# Patient Record
Sex: FEMALE | Race: WHITE | NOT HISPANIC OR LATINO | Employment: OTHER | URBAN - METROPOLITAN AREA
[De-identification: names, ages, dates, MRNs, and addresses within clinical notes are randomized per-mention and may not be internally consistent; named-entity substitution may affect disease eponyms.]

---

## 2017-01-26 RX ORDER — WARFARIN SODIUM 2 MG/1
TABLET ORAL
Qty: 30 TABLET | Refills: 0 | Status: SHIPPED | OUTPATIENT
Start: 2017-01-26 | End: 2017-03-07 | Stop reason: SDUPTHER

## 2017-02-01 ENCOUNTER — TELEPHONE (OUTPATIENT)
Dept: CARDIOLOGY | Facility: CLINIC | Age: 82
End: 2017-02-01

## 2017-02-01 NOTE — TELEPHONE ENCOUNTER
Pt said you took her losartan because her feet were swelling.  She went bk to Dr. Lema and she put her bk on it and a water pill and she is wondering if she should take the Klor zak with it?    Shu

## 2017-02-02 NOTE — TELEPHONE ENCOUNTER
I would have her take water pill and check a BMP in 1 week after being on it to check her potassium level.

## 2017-02-06 DIAGNOSIS — I10 ESSENTIAL HYPERTENSION: Primary | ICD-10-CM

## 2017-02-06 NOTE — TELEPHONE ENCOUNTER
Spoke to pt. Please place the order for the BMP.  I will call her bk and let her know when it is there.      Thanks,  Shu

## 2017-02-07 ENCOUNTER — LAB (OUTPATIENT)
Dept: LAB | Facility: HOSPITAL | Age: 82
End: 2017-02-07

## 2017-02-07 DIAGNOSIS — I10 ESSENTIAL HYPERTENSION: ICD-10-CM

## 2017-02-07 LAB
ANION GAP SERPL CALCULATED.3IONS-SCNC: 10.5 MMOL/L
BUN BLD-MCNC: 16 MG/DL (ref 8–23)
BUN/CREAT SERPL: 18 (ref 7–25)
CALCIUM SPEC-SCNC: 9.2 MG/DL (ref 8.6–10.5)
CHLORIDE SERPL-SCNC: 97 MMOL/L (ref 98–107)
CO2 SERPL-SCNC: 30.5 MMOL/L (ref 22–29)
CREAT BLD-MCNC: 0.89 MG/DL (ref 0.57–1)
GFR SERPL CREATININE-BSD FRML MDRD: 61 ML/MIN/1.73
GLUCOSE BLD-MCNC: 97 MG/DL (ref 65–99)
POTASSIUM BLD-SCNC: 3.8 MMOL/L (ref 3.5–5.2)
SODIUM BLD-SCNC: 138 MMOL/L (ref 136–145)

## 2017-02-07 PROCEDURE — 80048 BASIC METABOLIC PNL TOTAL CA: CPT

## 2017-02-07 PROCEDURE — 36415 COLL VENOUS BLD VENIPUNCTURE: CPT

## 2017-02-08 ENCOUNTER — TELEPHONE (OUTPATIENT)
Dept: CARDIOLOGY | Facility: CLINIC | Age: 82
End: 2017-02-08

## 2017-03-07 ENCOUNTER — OFFICE VISIT (OUTPATIENT)
Dept: INTERNAL MEDICINE | Facility: CLINIC | Age: 82
End: 2017-03-07

## 2017-03-07 VITALS
OXYGEN SATURATION: 98 % | DIASTOLIC BLOOD PRESSURE: 90 MMHG | BODY MASS INDEX: 28.32 KG/M2 | SYSTOLIC BLOOD PRESSURE: 128 MMHG | HEIGHT: 65 IN | HEART RATE: 72 BPM | WEIGHT: 170 LBS

## 2017-03-07 DIAGNOSIS — I10 BENIGN ESSENTIAL HYPERTENSION: Primary | ICD-10-CM

## 2017-03-07 PROCEDURE — 99213 OFFICE O/P EST LOW 20 MIN: CPT | Performed by: INTERNAL MEDICINE

## 2017-03-07 RX ORDER — METOPROLOL SUCCINATE 50 MG/1
50 TABLET, EXTENDED RELEASE ORAL DAILY
Qty: 90 TABLET | Refills: 0
Start: 2017-03-07 | End: 2017-03-24 | Stop reason: SDUPTHER

## 2017-03-07 RX ORDER — WARFARIN SODIUM 2 MG/1
TABLET ORAL
Qty: 30 TABLET | Refills: 2 | Status: SHIPPED | OUTPATIENT
Start: 2017-03-07 | End: 2017-06-08

## 2017-03-07 NOTE — PATIENT INSTRUCTIONS

## 2017-03-07 NOTE — PROGRESS NOTES
"Subjective     Citlali ARIANA Solorio is a 81 y.o. female who presents with   Chief Complaint   Patient presents with   • Hypertension       History of Present Illness     HTN.  Control is variable.  Runs from the 90s-160s systolic.  She feels very weak when it is low.  She is eating a lot of salty foods.  There is room for improvement on that might could help the highs.     Review of Systems   Respiratory: Negative.    Cardiovascular: Negative.        The following portions of the patient's history were reviewed and updated as appropriate: allergies, current medications and problem list.    Patient Active Problem List    Diagnosis Date Noted   • Mitral valve insufficiency 07/06/2016   • Tricuspid valve insufficiency 07/06/2016   • Stress incontinence 05/23/2016   • Abnormal gait 04/21/2016     Note Last Updated: 4/21/2016     Description: \"frozen gait\"  Seen at [facility] with full work up.     • Anxiety 04/21/2016   • Atherosclerosis of both carotid arteries 04/21/2016     Note Last Updated: 4/21/2016     Description: plaque on screening last done 3/13, 11/13     • Heart failure 04/21/2016     Note Last Updated: 4/21/2016     Description: admission 7/2013     • Gastroesophageal reflux disease 04/21/2016   • Hyperlipidemia 04/21/2016     Note Last Updated: 4/21/2016     Description: Patient tried Lipitor, Crestor, Zocor, Bacol and Livalo in the past and was intolerant of all of them.     • Impaired fasting glucose 04/21/2016   • Spinal stenosis of lumbar region 04/21/2016   • Cobalamin deficiency 04/21/2016     Note Last Updated: 4/21/2016     Description: told at [facility] that B12 was low.  Monthly shots recommended 5/5/15.     • Atrial fibrillation 06/24/2013   • Benign essential hypertension 06/24/2013       Current Outpatient Prescriptions on File Prior to Visit   Medication Sig Dispense Refill   • Cholecalciferol (VITAMIN D PO) Take  by mouth daily.     • Gemfibrozil (LOPID PO) Take 5 mg by mouth.     • warfarin " "(COUMADIN) 2.5 MG tablet Take  by mouth.     • [DISCONTINUED] furosemide (LASIX) 20 MG tablet TAKE ONE TABLET BY MOUTH ONCE A DAY 90 tablet 1   • [DISCONTINUED] metoprolol succinate XL (TOPROL-XL) 50 MG 24 hr tablet TAKE TWO TABLETS BY MOUTH DAILY AS DIRECTED 180 tablet 0   • [DISCONTINUED] potassium chloride (K-DUR) 10 MEQ CR tablet Take 1 tablet by mouth daily. 90 tablet 2   • losartan-hydrochlorothiazide (HYZAAR) 50-12.5 MG per tablet Take 1 tablet by mouth Daily. 90 tablet 3   • [DISCONTINUED] azithromycin (ZITHROMAX Z-NERY) 250 MG tablet Take 2 tablets the first day, then 1 tablet daily for 4 days. 5 tablet 0   • [DISCONTINUED] warfarin (COUMADIN) 2 MG tablet TAKE ONE TABLET BY MOUTH DAILY OR AS DIRECTED 30 tablet 0     No current facility-administered medications on file prior to visit.        Objective     Visit Vitals   • /90   • Pulse 72   • Ht 65\" (165.1 cm)   • Wt 170 lb (77.1 kg)   • SpO2 98%   • BMI 28.29 kg/m2       Physical Exam   Constitutional: She is oriented to person, place, and time. She appears well-developed and well-nourished.   HENT:   Head: Normocephalic and atraumatic.   Cardiovascular: Normal rate, regular rhythm and normal heart sounds.    Pulmonary/Chest: Effort normal and breath sounds normal.   Neurological: She is alert and oriented to person, place, and time.   Skin: Skin is warm and dry.   Psychiatric: She has a normal mood and affect. Her behavior is normal.       Assessment/Plan   Citlali was seen today for hypertension.    Diagnoses and all orders for this visit:    Benign essential hypertension  -     Basic Metabolic Panel; Future    Other orders  -     metoprolol succinate XL (TOPROL-XL) 50 MG 24 hr tablet; Take 1 tablet by mouth Daily.        Discussion  Patient presents in f/u of hypertension with variable control.  Decrease losartan HCT to 1/2 daily.  Continue her Toprol XL 50 qd.  D/c potassium since not on lasix.  Check BMP in two weeks.  Call in checks from home.  "        Future Appointments  Date Time Provider Department Center   5/30/2017 11:00 AM Cristal Lema MD [facility] None

## 2017-03-21 RX ORDER — METOPROLOL SUCCINATE 50 MG/1
TABLET, EXTENDED RELEASE ORAL
Qty: 180 TABLET | Refills: 0 | OUTPATIENT
Start: 2017-03-21

## 2017-03-22 ENCOUNTER — TELEPHONE (OUTPATIENT)
Dept: CARDIOLOGY | Facility: CLINIC | Age: 82
End: 2017-03-22

## 2017-03-24 RX ORDER — METOPROLOL SUCCINATE 50 MG/1
50 TABLET, EXTENDED RELEASE ORAL DAILY
Qty: 90 TABLET | Refills: 0 | Status: SHIPPED | OUTPATIENT
Start: 2017-03-24 | End: 2017-06-07 | Stop reason: SDUPTHER

## 2017-03-24 NOTE — TELEPHONE ENCOUNTER
Refill sent.   prt  
We received a refill request for pt's Metoprolol 50 mg #180.  Is this ok with you since it's a 90 day Rx and she needs an appt. for May?        Thanks,  Shu  
ok  
No

## 2017-03-27 ENCOUNTER — APPOINTMENT (OUTPATIENT)
Dept: WOMENS IMAGING | Facility: HOSPITAL | Age: 82
End: 2017-03-27

## 2017-03-27 PROCEDURE — G0202 SCR MAMMO BI INCL CAD: HCPCS | Performed by: RADIOLOGY

## 2017-03-27 PROCEDURE — 77063 BREAST TOMOSYNTHESIS BI: CPT | Performed by: RADIOLOGY

## 2017-03-27 PROCEDURE — MDREVIEWSP: Performed by: RADIOLOGY

## 2017-04-20 ENCOUNTER — OFFICE VISIT (OUTPATIENT)
Dept: CARDIOLOGY | Facility: CLINIC | Age: 82
End: 2017-04-20

## 2017-04-20 VITALS
DIASTOLIC BLOOD PRESSURE: 86 MMHG | SYSTOLIC BLOOD PRESSURE: 140 MMHG | WEIGHT: 173 LBS | HEIGHT: 65 IN | HEART RATE: 73 BPM | BODY MASS INDEX: 28.82 KG/M2

## 2017-04-20 DIAGNOSIS — I48.0 PAF (PAROXYSMAL ATRIAL FIBRILLATION) (HCC): Primary | ICD-10-CM

## 2017-04-20 PROCEDURE — 99213 OFFICE O/P EST LOW 20 MIN: CPT | Performed by: INTERNAL MEDICINE

## 2017-04-20 PROCEDURE — 93000 ELECTROCARDIOGRAM COMPLETE: CPT | Performed by: INTERNAL MEDICINE

## 2017-04-20 NOTE — PROGRESS NOTES
Subjective:     Encounter Date:04/20/2017      Patient ID: Citlali Solorio is a 81 y.o. female.    Chief Complaint:  Atrial Fibrillation   Presents for follow-up visit. Symptoms are negative for chest pain, dizziness, hypertension, hypotension, palpitations, shortness of breath and syncope. The symptoms have been stable. Past medical history includes atrial fibrillation.       Patient presents today for reevaluation.  Patient's been having fatigue and weak spells.  Dr. Lema had decreased her medications.  Since that she has felt significantly better.  Most of her symptoms have resolved.  Currently she complains of some right knee pain is scheduled to see orthopedics tomorrow.    Review of Systems   Cardiovascular: Negative for chest pain, palpitations and syncope.   Respiratory: Negative for shortness of breath.    Neurological: Negative for dizziness.   All other systems reviewed and are negative.        ECG 12 Lead  Date/Time: 4/20/2017 3:53 PM  Performed by: DAVE CHASE  Authorized by: DAVE CHASE   Comparison: compared with previous ECG from 5/19/2016  Similar to previous ECG  Rhythm: sinus rhythm  Clinical impression: normal ECG               Objective:     Physical Exam   Constitutional: She is oriented to person, place, and time. She appears well-developed.   HENT:   Head: Normocephalic.   Eyes: Conjunctivae are normal.   Neck: Normal range of motion.   Cardiovascular: Normal rate, regular rhythm and normal heart sounds.    Pulmonary/Chest: Breath sounds normal.   Abdominal: Soft. Bowel sounds are normal.   Musculoskeletal: Normal range of motion. She exhibits no edema.   Neurological: She is alert and oriented to person, place, and time.   Skin: Skin is warm and dry.   Psychiatric: She has a normal mood and affect. Her behavior is normal.   Vitals reviewed.      Lab Review:       Assessment:         No diagnosis found.       Plan:       1.  Paroxysmal atrial fibrillation.  She remains in  sinus rhythm anticoagulated.  2.  Hypotension.  Patient clearly was symptomatic with decreasing her medications she is doing better.  She did ask about the potassium.  Per your note intercurrent check a BMP in several weeks to agree with.  3.  At this point I think she is stable from a cardiovascular standpoint told her follow-up in 6 months sooner if issues.    Atrial Fibrillation and Atrial Flutter  Assessment  • The patient has paroxysmal atrial fibrillation  • The patient's CHADS2-VASc score is 4  • A [id number] score of 2 or more is considered a high risk for a thromboembolic event  • Warfarin prescribed    Plan  • Attempt to maintain sinus rhythm  • Continue warfarin for antithrombotic therapy, bleeding issues discussed  • Continue beta blocker for rhythm control

## 2017-04-21 ENCOUNTER — TRANSCRIBE ORDERS (OUTPATIENT)
Dept: ADMINISTRATIVE | Facility: HOSPITAL | Age: 82
End: 2017-04-21

## 2017-04-21 DIAGNOSIS — G89.29 CHRONIC PAIN OF RIGHT KNEE: Primary | ICD-10-CM

## 2017-04-21 DIAGNOSIS — M25.561 CHRONIC PAIN OF RIGHT KNEE: Primary | ICD-10-CM

## 2017-04-26 ENCOUNTER — HOSPITAL ENCOUNTER (OUTPATIENT)
Dept: MRI IMAGING | Facility: HOSPITAL | Age: 82
Discharge: HOME | End: 2017-04-26
Attending: ORTHOPAEDIC SURGERY | Admitting: ORTHOPAEDIC SURGERY

## 2017-04-26 DIAGNOSIS — G89.29 CHRONIC PAIN OF RIGHT KNEE: ICD-10-CM

## 2017-04-26 DIAGNOSIS — M25.561 CHRONIC PAIN OF RIGHT KNEE: ICD-10-CM

## 2017-04-26 PROCEDURE — 73721 MRI JNT OF LWR EXTRE W/O DYE: CPT

## 2017-05-18 ENCOUNTER — HOSPITAL ENCOUNTER (OUTPATIENT)
Dept: CARDIOLOGY | Facility: HOSPITAL | Age: 82
Discharge: HOME | End: 2017-05-18
Admitting: PSYCHIATRY & NEUROLOGY

## 2017-05-18 ENCOUNTER — TRANSCRIBE ORDERS (OUTPATIENT)
Dept: ADMINISTRATIVE | Facility: HOSPITAL | Age: 82
End: 2017-05-18

## 2017-05-18 DIAGNOSIS — R00.2 PALPITATIONS: ICD-10-CM

## 2017-05-18 DIAGNOSIS — Z51.81 ENCOUNTER FOR THERAPEUTIC DRUG MONITORING: ICD-10-CM

## 2017-05-18 DIAGNOSIS — I48.91 ATRIAL FIBRILLATION, UNSPECIFIED TYPE (HCC): ICD-10-CM

## 2017-05-18 DIAGNOSIS — I48.91 ATRIAL FIBRILLATION, UNSPECIFIED TYPE (HCC): Primary | ICD-10-CM

## 2017-05-18 PROCEDURE — 93005 ELECTROCARDIOGRAM TRACING: CPT | Performed by: PSYCHIATRY & NEUROLOGY

## 2017-05-18 PROCEDURE — 93010 ELECTROCARDIOGRAM REPORT: CPT | Performed by: INTERNAL MEDICINE

## 2017-05-30 ENCOUNTER — OFFICE VISIT (OUTPATIENT)
Dept: INTERNAL MEDICINE | Facility: CLINIC | Age: 82
End: 2017-05-30

## 2017-05-30 VITALS
WEIGHT: 166 LBS | TEMPERATURE: 98 F | HEIGHT: 65 IN | OXYGEN SATURATION: 96 % | HEART RATE: 92 BPM | BODY MASS INDEX: 27.66 KG/M2 | DIASTOLIC BLOOD PRESSURE: 80 MMHG | SYSTOLIC BLOOD PRESSURE: 132 MMHG

## 2017-05-30 DIAGNOSIS — I10 BENIGN ESSENTIAL HYPERTENSION: ICD-10-CM

## 2017-05-30 DIAGNOSIS — E78.5 HYPERLIPIDEMIA, UNSPECIFIED HYPERLIPIDEMIA TYPE: ICD-10-CM

## 2017-05-30 DIAGNOSIS — Z00.00 MEDICARE ANNUAL WELLNESS VISIT, SUBSEQUENT: Primary | ICD-10-CM

## 2017-05-30 DIAGNOSIS — R73.01 IMPAIRED FASTING GLUCOSE: ICD-10-CM

## 2017-05-30 DIAGNOSIS — I48.91 ATRIAL FIBRILLATION, UNSPECIFIED TYPE (HCC): ICD-10-CM

## 2017-05-30 DIAGNOSIS — E53.8 B12 DEFICIENCY: ICD-10-CM

## 2017-05-30 PROCEDURE — 99214 OFFICE O/P EST MOD 30 MIN: CPT | Performed by: INTERNAL MEDICINE

## 2017-05-30 PROCEDURE — G0439 PPPS, SUBSEQ VISIT: HCPCS | Performed by: INTERNAL MEDICINE

## 2017-05-31 LAB
ALBUMIN SERPL-MCNC: 4.2 G/DL (ref 3.5–5.2)
ALBUMIN/GLOB SERPL: 1.4 G/DL
ALP SERPL-CCNC: 51 U/L (ref 39–117)
ALT SERPL-CCNC: 15 U/L (ref 1–33)
AST SERPL-CCNC: 16 U/L (ref 1–32)
BASOPHILS # BLD AUTO: 0.02 10*3/MM3 (ref 0–0.2)
BASOPHILS NFR BLD AUTO: 0.3 % (ref 0–1.5)
BILIRUB SERPL-MCNC: 0.5 MG/DL (ref 0.1–1.2)
BUN SERPL-MCNC: 18 MG/DL (ref 8–23)
BUN/CREAT SERPL: 18.6 (ref 7–25)
CALCIUM SERPL-MCNC: 9.5 MG/DL (ref 8.6–10.5)
CHLORIDE SERPL-SCNC: 98 MMOL/L (ref 98–107)
CHOLEST SERPL-MCNC: 191 MG/DL (ref 0–200)
CO2 SERPL-SCNC: 28.2 MMOL/L (ref 22–29)
CREAT SERPL-MCNC: 0.97 MG/DL (ref 0.57–1)
EOSINOPHIL # BLD AUTO: 0.08 10*3/MM3 (ref 0–0.7)
EOSINOPHIL NFR BLD AUTO: 1.3 % (ref 0.3–6.2)
ERYTHROCYTE [DISTWIDTH] IN BLOOD BY AUTOMATED COUNT: 12.9 % (ref 11.7–13)
GLOBULIN SER CALC-MCNC: 3 GM/DL
GLUCOSE SERPL-MCNC: 85 MG/DL (ref 65–99)
HCT VFR BLD AUTO: 38.7 % (ref 35.6–45.5)
HDLC SERPL-MCNC: 41 MG/DL (ref 40–60)
HGB BLD-MCNC: 12.6 G/DL (ref 11.9–15.5)
IMM GRANULOCYTES # BLD: 0.02 10*3/MM3 (ref 0–0.03)
IMM GRANULOCYTES NFR BLD: 0.3 % (ref 0–0.5)
LDLC SERPL CALC-MCNC: 93 MG/DL (ref 0–100)
LYMPHOCYTES # BLD AUTO: 1.55 10*3/MM3 (ref 0.9–4.8)
LYMPHOCYTES NFR BLD AUTO: 24.2 % (ref 19.6–45.3)
MCH RBC QN AUTO: 32.1 PG (ref 26.9–32)
MCHC RBC AUTO-ENTMCNC: 32.6 G/DL (ref 32.4–36.3)
MCV RBC AUTO: 98.5 FL (ref 80.5–98.2)
MONOCYTES # BLD AUTO: 0.73 10*3/MM3 (ref 0.2–1.2)
MONOCYTES NFR BLD AUTO: 11.4 % (ref 5–12)
NEUTROPHILS # BLD AUTO: 4 10*3/MM3 (ref 1.9–8.1)
NEUTROPHILS NFR BLD AUTO: 62.5 % (ref 42.7–76)
PLATELET # BLD AUTO: 271 10*3/MM3 (ref 140–500)
POTASSIUM SERPL-SCNC: 4.3 MMOL/L (ref 3.5–5.2)
PROT SERPL-MCNC: 7.2 G/DL (ref 6–8.5)
RBC # BLD AUTO: 3.93 10*6/MM3 (ref 3.9–5.2)
SODIUM SERPL-SCNC: 139 MMOL/L (ref 136–145)
TRIGL SERPL-MCNC: 287 MG/DL (ref 0–150)
TSH SERPL DL<=0.005 MIU/L-ACNC: 2.64 MIU/ML (ref 0.27–4.2)
UNABLE TO VOID: NORMAL
VIT B12 SERPL-MCNC: 766 PG/ML (ref 211–946)
VLDLC SERPL CALC-MCNC: 57.4 MG/DL (ref 5–40)
WBC # BLD AUTO: 6.4 10*3/MM3 (ref 4.5–10.7)

## 2017-06-08 ENCOUNTER — APPOINTMENT (OUTPATIENT)
Dept: PREADMISSION TESTING | Facility: HOSPITAL | Age: 82
End: 2017-06-08

## 2017-06-08 VITALS
HEIGHT: 65 IN | RESPIRATION RATE: 16 BRPM | WEIGHT: 168 LBS | BODY MASS INDEX: 27.99 KG/M2 | TEMPERATURE: 97.8 F | SYSTOLIC BLOOD PRESSURE: 163 MMHG | DIASTOLIC BLOOD PRESSURE: 80 MMHG | OXYGEN SATURATION: 97 % | HEART RATE: 86 BPM

## 2017-06-08 RX ORDER — WARFARIN SODIUM 2 MG/1
2 TABLET ORAL
COMMUNITY
End: 2017-06-27 | Stop reason: SDUPTHER

## 2017-06-08 RX ORDER — METOPROLOL SUCCINATE 50 MG/1
50 TABLET, EXTENDED RELEASE ORAL DAILY
Qty: 90 TABLET | Refills: 1 | Status: ON HOLD | OUTPATIENT
Start: 2017-06-08 | End: 2017-08-25

## 2017-06-08 RX ORDER — WARFARIN SODIUM 1 MG/1
1 TABLET ORAL
COMMUNITY
End: 2017-08-25 | Stop reason: HOSPADM

## 2017-06-15 ENCOUNTER — ANESTHESIA (OUTPATIENT)
Dept: PERIOP | Facility: HOSPITAL | Age: 82
End: 2017-06-15

## 2017-06-15 ENCOUNTER — HOSPITAL ENCOUNTER (OUTPATIENT)
Facility: HOSPITAL | Age: 82
Setting detail: HOSPITAL OUTPATIENT SURGERY
Discharge: HOME | End: 2017-06-15
Attending: ORTHOPAEDIC SURGERY | Admitting: ORTHOPAEDIC SURGERY

## 2017-06-15 ENCOUNTER — ANESTHESIA EVENT (OUTPATIENT)
Dept: PERIOP | Facility: HOSPITAL | Age: 82
End: 2017-06-15

## 2017-06-15 VITALS
TEMPERATURE: 98.7 F | SYSTOLIC BLOOD PRESSURE: 126 MMHG | RESPIRATION RATE: 16 BRPM | OXYGEN SATURATION: 98 % | HEART RATE: 76 BPM | DIASTOLIC BLOOD PRESSURE: 85 MMHG

## 2017-06-15 DIAGNOSIS — S83.231D COMPLEX TEAR OF MEDIAL MENISCUS OF RIGHT KNEE AS CURRENT INJURY, SUBSEQUENT ENCOUNTER: Primary | ICD-10-CM

## 2017-06-15 PROBLEM — S83.241A TEAR OF MEDIAL MENISCUS OF RIGHT KNEE, CURRENT: Status: ACTIVE | Noted: 2017-06-15

## 2017-06-15 LAB
INR PPP: 1.38 (ref 0.9–1.1)
PROTHROMBIN TIME: 16.5 SECONDS (ref 11.7–14.2)

## 2017-06-15 PROCEDURE — 25010000002 FENTANYL CITRATE (PF) 100 MCG/2ML SOLUTION: Performed by: NURSE ANESTHETIST, CERTIFIED REGISTERED

## 2017-06-15 PROCEDURE — 85610 PROTHROMBIN TIME: CPT | Performed by: ORTHOPAEDIC SURGERY

## 2017-06-15 PROCEDURE — 25010000002 NALOXONE PER 1 MG: Performed by: ANESTHESIOLOGY

## 2017-06-15 PROCEDURE — 25010000002 MIDAZOLAM PER 1 MG: Performed by: NURSE ANESTHETIST, CERTIFIED REGISTERED

## 2017-06-15 PROCEDURE — 25010000002 PROPOFOL 10 MG/ML EMULSION: Performed by: NURSE ANESTHETIST, CERTIFIED REGISTERED

## 2017-06-15 PROCEDURE — 25010000002 PROPOFOL 1000 MG/ML EMULSION: Performed by: NURSE ANESTHETIST, CERTIFIED REGISTERED

## 2017-06-15 RX ORDER — PROMETHAZINE HYDROCHLORIDE 25 MG/1
25 TABLET ORAL ONCE AS NEEDED
Status: DISCONTINUED | OUTPATIENT
Start: 2017-06-15 | End: 2017-06-15 | Stop reason: HOSPADM

## 2017-06-15 RX ORDER — FENTANYL CITRATE 50 UG/ML
INJECTION, SOLUTION INTRAMUSCULAR; INTRAVENOUS AS NEEDED
Status: DISCONTINUED | OUTPATIENT
Start: 2017-06-15 | End: 2017-06-15 | Stop reason: SURG

## 2017-06-15 RX ORDER — SODIUM CHLORIDE 0.9 % (FLUSH) 0.9 %
1-10 SYRINGE (ML) INJECTION AS NEEDED
Status: DISCONTINUED | OUTPATIENT
Start: 2017-06-15 | End: 2017-06-15 | Stop reason: HOSPADM

## 2017-06-15 RX ORDER — HYDRALAZINE HYDROCHLORIDE 20 MG/ML
5 INJECTION INTRAMUSCULAR; INTRAVENOUS
Status: DISCONTINUED | OUTPATIENT
Start: 2017-06-15 | End: 2017-06-15 | Stop reason: HOSPADM

## 2017-06-15 RX ORDER — LIDOCAINE HYDROCHLORIDE 20 MG/ML
INJECTION, SOLUTION INFILTRATION; PERINEURAL AS NEEDED
Status: DISCONTINUED | OUTPATIENT
Start: 2017-06-15 | End: 2017-06-15 | Stop reason: SURG

## 2017-06-15 RX ORDER — PROMETHAZINE HYDROCHLORIDE 25 MG/ML
6.25 INJECTION, SOLUTION INTRAMUSCULAR; INTRAVENOUS ONCE AS NEEDED
Status: DISCONTINUED | OUTPATIENT
Start: 2017-06-15 | End: 2017-06-15 | Stop reason: HOSPADM

## 2017-06-15 RX ORDER — ACETAMINOPHEN 325 MG/1
650 TABLET ORAL ONCE AS NEEDED
Status: DISCONTINUED | OUTPATIENT
Start: 2017-06-15 | End: 2017-06-15 | Stop reason: HOSPADM

## 2017-06-15 RX ORDER — OXYCODONE HYDROCHLORIDE AND ACETAMINOPHEN 5; 325 MG/1; MG/1
1 TABLET ORAL ONCE AS NEEDED
Status: DISCONTINUED | OUTPATIENT
Start: 2017-06-15 | End: 2017-06-15 | Stop reason: HOSPADM

## 2017-06-15 RX ORDER — ACETAMINOPHEN 325 MG/1
650 TABLET ORAL ONCE
Status: DISCONTINUED | OUTPATIENT
Start: 2017-06-15 | End: 2017-06-15 | Stop reason: HOSPADM

## 2017-06-15 RX ORDER — FENTANYL CITRATE 50 UG/ML
25 INJECTION, SOLUTION INTRAMUSCULAR; INTRAVENOUS
Status: DISCONTINUED | OUTPATIENT
Start: 2017-06-15 | End: 2017-06-15 | Stop reason: HOSPADM

## 2017-06-15 RX ORDER — NALBUPHINE HCL 10 MG/ML
2 AMPUL (ML) INJECTION EVERY 4 HOURS PRN
Status: DISCONTINUED | OUTPATIENT
Start: 2017-06-15 | End: 2017-06-15 | Stop reason: HOSPADM

## 2017-06-15 RX ORDER — PROPOFOL 10 MG/ML
VIAL (ML) INTRAVENOUS AS NEEDED
Status: DISCONTINUED | OUTPATIENT
Start: 2017-06-15 | End: 2017-06-15 | Stop reason: SURG

## 2017-06-15 RX ORDER — DIPHENHYDRAMINE HYDROCHLORIDE 50 MG/ML
12.5 INJECTION INTRAMUSCULAR; INTRAVENOUS
Status: DISCONTINUED | OUTPATIENT
Start: 2017-06-15 | End: 2017-06-15 | Stop reason: HOSPADM

## 2017-06-15 RX ORDER — SODIUM CHLORIDE, SODIUM LACTATE, POTASSIUM CHLORIDE, AND CALCIUM CHLORIDE .6; .31; .03; .02 G/100ML; G/100ML; G/100ML; G/100ML
IRRIGANT IRRIGATION AS NEEDED
Status: DISCONTINUED | OUTPATIENT
Start: 2017-06-15 | End: 2017-06-15 | Stop reason: HOSPADM

## 2017-06-15 RX ORDER — MIDAZOLAM HYDROCHLORIDE 1 MG/ML
INJECTION INTRAMUSCULAR; INTRAVENOUS AS NEEDED
Status: DISCONTINUED | OUTPATIENT
Start: 2017-06-15 | End: 2017-06-15 | Stop reason: SURG

## 2017-06-15 RX ORDER — PROMETHAZINE HYDROCHLORIDE 25 MG/1
25 SUPPOSITORY RECTAL ONCE AS NEEDED
Status: DISCONTINUED | OUTPATIENT
Start: 2017-06-15 | End: 2017-06-15 | Stop reason: HOSPADM

## 2017-06-15 RX ORDER — NALBUPHINE HCL 10 MG/ML
10 AMPUL (ML) INJECTION EVERY 4 HOURS PRN
Status: DISCONTINUED | OUTPATIENT
Start: 2017-06-15 | End: 2017-06-15 | Stop reason: HOSPADM

## 2017-06-15 RX ORDER — LIDOCAINE HYDROCHLORIDE 10 MG/ML
0.5 INJECTION, SOLUTION EPIDURAL; INFILTRATION; INTRACAUDAL; PERINEURAL ONCE AS NEEDED
Status: COMPLETED | OUTPATIENT
Start: 2017-06-15 | End: 2017-06-15

## 2017-06-15 RX ORDER — ACETAMINOPHEN 650 MG/1
650 SUPPOSITORY RECTAL ONCE AS NEEDED
Status: DISCONTINUED | OUTPATIENT
Start: 2017-06-15 | End: 2017-06-15 | Stop reason: HOSPADM

## 2017-06-15 RX ORDER — HYDROCODONE BITARTRATE AND ACETAMINOPHEN 5; 325 MG/1; MG/1
1 TABLET ORAL ONCE AS NEEDED
Status: COMPLETED | OUTPATIENT
Start: 2017-06-15 | End: 2017-06-15

## 2017-06-15 RX ORDER — HYDROCODONE BITARTRATE AND ACETAMINOPHEN 5; 325 MG/1; MG/1
TABLET ORAL
Status: COMPLETED
Start: 2017-06-15 | End: 2017-06-15

## 2017-06-15 RX ORDER — FAMOTIDINE 10 MG/ML
20 INJECTION, SOLUTION INTRAVENOUS ONCE
Status: COMPLETED | OUTPATIENT
Start: 2017-06-15 | End: 2017-06-15

## 2017-06-15 RX ORDER — CLINDAMYCIN PHOSPHATE 600 MG/50ML
600 INJECTION INTRAVENOUS ONCE
Status: COMPLETED | OUTPATIENT
Start: 2017-06-15 | End: 2017-06-15

## 2017-06-15 RX ORDER — SODIUM CHLORIDE, SODIUM LACTATE, POTASSIUM CHLORIDE, CALCIUM CHLORIDE 600; 310; 30; 20 MG/100ML; MG/100ML; MG/100ML; MG/100ML
9 INJECTION, SOLUTION INTRAVENOUS CONTINUOUS
Status: DISCONTINUED | OUTPATIENT
Start: 2017-06-15 | End: 2017-06-15 | Stop reason: HOSPADM

## 2017-06-15 RX ORDER — NALOXONE HCL 0.4 MG/ML
0.4 VIAL (ML) INJECTION AS NEEDED
Status: DISCONTINUED | OUTPATIENT
Start: 2017-06-15 | End: 2017-06-15 | Stop reason: HOSPADM

## 2017-06-15 RX ORDER — BUPIVACAINE HYDROCHLORIDE 5 MG/ML
INJECTION, SOLUTION PERINEURAL AS NEEDED
Status: DISCONTINUED | OUTPATIENT
Start: 2017-06-15 | End: 2017-06-15 | Stop reason: HOSPADM

## 2017-06-15 RX ADMIN — NALOXONE HYDROCHLORIDE 0.04 MG: 0.4 INJECTION, SOLUTION INTRAMUSCULAR; INTRAVENOUS; SUBCUTANEOUS at 15:02

## 2017-06-15 RX ADMIN — PROPOFOL 160 MCG/KG/MIN: 10 INJECTION, EMULSION INTRAVENOUS at 14:10

## 2017-06-15 RX ADMIN — SODIUM CHLORIDE, POTASSIUM CHLORIDE, SODIUM LACTATE AND CALCIUM CHLORIDE 9 ML/HR: 600; 310; 30; 20 INJECTION, SOLUTION INTRAVENOUS at 12:31

## 2017-06-15 RX ADMIN — FENTANYL CITRATE 25 MCG: 50 INJECTION INTRAMUSCULAR; INTRAVENOUS at 14:28

## 2017-06-15 RX ADMIN — HYDROCODONE BITARTRATE AND ACETAMINOPHEN 1 TABLET: 5; 325 TABLET ORAL at 16:20

## 2017-06-15 RX ADMIN — FAMOTIDINE 20 MG: 10 INJECTION, SOLUTION INTRAVENOUS at 13:54

## 2017-06-15 RX ADMIN — CLINDAMYCIN PHOSPHATE 600 MG: 12 INJECTION, SOLUTION INTRAVENOUS at 14:04

## 2017-06-15 RX ADMIN — PROPOFOL 150 MG: 10 INJECTION, EMULSION INTRAVENOUS at 14:10

## 2017-06-15 RX ADMIN — PROPOFOL 20 MG: 10 INJECTION, EMULSION INTRAVENOUS at 14:28

## 2017-06-15 RX ADMIN — FENTANYL CITRATE 25 MCG: 50 INJECTION INTRAMUSCULAR; INTRAVENOUS at 14:22

## 2017-06-15 RX ADMIN — PROPOFOL 30 MG: 10 INJECTION, EMULSION INTRAVENOUS at 14:22

## 2017-06-15 RX ADMIN — LIDOCAINE HYDROCHLORIDE 0.5 ML: 10 INJECTION, SOLUTION EPIDURAL; INFILTRATION; INTRACAUDAL; PERINEURAL at 12:31

## 2017-06-15 RX ADMIN — PROPOFOL 30 MG: 10 INJECTION, EMULSION INTRAVENOUS at 14:23

## 2017-06-15 RX ADMIN — MIDAZOLAM HYDROCHLORIDE 3 MG: 1 INJECTION, SOLUTION INTRAMUSCULAR; INTRAVENOUS at 14:06

## 2017-06-15 RX ADMIN — FENTANYL CITRATE 25 MCG: 50 INJECTION INTRAMUSCULAR; INTRAVENOUS at 14:15

## 2017-06-15 RX ADMIN — LIDOCAINE HYDROCHLORIDE 40 MG: 20 INJECTION, SOLUTION INFILTRATION; PERINEURAL at 14:10

## 2017-06-15 NOTE — PLAN OF CARE
Problem: Patient Care Overview (Adult)  Goal: Plan of Care Review  Outcome: Ongoing (interventions implemented as appropriate)    06/15/17 1145   Coping/Psychosocial Response Interventions   Plan Of Care Reviewed With patient   Patient Care Overview   Progress no change       Goal: Discharge Needs Assessment  Outcome: Ongoing (interventions implemented as appropriate)    06/15/17 1145   Discharge Needs Assessment   Concerns To Be Addressed no discharge needs identified   Equipment Needed After Discharge none   Self-Care   Equipment Currently Used at Home none         Problem: Perioperative Period (Adult)  Goal: Signs and Symptoms of Listed Potential Problems Will be Absent or Manageable (Perioperative Period)  Outcome: Ongoing (interventions implemented as appropriate)    06/15/17 1145   Perioperative Period   Problems Assessed (Perioperative Period) all   Problems Present (Perioperative Period) pain

## 2017-06-15 NOTE — PLAN OF CARE
Problem: Patient Care Overview (Adult)  Goal: Plan of Care Review  Outcome: Ongoing (interventions implemented as appropriate)    06/15/17 0887   Coping/Psychosocial Response Interventions   Plan Of Care Reviewed With patient   Patient Care Overview   Progress improving   Outcome Evaluation   Outcome Summary/Follow up Plan low b/p on arrival to pacu tx. per anesth.       Goal: Adult Individualization and Mutuality  Outcome: Ongoing (interventions implemented as appropriate)  Goal: Discharge Needs Assessment  Outcome: Ongoing (interventions implemented as appropriate)    Problem: Perioperative Period (Adult)  Goal: Signs and Symptoms of Listed Potential Problems Will be Absent or Manageable (Perioperative Period)  Outcome: Ongoing (interventions implemented as appropriate)

## 2017-06-15 NOTE — ANESTHESIA PROCEDURE NOTES
Airway  Urgency: elective    Airway not difficult    General Information and Staff    Patient location during procedure: OR  Anesthesiologist: RENDER, ROSA M RAY  CRNA: ARLYN DACOSTA    Indications and Patient Condition  Indications for airway management: airway protection    Preoxygenated: yes  Mask difficulty assessment: 1 - vent by mask    Final Airway Details  Final airway type: supraglottic airway      Successful airway: unique  Size 4    Number of attempts at approach: 1    Additional Comments  Smooth IV/mask induction and placement of LMA. Atraumatic, lips/teeth/mouth intact, as preop. +ETCO2, bilateral breath sounds and equal.

## 2017-06-15 NOTE — PERIOPERATIVE NURSING NOTE
Anesth. Called to pacu stat s/t low b/p.dr. Allison and  in pacu . Oli given per dr. Allison/ orders for 0.04 narcan  Given iv .

## 2017-06-15 NOTE — ANESTHESIA POSTPROCEDURE EVALUATION
Patient: Citlali A Clore    Procedure Summary     Date Anesthesia Start Anesthesia Stop Room / Location    06/15/17 1404 1455  [facility] OR  /  [facility]       Procedure Diagnosis Surgeon Provider    RT KNEE ARTHROSCOPIC PARTIAL MEDIAL AND LATERAL MENISECTOMY AND SYNOVECTOMY (Right Knee) No diagnosis on file. MD Jose F Reed MD          Anesthesia Type: general  Last vitals  /82 (06/15/17 1615)    Temp 37.1 °C (98.7 °F) (06/15/17 1615)    Pulse 72 (06/15/17 1615)   Resp 16 (06/15/17 1615)    SpO2 96 % (06/15/17 1615)      Post Anesthesia Care and Evaluation    Patient location during evaluation: PACU  Patient participation: complete - patient participated  Level of consciousness: awake and alert  Pain management: adequate  Airway patency: patent  Anesthetic complications: No anesthetic complications    Cardiovascular status: acceptable  Respiratory status: acceptable  Hydration status: acceptable

## 2017-06-15 NOTE — ANESTHESIA PREPROCEDURE EVALUATION
" Anesthesia Evaluation     history of anesthetic complications: PONV    NPO Liquid Status: > 4 hours     Airway   Mallampati: II  TM distance: >3 FB  Neck ROM: full  Dental - normal exam     Pulmonary - normal exam   Cardiovascular   Exercise tolerance: poor (<4 METS)    ECG reviewed  Rhythm: regular    (+) hypertension, dysrhythmias Atrial Fib, CHF, hyperlipidemia  (-) murmur    ROS comment: Currently in NSR- hx of afib    Neuro/Psych  (+) TIA, psychiatric history Anxiety,      ROS Comment: \"frozen gait\" neurologic problem  GI/Hepatic/Renal/Endo    (+)  GERD,     Musculoskeletal (-) negative ROS    Abdominal  - normal exam   Substance History      OB/GYN          Other                                      Anesthesia Plan    ASA 3     general   (  D/W R&B of GA including but not limited to: heart, lung, liver, kidney, neurologic problems, positioning injuries, dental damage, corneal abrasion and TMJ.  .    Pt reports she always gets sick with \"anesthetic gas\"  Will plan on TIVA)  intravenous induction   Anesthetic plan and risks discussed with patient.      "

## 2017-06-15 NOTE — BRIEF OP NOTE
KNEE ARTHROSCOPY  Procedure Note    Citlali ARIANA Clore  6/15/2017    Pre-op Diagnosis:   Right knee medial and lateral meniscal tears  Right knee synovitis    Post-op Diagnosis:     Right knee medial and lateral meniscal tears  Right knee synovitis    Procedure/CPT® Codes:      Procedure(s):  RT KNEE ARTHROSCOPIC PARTIAL MEDIAL AND LATERAL MENISECTOMY AND SYNOVECTOMY    Surgeon(s):  Sam Soni MD    Anesthesia: General    Staff:   Circulator: Maira Velez RN  Scrub Person: Tia Billingsley    Estimated Blood Loss: *No blood loss documented*  Urine Voided: * No values recorded between 6/15/2017  2:02 PM and 6/15/2017  2:51 PM *    Specimens:                * No specimens in log *      Drains:           Findings: tears    Complications: none      Sam Soni MD     Date: 6/15/2017  Time: 2:52 PM

## 2017-06-15 NOTE — PLAN OF CARE
Problem: Patient Care Overview (Adult)  Goal: Plan of Care Review  Outcome: Outcome(s) achieved Date Met:  06/15/17

## 2017-06-15 NOTE — PLAN OF CARE
Problem: Perioperative Period (Adult)  Goal: Signs and Symptoms of Listed Potential Problems Will be Absent or Manageable (Perioperative Period)  Outcome: Outcome(s) achieved Date Met:  06/15/17

## 2017-06-15 NOTE — PLAN OF CARE
Problem: Patient Care Overview (Adult)  Goal: Discharge Needs Assessment  Outcome: Outcome(s) achieved Date Met:  06/15/17

## 2017-06-16 NOTE — OP NOTE
DATE OF PROCEDURE:  06/15/2017     PREOPERATIVE DIAGNOSES:   1.  Right knee lateral meniscal tear.   2.  Right knee synovitis.     POSTOPERATIVE DIAGNOSES:  1.  Right knee lateral meniscal tear.   2.  Right knee synovitis.   3.  Right knee anterior medial meniscal horn tear.     PROCEDURES PERFORMED:  1.  Right knee arthroscopic partial medial and lateral meniscectomies, 42253.   2.  Right knee arthroscopic synovectomy, 86612.     SURGEON:  Sam Soni MD     ASSISTANT:  None.     ANESTHESIA:  General with LMA.     COMPLICATIONS:  None.     SPECIMENS:  None.     DRAINS:  None.     IMPLANTS:  None.     INDICATIONS FOR PROCEDURE:  The patient is an 82-year-old female who has had persistent right knee pain. She had failed to respond to conservative treatment, including medication, cortisone injection, viscosupplementation injection, and PRP injection. MRI confirmed meniscal tear and synovitis. Swelling was a big component of her discomfort. We discussed the options and secondary to failure to respond to conservative treatment, the patient elected knee arthroscopy. I discussed with her that this may not get rid of her knee pain. Hopefully, this would help with swelling and improve her function. Risks, benefits, alternatives of surgery were discussed with the patient and family, and informed consent was obtained. Risks include but are not limited to infection, bleeding, nerve injury, blood clots, risks associated with anesthesia, need for further surgery, persistent pain, and possibly death.     DESCRIPTION OF PROCEDURE:  On 06/15/2017, the patient was seen in preoperative holding area, where her surgical site was marked. Preoperative antibiotics received. History and physical and consent updated. She was taken to the operating room and provided general anesthesia. Right thigh-high tourniquet placed. Right leg placed in arthroscopic leg easley. It was then prepped and draped in typical sterile fashion. Timeout  performed, confirming the correct surgical site and procedure. Esmarch used to exsanguinate the leg and tourniquet inflated to 250 mmHg. At this point, an anterolateral portal created with a #11 blade. Blunt trocar carefully inserted into the knee. Full inventory of the knee was performed. There was significant synovitis, particularly in the suprapatellar pouch. There was tricompartmental degenerative changes, but most predominantly in the lateral compartment. There were significant calcifications noted throughout the joint, consistent with pseudogout. An anteromedial portal was created using an 18-gauge needle for guidance. Next, an arthroscopic shaver was brought into the knee. There was a medial meniscal horn tear noted. A shaver was used to debride this back to a stable base. There were significant calcifications within this. Before and after images were taken. This was roughly 10% of the medial meniscus. Intercondylar notch was normal. Lateral compartment was evaluated. There was a large posterior complex radial tear, extending into the body of the lateral meniscus. There was exposed bone underneath the torn portion of the meniscus. There were grade 2 and 3 changes of the lateral femoral condyle. Arthroscopic shaver was brought in and the meniscus tear was debrided back to the peripheral rim that was stable. Before and after images were taken of this. Articular cartilage debrided as well to a stable base. Once this was complete, the ArthroCare wand was inserted into the knee and particularly into the suprapatellar pouch, where there was significant synovitis. A good majority of this was cauterized for A synovectomy to improve pain and swelling. In a similar fashion, before and after images were taken. The ArthroCare wand was taken along the medial and lateral gutters, as well as the anterior aspect of the knee. Once this was complete, all instruments were removed. Two 3-0 nylon sutures were placed. Marcaine  0.5% without epinephrine 15 mL was injected into the knee. Adaptic, 4 x 4's, ABD pad, cast padding, Ace bandage were placed. Tourniquet was released. The patient was taken to the PACU postoperatively in stable condition.     PLAN:  The patient will be discharged to home. She will be weight-bearing as tolerated, with range of motion as tolerated. She will restart her Coumadin. She will followup in my office in 10-14 days. No complications encountered during the surgical procedure.        Sam Soni M.D.  BAD:ms  D:   06/15/2017 17:00:28  T:   06/15/2017 19:59:39  Job ID:   [id number]  Document ID:   [id number]  cc:

## 2017-06-27 RX ORDER — WARFARIN SODIUM 2 MG/1
TABLET ORAL
Qty: 30 TABLET | Refills: 1 | Status: SHIPPED | OUTPATIENT
Start: 2017-06-27 | End: 2017-06-28 | Stop reason: SDUPTHER

## 2017-06-28 RX ORDER — WARFARIN SODIUM 2 MG/1
2 TABLET ORAL
Qty: 30 TABLET | Refills: 1 | Status: ON HOLD | OUTPATIENT
Start: 2017-06-28 | End: 2017-08-25

## 2017-07-28 ENCOUNTER — TELEPHONE (OUTPATIENT)
Dept: CARDIOLOGY | Facility: CLINIC | Age: 82
End: 2017-07-28

## 2017-07-28 ENCOUNTER — TELEPHONE (OUTPATIENT)
Dept: INTERNAL MEDICINE | Facility: CLINIC | Age: 82
End: 2017-07-28

## 2017-07-28 NOTE — TELEPHONE ENCOUNTER
Patient daughter is call about getting a occupational home assessment for her mother in her home. Patient daughter will be calling back with all the information.

## 2017-08-08 ENCOUNTER — OFFICE VISIT (OUTPATIENT)
Dept: INTERNAL MEDICINE | Facility: CLINIC | Age: 82
End: 2017-08-08

## 2017-08-08 VITALS
HEART RATE: 62 BPM | WEIGHT: 168 LBS | DIASTOLIC BLOOD PRESSURE: 62 MMHG | BODY MASS INDEX: 27.99 KG/M2 | RESPIRATION RATE: 18 BRPM | SYSTOLIC BLOOD PRESSURE: 128 MMHG | OXYGEN SATURATION: 98 % | HEIGHT: 65 IN

## 2017-08-08 DIAGNOSIS — M54.50 ACUTE RIGHT-SIDED LOW BACK PAIN WITHOUT SCIATICA: Primary | ICD-10-CM

## 2017-08-08 DIAGNOSIS — M79.601 RIGHT ARM PAIN: ICD-10-CM

## 2017-08-08 PROCEDURE — 72110 X-RAY EXAM L-2 SPINE 4/>VWS: CPT | Performed by: INTERNAL MEDICINE

## 2017-08-08 PROCEDURE — 73060 X-RAY EXAM OF HUMERUS: CPT | Performed by: INTERNAL MEDICINE

## 2017-08-08 PROCEDURE — 99213 OFFICE O/P EST LOW 20 MIN: CPT | Performed by: INTERNAL MEDICINE

## 2017-08-08 RX ORDER — UBIDECARENONE 75 MG
50 CAPSULE ORAL DAILY
COMMUNITY
End: 2018-07-18 | Stop reason: SDUPTHER

## 2017-08-08 RX ORDER — METHYLPREDNISOLONE 4 MG/1
TABLET ORAL
Qty: 21 TABLET | Refills: 0 | Status: SHIPPED | OUTPATIENT
Start: 2017-08-08 | End: 2017-08-22

## 2017-08-08 NOTE — PROGRESS NOTES
Subjective     Citlali ARIANA Solorio is a 82 y.o. female who presents with   Chief Complaint   Patient presents with   • Cyst     right arm, x 1 week, hurts when using arm   • Back Pain       History of Present Illness       C/o LBP.  Severe and started today.  No pain at rest.  Started with getting out of bed. She took two tylenol.  Ibuprofen was helpful.     Right arm bruise.  Mid arm.  No injury.  Painful to lift.     Review of Systems   Respiratory: Negative.    Cardiovascular: Negative.        The following portions of the patient's history were reviewed and updated as appropriate: allergies, current medications and problem list.    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     • Impaired fasting glucose 04/21/2016   • Spinal stenosis of lumbar region 04/21/2016   • Cobalamin deficiency 04/21/2016     Note Last Updated: 5/31/2017     Description: told at [facility] that B12 was low.  Monthly shots recommended 5/5/15.  Now on oral replacement.      • Atrial fibrillation 06/24/2013   • Benign essential hypertension 06/24/2013       Current Outpatient Prescriptions on File Prior to Visit   Medication Sig Dispense Refill   • Cholecalciferol (VITAMIN D PO) Take 1,000 mg by mouth Daily.    "  • losartan-hydrochlorothiazide (HYZAAR) 50-12.5 MG per tablet Take 1 tablet by mouth Daily. (Patient taking differently: Take 1 tablet by mouth Every Morning. Take 1/2 tablet daily) 90 tablet 3   • metoprolol succinate XL (TOPROL-XL) 50 MG 24 hr tablet Take 1 tablet by mouth Daily. (Patient taking differently: Take 50 mg by mouth Every Evening.) 90 tablet 1   • warfarin (COUMADIN) 1 MG tablet Take 1 mg by mouth Daily. TAKES 3 TIMES WEEKLY(MON., WED., Friday)  STOPPED  6/11/2017     • warfarin (COUMADIN) 2 MG tablet Take 1 tablet by mouth Daily. Take one tablet by mouth daily or as directed 30 tablet 1     No current facility-administered medications on file prior to visit.        Objective     /62  Pulse 62  Resp 18  Ht 65\" (165.1 cm)  Wt 168 lb (76.2 kg)  SpO2 98%  BMI 27.96 kg/m2    Physical Exam   Constitutional: She is oriented to person, place, and time. She appears well-developed and well-nourished.   HENT:   Head: Normocephalic and atraumatic.   Pulmonary/Chest: Effort normal.   Musculoskeletal:        Lumbar back: She exhibits tenderness. She exhibits normal range of motion, no bony tenderness, no swelling, no edema, no deformity and no laceration.        Right upper arm: She exhibits tenderness. She exhibits no bony tenderness, no swelling, no edema and no deformity.   Neurological: She is alert and oriented to person, place, and time.   Psychiatric: She has a normal mood and affect. Her behavior is normal.     X-rays of the right arm and spine performed today for following indication:   pain.  X-ray reveal ddd in spine.  NAD of arm.  There is no available x-ray for comparison.  X-ray sent to radiology for official interpretation and findings.        Assessment/Plan   Citlali was seen today for cyst and back pain.    Diagnoses and all orders for this visit:    Acute right-sided low back pain without sciatica  -     XR Spine Lumbar 4+ View  -     XR Humerus Right (In Office)    Right arm " pain  -     XR Spine Lumbar 4+ View  -     XR Humerus Right (In Office)    Other orders  -     MethylPREDNISolone (MEDROL, NERY,) 4 MG tablet; Take as directed on package instructions.        Discussion  Patient presents with acute severe LBP most c/w strain.  Trial of conservative management with a steroid nery.  Let me know if not feeling better over the next several day or if there is any change in symptoms.  Arm in right arm from bruise should spontaneously resolve.           No future appointments.

## 2017-08-22 ENCOUNTER — HOSPITAL ENCOUNTER (OUTPATIENT)
Dept: CARDIOLOGY | Facility: HOSPITAL | Age: 82
Discharge: HOME | End: 2017-08-22

## 2017-08-22 ENCOUNTER — OFFICE VISIT (OUTPATIENT)
Dept: INTERNAL MEDICINE | Facility: CLINIC | Age: 82
End: 2017-08-22

## 2017-08-22 ENCOUNTER — HOSPITAL ENCOUNTER (INPATIENT)
Facility: HOSPITAL | Age: 82
LOS: 1 days | Discharge: HOME | End: 2017-08-25
Attending: INTERNAL MEDICINE | Admitting: INTERNAL MEDICINE

## 2017-08-22 VITALS
HEIGHT: 65 IN | DIASTOLIC BLOOD PRESSURE: 82 MMHG | SYSTOLIC BLOOD PRESSURE: 130 MMHG | BODY MASS INDEX: 27.99 KG/M2 | TEMPERATURE: 98.1 F | WEIGHT: 168 LBS | OXYGEN SATURATION: 97 %

## 2017-08-22 VITALS
HEART RATE: 146 BPM | HEIGHT: 65 IN | OXYGEN SATURATION: 99 % | SYSTOLIC BLOOD PRESSURE: 128 MMHG | WEIGHT: 168 LBS | DIASTOLIC BLOOD PRESSURE: 78 MMHG | BODY MASS INDEX: 27.99 KG/M2

## 2017-08-22 DIAGNOSIS — J40 BRONCHITIS: ICD-10-CM

## 2017-08-22 DIAGNOSIS — R93.89 ABNORMAL CHEST X-RAY: ICD-10-CM

## 2017-08-22 DIAGNOSIS — I50.9 ACUTE HEART FAILURE, UNSPECIFIED HEART FAILURE TYPE (HCC): ICD-10-CM

## 2017-08-22 DIAGNOSIS — R94.31 ABNORMAL EKG: ICD-10-CM

## 2017-08-22 DIAGNOSIS — R00.0 TACHYCARDIA: ICD-10-CM

## 2017-08-22 DIAGNOSIS — R05.9 COUGH: ICD-10-CM

## 2017-08-22 DIAGNOSIS — R60.0 LOCALIZED EDEMA: ICD-10-CM

## 2017-08-22 DIAGNOSIS — M62.81 MUSCLE WEAKNESS (GENERALIZED): Primary | ICD-10-CM

## 2017-08-22 DIAGNOSIS — I48.0 PAROXYSMAL ATRIAL FIBRILLATION (HCC): Primary | ICD-10-CM

## 2017-08-22 DIAGNOSIS — I48.91 ATRIAL FIBRILLATION, UNSPECIFIED TYPE (HCC): Primary | ICD-10-CM

## 2017-08-22 LAB
ALBUMIN SERPL-MCNC: 4 G/DL (ref 3.5–5.2)
ALBUMIN/GLOB SERPL: 1.3 G/DL
ALP SERPL-CCNC: 53 U/L (ref 39–117)
ALT SERPL W P-5'-P-CCNC: 24 U/L (ref 1–33)
ANION GAP SERPL CALCULATED.3IONS-SCNC: 13.2 MMOL/L
ANION GAP SERPL CALCULATED.3IONS-SCNC: 13.9 MMOL/L
AST SERPL-CCNC: 18 U/L (ref 1–32)
BASOPHILS # BLD AUTO: 0.03 10*3/MM3 (ref 0–0.2)
BASOPHILS NFR BLD AUTO: 0.4 % (ref 0–1.5)
BILIRUB SERPL-MCNC: 1.1 MG/DL (ref 0.1–1.2)
BUN BLD-MCNC: 11 MG/DL (ref 8–23)
BUN BLD-MCNC: 12 MG/DL (ref 8–23)
BUN/CREAT SERPL: 10.4 (ref 7–25)
BUN/CREAT SERPL: 10.8 (ref 7–25)
CALCIUM SPEC-SCNC: 9.1 MG/DL (ref 8.6–10.5)
CALCIUM SPEC-SCNC: 9.2 MG/DL (ref 8.6–10.5)
CHLORIDE SERPL-SCNC: 101 MMOL/L (ref 98–107)
CHLORIDE SERPL-SCNC: 99 MMOL/L (ref 98–107)
CO2 SERPL-SCNC: 25.1 MMOL/L (ref 22–29)
CO2 SERPL-SCNC: 26.8 MMOL/L (ref 22–29)
CREAT BLD-MCNC: 1.02 MG/DL (ref 0.57–1)
CREAT BLD-MCNC: 1.15 MG/DL (ref 0.57–1)
D DIMER PPP FEU-MCNC: 0.3 MCGFEU/ML (ref 0–0.49)
DEPRECATED RDW RBC AUTO: 46.7 FL (ref 37–54)
EOSINOPHIL # BLD AUTO: 0.16 10*3/MM3 (ref 0–0.7)
EOSINOPHIL NFR BLD AUTO: 2.3 % (ref 0.3–6.2)
ERYTHROCYTE [DISTWIDTH] IN BLOOD BY AUTOMATED COUNT: 12.9 % (ref 11.7–13)
GFR SERPL CREATININE-BSD FRML MDRD: 45 ML/MIN/1.73
GFR SERPL CREATININE-BSD FRML MDRD: 52 ML/MIN/1.73
GLOBULIN UR ELPH-MCNC: 3 GM/DL
GLUCOSE BLD-MCNC: 105 MG/DL (ref 65–99)
GLUCOSE BLD-MCNC: 133 MG/DL (ref 65–99)
HCT VFR BLD AUTO: 39.3 % (ref 35.6–45.5)
HGB BLD-MCNC: 12.7 G/DL (ref 11.9–15.5)
IMM GRANULOCYTES # BLD: 0.02 10*3/MM3 (ref 0–0.03)
IMM GRANULOCYTES NFR BLD: 0.3 % (ref 0–0.5)
INR PPP: 3.91 (ref 0.9–1.1)
INR PPP: 4.01 (ref 0.9–1.1)
LYMPHOCYTES # BLD AUTO: 1.63 10*3/MM3 (ref 0.9–4.8)
LYMPHOCYTES NFR BLD AUTO: 23.2 % (ref 19.6–45.3)
MCH RBC QN AUTO: 32 PG (ref 26.9–32)
MCHC RBC AUTO-ENTMCNC: 32.3 G/DL (ref 32.4–36.3)
MCV RBC AUTO: 99 FL (ref 80.5–98.2)
MONOCYTES # BLD AUTO: 0.66 10*3/MM3 (ref 0.2–1.2)
MONOCYTES NFR BLD AUTO: 9.4 % (ref 5–12)
NEUTROPHILS # BLD AUTO: 4.52 10*3/MM3 (ref 1.9–8.1)
NEUTROPHILS NFR BLD AUTO: 64.4 % (ref 42.7–76)
NT-PROBNP SERPL-MCNC: 2159 PG/ML (ref 0–1800)
PLATELET # BLD AUTO: 228 10*3/MM3 (ref 140–500)
PMV BLD AUTO: 11 FL (ref 6–12)
POTASSIUM BLD-SCNC: 3.6 MMOL/L (ref 3.5–5.2)
POTASSIUM BLD-SCNC: 4.5 MMOL/L (ref 3.5–5.2)
PROCALCITONIN SERPL-MCNC: 0.05 NG/ML (ref 0.1–0.25)
PROT SERPL-MCNC: 7 G/DL (ref 6–8.5)
PROTHROMBIN TIME: 37.1 SECONDS (ref 11.7–14.2)
PROTHROMBIN TIME: 37.9 SECONDS (ref 11.7–14.2)
RBC # BLD AUTO: 3.97 10*6/MM3 (ref 3.9–5.2)
SODIUM BLD-SCNC: 139 MMOL/L (ref 136–145)
SODIUM BLD-SCNC: 140 MMOL/L (ref 136–145)
TROPONIN T SERPL-MCNC: <0.01 NG/ML (ref 0–0.03)
WBC NRBC COR # BLD: 7.02 10*3/MM3 (ref 4.5–10.7)

## 2017-08-22 PROCEDURE — 99214 OFFICE O/P EST MOD 30 MIN: CPT | Performed by: INTERNAL MEDICINE

## 2017-08-22 PROCEDURE — 36415 COLL VENOUS BLD VENIPUNCTURE: CPT | Performed by: NURSE PRACTITIONER

## 2017-08-22 PROCEDURE — 71020 XR CHEST PA AND LATERAL: CPT | Performed by: INTERNAL MEDICINE

## 2017-08-22 PROCEDURE — 94760 N-INVAS EAR/PLS OXIMETRY 1: CPT | Performed by: NURSE PRACTITIONER

## 2017-08-22 PROCEDURE — 96374 THER/PROPH/DIAG INJ IV PUSH: CPT | Performed by: NURSE PRACTITIONER

## 2017-08-22 PROCEDURE — 85025 COMPLETE CBC W/AUTO DIFF WBC: CPT | Performed by: NURSE PRACTITIONER

## 2017-08-22 PROCEDURE — 85610 PROTHROMBIN TIME: CPT | Performed by: NURSE PRACTITIONER

## 2017-08-22 PROCEDURE — G0378 HOSPITAL OBSERVATION PER HR: HCPCS

## 2017-08-22 PROCEDURE — 83880 ASSAY OF NATRIURETIC PEPTIDE: CPT | Performed by: NURSE PRACTITIONER

## 2017-08-22 PROCEDURE — 84145 PROCALCITONIN (PCT): CPT | Performed by: NURSE PRACTITIONER

## 2017-08-22 PROCEDURE — 84484 ASSAY OF TROPONIN QUANT: CPT | Performed by: NURSE PRACTITIONER

## 2017-08-22 PROCEDURE — 25010000002 PERFLUTREN (DEFINITY) 8.476 MG IN SODIUM CHLORIDE 10 ML INJECTION: Performed by: NURSE PRACTITIONER

## 2017-08-22 PROCEDURE — C8929 TTE W OR WO FOL WCON,DOPPLER: HCPCS

## 2017-08-22 PROCEDURE — 93000 ELECTROCARDIOGRAM COMPLETE: CPT | Performed by: INTERNAL MEDICINE

## 2017-08-22 PROCEDURE — 93306 TTE W/DOPPLER COMPLETE: CPT | Performed by: INTERNAL MEDICINE

## 2017-08-22 PROCEDURE — 99215 OFFICE O/P EST HI 40 MIN: CPT | Performed by: NURSE PRACTITIONER

## 2017-08-22 PROCEDURE — 85379 FIBRIN DEGRADATION QUANT: CPT | Performed by: NURSE PRACTITIONER

## 2017-08-22 PROCEDURE — 80053 COMPREHEN METABOLIC PANEL: CPT | Performed by: NURSE PRACTITIONER

## 2017-08-22 RX ORDER — L.ACID,PARA/B.BIFIDUM/S.THERM 8B CELL
1 CAPSULE ORAL DAILY
Status: DISCONTINUED | OUTPATIENT
Start: 2017-08-22 | End: 2017-08-25 | Stop reason: HOSPADM

## 2017-08-22 RX ORDER — SODIUM CHLORIDE 0.9 % (FLUSH) 0.9 %
10 SYRINGE (ML) INJECTION AS NEEDED
Status: DISCONTINUED | OUTPATIENT
Start: 2017-08-22 | End: 2017-08-25 | Stop reason: HOSPADM

## 2017-08-22 RX ORDER — HYDROCHLOROTHIAZIDE 12.5 MG/1
12.5 CAPSULE, GELATIN COATED ORAL
Status: DISCONTINUED | OUTPATIENT
Start: 2017-08-23 | End: 2017-08-25 | Stop reason: HOSPADM

## 2017-08-22 RX ORDER — ACETAMINOPHEN 325 MG/1
650 TABLET ORAL EVERY 4 HOURS PRN
Status: DISCONTINUED | OUTPATIENT
Start: 2017-08-22 | End: 2017-08-25 | Stop reason: HOSPADM

## 2017-08-22 RX ORDER — LOSARTAN POTASSIUM AND HYDROCHLOROTHIAZIDE 12.5; 5 MG/1; MG/1
1 TABLET ORAL EVERY MORNING
Status: DISCONTINUED | OUTPATIENT
Start: 2017-08-23 | End: 2017-08-22 | Stop reason: CLARIF

## 2017-08-22 RX ORDER — DILTIAZEM HYDROCHLORIDE 5 MG/ML
5 INJECTION INTRAVENOUS ONCE
Status: COMPLETED | OUTPATIENT
Start: 2017-08-22 | End: 2017-08-22

## 2017-08-22 RX ORDER — NITROGLYCERIN 0.4 MG/1
0.4 TABLET SUBLINGUAL
Status: DISCONTINUED | OUTPATIENT
Start: 2017-08-22 | End: 2017-08-25 | Stop reason: HOSPADM

## 2017-08-22 RX ORDER — DOXYCYCLINE 100 MG/1
100 CAPSULE ORAL 2 TIMES DAILY
Qty: 20 CAPSULE | Refills: 0 | Status: ON HOLD | OUTPATIENT
Start: 2017-08-22 | End: 2017-08-22

## 2017-08-22 RX ORDER — SODIUM CHLORIDE 0.9 % (FLUSH) 0.9 %
1-10 SYRINGE (ML) INJECTION AS NEEDED
Status: DISCONTINUED | OUTPATIENT
Start: 2017-08-22 | End: 2017-08-25 | Stop reason: HOSPADM

## 2017-08-22 RX ORDER — UBIDECARENONE 75 MG
50 CAPSULE ORAL DAILY
Status: DISCONTINUED | OUTPATIENT
Start: 2017-08-22 | End: 2017-08-22 | Stop reason: CLARIF

## 2017-08-22 RX ORDER — SODIUM CHLORIDE 0.9 % (FLUSH) 0.9 %
10 SYRINGE (ML) INJECTION AS NEEDED
Status: DISCONTINUED | OUTPATIENT
Start: 2017-08-22 | End: 2018-01-10 | Stop reason: HOSPADM

## 2017-08-22 RX ORDER — METOPROLOL SUCCINATE 50 MG/1
50 TABLET, EXTENDED RELEASE ORAL EVERY EVENING
Status: DISCONTINUED | OUTPATIENT
Start: 2017-08-22 | End: 2017-08-23

## 2017-08-22 RX ORDER — NITROGLYCERIN 0.4 MG/1
0.4 TABLET SUBLINGUAL
Status: DISCONTINUED | OUTPATIENT
Start: 2017-08-22 | End: 2018-08-21

## 2017-08-22 RX ORDER — LOSARTAN POTASSIUM 50 MG/1
50 TABLET ORAL
Status: DISCONTINUED | OUTPATIENT
Start: 2017-08-23 | End: 2017-08-25 | Stop reason: HOSPADM

## 2017-08-22 RX ADMIN — Medication 1 CAPSULE: at 20:42

## 2017-08-22 RX ADMIN — METOPROLOL SUCCINATE 50 MG: 50 TABLET, FILM COATED, EXTENDED RELEASE ORAL at 20:42

## 2017-08-22 RX ADMIN — DILTIAZEM HYDROCHLORIDE 13 MG/HR: 100 INJECTION, POWDER, LYOPHILIZED, FOR SOLUTION INTRAVENOUS at 22:54

## 2017-08-22 RX ADMIN — METOROPROLOL TARTRATE 5 MG: 5 INJECTION, SOLUTION INTRAVENOUS at 15:41

## 2017-08-22 RX ADMIN — DILTIAZEM HYDROCHLORIDE 5 MG: 5 INJECTION INTRAVENOUS at 18:25

## 2017-08-22 RX ADMIN — PERFLUTREN 1.5 ML: 6.52 INJECTION, SUSPENSION INTRAVENOUS at 16:56

## 2017-08-22 RX ADMIN — DILTIAZEM HYDROCHLORIDE 10 MG/HR: 100 INJECTION, POWDER, LYOPHILIZED, FOR SOLUTION INTRAVENOUS at 18:32

## 2017-08-22 NOTE — PROGRESS NOTES
To PCP today for cough x 10 days. Found to be in AFib with RVR. CXR done. Has had steroids recently, and PCP would like to start ABs. No CP. No SOA. Nausea but no vomiting (may be due to home meds).

## 2017-08-22 NOTE — PROGRESS NOTES
Subjective     Citlali ARIANA Solorio is a 82 y.o. female who presents with   Chief Complaint   Patient presents with   • Cough     times 1 week       History of Present Illness     C/o rapid heart rate since she took a steroid.  No SOA is associated.  No CP.  C/o cough for the past 10 days.  Non-productive.  No fever.  Deep cough.  No fever.      Review of Systems   Constitutional: Negative for fever.   HENT: Negative for sore throat.    Cardiovascular: Positive for leg swelling.       The following portions of the patient's history were reviewed and updated as appropriate: allergies, current medications and problem list.    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     • Impaired fasting glucose 04/21/2016   • Spinal stenosis of lumbar region 04/21/2016   • Cobalamin deficiency 04/21/2016     Note Last Updated: 5/31/2017     Description: told at [facility] that B12 was low.  Monthly shots recommended 5/5/15.  Now on oral replacement.      • Atrial fibrillation 06/24/2013   • Benign essential hypertension 06/24/2013       Current Outpatient Prescriptions on File Prior to Visit   Medication Sig Dispense Refill   • Cholecalciferol (VITAMIN D PO) Take 1,000 mg by mouth Daily.     •  "losartan-hydrochlorothiazide (HYZAAR) 50-12.5 MG per tablet Take 1 tablet by mouth Daily. (Patient taking differently: Take 1 tablet by mouth Every Morning. Take 1/2 tablet daily) 90 tablet 3   • metoprolol succinate XL (TOPROL-XL) 50 MG 24 hr tablet Take 1 tablet by mouth Daily. (Patient taking differently: Take 50 mg by mouth Every Evening.) 90 tablet 1   • vitamin B-12 (CYANOCOBALAMIN) 100 MCG tablet Take 50 mcg by mouth Daily.     • warfarin (COUMADIN) 1 MG tablet Take 1 mg by mouth Daily. TAKES 3 TIMES WEEKLY(MON., WED., Friday)  STOPPED  6/11/2017     • warfarin (COUMADIN) 2 MG tablet Take 1 tablet by mouth Daily. Take one tablet by mouth daily or as directed 30 tablet 1   • [DISCONTINUED] MethylPREDNISolone (MEDROL, NERY,) 4 MG tablet Take as directed on package instructions. 21 tablet 0     No current facility-administered medications on file prior to visit.        Objective     /82  Temp 98.1 °F (36.7 °C)  Ht 65\" (165.1 cm)  Wt 168 lb (76.2 kg)  SpO2 97%  BMI 27.96 kg/m2    Physical Exam   Constitutional: She is oriented to person, place, and time. She appears well-developed and well-nourished.   HENT:   Head: Normocephalic and atraumatic.   Right Ear: Hearing and tympanic membrane normal.   Left Ear: Hearing and tympanic membrane normal.   Mouth/Throat: No oropharyngeal exudate or posterior oropharyngeal erythema.   Cardiovascular: Normal heart sounds.  An irregularly irregular rhythm present. Tachycardia present.    1+ ankle edema   Pulmonary/Chest: Effort normal. She has rales in the right lower field and the left lower field.   Neurological: She is alert and oriented to person, place, and time.   Skin: Skin is warm and dry.   Psychiatric: She has a normal mood and affect. Her behavior is normal.          ECG 12 Lead  Date/Time: 8/22/2017 1:34 PM  Performed by: PRASHANT MONTERROSO  Authorized by: PRASHANT MONTERROSO   Comparison: compared with previous ECG from 5/18/2017  Comparison to previous ECG: " She is in rapid afib  Rhythm: atrial fibrillation  Rate: tachycardic  Conduction: conduction normal  ST Segments: ST segments normal  T Waves: T waves normal  QRS axis: normal  Clinical impression: abnormal ECG        X-rays of the chest  performed today for following indication:   cough.  X-ray reveal increased markings in bases.  There is no available x-ray for comparison.  X-ray sent to radiology for official interpretation and findings.          Assessment/Plan   Citlali was seen today for cough.    Diagnoses and all orders for this visit:    Atrial fibrillation, unspecified type  -     ECG 12 Lead    Bronchitis  -     XR Chest PA & Lateral    Other orders  -     doxycycline (MONODOX) 100 MG capsule; Take 1 capsule by mouth 2 (Two) Times a Day.        Discussion  Patient came in for a cough.  She was found to be in rapid afib.  I will treat her for acute bronchitis with doxycycline.  She is instructed to let her cardiologist know about the antibiotic because they manage her INR.  D/w CPU at [facility].  They will see her today for rapid afib.  15/25 minutes was spent in counseling of the following topics: test results, impressions, risks/benefits of treatment options,         No future appointments.

## 2017-08-23 ENCOUNTER — APPOINTMENT (OUTPATIENT)
Dept: GENERAL RADIOLOGY | Facility: HOSPITAL | Age: 82
End: 2017-08-23

## 2017-08-23 LAB
ANION GAP SERPL CALCULATED.3IONS-SCNC: 14.2 MMOL/L
ASCENDING AORTA: 2.5 CM
BH CV ECHO MEAS - ACS: 1.7 CM
BH CV ECHO MEAS - AO MAX PG (FULL): 2.6 MMHG
BH CV ECHO MEAS - AO MAX PG: 4.7 MMHG
BH CV ECHO MEAS - AO MEAN PG (FULL): 2.1 MMHG
BH CV ECHO MEAS - AO MEAN PG: 3.3 MMHG
BH CV ECHO MEAS - AO ROOT AREA (BSA CORRECTED): 1.4
BH CV ECHO MEAS - AO ROOT AREA: 5 CM^2
BH CV ECHO MEAS - AO ROOT DIAM: 2.5 CM
BH CV ECHO MEAS - AO V2 MAX: 108.6 CM/SEC
BH CV ECHO MEAS - AO V2 MEAN: 87.6 CM/SEC
BH CV ECHO MEAS - AO V2 VTI: 20.4 CM
BH CV ECHO MEAS - AVA(I,A): 1.8 CM^2
BH CV ECHO MEAS - AVA(I,D): 1.8 CM^2
BH CV ECHO MEAS - AVA(V,A): 1.9 CM^2
BH CV ECHO MEAS - AVA(V,D): 1.9 CM^2
BH CV ECHO MEAS - BSA(HAYCOCK): 1.9 M^2
BH CV ECHO MEAS - BSA: 1.8 M^2
BH CV ECHO MEAS - BZI_BMI: 28 KILOGRAMS/M^2
BH CV ECHO MEAS - BZI_METRIC_HEIGHT: 165.1 CM
BH CV ECHO MEAS - BZI_METRIC_WEIGHT: 76.2 KG
BH CV ECHO MEAS - CONTRAST EF (2CH): 51.1 ML/M^2
BH CV ECHO MEAS - CONTRAST EF 4CH: 53.8 ML/M^2
BH CV ECHO MEAS - EDV(MOD-SP2): 88 ML
BH CV ECHO MEAS - EDV(MOD-SP4): 78 ML
BH CV ECHO MEAS - EDV(TEICH): 73.9 ML
BH CV ECHO MEAS - EF(CUBED): 49.4 %
BH CV ECHO MEAS - EF(MOD-SP2): 51.1 %
BH CV ECHO MEAS - EF(MOD-SP4): 53.8 %
BH CV ECHO MEAS - EF(TEICH): 42 %
BH CV ECHO MEAS - ESV(MOD-SP2): 43 ML
BH CV ECHO MEAS - ESV(MOD-SP4): 36 ML
BH CV ECHO MEAS - ESV(TEICH): 42.9 ML
BH CV ECHO MEAS - FS: 20.3 %
BH CV ECHO MEAS - IVS/LVPW: 1.2
BH CV ECHO MEAS - IVSD: 1 CM
BH CV ECHO MEAS - LAT PEAK E' VEL: 7 CM/SEC
BH CV ECHO MEAS - LV DIASTOLIC VOL/BSA (35-75): 42.5 ML/M^2
BH CV ECHO MEAS - LV MASS(C)D: 116.4 GRAMS
BH CV ECHO MEAS - LV MASS(C)DI: 63.4 GRAMS/M^2
BH CV ECHO MEAS - LV MAX PG: 2.1 MMHG
BH CV ECHO MEAS - LV MEAN PG: 1.2 MMHG
BH CV ECHO MEAS - LV SYSTOLIC VOL/BSA (12-30): 19.6 ML/M^2
BH CV ECHO MEAS - LV V1 MAX: 72.8 CM/SEC
BH CV ECHO MEAS - LV V1 MEAN: 53.1 CM/SEC
BH CV ECHO MEAS - LV V1 VTI: 13.3 CM
BH CV ECHO MEAS - LVIDD: 4.1 CM
BH CV ECHO MEAS - LVIDS: 3.3 CM
BH CV ECHO MEAS - LVLD AP2: 6.1 CM
BH CV ECHO MEAS - LVLD AP4: 7.1 CM
BH CV ECHO MEAS - LVLS AP2: 5.4 CM
BH CV ECHO MEAS - LVLS AP4: 5.7 CM
BH CV ECHO MEAS - LVOT AREA (M): 2.8 CM^2
BH CV ECHO MEAS - LVOT AREA: 2.8 CM^2
BH CV ECHO MEAS - LVOT DIAM: 1.9 CM
BH CV ECHO MEAS - LVPWD: 0.83 CM
BH CV ECHO MEAS - MED PEAK E' VEL: 10 CM/SEC
BH CV ECHO MEAS - MR MAX PG: 93.6 MMHG
BH CV ECHO MEAS - MR MAX VEL: 483.7 CM/SEC
BH CV ECHO MEAS - MV DEC SLOPE: 1169 CM/SEC^2
BH CV ECHO MEAS - MV DEC TIME: 0.08 SEC
BH CV ECHO MEAS - MV E MAX VEL: 90 CM/SEC
BH CV ECHO MEAS - MV P1/2T MAX VEL: 89.4 CM/SEC
BH CV ECHO MEAS - MV P1/2T: 22.4 MSEC
BH CV ECHO MEAS - MVA P1/2T LCG: 2.5 CM^2
BH CV ECHO MEAS - MVA(P1/2T): 9.8 CM^2
BH CV ECHO MEAS - PA ACC TIME: 0.1 SEC
BH CV ECHO MEAS - PA MAX PG (FULL): 3.1 MMHG
BH CV ECHO MEAS - PA MAX PG: 4 MMHG
BH CV ECHO MEAS - PA PR(ACCEL): 34.6 MMHG
BH CV ECHO MEAS - PA V2 MAX: 100.2 CM/SEC
BH CV ECHO MEAS - PULM A REVS DUR: 0.08 SEC
BH CV ECHO MEAS - PULM A REVS VEL: 21.7 CM/SEC
BH CV ECHO MEAS - PULM DIAS VEL: 17.1 CM/SEC
BH CV ECHO MEAS - PULM S/D: 2
BH CV ECHO MEAS - PULM SYS VEL: 34.8 CM/SEC
BH CV ECHO MEAS - PVA(V,A): 1.4 CM^2
BH CV ECHO MEAS - PVA(V,D): 1.4 CM^2
BH CV ECHO MEAS - QP/QS: 0.72
BH CV ECHO MEAS - RAP SYSTOLE: 3 MMHG
BH CV ECHO MEAS - RV MAX PG: 0.94 MMHG
BH CV ECHO MEAS - RV MEAN PG: 0.55 MMHG
BH CV ECHO MEAS - RV V1 MAX: 48.5 CM/SEC
BH CV ECHO MEAS - RV V1 MEAN: 33.1 CM/SEC
BH CV ECHO MEAS - RV V1 VTI: 9.2 CM
BH CV ECHO MEAS - RVOT AREA: 2.9 CM^2
BH CV ECHO MEAS - RVOT DIAM: 1.9 CM
BH CV ECHO MEAS - RVSP: 48 MMHG
BH CV ECHO MEAS - SI(AO): 55.4 ML/M^2
BH CV ECHO MEAS - SI(CUBED): 18.4 ML/M^2
BH CV ECHO MEAS - SI(LVOT): 20.1 ML/M^2
BH CV ECHO MEAS - SI(MOD-SP2): 24.5 ML/M^2
BH CV ECHO MEAS - SI(MOD-SP4): 22.9 ML/M^2
BH CV ECHO MEAS - SI(TEICH): 16.9 ML/M^2
BH CV ECHO MEAS - SV(AO): 101.7 ML
BH CV ECHO MEAS - SV(CUBED): 33.9 ML
BH CV ECHO MEAS - SV(LVOT): 37 ML
BH CV ECHO MEAS - SV(MOD-SP2): 45 ML
BH CV ECHO MEAS - SV(MOD-SP4): 42 ML
BH CV ECHO MEAS - SV(RVOT): 26.5 ML
BH CV ECHO MEAS - SV(TEICH): 31 ML
BH CV ECHO MEAS - TAPSE (>1.6): 1.8 CM2
BH CV ECHO MEAS - TR MAX VEL: 335.8 CM/SEC
BH CV XLRA - RV BASE: 3.1 CM
BH CV XLRA - TDI S': 12 CM/SEC
BUN BLD-MCNC: 10 MG/DL (ref 8–23)
BUN/CREAT SERPL: 10.3 (ref 7–25)
C DIFF TOX GENS STL QL NAA+PROBE: NEGATIVE
CALCIUM SPEC-SCNC: 8.9 MG/DL (ref 8.6–10.5)
CHLORIDE SERPL-SCNC: 98 MMOL/L (ref 98–107)
CO2 SERPL-SCNC: 25.8 MMOL/L (ref 22–29)
CREAT BLD-MCNC: 0.97 MG/DL (ref 0.57–1)
E/E' RATIO: 9
GFR SERPL CREATININE-BSD FRML MDRD: 55 ML/MIN/1.73
GLUCOSE BLD-MCNC: 138 MG/DL (ref 65–99)
INR PPP: 3.72 (ref 0.9–1.1)
LEFT ATRIUM VOLUME INDEX: 69 ML/M2
POTASSIUM BLD-SCNC: 3.7 MMOL/L (ref 3.5–5.2)
PROTHROMBIN TIME: 35.7 SECONDS (ref 11.7–14.2)
SINUS: 2.6 CM
SODIUM BLD-SCNC: 138 MMOL/L (ref 136–145)
STJ: 2.5 CM

## 2017-08-23 PROCEDURE — G8978 MOBILITY CURRENT STATUS: HCPCS

## 2017-08-23 PROCEDURE — G0378 HOSPITAL OBSERVATION PER HR: HCPCS

## 2017-08-23 PROCEDURE — G8979 MOBILITY GOAL STATUS: HCPCS

## 2017-08-23 PROCEDURE — 25010000002 DIGOXIN PER 500 MCG: Performed by: INTERNAL MEDICINE

## 2017-08-23 PROCEDURE — 85610 PROTHROMBIN TIME: CPT | Performed by: NURSE PRACTITIONER

## 2017-08-23 PROCEDURE — 99226 PR SBSQ OBSERVATION CARE/DAY 35 MINUTES: CPT | Performed by: INTERNAL MEDICINE

## 2017-08-23 PROCEDURE — 71020 HC CHEST PA AND LATERAL: CPT

## 2017-08-23 PROCEDURE — 97162 PT EVAL MOD COMPLEX 30 MIN: CPT

## 2017-08-23 PROCEDURE — 80048 BASIC METABOLIC PNL TOTAL CA: CPT | Performed by: NURSE PRACTITIONER

## 2017-08-23 PROCEDURE — 87493 C DIFF AMPLIFIED PROBE: CPT | Performed by: INTERNAL MEDICINE

## 2017-08-23 RX ORDER — METOPROLOL SUCCINATE 50 MG/1
50 TABLET, EXTENDED RELEASE ORAL EVERY 12 HOURS SCHEDULED
Status: DISCONTINUED | OUTPATIENT
Start: 2017-08-23 | End: 2017-08-25 | Stop reason: HOSPADM

## 2017-08-23 RX ORDER — DIGOXIN 0.25 MG/ML
250 INJECTION INTRAMUSCULAR; INTRAVENOUS ONCE
Status: COMPLETED | OUTPATIENT
Start: 2017-08-23 | End: 2017-08-23

## 2017-08-23 RX ORDER — DIGOXIN 0.25 MG/ML
250 INJECTION INTRAMUSCULAR; INTRAVENOUS ONCE
Status: COMPLETED | OUTPATIENT
Start: 2017-08-24 | End: 2017-08-24

## 2017-08-23 RX ADMIN — METOPROLOL SUCCINATE 50 MG: 50 TABLET, FILM COATED, EXTENDED RELEASE ORAL at 20:51

## 2017-08-23 RX ADMIN — DIGOXIN 250 MCG: 0.25 INJECTION INTRAMUSCULAR; INTRAVENOUS at 17:53

## 2017-08-23 RX ADMIN — HYDROCHLOROTHIAZIDE 12.5 MG: 12.5 CAPSULE, GELATIN COATED ORAL at 08:59

## 2017-08-23 RX ADMIN — Medication 1 CAPSULE: at 08:59

## 2017-08-23 RX ADMIN — LOSARTAN POTASSIUM 50 MG: 50 TABLET, FILM COATED ORAL at 08:59

## 2017-08-24 ENCOUNTER — APPOINTMENT (OUTPATIENT)
Dept: GENERAL RADIOLOGY | Facility: HOSPITAL | Age: 82
End: 2017-08-24

## 2017-08-24 PROBLEM — I48.91 ATRIAL FIBRILLATION WITH RVR: Status: ACTIVE | Noted: 2017-08-24

## 2017-08-24 LAB
ANION GAP SERPL CALCULATED.3IONS-SCNC: 13.5 MMOL/L
BUN BLD-MCNC: 8 MG/DL (ref 8–23)
BUN/CREAT SERPL: 9.8 (ref 7–25)
CALCIUM SPEC-SCNC: 9.2 MG/DL (ref 8.6–10.5)
CHLORIDE SERPL-SCNC: 99 MMOL/L (ref 98–107)
CO2 SERPL-SCNC: 26.5 MMOL/L (ref 22–29)
CREAT BLD-MCNC: 0.82 MG/DL (ref 0.57–1)
DEPRECATED RDW RBC AUTO: 46.2 FL (ref 37–54)
ERYTHROCYTE [DISTWIDTH] IN BLOOD BY AUTOMATED COUNT: 12.8 % (ref 11.7–13)
GFR SERPL CREATININE-BSD FRML MDRD: 67 ML/MIN/1.73
GLUCOSE BLD-MCNC: 91 MG/DL (ref 65–99)
HCT VFR BLD AUTO: 36.3 % (ref 35.6–45.5)
HGB BLD-MCNC: 11.7 G/DL (ref 11.9–15.5)
INR PPP: 2.44 (ref 0.9–1.1)
MCH RBC QN AUTO: 31.8 PG (ref 26.9–32)
MCHC RBC AUTO-ENTMCNC: 32.2 G/DL (ref 32.4–36.3)
MCV RBC AUTO: 98.6 FL (ref 80.5–98.2)
PLATELET # BLD AUTO: 214 10*3/MM3 (ref 140–500)
PMV BLD AUTO: 11.2 FL (ref 6–12)
POTASSIUM BLD-SCNC: 3.9 MMOL/L (ref 3.5–5.2)
PROCALCITONIN SERPL-MCNC: 0.05 NG/ML (ref 0.1–0.25)
PROTHROMBIN TIME: 25.7 SECONDS (ref 11.7–14.2)
RBC # BLD AUTO: 3.68 10*6/MM3 (ref 3.9–5.2)
SODIUM BLD-SCNC: 139 MMOL/L (ref 136–145)
WBC NRBC COR # BLD: 6.45 10*3/MM3 (ref 4.5–10.7)

## 2017-08-24 PROCEDURE — 25010000002 DIGOXIN PER 500 MCG: Performed by: INTERNAL MEDICINE

## 2017-08-24 PROCEDURE — 84145 PROCALCITONIN (PCT): CPT | Performed by: INTERNAL MEDICINE

## 2017-08-24 PROCEDURE — 97110 THERAPEUTIC EXERCISES: CPT

## 2017-08-24 PROCEDURE — 80048 BASIC METABOLIC PNL TOTAL CA: CPT | Performed by: NURSE PRACTITIONER

## 2017-08-24 PROCEDURE — 85027 COMPLETE CBC AUTOMATED: CPT | Performed by: INTERNAL MEDICINE

## 2017-08-24 PROCEDURE — 74230 X-RAY XM SWLNG FUNCJ C+: CPT

## 2017-08-24 PROCEDURE — 99232 SBSQ HOSP IP/OBS MODERATE 35: CPT | Performed by: INTERNAL MEDICINE

## 2017-08-24 PROCEDURE — G8996 SWALLOW CURRENT STATUS: HCPCS

## 2017-08-24 PROCEDURE — 85610 PROTHROMBIN TIME: CPT | Performed by: NURSE PRACTITIONER

## 2017-08-24 PROCEDURE — 92611 MOTION FLUOROSCOPY/SWALLOW: CPT

## 2017-08-24 PROCEDURE — G8997 SWALLOW GOAL STATUS: HCPCS

## 2017-08-24 RX ORDER — WARFARIN SODIUM 1 MG/1
1 TABLET ORAL
Status: COMPLETED | OUTPATIENT
Start: 2017-08-24 | End: 2017-08-24

## 2017-08-24 RX ORDER — DIGOXIN 125 MCG
125 TABLET ORAL
Status: DISCONTINUED | OUTPATIENT
Start: 2017-08-24 | End: 2017-08-25 | Stop reason: HOSPADM

## 2017-08-24 RX ORDER — DEXTROMETHORPHAN POLISTIREX 30 MG/5ML
30 SUSPENSION ORAL 2 TIMES DAILY PRN
Status: DISCONTINUED | OUTPATIENT
Start: 2017-08-24 | End: 2017-08-25 | Stop reason: HOSPADM

## 2017-08-24 RX ADMIN — METOPROLOL SUCCINATE 50 MG: 50 TABLET, FILM COATED, EXTENDED RELEASE ORAL at 08:50

## 2017-08-24 RX ADMIN — Medication 1 CAPSULE: at 08:53

## 2017-08-24 RX ADMIN — LOSARTAN POTASSIUM 50 MG: 50 TABLET, FILM COATED ORAL at 08:50

## 2017-08-24 RX ADMIN — HYDROCHLOROTHIAZIDE 12.5 MG: 12.5 CAPSULE, GELATIN COATED ORAL at 08:50

## 2017-08-24 RX ADMIN — WARFARIN SODIUM 1 MG: 1 TABLET ORAL at 18:05

## 2017-08-24 RX ADMIN — METOPROLOL SUCCINATE 50 MG: 50 TABLET, FILM COATED, EXTENDED RELEASE ORAL at 20:52

## 2017-08-24 RX ADMIN — DIGOXIN 125 MCG: 0.12 TABLET ORAL at 12:49

## 2017-08-24 RX ADMIN — DIGOXIN 250 MCG: 0.25 INJECTION INTRAMUSCULAR; INTRAVENOUS at 01:35

## 2017-08-25 VITALS
BODY MASS INDEX: 27.71 KG/M2 | DIASTOLIC BLOOD PRESSURE: 93 MMHG | SYSTOLIC BLOOD PRESSURE: 157 MMHG | TEMPERATURE: 97.6 F | RESPIRATION RATE: 18 BRPM | OXYGEN SATURATION: 92 % | WEIGHT: 166.31 LBS | HEART RATE: 91 BPM | HEIGHT: 65 IN

## 2017-08-25 LAB
ANION GAP SERPL CALCULATED.3IONS-SCNC: 13.2 MMOL/L
BUN BLD-MCNC: 10 MG/DL (ref 8–23)
BUN/CREAT SERPL: 11 (ref 7–25)
CALCIUM SPEC-SCNC: 9 MG/DL (ref 8.6–10.5)
CHLORIDE SERPL-SCNC: 98 MMOL/L (ref 98–107)
CO2 SERPL-SCNC: 26.8 MMOL/L (ref 22–29)
CREAT BLD-MCNC: 0.91 MG/DL (ref 0.57–1)
GFR SERPL CREATININE-BSD FRML MDRD: 59 ML/MIN/1.73
GLUCOSE BLD-MCNC: 89 MG/DL (ref 65–99)
INR PPP: 1.93 (ref 0.9–1.1)
POTASSIUM BLD-SCNC: 3.4 MMOL/L (ref 3.5–5.2)
PROTHROMBIN TIME: 21.4 SECONDS (ref 11.7–14.2)
SODIUM BLD-SCNC: 138 MMOL/L (ref 136–145)

## 2017-08-25 PROCEDURE — 80048 BASIC METABOLIC PNL TOTAL CA: CPT | Performed by: NURSE PRACTITIONER

## 2017-08-25 PROCEDURE — 99238 HOSP IP/OBS DSCHRG MGMT 30/<: CPT | Performed by: INTERNAL MEDICINE

## 2017-08-25 PROCEDURE — G8997 SWALLOW GOAL STATUS: HCPCS

## 2017-08-25 PROCEDURE — G8998 SWALLOW D/C STATUS: HCPCS

## 2017-08-25 PROCEDURE — 92526 ORAL FUNCTION THERAPY: CPT

## 2017-08-25 PROCEDURE — G8996 SWALLOW CURRENT STATUS: HCPCS

## 2017-08-25 PROCEDURE — 85610 PROTHROMBIN TIME: CPT | Performed by: NURSE PRACTITIONER

## 2017-08-25 RX ORDER — DIGOXIN 125 MCG
125 TABLET ORAL
Qty: 30 TABLET | Refills: 11 | Status: SHIPPED | OUTPATIENT
Start: 2017-08-25 | End: 2018-07-24 | Stop reason: SDUPTHER

## 2017-08-25 RX ORDER — METOPROLOL SUCCINATE 50 MG/1
50 TABLET, EXTENDED RELEASE ORAL 2 TIMES DAILY
Qty: 90 TABLET | Refills: 1 | Status: SHIPPED | OUTPATIENT
Start: 2017-08-25 | End: 2017-08-31 | Stop reason: SDUPTHER

## 2017-08-25 RX ORDER — WARFARIN SODIUM 1 MG/1
1 TABLET ORAL
Qty: 30 TABLET | Refills: 3 | Status: SHIPPED | OUTPATIENT
Start: 2017-08-25 | End: 2018-02-27 | Stop reason: SDUPTHER

## 2017-08-25 RX ADMIN — METOPROLOL SUCCINATE 50 MG: 50 TABLET, FILM COATED, EXTENDED RELEASE ORAL at 08:20

## 2017-08-25 RX ADMIN — HYDROCHLOROTHIAZIDE 12.5 MG: 12.5 CAPSULE, GELATIN COATED ORAL at 08:20

## 2017-08-25 RX ADMIN — Medication 1 CAPSULE: at 08:21

## 2017-08-25 RX ADMIN — LOSARTAN POTASSIUM 50 MG: 50 TABLET, FILM COATED ORAL at 08:20

## 2017-08-28 ENCOUNTER — PATIENT OUTREACH (OUTPATIENT)
Dept: CASE MANAGEMENT | Facility: OTHER | Age: 82
End: 2017-08-28

## 2017-08-28 ENCOUNTER — EPISODE CHANGES (OUTPATIENT)
Dept: CASE MANAGEMENT | Facility: OTHER | Age: 82
End: 2017-08-28

## 2017-08-28 ENCOUNTER — DOCUMENTATION (OUTPATIENT)
Dept: CASE MANAGEMENT | Facility: OTHER | Age: 82
End: 2017-08-28

## 2017-08-28 ENCOUNTER — TELEPHONE (OUTPATIENT)
Dept: CARDIOLOGY | Facility: CLINIC | Age: 82
End: 2017-08-28

## 2017-08-28 NOTE — TELEPHONE ENCOUNTER
Pt's daughter, Laura, called and said her mother wants to know if it's ok with you, to take the antibiotic Doxycycline, 100 mg BID?  She was in the hospital last Friday and Dr. Lema has prescribed this for her.      Thanks,  Shu

## 2017-08-28 NOTE — OUTREACH NOTE
Complaints of weakness and fatigue.  [facility] will schedule appointment with Cathy Jin for this Friday as advised upon hospital discharge instructions.  INR this morning 1.6 and she has placed a call to cardiology office for instructions with Coumadin dosing.  CA arranged follow-up appt. With Dr. Pita Ndiaye for 9/11/17 at PM which patient is aware and agreeable.  States daughter assists with transport needs to appointments.  Continues with coughing and review of medications from discharge and Doxcycline absent from list and informed to administer as prescribed by Dr. Lema on 8/22 for bronchitis.

## 2017-08-29 ENCOUNTER — TELEPHONE (OUTPATIENT)
Dept: INTERNAL MEDICINE | Facility: CLINIC | Age: 82
End: 2017-08-29

## 2017-08-29 RX ORDER — BENZONATATE 200 MG/1
200 CAPSULE ORAL 3 TIMES DAILY PRN
Qty: 30 CAPSULE | Refills: 0 | Status: SHIPPED | OUTPATIENT
Start: 2017-08-29 | End: 2017-09-20 | Stop reason: SDUPTHER

## 2017-08-29 NOTE — TELEPHONE ENCOUNTER
Medicine you gave her for cough isn't helping can you send in something else? CC    I called in Tessalon Perrless.  I would like to see in f/u.  SLW    Patient advised. CLC

## 2017-08-31 RX ORDER — METOPROLOL SUCCINATE 50 MG/1
50 TABLET, EXTENDED RELEASE ORAL 2 TIMES DAILY
Qty: 60 TABLET | Refills: 1 | Status: SHIPPED | OUTPATIENT
Start: 2017-08-31 | End: 2017-09-01 | Stop reason: SDUPTHER

## 2017-09-01 ENCOUNTER — OFFICE VISIT (OUTPATIENT)
Dept: CARDIOLOGY | Facility: CLINIC | Age: 82
End: 2017-09-01

## 2017-09-01 VITALS
HEIGHT: 65 IN | SYSTOLIC BLOOD PRESSURE: 130 MMHG | BODY MASS INDEX: 27.82 KG/M2 | WEIGHT: 167 LBS | DIASTOLIC BLOOD PRESSURE: 70 MMHG | HEART RATE: 109 BPM

## 2017-09-01 DIAGNOSIS — I50.9 ACUTE HEART FAILURE, UNSPECIFIED HEART FAILURE TYPE (HCC): ICD-10-CM

## 2017-09-01 DIAGNOSIS — R05.9 COUGH: ICD-10-CM

## 2017-09-01 DIAGNOSIS — I48.91 ATRIAL FIBRILLATION WITH RVR (HCC): Primary | ICD-10-CM

## 2017-09-01 PROCEDURE — 93000 ELECTROCARDIOGRAM COMPLETE: CPT | Performed by: NURSE PRACTITIONER

## 2017-09-01 PROCEDURE — 99214 OFFICE O/P EST MOD 30 MIN: CPT | Performed by: NURSE PRACTITIONER

## 2017-09-01 RX ORDER — METOPROLOL SUCCINATE 50 MG/1
TABLET, EXTENDED RELEASE ORAL
Qty: 60 TABLET | Refills: 1
Start: 2017-09-01 | End: 2017-09-26 | Stop reason: SDUPTHER

## 2017-09-01 RX ORDER — FUROSEMIDE 20 MG/1
20 TABLET ORAL DAILY
Qty: 30 TABLET | Refills: 2 | Status: SHIPPED | OUTPATIENT
Start: 2017-09-01 | End: 2017-12-26 | Stop reason: SDUPTHER

## 2017-09-01 RX ORDER — DOXYCYCLINE HYCLATE 100 MG
100 TABLET ORAL 2 TIMES DAILY
COMMUNITY
End: 2017-09-28 | Stop reason: ALTCHOICE

## 2017-09-01 RX ORDER — POTASSIUM CHLORIDE 750 MG/1
10 TABLET, FILM COATED, EXTENDED RELEASE ORAL DAILY
Qty: 30 TABLET | Refills: 11 | Status: SHIPPED | OUTPATIENT
Start: 2017-09-01 | End: 2018-09-04 | Stop reason: SDUPTHER

## 2017-09-01 NOTE — PROGRESS NOTES
"Date of Office Visit: 2017  Encounter Provider: FAM Adkins  Place of Service: [facility] CARDIOLOGY  Patient Name: Citlali Solorio  :1935    Chief Complaint   Patient presents with   • Atrial Fibrillation   :     HPI: Citlali Solorio is a 82 y.o. female comes in today for follow up.Regularly follows with Dr. Apple. I am seeing the patient for the first time today and have reviewed their records.     She has a history of paroxysmal atrial fibrillation (warfarin),hypotension, diastolic heart failure, hyperlipidemia.    2017, she presented to the [facility]  For A. fib with RVR.  Her rate was controlled after doubling her beta blocker and adding digoxin.    She comes in for follow-up today.  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  During her hospital stay, she had a substantial cough.  This was investigated with a swallow study and was ruled out for aspiration.  She was seen by pulmonary.  Today she comes in still complaining that her cough is there.  Actually a little bit worse.  She does not have orthopnea.  She does not have a cough at night, she is able to get some sleep.  She is still taking doxycycline.  Her heart rate is still elevated.  She denies any edema or dizziness.      Past Medical History:   Diagnosis Date   • Abnormal gait     \"frozen gait\"  Seen at [facility] with full work up.   • Anxiety    • Ataxic gait    • Atrial fibrillation    • Carotid artery stenosis     plaque on screening last done 3/13,    • Diastolic congestive heart failure     addmission 2013   • Dysuria    • Esophageal reflux    • H/O colonoscopy     normal 2012   • History of arthritis    • History of dysuria    • History of vitamin B deficiency    • Hypercholesterolemia     Patient tried Lipitor, Crestor, Zocor, Bacol and Livalo in the past and was intolerant of all of them.   • Hypertension    • IFG " (impaired fasting glucose)    • Left shoulder pain    • Lumbar canal stenosis    • Transient cerebral ischemia     versus migraine in 11/11   • Vitamin B12 deficiency     told at [facility] that B12 was low.  Monthly shots recommended 5/5/15.       Past Surgical History:   Procedure Laterality Date   • BLADDER SURGERY     • CATARACT EXTRACTION     • HYSTERECTOMY     • KNEE ARTHROSCOPY Right 6/15/2017    Procedure: RT KNEE ARTHROSCOPIC PARTIAL MEDIAL AND LATERAL MENISECTOMY AND SYNOVECTOMY;  Surgeon: Sam Soni MD;  [facility] [facility];  Service:    • KNEE SURGERY Left     Knee Replacement           Review of Systems   Constitution: Negative for fever and malaise/fatigue.   HENT: Negative for ear pain, hearing loss, nosebleeds and sore throat.    Eyes: Negative for double vision, pain, vision loss in left eye, vision loss in right eye and visual disturbance.   Cardiovascular: Negative for chest pain, claudication, leg swelling, palpitations and syncope.   Respiratory: Positive for cough. Negative for shortness of breath, snoring and wheezing.    Endocrine: Negative for cold intolerance, heat intolerance and polyuria.   Skin: Negative for color change, itching and rash.   Musculoskeletal: Negative for joint pain, joint swelling and muscle cramps.   Gastrointestinal: Negative for abdominal pain, diarrhea, melena, nausea and vomiting.   Genitourinary: Negative for bladder incontinence and hematuria.   Neurological: Negative for excessive daytime sleepiness, dizziness, light-headedness, paresthesias and seizures.   Psychiatric/Behavioral: Negative for depression. The patient is not nervous/anxious.    All other systems reviewed and are negative.    All other systems reviewed and are negative    Allergies   Allergen Reactions   • Atorvastatin    • Penicillins Itching   • Pitavastatin    • Rosuvastatin    • Simvastatin        All aspects of family and social history reviewed.          Objective:     Vitals:     "09/01/17 1345   BP: 130/70   BP Location: Right arm   Pulse: 109   Weight: 167 lb (75.8 kg)   Height: 65\" (165.1 cm)     Body mass index is 27.79 kg/(m^2).    PHYSICAL EXAM:  Physical Exam   Constitutional: She is oriented to person, place, and time. She appears well-developed and well-nourished.   HENT:   Head: Normocephalic and atraumatic.   Neck: Neck supple. No JVD present.   Cardiovascular: Normal rate, regular rhythm, normal heart sounds and intact distal pulses.    Pulses:       Carotid pulses are 2+ on the right side, and 2+ on the left side.       Radial pulses are 2+ on the right side, and 2+ on the left side.        Dorsalis pedis pulses are 2+ on the right side, and 2+ on the left side.   Pulmonary/Chest: Effort normal and breath sounds normal. No accessory muscle usage. No respiratory distress. She has no rales.   Abdominal: Soft. Normal appearance and bowel sounds are normal. There is no tenderness.   Musculoskeletal: Normal range of motion. She exhibits no edema.   Neurological: She is alert and oriented to person, place, and time.   Skin: Skin is warm, dry and intact. She is not diaphoretic.   Psychiatric: She has a normal mood and affect. Her speech is normal and behavior is normal. Judgment and thought content normal. Cognition and memory are normal.         ECG 12 Lead  Date/Time: 9/1/2017 2:23 PM  Performed by: MADELINE SHANE  Authorized by: MADELINE SHANE   Comparison: compared with previous ECG from 8/22/2017  Similar to previous ECG  Rhythm: atrial fibrillation  Rate: tachycardic  BPM: 109  Conduction: conduction normal  ST Segments: ST segments normal  T Waves: T waves normal  QRS axis: normal  Clinical impression: abnormal ECG  Comments: Indication: afib                Assessment:       Diagnosis Plan   1. Atrial fibrillation with RVR     2. Acute heart failure, unspecified heart failure type     3. Cough          Orders Placed This Encounter   Procedures   • ECG 12 Lead     This order " was created via procedure documentation       Current Outpatient Prescriptions   Medication Sig Dispense Refill   • benzonatate (TESSALON) 200 MG capsule Take 1 capsule by mouth 3 (Three) Times a Day As Needed for Cough. 30 capsule 0   • Cholecalciferol (VITAMIN D PO) Take 1,000 mg by mouth Daily.     • digoxin (LANOXIN) 125 MCG tablet Take 1 tablet by mouth Daily. 30 tablet 11   • doxycycline (VIBRAMYICN) 100 MG tablet Take 100 mg by mouth 2 (Two) Times a Day.     • losartan-hydrochlorothiazide (HYZAAR) 50-12.5 MG per tablet Take 1 tablet by mouth Daily. (Patient taking differently: Take 1 tablet by mouth Every Morning. Take 1/2 tablet daily) 90 tablet 3   • metoprolol succinate XL (TOPROL-XL) 50 MG 24 hr tablet 75 mg bid 60 tablet 1   • Probiotic Product (PROBIOTIC PO) Take  by mouth.     • vitamin B-12 (CYANOCOBALAMIN) 100 MCG tablet Take 50 mcg by mouth Daily.     • warfarin (COUMADIN) 1 MG tablet Take 1 tablet by mouth Daily. Take one tablet by mouth daily or as directed 30 tablet 3   • furosemide (LASIX) 20 MG tablet Take 1 tablet by mouth Daily. 30 tablet 2   • potassium chloride (K-DUR) 10 MEQ CR tablet Take 1 tablet by mouth Daily. 30 tablet 11     Current Facility-Administered Medications   Medication Dose Route Frequency Provider Last Rate Last Dose   • nitroglycerin (NITROSTAT) SL tablet 0.4 mg  0.4 mg Sublingual Q5 Min PRN FAM Lopez       • sodium chloride 0.9 % flush 10 mL  10 mL Intravenous PRN FAM Lopez                Plan:       1. Atrial Fibrillation and Atrial Flutter  Assessment  • The patient has paroxysmal atrial fibrillation  • The patient's CHADS2-VASc score is 4  • A [id number] score of 2 or more is considered a high risk for a thromboembolic event  • Warfarin prescribed    Plan  • Attempt to maintain sinus rhythm  • Continue warfarin for antithrombotic therapy, bleeding issues discussed  • Continue beta blocker for rhythm control    Continues in atrial fibrillation  with a rapid rate.  I'm and increase her metoprolol to 75 mg twice a day.    2.  Diastolic heart failure/cough-patient still having a cough.  Rales in her lower lungs.  She did not go home on diuretic and after reviewing the notes, it seems it was some improved on a diuretic.  I I'm going to try to start furosemide 20 mg daily with 10 mEq of potassium.  The patient follows up with her primary care physician next week.  If her cough is improved on the furosemide, she can continue this.  If not improved, it is okay to stop after she sees her PCP next week.  This was discussed with the patient's daughter.      Follow up in office in 3 weeks    As always, it has been a pleasure to participate in this patient's care.      Sincerely,      FAM Adkins

## 2017-09-05 ENCOUNTER — TELEPHONE (OUTPATIENT)
Dept: CARDIOLOGY | Facility: CLINIC | Age: 82
End: 2017-09-05

## 2017-09-05 NOTE — TELEPHONE ENCOUNTER
Pt called because she is feeling very sick. She has been put on a lot of meds recently she says. She would like to discuss with you........Dacia

## 2017-09-06 ENCOUNTER — OFFICE VISIT (OUTPATIENT)
Dept: INTERNAL MEDICINE | Facility: CLINIC | Age: 82
End: 2017-09-06

## 2017-09-06 VITALS
SYSTOLIC BLOOD PRESSURE: 90 MMHG | OXYGEN SATURATION: 98 % | WEIGHT: 167 LBS | HEIGHT: 65 IN | BODY MASS INDEX: 27.82 KG/M2 | DIASTOLIC BLOOD PRESSURE: 60 MMHG | HEART RATE: 90 BPM

## 2017-09-06 DIAGNOSIS — I50.9 ACUTE HEART FAILURE, UNSPECIFIED HEART FAILURE TYPE (HCC): ICD-10-CM

## 2017-09-06 DIAGNOSIS — R05.3 PERSISTENT COUGH: Primary | ICD-10-CM

## 2017-09-06 PROCEDURE — 99213 OFFICE O/P EST LOW 20 MIN: CPT | Performed by: INTERNAL MEDICINE

## 2017-09-06 RX ORDER — CETIRIZINE HYDROCHLORIDE 10 MG/1
10 TABLET ORAL DAILY
Qty: 89 TABLET | Refills: 3
Start: 2017-09-06 | End: 2017-09-28 | Stop reason: ALTCHOICE

## 2017-09-06 NOTE — PROGRESS NOTES
Subjective     Citlali ARIANA Solorio is a 82 y.o. female who presents with   Chief Complaint   Patient presents with   • Cough     Hospital Follow up       History of Present Illness     Patient with persistent cough for three weeks.  At last OV she was admitted to the hospital with CHF exacerbation.  Cough has persisted throughout.  No fever.  No swelling.  Breathing fine.  Cough is associated with talking but not eating.  Nose is running more.  She saw pulmonary while in the hospital.  They suspected aspiration.  She received speech evaluation while in the hospital.     Review of Systems   Respiratory: Positive for cough. Negative for shortness of breath and wheezing.    Cardiovascular: Negative for leg swelling.       The following portions of the patient's history were reviewed and updated as appropriate: allergies, current medications and problem list.    Patient Active Problem List    Diagnosis Date Noted   • Cough 09/01/2017   • Atrial fibrillation with RVR 08/24/2017   • Tear of medial meniscus of right knee, current 06/15/2017   • Mitral valve insufficiency 07/06/2016   • Tricuspid valve insufficiency 07/06/2016   • Stress incontinence 05/23/2016   • Progressive supranuclear palsy 04/21/2016   • Anxiety 04/21/2016   • Atherosclerosis of both carotid arteries 04/21/2016     Note Last Updated: 5/31/2017     Description: plaque on screening last done 3/13, 11/13, 10/16     • Heart failure 04/21/2016     Note Last Updated: 4/21/2016     Description: admission 7/2013     • Gastroesophageal reflux disease 04/21/2016   • Hyperlipidemia 04/21/2016     Note Last Updated: 4/21/2016     Description: Patient tried Lipitor, Crestor, Zocor, Bacol and Livalo in the past and was intolerant of all of them.     • Impaired fasting glucose 04/21/2016   • Spinal stenosis of lumbar region 04/21/2016   • Cobalamin deficiency 04/21/2016     Note Last Updated: 5/31/2017     Description: told at [facility] that B12 was low.  Monthly  "shots recommended 5/5/15.  Now on oral replacement.      • Atrial fibrillation 06/24/2013   • Benign essential hypertension 06/24/2013       Current Outpatient Prescriptions on File Prior to Visit   Medication Sig Dispense Refill   • Cholecalciferol (VITAMIN D PO) Take 1,000 mg by mouth Daily.     • digoxin (LANOXIN) 125 MCG tablet Take 1 tablet by mouth Daily. 30 tablet 11   • doxycycline (VIBRAMYICN) 100 MG tablet Take 100 mg by mouth 2 (Two) Times a Day.     • furosemide (LASIX) 20 MG tablet Take 1 tablet by mouth Daily. 30 tablet 2   • losartan-hydrochlorothiazide (HYZAAR) 50-12.5 MG per tablet Take 1 tablet by mouth Daily. (Patient taking differently: Take 1 tablet by mouth Every Morning. Take 1/2 tablet daily) 90 tablet 3   • metoprolol succinate XL (TOPROL-XL) 50 MG 24 hr tablet 75 mg bid 60 tablet 1   • potassium chloride (K-DUR) 10 MEQ CR tablet Take 1 tablet by mouth Daily. 30 tablet 11   • Probiotic Product (PROBIOTIC PO) Take  by mouth.     • vitamin B-12 (CYANOCOBALAMIN) 100 MCG tablet Take 50 mcg by mouth Daily.     • warfarin (COUMADIN) 1 MG tablet Take 1 tablet by mouth Daily. Take one tablet by mouth daily or as directed 30 tablet 3   • benzonatate (TESSALON) 200 MG capsule Take 1 capsule by mouth 3 (Three) Times a Day As Needed for Cough. 30 capsule 0     Current Facility-Administered Medications on File Prior to Visit   Medication Dose Route Frequency Provider Last Rate Last Dose   • nitroglycerin (NITROSTAT) SL tablet 0.4 mg  0.4 mg Sublingual Q5 Min PRN Jelena Hernandez APRN       • sodium chloride 0.9 % flush 10 mL  10 mL Intravenous PRN Jelena Hernandez APRN           Objective     BP 90/60  Pulse 90  Ht 65\" (165.1 cm)  Wt 167 lb (75.8 kg)  SpO2 98%  BMI 27.79 kg/m2    Physical Exam   Constitutional: She is oriented to person, place, and time. She appears well-developed and well-nourished.   HENT:   Head: Normocephalic and atraumatic.   Right Ear: Hearing and tympanic membrane normal. "   Left Ear: Hearing and tympanic membrane normal.   Mouth/Throat: No oropharyngeal exudate or posterior oropharyngeal erythema.   Cardiovascular: Normal rate, regular rhythm and normal heart sounds.    Pulmonary/Chest: Effort normal and breath sounds normal.   Neurological: She is alert and oriented to person, place, and time.   Skin: Skin is warm and dry.   Psychiatric: She has a normal mood and affect. Her behavior is normal.       Assessment/Plan   Citlali was seen today for cough.    Diagnoses and all orders for this visit:    Persistent cough  -     CBC & Differential  -     Comprehensive Metabolic Panel  -     Digoxin Level    Acute heart failure, unspecified heart failure type  -     CBC & Differential  -     Comprehensive Metabolic Panel  -     Digoxin Level    Other orders  -     cetirizine (zyrTEC) 10 MG tablet; Take 1 tablet by mouth Daily.        Discussion  Patient presents in f/u of persistent cough.  They think allergies could be contributing.  Add OTC Zyrtec.  Let me know if not feeling better over the next 7 days or if there is any change in symptoms.  Continue current recommendations from speech therapy.  Refer back to pulmonary if cough persists.          Future Appointments  Date Time Provider Department Center   9/25/2017 2:30 PM MD ERMA Reddy [facility] None   11/14/2017 3:00 PM MD THIAGO [facility]K [facility] None

## 2017-09-07 ENCOUNTER — TELEPHONE (OUTPATIENT)
Dept: INTERNAL MEDICINE | Facility: CLINIC | Age: 82
End: 2017-09-07

## 2017-09-07 LAB
ALBUMIN SERPL-MCNC: 3.8 G/DL (ref 3.5–4.7)
ALBUMIN/GLOB SERPL: 1.2 {RATIO} (ref 1.2–2.2)
ALP SERPL-CCNC: 67 IU/L (ref 39–117)
ALT SERPL-CCNC: 15 IU/L (ref 0–32)
AST SERPL-CCNC: 18 IU/L (ref 0–40)
BASOPHILS # BLD AUTO: 0 X10E3/UL (ref 0–0.2)
BASOPHILS NFR BLD AUTO: 0 %
BILIRUB SERPL-MCNC: 0.6 MG/DL (ref 0–1.2)
BUN SERPL-MCNC: 26 MG/DL (ref 8–27)
BUN/CREAT SERPL: 25 (ref 12–28)
CALCIUM SERPL-MCNC: 9.7 MG/DL (ref 8.7–10.3)
CHLORIDE SERPL-SCNC: 98 MMOL/L (ref 96–106)
CO2 SERPL-SCNC: 25 MMOL/L (ref 18–29)
CREAT SERPL-MCNC: 1.03 MG/DL (ref 0.57–1)
DIGOXIN SERPL-MCNC: 0.7 NG/ML (ref 0.5–0.9)
EOSINOPHIL # BLD AUTO: 0.2 X10E3/UL (ref 0–0.4)
EOSINOPHIL NFR BLD AUTO: 2 %
ERYTHROCYTE [DISTWIDTH] IN BLOOD BY AUTOMATED COUNT: 13.2 % (ref 12.3–15.4)
GLOBULIN SER CALC-MCNC: 3.1 G/DL (ref 1.5–4.5)
GLUCOSE SERPL-MCNC: 104 MG/DL (ref 65–99)
HCT VFR BLD AUTO: 40.4 % (ref 34–46.6)
HGB BLD-MCNC: 13.8 G/DL (ref 11.1–15.9)
IMM GRANULOCYTES # BLD: 0 X10E3/UL (ref 0–0.1)
IMM GRANULOCYTES NFR BLD: 0 %
LYMPHOCYTES # BLD AUTO: 2.2 X10E3/UL (ref 0.7–3.1)
LYMPHOCYTES NFR BLD AUTO: 32 %
MCH RBC QN AUTO: 31.3 PG (ref 26.6–33)
MCHC RBC AUTO-ENTMCNC: 34.2 G/DL (ref 31.5–35.7)
MCV RBC AUTO: 92 FL (ref 79–97)
MONOCYTES # BLD AUTO: 0.7 X10E3/UL (ref 0.1–0.9)
MONOCYTES NFR BLD AUTO: 10 %
NEUTROPHILS # BLD AUTO: 3.7 X10E3/UL (ref 1.4–7)
NEUTROPHILS NFR BLD AUTO: 56 %
PLATELET # BLD AUTO: 323 X10E3/UL (ref 150–379)
POTASSIUM SERPL-SCNC: 4 MMOL/L (ref 3.5–5.2)
PROT SERPL-MCNC: 6.9 G/DL (ref 6–8.5)
RBC # BLD AUTO: 4.41 X10E6/UL (ref 3.77–5.28)
SODIUM SERPL-SCNC: 140 MMOL/L (ref 134–144)
WBC # BLD AUTO: 6.8 X10E3/UL (ref 3.4–10.8)

## 2017-09-07 NOTE — TELEPHONE ENCOUNTER
Patient was seen yesterday by Dr. Lema for a hosptial follow up. Patient daughter called Laura and stated that her and her mother forgot to tell Dr. Lema if she could order an x-ray because a fluid around her lungs and afib per daughter. Can Dr. Lema order this for her?    Best call back number for Laura is [phone]    OK to put in order for the hospital or here. Dx CHF.  SLW

## 2017-09-08 ENCOUNTER — HOSPITAL ENCOUNTER (OUTPATIENT)
Dept: GENERAL RADIOLOGY | Facility: HOSPITAL | Age: 82
Discharge: HOME | End: 2017-09-08
Admitting: INTERNAL MEDICINE

## 2017-09-08 DIAGNOSIS — I50.9 ACUTE HEART FAILURE, UNSPECIFIED HEART FAILURE TYPE (HCC): Primary | ICD-10-CM

## 2017-09-08 PROCEDURE — 71020 HC CHEST PA AND LATERAL: CPT

## 2017-09-20 RX ORDER — BENZONATATE 200 MG/1
CAPSULE ORAL
Qty: 30 CAPSULE | Refills: 0 | Status: SHIPPED | OUTPATIENT
Start: 2017-09-20 | End: 2017-11-20

## 2017-09-21 ENCOUNTER — TELEPHONE (OUTPATIENT)
Dept: CARDIOLOGY | Facility: CLINIC | Age: 82
End: 2017-09-21

## 2017-09-21 NOTE — TELEPHONE ENCOUNTER
----- Message from Shu Escoto MA sent at 9/21/2017  9:58 AM EDT -----  Regarding: RE: INR  1 week.    Thanks!  ----- Message -----     From: Dacia Tovar MA     Sent: 9/21/2017   9:49 AM       To: Shu Escoto MA  Subject: INR                                              Pt called and said she did not catch when she is to re-check INR when you called the other day. You can call her or just message me back and I will let her know. Thanks.......Dacia

## 2017-09-26 RX ORDER — METOPROLOL SUCCINATE 50 MG/1
TABLET, EXTENDED RELEASE ORAL
Qty: 90 TABLET | Refills: 2 | Status: SHIPPED | OUTPATIENT
Start: 2017-09-26 | End: 2018-02-20 | Stop reason: SDUPTHER

## 2017-09-28 ENCOUNTER — OFFICE VISIT (OUTPATIENT)
Dept: CARDIOLOGY | Facility: CLINIC | Age: 82
End: 2017-09-28

## 2017-09-28 VITALS
WEIGHT: 167 LBS | OXYGEN SATURATION: 97 % | BODY MASS INDEX: 27.82 KG/M2 | SYSTOLIC BLOOD PRESSURE: 100 MMHG | HEIGHT: 65 IN | HEART RATE: 77 BPM | DIASTOLIC BLOOD PRESSURE: 70 MMHG

## 2017-09-28 DIAGNOSIS — I50.9 ACUTE HEART FAILURE, UNSPECIFIED HEART FAILURE TYPE (HCC): Primary | ICD-10-CM

## 2017-09-28 DIAGNOSIS — I48.20 CHRONIC ATRIAL FIBRILLATION (HCC): ICD-10-CM

## 2017-09-28 DIAGNOSIS — I34.0 NON-RHEUMATIC MITRAL REGURGITATION: ICD-10-CM

## 2017-09-28 PROCEDURE — 99214 OFFICE O/P EST MOD 30 MIN: CPT | Performed by: INTERNAL MEDICINE

## 2017-09-28 NOTE — PROGRESS NOTES
Subjective:     Encounter Date:09/28/2017      Patient ID: Citlali Solorio is a 82 y.o. female.    Chief Complaint:  Atrial Fibrillation   Presents for follow-up visit. Symptoms are negative for chest pain, dizziness, hypertension, hypotension, palpitations, shortness of breath and syncope. The symptoms have been stable. Past medical history includes atrial fibrillation.   Cough   This is a recurrent problem. The current episode started more than 1 month ago. The problem has been gradually improving. Pertinent negatives include no chest pain or shortness of breath.       82 year old female who presents today for reevaluation.  Patient had a cough that has significantly improved.  She was in heart failure with atrial fibrillation and a rapid rate.  Her heart rate is significantly improved today which has helped her cough.  She says however it has persisted and there are nights where she is up all night coughing.    Review of Systems   Cardiovascular: Negative for chest pain, palpitations and syncope.   Respiratory: Positive for cough. Negative for shortness of breath.    Neurological: Negative for dizziness.       Procedures       Objective:     Physical Exam   Constitutional: She is oriented to person, place, and time. She appears well-developed and well-nourished.   HENT:   Head: Normocephalic and atraumatic.   Eyes: Conjunctivae are normal.   Neck: Normal range of motion. Neck supple. No JVD present.   Cardiovascular: Normal rate, normal heart sounds and intact distal pulses.  An irregularly irregular rhythm present.   Pulmonary/Chest: Effort normal and breath sounds normal. No accessory muscle usage. No respiratory distress. She has no rales.   Abdominal: Soft. Normal appearance and bowel sounds are normal. There is no tenderness.   Musculoskeletal: Normal range of motion. She exhibits no edema.   Neurological: She is alert and oriented to person, place, and time.   Skin: Skin is warm, dry and intact. She is  not diaphoretic.   Psychiatric: She has a normal mood and affect. Her speech is normal and behavior is normal. Judgment and thought content normal. Cognition and memory are normal.   Vitals reviewed.      Lab Review:       Assessment:         No diagnosis found.       Plan:       1.  Atrial fibrillation.  Heart rate is significantly improved.  2.  Cough probably from fluid.  It is improved with her diuretics.  She has not taken them consistently and I told her to.  One other concern is the ARB could be causing it.  I told her to hold it for a week take her Lasix and potassium daily.  We will see if it changes are cough and they're supposed to report back.  3.  Blood pressures good  4.  Follow-up 3 months sooner if worsening issues.        Atrial Fibrillation and Atrial Flutter  Assessment  • The patient has paroxysmal atrial fibrillation  • The patient's CHADS2-VASc score is 4  • A [id number] score of 2 or more is considered a high risk for a thromboembolic event  • Warfarin prescribed    Plan  • Attempt to maintain sinus rhythm  • Continue warfarin for antithrombotic therapy, bleeding issues discussed  • Continue beta blocker for rhythm control

## 2017-10-04 ENCOUNTER — TELEPHONE (OUTPATIENT)
Dept: CARDIOLOGY | Facility: CLINIC | Age: 82
End: 2017-10-04

## 2017-10-04 ENCOUNTER — OFFICE VISIT (OUTPATIENT)
Dept: CARDIOLOGY | Facility: CLINIC | Age: 82
End: 2017-10-04

## 2017-10-04 VITALS
DIASTOLIC BLOOD PRESSURE: 98 MMHG | WEIGHT: 176 LBS | HEART RATE: 87 BPM | BODY MASS INDEX: 29.32 KG/M2 | HEIGHT: 65 IN | SYSTOLIC BLOOD PRESSURE: 140 MMHG

## 2017-10-04 DIAGNOSIS — I48.20 CHRONIC ATRIAL FIBRILLATION (HCC): Primary | ICD-10-CM

## 2017-10-04 DIAGNOSIS — I50.33 ACUTE ON CHRONIC DIASTOLIC HEART FAILURE (HCC): ICD-10-CM

## 2017-10-04 PROCEDURE — 99215 OFFICE O/P EST HI 40 MIN: CPT | Performed by: INTERNAL MEDICINE

## 2017-10-04 PROCEDURE — 93000 ELECTROCARDIOGRAM COMPLETE: CPT | Performed by: INTERNAL MEDICINE

## 2017-10-04 RX ORDER — HYDRALAZINE HYDROCHLORIDE 25 MG/1
25 TABLET, FILM COATED ORAL 2 TIMES DAILY
Qty: 60 TABLET | Refills: 6 | Status: SHIPPED | OUTPATIENT
Start: 2017-10-04 | End: 2018-05-22 | Stop reason: SDUPTHER

## 2017-10-04 NOTE — TELEPHONE ENCOUNTER
Pt called because she has a lot of swelling in hers legs. She was asking about needing compression stockings. Her daughter says you probably need to see her. Pt is currently on Lasix 20mg daily.....Dacia

## 2017-10-06 ENCOUNTER — TELEPHONE (OUTPATIENT)
Dept: CARDIOLOGY | Facility: CLINIC | Age: 82
End: 2017-10-06

## 2017-10-06 ENCOUNTER — PATIENT OUTREACH (OUTPATIENT)
Dept: CASE MANAGEMENT | Facility: OTHER | Age: 82
End: 2017-10-06

## 2017-10-06 DIAGNOSIS — I50.43 ACUTE ON CHRONIC COMBINED SYSTOLIC AND DIASTOLIC CONGESTIVE HEART FAILURE (HCC): Primary | ICD-10-CM

## 2017-10-06 NOTE — OUTREACH NOTE
Review of heart failure education and states has a difficulty with low sodium diet and adherent to no added salt and advised to avoid canned good products and frozen foods high in sodium.. Records daily weights and adherent to medication regimen and contacting cardiologist with weight gain as directed.   Reports two daughters are very supportive and assist with home care needs.

## 2017-10-06 NOTE — TELEPHONE ENCOUNTER
Pt left msg saying she was in the office the other day because her feet were really swollen and they've gone down a lot. The swelling isn't totally gone, but has gone down a lot.  She went ahead and took her 2 potassium pills this morning.  What do you think about that?  And she mentioned you wanted to check her potassium.      Thanks,  Shu

## 2017-10-06 NOTE — PROGRESS NOTES
Subjective:     Encounter Date:10/04/2017      Patient ID: Citlali Solorio is a 82 y.o. female.    Chief Complaint:  Atrial Fibrillation   Presents for follow-up visit. Symptoms are negative for chest pain, dizziness, hypertension, hypotension, palpitations, shortness of breath and syncope. The symptoms have been stable. Past medical history includes atrial fibrillation.   Cough   This is a recurrent problem. The current episode started more than 1 month ago. The problem has been rapidly improving. Pertinent negatives include no chest pain, rash or shortness of breath.   Leg Swelling   This is a new problem. The current episode started in the past 7 days. The problem occurs daily. The problem has been rapidly worsening. Associated symptoms include coughing, fatigue and joint swelling. Pertinent negatives include no chest pain, rash or vertigo.       82-year-old female well-known to me.  She was just seen recently and was having a persistent cough.  We changed her medications around concerned that it could've been her ARB causing the cough.  With discontinuing that her cough has steadily improved.  She however had excessive salt intake 2 nights ago.  She was eating being chicken gizzards that her  even commented are extremely salty.  She then subsequently had barbecue last night.  With this she has significant swelling her daughter contacted my office and she was worked in today emergently.    Review of Systems   Constitution: Positive for fatigue.   Cardiovascular: Negative for chest pain, palpitations and syncope.   Respiratory: Positive for cough. Negative for shortness of breath.    Skin: Negative for rash.   Musculoskeletal: Positive for joint pain and joint swelling.   Gastrointestinal: Positive for diarrhea.   Neurological: Negative for dizziness and vertigo.   All other systems reviewed and are negative.        ECG 12 Lead  Date/Time: 10/4/2017 11:19 AM  Performed by: DAVE CHASE  by: DAVE CHASE   Comparison: compared with previous ECG from 9/1/2017  Similar to previous ECG  Rhythm: atrial fibrillation  Clinical impression: abnormal ECG               Objective:     Physical Exam   Constitutional: She is oriented to person, place, and time. She appears well-developed.   HENT:   Head: Normocephalic.   Eyes: Conjunctivae are normal.   Neck: Normal range of motion.   Cardiovascular: Normal rate and normal heart sounds.  An irregularly irregular rhythm present.   Pulmonary/Chest: Breath sounds normal.   Abdominal: Soft. Bowel sounds are normal.   Musculoskeletal: Normal range of motion. She exhibits edema.   Neurological: She is alert and oriented to person, place, and time.   Skin: Skin is warm and dry.   Psychiatric: She has a normal mood and affect. Her behavior is normal.   Vitals reviewed.      Lab Review:       Assessment:          Diagnosis Plan   1. Chronic atrial fibrillation  ECG 12 Lead   2. Acute on chronic diastolic heart failure            Plan:       1.  Chronic atrial fibrillation heart rate better continue the same.  2.  Cough improving with discontinuation of ARB.  3.  Diastolic heart failure.  Massive salt intake causing lower extremity edema.  I gave her IV diuretics here in the office today as well as increased her diuretics and potassium at home.  We'll see how she responds she is to call me back on Friday.  4.  We'll check BMP next week  5.  Hypertension blood pressures a little elevated I think this is secondary to fluid overload  6.  If not improved by Friday we'll consider admission to the hospital.        Atrial Fibrillation and Atrial Flutter  Assessment  • The patient has paroxysmal atrial fibrillation  • The patient's CHADS2-VASc score is 4  • A ZQA4BQ2-QAKi score of 2 or more is considered a high risk for a thromboembolic event  • Warfarin prescribed    Plan  • Attempt to maintain sinus rhythm  • Continue warfarin for antithrombotic therapy, bleeding issues  discussed  • Continue beta blocker for rhythm control  Heart Failure  Assessment  • NYHA class III-B - There is significant limitation of physical activity. The patient is comfortable at rest, but minimal activity causes fatigue, palpitations or shortness of breath.  • Left ventricular function is normal by qualitative assessment    Plan  • The patient has received heart failure education on the following topics: dietary sodium restriction and medication instructions  • The heart failure care plan was discussed with the patient today including: up-titrating HF medications    Subjective/Objective  • Physical exam findings positive for peripheral edema.

## 2017-10-09 ENCOUNTER — LAB (OUTPATIENT)
Dept: LAB | Facility: HOSPITAL | Age: 82
End: 2017-10-09

## 2017-10-09 DIAGNOSIS — I50.43 ACUTE ON CHRONIC COMBINED SYSTOLIC AND DIASTOLIC CONGESTIVE HEART FAILURE (HCC): ICD-10-CM

## 2017-10-09 LAB
ANION GAP SERPL CALCULATED.3IONS-SCNC: 13.2 MMOL/L
BUN BLD-MCNC: 12 MG/DL (ref 8–23)
BUN/CREAT SERPL: 12.2 (ref 7–25)
CALCIUM SPEC-SCNC: 9.8 MG/DL (ref 8.6–10.5)
CHLORIDE SERPL-SCNC: 99 MMOL/L (ref 98–107)
CO2 SERPL-SCNC: 27.8 MMOL/L (ref 22–29)
CREAT BLD-MCNC: 0.98 MG/DL (ref 0.57–1)
GFR SERPL CREATININE-BSD FRML MDRD: 55 ML/MIN/1.73
GLUCOSE BLD-MCNC: 109 MG/DL (ref 65–99)
POTASSIUM BLD-SCNC: 4 MMOL/L (ref 3.5–5.2)
SODIUM BLD-SCNC: 140 MMOL/L (ref 136–145)

## 2017-10-09 PROCEDURE — 80048 BASIC METABOLIC PNL TOTAL CA: CPT

## 2017-10-09 PROCEDURE — 36415 COLL VENOUS BLD VENIPUNCTURE: CPT

## 2017-10-31 ENCOUNTER — TELEPHONE (OUTPATIENT)
Dept: CARDIOLOGY | Facility: CLINIC | Age: 82
End: 2017-10-31

## 2017-11-02 ENCOUNTER — TELEPHONE (OUTPATIENT)
Dept: CARDIOLOGY | Facility: CLINIC | Age: 82
End: 2017-11-02

## 2017-11-02 NOTE — TELEPHONE ENCOUNTER
[facility], Marina,  called. Patient was extremely short of breath last night. Her  almost took her to the ER but she felt a bit better after sitting in the recliner.  VS were not taken in the middle of the night but were taken this morning.  120/97 and HR 97 and 140/75 HR 75.  She has been having edema but patient stated she has had more in the past few days.  I asked Marina to get her weight and it was 171.  Two days ago she was 169 lbs on the same scale.  She is a CHF patient.  She did get a cortisone shot yesterday but this didn't happen with her last one.  She has been having more trouble walking.  This is the first time she has experienced SOB this severe.  She has been coughing more than usual.  Denies SOA right now while sitting.      Patient can be reached at [phone]  Marina's number [phone]    Did tell Marina to take her to the ER if symptoms worsen as they did last night.    Thanks,  Adilene

## 2017-11-02 NOTE — TELEPHONE ENCOUNTER
Called lita  Told to double lasix and kcl until  Better.  She is eating a lot of salt.  I told her to call Monday if not better and go to ER if no improvement

## 2017-11-06 NOTE — TELEPHONE ENCOUNTER
Marina called wanting to know how long her grandmother should stay on this regimen of the doubled lasix and kcl?  She said her grandmother reports and 8lbs wt loss as of yesterday and says her swelling is not as bad.    Marina said she is trying to help her organize her medicines and found that she is not taking some correctly.  She should be taking the metoprolol 1 1/2 BID and instead is taking it 1 BID,  and Mrs. Solorio told Marina that she isn't even  taking the Losartan anymore--is this a problem?  Should she be taking this?  Please advise.    Marina # [phone]    Thanks,  Shu

## 2017-11-07 ENCOUNTER — EPISODE CHANGES (OUTPATIENT)
Dept: CASE MANAGEMENT | Facility: OTHER | Age: 82
End: 2017-11-07

## 2017-11-07 ENCOUNTER — PATIENT OUTREACH (OUTPATIENT)
Dept: CASE MANAGEMENT | Facility: OTHER | Age: 82
End: 2017-11-07

## 2017-11-09 ENCOUNTER — TELEPHONE (OUTPATIENT)
Dept: CARDIOLOGY | Facility: CLINIC | Age: 82
End: 2017-11-09

## 2017-11-09 NOTE — TELEPHONE ENCOUNTER
Pt called and wants to talk with the nurse that left message about her Warfarin yesterday....Dacia

## 2017-11-20 ENCOUNTER — OFFICE VISIT (OUTPATIENT)
Dept: INTERNAL MEDICINE | Facility: CLINIC | Age: 82
End: 2017-11-20

## 2017-11-20 VITALS
DIASTOLIC BLOOD PRESSURE: 84 MMHG | OXYGEN SATURATION: 94 % | SYSTOLIC BLOOD PRESSURE: 134 MMHG | BODY MASS INDEX: 26.99 KG/M2 | HEIGHT: 65 IN | HEART RATE: 96 BPM | WEIGHT: 162 LBS

## 2017-11-20 DIAGNOSIS — R53.1 WEAKNESS: ICD-10-CM

## 2017-11-20 DIAGNOSIS — G23.1 PROGRESSIVE SUPRANUCLEAR PALSY (HCC): ICD-10-CM

## 2017-11-20 DIAGNOSIS — J20.8 ACUTE BRONCHITIS DUE TO OTHER SPECIFIED ORGANISMS: Primary | ICD-10-CM

## 2017-11-20 DIAGNOSIS — R27.0 ATAXIA: ICD-10-CM

## 2017-11-20 PROCEDURE — 99214 OFFICE O/P EST MOD 30 MIN: CPT | Performed by: INTERNAL MEDICINE

## 2017-11-20 RX ORDER — DOXYCYCLINE 100 MG/1
100 CAPSULE ORAL 2 TIMES DAILY
Qty: 20 CAPSULE | Refills: 0 | Status: SHIPPED | OUTPATIENT
Start: 2017-11-20 | End: 2018-01-04 | Stop reason: ALTCHOICE

## 2017-11-20 NOTE — PROGRESS NOTES
Subjective     Citlali ARIANA Solorio is a 82 y.o. female who presents with   Chief Complaint   Patient presents with   • Cough       History of Present Illness     Doing well until three days ago.  She started with diarrhea.  Now she has a cough.  No sinus congestion.  Cough without production.  No SOA is associated.  No fever.      Review of Systems   Respiratory: Negative for shortness of breath.    Cardiovascular: Negative for chest pain.   Musculoskeletal: Positive for gait problem.   Neurological: Positive for weakness.       The following portions of the patient's history were reviewed and updated as appropriate: allergies, current medications and problem list.    Patient Active Problem List    Diagnosis Date Noted   • Cough 09/01/2017   • Atrial fibrillation with RVR 08/24/2017   • Tear of medial meniscus of right knee, current 06/15/2017   • Mitral valve insufficiency 07/06/2016   • Tricuspid valve insufficiency 07/06/2016   • Stress incontinence 05/23/2016   • Progressive supranuclear palsy 04/21/2016   • Anxiety 04/21/2016   • Atherosclerosis of both carotid arteries 04/21/2016     Note Last Updated: 5/31/2017     Description: plaque on screening last done 3/13, 11/13, 10/16     • Heart failure 04/21/2016     Note Last Updated: 4/21/2016     Description: admission 7/2013     • Gastroesophageal reflux disease 04/21/2016   • Hyperlipidemia 04/21/2016     Note Last Updated: 4/21/2016     Description: Patient tried Lipitor, Crestor, Zocor, Bacol and Livalo in the past and was intolerant of all of them.     • Impaired fasting glucose 04/21/2016   • Spinal stenosis of lumbar region 04/21/2016   • Cobalamin deficiency 04/21/2016     Note Last Updated: 5/31/2017     Description: told at [facility] that B12 was low.  Monthly shots recommended 5/5/15.  Now on oral replacement.      • Atrial fibrillation 06/24/2013   • Benign essential hypertension 06/24/2013       Current Outpatient Prescriptions on File Prior to Visit  "  Medication Sig Dispense Refill   • Cholecalciferol (VITAMIN D PO) Take 1,000 mg by mouth Daily.     • digoxin (LANOXIN) 125 MCG tablet Take 1 tablet by mouth Daily. 30 tablet 11   • furosemide (LASIX) 20 MG tablet Take 1 tablet by mouth Daily. 30 tablet 2   • hydrALAZINE (APRESOLINE) 25 MG tablet Take 1 tablet by mouth 2 (Two) Times a Day. 60 tablet 6   • losartan-hydrochlorothiazide (HYZAAR) 50-12.5 MG per tablet Take 1 tablet by mouth Daily. (Patient taking differently: Take 1 tablet by mouth Every Morning. Take 1/2 tablet daily) 90 tablet 3   • metoprolol succinate XL (TOPROL-XL) 50 MG 24 hr tablet 75 mg bid 90 tablet 2   • potassium chloride (K-DUR) 10 MEQ CR tablet Take 1 tablet by mouth Daily. 30 tablet 11   • Probiotic Product (PROBIOTIC PO) Take  by mouth.     • vitamin B-12 (CYANOCOBALAMIN) 100 MCG tablet Take 50 mcg by mouth Daily.     • warfarin (COUMADIN) 1 MG tablet Take 1 tablet by mouth Daily. Take one tablet by mouth daily or as directed 30 tablet 3   • [DISCONTINUED] benzonatate (TESSALON) 200 MG capsule TAKE ONE CAPSULE BY MOUTH THREE TIMES A DAY AS NEEDED FOR COUGH 30 capsule 0     Current Facility-Administered Medications on File Prior to Visit   Medication Dose Route Frequency Provider Last Rate Last Dose   • nitroglycerin (NITROSTAT) SL tablet 0.4 mg  0.4 mg Sublingual Q5 Min PRN Jelena Hernandez APRN       • sodium chloride 0.9 % flush 10 mL  10 mL Intravenous PRN Jelena Hernandez APRN           Objective     /84  Pulse 96  Ht 65\" (165.1 cm)  Wt 162 lb (73.5 kg)  SpO2 94%  BMI 26.96 kg/m2    Physical Exam   Constitutional: She is oriented to person, place, and time. She appears well-developed and well-nourished.   HENT:   Head: Normocephalic and atraumatic.   Cardiovascular: Normal rate and normal heart sounds.  An irregularly irregular rhythm present.   Pulmonary/Chest: Effort normal and breath sounds normal.   Neurological: She is alert and oriented to person, place, and time. "   Skin: Skin is warm and dry.   Psychiatric: She has a normal mood and affect. Her behavior is normal.       Assessment/Plan   Citlali was seen today for cough.    Diagnoses and all orders for this visit:    Acute bronchitis due to other specified organisms  -     Comprehensive Metabolic Panel    Progressive supranuclear palsy  -     Cancel: Ambulatory Referral to Physical Therapy  -     Ambulatory Referral to Physical Therapy    Weakness  -     Ambulatory Referral to Physical Therapy    Ataxia  -     Ambulatory Referral to Physical Therapy    Other orders  -     doxycycline (MONODOX) 100 MG capsule; Take 1 capsule by mouth 2 (Two) Times a Day.        Discussion    Patient presents with episodes of acute bronchitis.  A prescription for antibiotics is provided today.  The patient is instructed to take along with Mucinex DM.  Let me know they are not feeling better over the next 3 days or if there is any change in symptoms.    Progressive supranuclear palsy with weakness and ataxia.  She is very sedentary.  Refer back to PT.  She really needs to establish a routine exercise regimen to prevent muscle atrophy.    15/25 minutes was spent in counseling of the following topics:, impressions, treatment options    Current outpatient and discharge medications have been reconciled for the patient.  Cristal Lema MD         Future Appointments  Date Time Provider Department Center   1/4/2018 2:00 PM MD ERMA Reddy CD [id number] None   2/20/2018 1:00 PM MD [facility] Flores None

## 2017-11-21 ENCOUNTER — TELEPHONE (OUTPATIENT)
Dept: CARDIOLOGY | Facility: CLINIC | Age: 82
End: 2017-11-21

## 2017-11-21 LAB
ALBUMIN SERPL-MCNC: 4.5 G/DL (ref 3.5–5.2)
ALBUMIN/GLOB SERPL: 1.5 G/DL
ALP SERPL-CCNC: 69 U/L (ref 39–117)
ALT SERPL-CCNC: 18 U/L (ref 1–33)
AST SERPL-CCNC: 21 U/L (ref 1–32)
BILIRUB SERPL-MCNC: 0.8 MG/DL (ref 0.1–1.2)
BUN SERPL-MCNC: 14 MG/DL (ref 8–23)
BUN/CREAT SERPL: 12.5 (ref 7–25)
CALCIUM SERPL-MCNC: 9.7 MG/DL (ref 8.6–10.5)
CHLORIDE SERPL-SCNC: 98 MMOL/L (ref 98–107)
CO2 SERPL-SCNC: 31.4 MMOL/L (ref 22–29)
CREAT SERPL-MCNC: 1.12 MG/DL (ref 0.57–1)
GFR SERPLBLD CREATININE-BSD FMLA CKD-EPI: 47 ML/MIN/1.73
GFR SERPLBLD CREATININE-BSD FMLA CKD-EPI: 57 ML/MIN/1.73
GLOBULIN SER CALC-MCNC: 3 GM/DL
GLUCOSE SERPL-MCNC: 125 MG/DL (ref 65–99)
POTASSIUM SERPL-SCNC: 4.3 MMOL/L (ref 3.5–5.2)
PROT SERPL-MCNC: 7.5 G/DL (ref 6–8.5)
SODIUM SERPL-SCNC: 141 MMOL/L (ref 136–145)

## 2017-11-21 NOTE — TELEPHONE ENCOUNTER
Pt called because her PCP wants her to start an antibiotic. They told her to check her INR before she started it. It 3.8. They told her to call and let us know so she can get her INR right before she starts it. She would like for someone to call her with instructions......Dacia

## 2017-12-07 ENCOUNTER — EPISODE CHANGES (OUTPATIENT)
Dept: CASE MANAGEMENT | Facility: OTHER | Age: 82
End: 2017-12-07

## 2017-12-27 RX ORDER — FUROSEMIDE 20 MG/1
TABLET ORAL
Qty: 30 TABLET | Refills: 1 | Status: SHIPPED | OUTPATIENT
Start: 2017-12-27 | End: 2018-04-03 | Stop reason: SDUPTHER

## 2018-01-02 ENCOUNTER — TELEPHONE (OUTPATIENT)
Dept: INTERNAL MEDICINE | Facility: CLINIC | Age: 83
End: 2018-01-02

## 2018-01-02 RX ORDER — OSELTAMIVIR PHOSPHATE 75 MG/1
75 CAPSULE ORAL 2 TIMES DAILY
Qty: 10 CAPSULE | Refills: 0 | Status: SHIPPED | OUTPATIENT
Start: 2018-01-02 | End: 2018-01-10 | Stop reason: HOSPADM

## 2018-01-02 NOTE — TELEPHONE ENCOUNTER
Patient has the flu was exposed to it. Has nausea, diarrhea and fever. Can you send in a script for tamaflu?  CLC    I sent in for the patient.  SLW    Patient advised. CLC

## 2018-01-03 ENCOUNTER — TELEPHONE (OUTPATIENT)
Dept: INTERNAL MEDICINE | Facility: CLINIC | Age: 83
End: 2018-01-03

## 2018-01-03 ENCOUNTER — TELEPHONE (OUTPATIENT)
Dept: CARDIOLOGY | Facility: CLINIC | Age: 83
End: 2018-01-03

## 2018-01-03 NOTE — TELEPHONE ENCOUNTER
01/03/18  3:17 PM  Citlali Solorio  1935    Home Phone [phone]   Mobile [phone]     Ms. Solorio's daughter calls for same. They arrived at [facility] ER and felt it was too busy for her to wait there. She has been having diarrhea and it has been difficult for her to maneuver to the restroom. They want her to be seen in [facility] office.    She also reports that she has been coughing for a few days and they were concerned she might have fluid in her lungs. Her cough is non-productive. She is no more SOA than usual. They have not measured her weight or height.     She also tells me that Dr. Lema has advised that they take mother to ER earlier today. I advised that they should proceed with this plan. I advised that they can try another ER if they feel that they may be able to be seen sooner.    Ambika PINEDA RN

## 2018-01-03 NOTE — TELEPHONE ENCOUNTER
Agusto called saying her mom's PMD told her to go to the ER and said either [facility] or [facility], but she wanted to know which place Dr. Apple would be able to see her mother at, if need be, and I told her [facility].    Shu

## 2018-01-03 NOTE — TELEPHONE ENCOUNTER
Patient's daughter called and requested to speak with Dr Lema  Patient has had diarrhea for 2 days and they are having trouble getting her to the restroom and she will not sit down to use the restroom she will just stand up and use the restroom or just fall in the floor then they have trouble getting her up.     Mother's condition is very troublesome,  She has been riding scooter with eyes closed, tipped it over on herself   Sleeping a lot-talking in sleep, throwing arms about- very out of character for patient  Daughter would like to speak to you about [facility] for assistance until the diarrhea passed.  MAW    I d/w patient daughter.  With these symptoms the best course of action would be to take her to the ER with such an abrupt change in status.  LEOPOLDO

## 2018-01-04 ENCOUNTER — HOSPITAL ENCOUNTER (INPATIENT)
Facility: HOSPITAL | Age: 83
LOS: 6 days | Discharge: NURSING, REHAB OR LONG-TERM CARE | End: 2018-01-10
Attending: INTERNAL MEDICINE | Admitting: INTERNAL MEDICINE

## 2018-01-04 ENCOUNTER — TELEPHONE (OUTPATIENT)
Dept: CARDIOLOGY | Facility: HOSPITAL | Age: 83
End: 2018-01-04

## 2018-01-04 ENCOUNTER — APPOINTMENT (OUTPATIENT)
Dept: GENERAL RADIOLOGY | Facility: HOSPITAL | Age: 83
End: 2018-01-04
Attending: INTERNAL MEDICINE

## 2018-01-04 ENCOUNTER — OFFICE VISIT (OUTPATIENT)
Dept: CARDIOLOGY | Facility: CLINIC | Age: 83
End: 2018-01-04

## 2018-01-04 VITALS
BODY MASS INDEX: 26.33 KG/M2 | DIASTOLIC BLOOD PRESSURE: 78 MMHG | SYSTOLIC BLOOD PRESSURE: 140 MMHG | HEIGHT: 65 IN | HEART RATE: 116 BPM | WEIGHT: 158 LBS

## 2018-01-04 DIAGNOSIS — I48.20 CHRONIC ATRIAL FIBRILLATION (HCC): Primary | ICD-10-CM

## 2018-01-04 DIAGNOSIS — R26.89 DECREASED MOBILITY: Primary | ICD-10-CM

## 2018-01-04 DIAGNOSIS — R94.31 ABNORMAL EKG: ICD-10-CM

## 2018-01-04 DIAGNOSIS — I48.91 ATRIAL FIBRILLATION WITH RVR (HCC): ICD-10-CM

## 2018-01-04 DIAGNOSIS — I34.0 NON-RHEUMATIC MITRAL REGURGITATION: ICD-10-CM

## 2018-01-04 DIAGNOSIS — I10 BENIGN ESSENTIAL HYPERTENSION: ICD-10-CM

## 2018-01-04 DIAGNOSIS — I48.20 CHRONIC ATRIAL FIBRILLATION (HCC): ICD-10-CM

## 2018-01-04 PROBLEM — R41.82 ALTERED MENTAL STATUS, UNSPECIFIED: Status: ACTIVE | Noted: 2018-01-04

## 2018-01-04 PROBLEM — R19.7 DIARRHEA: Status: ACTIVE | Noted: 2018-01-04

## 2018-01-04 LAB
ALBUMIN SERPL-MCNC: 3.5 G/DL (ref 3.5–5.2)
ALBUMIN/GLOB SERPL: 1.1 G/DL
ALP SERPL-CCNC: 51 U/L (ref 39–117)
ALT SERPL W P-5'-P-CCNC: 22 U/L (ref 1–33)
ANION GAP SERPL CALCULATED.3IONS-SCNC: 13 MMOL/L
AST SERPL-CCNC: 27 U/L (ref 1–32)
BASOPHILS # BLD AUTO: 0.01 10*3/MM3 (ref 0–0.2)
BASOPHILS NFR BLD AUTO: 0.3 % (ref 0–1.5)
BILIRUB SERPL-MCNC: 0.7 MG/DL (ref 0.1–1.2)
BILIRUB UR QL STRIP: NEGATIVE
BUN BLD-MCNC: 15 MG/DL (ref 8–23)
BUN/CREAT SERPL: 16 (ref 7–25)
CALCIUM SPEC-SCNC: 8.9 MG/DL (ref 8.6–10.5)
CHLORIDE SERPL-SCNC: 98 MMOL/L (ref 98–107)
CLARITY UR: CLEAR
CO2 SERPL-SCNC: 27 MMOL/L (ref 22–29)
COLOR UR: YELLOW
CREAT BLD-MCNC: 0.94 MG/DL (ref 0.57–1)
DEPRECATED RDW RBC AUTO: 48 FL (ref 37–54)
EOSINOPHIL # BLD AUTO: 0.03 10*3/MM3 (ref 0–0.7)
EOSINOPHIL NFR BLD AUTO: 0.9 % (ref 0.3–6.2)
ERYTHROCYTE [DISTWIDTH] IN BLOOD BY AUTOMATED COUNT: 13.6 % (ref 11.7–13)
GFR SERPL CREATININE-BSD FRML MDRD: 57 ML/MIN/1.73
GLOBULIN UR ELPH-MCNC: 3.3 GM/DL
GLUCOSE BLD-MCNC: 115 MG/DL (ref 65–99)
GLUCOSE UR STRIP-MCNC: NEGATIVE MG/DL
HCT VFR BLD AUTO: 40.7 % (ref 35.6–45.5)
HGB BLD-MCNC: 13.2 G/DL (ref 11.9–15.5)
HGB UR QL STRIP.AUTO: NEGATIVE
IMM GRANULOCYTES # BLD: 0 10*3/MM3 (ref 0–0.03)
IMM GRANULOCYTES NFR BLD: 0 % (ref 0–0.5)
INR PPP: 2.58 (ref 0.9–1.1)
KETONES UR QL STRIP: NEGATIVE
LEUKOCYTE ESTERASE UR QL STRIP.AUTO: NEGATIVE
LYMPHOCYTES # BLD AUTO: 0.97 10*3/MM3 (ref 0.9–4.8)
LYMPHOCYTES NFR BLD AUTO: 29.1 % (ref 19.6–45.3)
MAGNESIUM SERPL-MCNC: 1.9 MG/DL (ref 1.6–2.4)
MCH RBC QN AUTO: 31.4 PG (ref 26.9–32)
MCHC RBC AUTO-ENTMCNC: 32.4 G/DL (ref 32.4–36.3)
MCV RBC AUTO: 96.7 FL (ref 80.5–98.2)
MONOCYTES # BLD AUTO: 0.55 10*3/MM3 (ref 0.2–1.2)
MONOCYTES NFR BLD AUTO: 16.5 % (ref 5–12)
NEUTROPHILS # BLD AUTO: 1.77 10*3/MM3 (ref 1.9–8.1)
NEUTROPHILS NFR BLD AUTO: 53.2 % (ref 42.7–76)
NITRITE UR QL STRIP: NEGATIVE
NRBC BLD MANUAL-RTO: 0 /100 WBC (ref 0–0)
PH UR STRIP.AUTO: 6.5 [PH] (ref 5–8)
PLATELET # BLD AUTO: 197 10*3/MM3 (ref 140–500)
PMV BLD AUTO: 11.6 FL (ref 6–12)
POTASSIUM BLD-SCNC: 3.5 MMOL/L (ref 3.5–5.2)
PROT SERPL-MCNC: 6.8 G/DL (ref 6–8.5)
PROT UR QL STRIP: NEGATIVE
PROTHROMBIN TIME: 26.9 SECONDS (ref 11.7–14.2)
RBC # BLD AUTO: 4.21 10*6/MM3 (ref 3.9–5.2)
SODIUM BLD-SCNC: 138 MMOL/L (ref 136–145)
SP GR UR STRIP: 1.01 (ref 1–1.03)
UROBILINOGEN UR QL STRIP: NORMAL
WBC NRBC COR # BLD: 3.33 10*3/MM3 (ref 4.5–10.7)

## 2018-01-04 PROCEDURE — 85025 COMPLETE CBC W/AUTO DIFF WBC: CPT | Performed by: INTERNAL MEDICINE

## 2018-01-04 PROCEDURE — 80053 COMPREHEN METABOLIC PANEL: CPT | Performed by: INTERNAL MEDICINE

## 2018-01-04 PROCEDURE — 87798 DETECT AGENT NOS DNA AMP: CPT | Performed by: INTERNAL MEDICINE

## 2018-01-04 PROCEDURE — 87486 CHLMYD PNEUM DNA AMP PROBE: CPT | Performed by: INTERNAL MEDICINE

## 2018-01-04 PROCEDURE — 87581 M.PNEUMON DNA AMP PROBE: CPT | Performed by: INTERNAL MEDICINE

## 2018-01-04 PROCEDURE — 87633 RESP VIRUS 12-25 TARGETS: CPT | Performed by: INTERNAL MEDICINE

## 2018-01-04 PROCEDURE — 81003 URINALYSIS AUTO W/O SCOPE: CPT | Performed by: INTERNAL MEDICINE

## 2018-01-04 PROCEDURE — 83735 ASSAY OF MAGNESIUM: CPT | Performed by: INTERNAL MEDICINE

## 2018-01-04 PROCEDURE — 93000 ELECTROCARDIOGRAM COMPLETE: CPT | Performed by: INTERNAL MEDICINE

## 2018-01-04 PROCEDURE — 71046 X-RAY EXAM CHEST 2 VIEWS: CPT

## 2018-01-04 PROCEDURE — 99215 OFFICE O/P EST HI 40 MIN: CPT | Performed by: INTERNAL MEDICINE

## 2018-01-04 PROCEDURE — 85610 PROTHROMBIN TIME: CPT | Performed by: INTERNAL MEDICINE

## 2018-01-04 RX ORDER — POTASSIUM CHLORIDE 1.5 G/1.77G
40 POWDER, FOR SOLUTION ORAL AS NEEDED
Status: DISCONTINUED | OUTPATIENT
Start: 2018-01-04 | End: 2018-01-10 | Stop reason: HOSPADM

## 2018-01-04 RX ORDER — NITROGLYCERIN 0.4 MG/1
0.4 TABLET SUBLINGUAL
Status: DISCONTINUED | OUTPATIENT
Start: 2018-01-04 | End: 2018-01-10 | Stop reason: HOSPADM

## 2018-01-04 RX ORDER — POTASSIUM CHLORIDE 750 MG/1
10 CAPSULE, EXTENDED RELEASE ORAL 2 TIMES DAILY WITH MEALS
Status: DISCONTINUED | OUTPATIENT
Start: 2018-01-04 | End: 2018-01-10 | Stop reason: HOSPADM

## 2018-01-04 RX ORDER — ACETAMINOPHEN 325 MG/1
650 TABLET ORAL EVERY 4 HOURS PRN
Status: DISCONTINUED | OUTPATIENT
Start: 2018-01-04 | End: 2018-01-10 | Stop reason: HOSPADM

## 2018-01-04 RX ORDER — SODIUM CHLORIDE 0.9 % (FLUSH) 0.9 %
10 SYRINGE (ML) INJECTION AS NEEDED
Status: DISCONTINUED | OUTPATIENT
Start: 2018-01-04 | End: 2018-01-10 | Stop reason: HOSPADM

## 2018-01-04 RX ORDER — SODIUM CHLORIDE 0.9 % (FLUSH) 0.9 %
1-10 SYRINGE (ML) INJECTION AS NEEDED
Status: DISCONTINUED | OUTPATIENT
Start: 2018-01-04 | End: 2018-01-10 | Stop reason: HOSPADM

## 2018-01-04 RX ORDER — WARFARIN SODIUM 2 MG/1
1 TABLET ORAL DAILY
COMMUNITY
Start: 2017-11-13 | End: 2018-01-10 | Stop reason: HOSPADM

## 2018-01-04 RX ORDER — WARFARIN SODIUM 1 MG/1
1 TABLET ORAL DAILY
Status: DISCONTINUED | OUTPATIENT
Start: 2018-01-04 | End: 2018-01-04 | Stop reason: SDUPTHER

## 2018-01-04 RX ORDER — WARFARIN SODIUM 1 MG/1
1 TABLET ORAL
Status: DISCONTINUED | OUTPATIENT
Start: 2018-01-04 | End: 2018-01-10 | Stop reason: HOSPADM

## 2018-01-04 RX ORDER — FUROSEMIDE 20 MG/1
20 TABLET ORAL DAILY
Status: DISCONTINUED | OUTPATIENT
Start: 2018-01-06 | End: 2018-01-05

## 2018-01-04 RX ORDER — DIGOXIN 125 MCG
125 TABLET ORAL
Status: DISCONTINUED | OUTPATIENT
Start: 2018-01-05 | End: 2018-01-10 | Stop reason: HOSPADM

## 2018-01-04 RX ORDER — POTASSIUM CHLORIDE 750 MG/1
40 CAPSULE, EXTENDED RELEASE ORAL AS NEEDED
Status: DISCONTINUED | OUTPATIENT
Start: 2018-01-04 | End: 2018-01-10 | Stop reason: HOSPADM

## 2018-01-04 RX ORDER — MULTIVIT-MIN/IRON/FOLIC ACID/K 18-600-40
2000 CAPSULE ORAL DAILY
Status: DISCONTINUED | OUTPATIENT
Start: 2018-01-04 | End: 2018-01-04

## 2018-01-04 RX ORDER — POTASSIUM CHLORIDE 7.45 MG/ML
10 INJECTION INTRAVENOUS
Status: DISCONTINUED | OUTPATIENT
Start: 2018-01-04 | End: 2018-01-10 | Stop reason: HOSPADM

## 2018-01-04 RX ORDER — MELATONIN
2000 DAILY
Status: DISCONTINUED | OUTPATIENT
Start: 2018-01-04 | End: 2018-01-10 | Stop reason: HOSPADM

## 2018-01-04 RX ORDER — ONDANSETRON 2 MG/ML
4 INJECTION INTRAMUSCULAR; INTRAVENOUS EVERY 6 HOURS PRN
Status: DISCONTINUED | OUTPATIENT
Start: 2018-01-04 | End: 2018-01-10 | Stop reason: HOSPADM

## 2018-01-04 RX ORDER — L.ACID,PARA/B.BIFIDUM/S.THERM 8B CELL
1 CAPSULE ORAL DAILY
Status: DISCONTINUED | OUTPATIENT
Start: 2018-01-04 | End: 2018-01-10 | Stop reason: HOSPADM

## 2018-01-04 RX ORDER — METOPROLOL SUCCINATE 50 MG/1
50 TABLET, EXTENDED RELEASE ORAL
Status: DISCONTINUED | OUTPATIENT
Start: 2018-01-04 | End: 2018-01-10 | Stop reason: HOSPADM

## 2018-01-04 RX ORDER — SODIUM CHLORIDE 9 MG/ML
75 INJECTION, SOLUTION INTRAVENOUS CONTINUOUS
Status: DISCONTINUED | OUTPATIENT
Start: 2018-01-04 | End: 2018-01-05

## 2018-01-04 RX ORDER — OSELTAMIVIR PHOSPHATE 75 MG/1
75 CAPSULE ORAL 2 TIMES DAILY
Status: DISCONTINUED | OUTPATIENT
Start: 2018-01-04 | End: 2018-01-08

## 2018-01-04 RX ORDER — UBIDECARENONE 75 MG
50 CAPSULE ORAL DAILY
Status: DISCONTINUED | OUTPATIENT
Start: 2018-01-04 | End: 2018-01-04

## 2018-01-04 RX ORDER — UBIDECARENONE 75 MG
50 CAPSULE ORAL DAILY
Status: DISCONTINUED | OUTPATIENT
Start: 2018-01-04 | End: 2018-01-10 | Stop reason: HOSPADM

## 2018-01-04 RX ORDER — HYDRALAZINE HYDROCHLORIDE 25 MG/1
25 TABLET, FILM COATED ORAL EVERY 12 HOURS SCHEDULED
Status: DISCONTINUED | OUTPATIENT
Start: 2018-01-04 | End: 2018-01-10 | Stop reason: HOSPADM

## 2018-01-04 RX ADMIN — SODIUM CHLORIDE 75 ML/HR: 9 INJECTION, SOLUTION INTRAVENOUS at 21:08

## 2018-01-04 RX ADMIN — VITAMIN D, TAB 1000IU (100/BT) 2000 UNITS: 25 TAB at 21:03

## 2018-01-04 RX ADMIN — WARFARIN SODIUM 1 MG: 1 TABLET ORAL at 21:23

## 2018-01-04 RX ADMIN — METOPROLOL SUCCINATE 50 MG: 50 TABLET, FILM COATED, EXTENDED RELEASE ORAL at 21:03

## 2018-01-04 RX ADMIN — HYDRALAZINE HYDROCHLORIDE 25 MG: 25 TABLET, FILM COATED ORAL at 21:03

## 2018-01-04 RX ADMIN — POTASSIUM CHLORIDE 10 MEQ: 750 CAPSULE, EXTENDED RELEASE ORAL at 21:03

## 2018-01-04 NOTE — PROGRESS NOTES
Subjective:     Encounter Date:01/04/2018      Patient ID: Citlali Solorio is a 82 y.o. female.    Chief Complaint:  Atrial Fibrillation   Presents for follow-up visit. Symptoms include shortness of breath. Symptoms are negative for chest pain, hypertension and palpitations. The symptoms have been stable. Past medical history includes atrial fibrillation and CHF.   Congestive Heart Failure   Presents for follow-up visit. Associated symptoms include fatigue and shortness of breath. Pertinent negatives include no chest pain or palpitations.       82-year-old female who presents today for evaluation.  Patient has a history of atrial fibrillation as well as congestive heart failure.  She was having an increasing cough as well as swelling in her legs.  This was most consistent with heart failure.  I increased her Lasix the swelling went down but the cough has persisted.  She's also developed diarrhea.  Her daughter has had the flu but patient has not had a type of fevers aches or pains.  She just had the shortness of breath and diarrhea.  She's also had intermittent episode of slurred speech.  She is also been hallucinating at night she's been getting around her house with a scooter closing her eyes.  She was seen today for further evaluation    Review of Systems   Constitution: Positive for fatigue.   Cardiovascular: Negative for chest pain and palpitations.   Respiratory: Positive for cough and shortness of breath.    Musculoskeletal: Positive for joint pain.   Gastrointestinal: Positive for diarrhea.         ECG 12 Lead  Date/Time: 1/4/2018 3:41 PM  Performed by: DAVE CHASE  Authorized by: DAVE CHASE   Comparison: compared with previous ECG from 10/4/2017  Similar to previous ECG  Rhythm: atrial fibrillation  Clinical impression: abnormal ECG               Objective:     Physical Exam   Constitutional: She is oriented to person, place, and time. She appears well-developed.   HENT:   Head:  Normocephalic.   Eyes: Conjunctivae are normal.   Neck: Normal range of motion.   Cardiovascular: Normal rate and normal heart sounds.  An irregularly irregular rhythm present.   Pulmonary/Chest: Breath sounds normal.   Abdominal: Soft. Bowel sounds are normal.   Musculoskeletal: Normal range of motion. She exhibits no edema.   Neurological: She is alert and oriented to person, place, and time.   Skin: Skin is warm and dry.   Psychiatric: She has a normal mood and affect. Her behavior is normal.   Vitals reviewed.      Lab Review:       Assessment:          Diagnosis Plan   1. Chronic atrial fibrillation     2. Atrial fibrillation with RVR     3. Non-rheumatic mitral regurgitation     4. Benign essential hypertension            Plan:       1.  Atrial fibrillation with a rapid rate.  I'm concerned that this is due to an illness see below.  2.  History of congestive heart failure no definitive heart failure at this time by physical exam.  She does have a cough which has been a presenting symptoms.  3.  Diarrhea obvious concern of dehydration.  4.  Patient is having episodes of confusion she does have dementia but this is been beyond that.  One concern is whether she has a urinary tract infection.  5.  Family is unable to care for patient at home at the current time due to confusion and multiple issues of extreme weakness.  In light of that I'm going to admit her to the hospital for further evaluation.    Atrial Fibrillation and Atrial Flutter  Assessment  • The patient has paroxysmal atrial fibrillation  • The patient's CHADS2-VASc score is 4  • A [id number] score of 2 or more is considered a high risk for a thromboembolic event  • Warfarin prescribed    Plan  • Attempt to maintain sinus rhythm  • Continue warfarin for antithrombotic therapy, bleeding issues discussed  • Continue beta blocker for rhythm control      Heart Failure  Assessment  • NYHA class III-B - There is significant limitation of physical activity.  The patient is comfortable at rest, but minimal activity causes fatigue, palpitations or shortness of breath.  • Left ventricular function is normal by qualitative assessment    Plan  • The patient has received heart failure education on the following topics: dietary sodium restriction and medication instructions  • The heart failure care plan was discussed with the patient today including: up-titrating HF medications    Subjective/Objective  • The patient reports dyspnea

## 2018-01-04 NOTE — TELEPHONE ENCOUNTER
01/04/18  11:22 AM  Patient was not seen in a [facility] or [facility] ER yesterday. She is scheduled to see you today in [facility] at 2pm; this appt was scheduled on 9/28/17 - tmm.

## 2018-01-05 PROBLEM — G93.41 METABOLIC ENCEPHALOPATHY: Status: ACTIVE | Noted: 2018-01-04

## 2018-01-05 PROBLEM — J10.1 INFLUENZA A: Status: ACTIVE | Noted: 2018-01-05

## 2018-01-05 LAB
ALBUMIN SERPL-MCNC: 3.4 G/DL (ref 3.5–5.2)
ALBUMIN/GLOB SERPL: 1.1 G/DL
ALP SERPL-CCNC: 48 U/L (ref 39–117)
ALT SERPL W P-5'-P-CCNC: 16 U/L (ref 1–33)
ANION GAP SERPL CALCULATED.3IONS-SCNC: 13.4 MMOL/L
AST SERPL-CCNC: 22 U/L (ref 1–32)
B PERT DNA SPEC QL NAA+PROBE: NOT DETECTED
BASOPHILS # BLD AUTO: 0.02 10*3/MM3 (ref 0–0.2)
BASOPHILS NFR BLD AUTO: 0.4 % (ref 0–1.5)
BILIRUB SERPL-MCNC: 0.7 MG/DL (ref 0.1–1.2)
BUN BLD-MCNC: 13 MG/DL (ref 8–23)
BUN/CREAT SERPL: 14.8 (ref 7–25)
C PNEUM DNA NPH QL NAA+NON-PROBE: NOT DETECTED
CALCIUM SPEC-SCNC: 8.5 MG/DL (ref 8.6–10.5)
CHLORIDE SERPL-SCNC: 100 MMOL/L (ref 98–107)
CO2 SERPL-SCNC: 23.6 MMOL/L (ref 22–29)
CREAT BLD-MCNC: 0.88 MG/DL (ref 0.57–1)
DEPRECATED RDW RBC AUTO: 47.8 FL (ref 37–54)
DIGOXIN SERPL-MCNC: 0.5 NG/ML (ref 0.6–1.2)
EOSINOPHIL # BLD AUTO: 0.06 10*3/MM3 (ref 0–0.7)
EOSINOPHIL NFR BLD AUTO: 1.1 % (ref 0.3–6.2)
ERYTHROCYTE [DISTWIDTH] IN BLOOD BY AUTOMATED COUNT: 13.4 % (ref 11.7–13)
FLUAV H1 2009 PAND RNA NPH QL NAA+PROBE: NOT DETECTED
FLUAV H1 HA GENE NPH QL NAA+PROBE: NOT DETECTED
FLUAV H3 RNA NPH QL NAA+PROBE: DETECTED
FLUAV SUBTYP SPEC NAA+PROBE: NOT DETECTED
FLUBV RNA ISLT QL NAA+PROBE: NOT DETECTED
GFR SERPL CREATININE-BSD FRML MDRD: 62 ML/MIN/1.73
GLOBULIN UR ELPH-MCNC: 3 GM/DL
GLUCOSE BLD-MCNC: 89 MG/DL (ref 65–99)
HADV DNA SPEC NAA+PROBE: NOT DETECTED
HCOV 229E RNA SPEC QL NAA+PROBE: NOT DETECTED
HCOV HKU1 RNA SPEC QL NAA+PROBE: NOT DETECTED
HCOV NL63 RNA SPEC QL NAA+PROBE: NOT DETECTED
HCOV OC43 RNA SPEC QL NAA+PROBE: NOT DETECTED
HCT VFR BLD AUTO: 38.3 % (ref 35.6–45.5)
HGB BLD-MCNC: 12.2 G/DL (ref 11.9–15.5)
HMPV RNA NPH QL NAA+NON-PROBE: NOT DETECTED
HPIV1 RNA SPEC QL NAA+PROBE: NOT DETECTED
HPIV2 RNA SPEC QL NAA+PROBE: NOT DETECTED
HPIV3 RNA NPH QL NAA+PROBE: NOT DETECTED
HPIV4 P GENE NPH QL NAA+PROBE: NOT DETECTED
IMM GRANULOCYTES # BLD: 0.02 10*3/MM3 (ref 0–0.03)
IMM GRANULOCYTES NFR BLD: 0.4 % (ref 0–0.5)
INR PPP: 2.57 (ref 0.9–1.1)
LYMPHOCYTES # BLD AUTO: 1.46 10*3/MM3 (ref 0.9–4.8)
LYMPHOCYTES NFR BLD AUTO: 26.8 % (ref 19.6–45.3)
M PNEUMO IGG SER IA-ACNC: NOT DETECTED
MAGNESIUM SERPL-MCNC: 1.8 MG/DL (ref 1.6–2.4)
MCH RBC QN AUTO: 30.9 PG (ref 26.9–32)
MCHC RBC AUTO-ENTMCNC: 31.9 G/DL (ref 32.4–36.3)
MCV RBC AUTO: 97 FL (ref 80.5–98.2)
MONOCYTES # BLD AUTO: 0.76 10*3/MM3 (ref 0.2–1.2)
MONOCYTES NFR BLD AUTO: 13.9 % (ref 5–12)
NEUTROPHILS # BLD AUTO: 3.13 10*3/MM3 (ref 1.9–8.1)
NEUTROPHILS NFR BLD AUTO: 57.4 % (ref 42.7–76)
PLATELET # BLD AUTO: 196 10*3/MM3 (ref 140–500)
PMV BLD AUTO: 11.5 FL (ref 6–12)
POTASSIUM BLD-SCNC: 3.8 MMOL/L (ref 3.5–5.2)
PROT SERPL-MCNC: 6.4 G/DL (ref 6–8.5)
PROTHROMBIN TIME: 26.7 SECONDS (ref 11.7–14.2)
RBC # BLD AUTO: 3.95 10*6/MM3 (ref 3.9–5.2)
RHINOVIRUS RNA SPEC NAA+PROBE: NOT DETECTED
RSV RNA NPH QL NAA+NON-PROBE: NOT DETECTED
SODIUM BLD-SCNC: 137 MMOL/L (ref 136–145)
TSH SERPL DL<=0.05 MIU/L-ACNC: 3.11 MIU/ML (ref 0.27–4.2)
WBC NRBC COR # BLD: 5.45 10*3/MM3 (ref 4.5–10.7)

## 2018-01-05 PROCEDURE — 84443 ASSAY THYROID STIM HORMONE: CPT | Performed by: INTERNAL MEDICINE

## 2018-01-05 PROCEDURE — 99232 SBSQ HOSP IP/OBS MODERATE 35: CPT | Performed by: INTERNAL MEDICINE

## 2018-01-05 PROCEDURE — 97162 PT EVAL MOD COMPLEX 30 MIN: CPT

## 2018-01-05 PROCEDURE — 85025 COMPLETE CBC W/AUTO DIFF WBC: CPT | Performed by: INTERNAL MEDICINE

## 2018-01-05 PROCEDURE — 83735 ASSAY OF MAGNESIUM: CPT | Performed by: INTERNAL MEDICINE

## 2018-01-05 PROCEDURE — 80053 COMPREHEN METABOLIC PANEL: CPT | Performed by: INTERNAL MEDICINE

## 2018-01-05 PROCEDURE — 80162 ASSAY OF DIGOXIN TOTAL: CPT | Performed by: INTERNAL MEDICINE

## 2018-01-05 PROCEDURE — 97110 THERAPEUTIC EXERCISES: CPT

## 2018-01-05 PROCEDURE — 85610 PROTHROMBIN TIME: CPT | Performed by: INTERNAL MEDICINE

## 2018-01-05 RX ORDER — SODIUM CHLORIDE 9 MG/ML
75 INJECTION, SOLUTION INTRAVENOUS CONTINUOUS
Status: DISCONTINUED | OUTPATIENT
Start: 2018-01-05 | End: 2018-01-08

## 2018-01-05 RX ADMIN — HYDRALAZINE HYDROCHLORIDE 25 MG: 25 TABLET, FILM COATED ORAL at 21:21

## 2018-01-05 RX ADMIN — HYDRALAZINE HYDROCHLORIDE 25 MG: 25 TABLET, FILM COATED ORAL at 08:26

## 2018-01-05 RX ADMIN — POTASSIUM CHLORIDE 40 MEQ: 750 CAPSULE, EXTENDED RELEASE ORAL at 00:41

## 2018-01-05 RX ADMIN — Medication 1 CAPSULE: at 08:26

## 2018-01-05 RX ADMIN — METOPROLOL SUCCINATE 50 MG: 50 TABLET, FILM COATED, EXTENDED RELEASE ORAL at 08:25

## 2018-01-05 RX ADMIN — POTASSIUM CHLORIDE 10 MEQ: 750 CAPSULE, EXTENDED RELEASE ORAL at 18:04

## 2018-01-05 RX ADMIN — POTASSIUM CHLORIDE 10 MEQ: 750 CAPSULE, EXTENDED RELEASE ORAL at 08:25

## 2018-01-05 RX ADMIN — DIGOXIN 125 MCG: 0.12 TABLET ORAL at 11:52

## 2018-01-05 RX ADMIN — SODIUM CHLORIDE 75 ML/HR: 9 INJECTION, SOLUTION INTRAVENOUS at 16:13

## 2018-01-05 RX ADMIN — VITAMIN D, TAB 1000IU (100/BT) 2000 UNITS: 25 TAB at 08:25

## 2018-01-05 RX ADMIN — ACETAMINOPHEN 650 MG: 325 TABLET ORAL at 16:14

## 2018-01-05 RX ADMIN — WARFARIN SODIUM 1 MG: 1 TABLET ORAL at 18:30

## 2018-01-06 PROCEDURE — 93010 ELECTROCARDIOGRAM REPORT: CPT | Performed by: INTERNAL MEDICINE

## 2018-01-06 PROCEDURE — 97110 THERAPEUTIC EXERCISES: CPT

## 2018-01-06 PROCEDURE — 93005 ELECTROCARDIOGRAM TRACING: CPT | Performed by: INTERNAL MEDICINE

## 2018-01-06 RX ADMIN — POTASSIUM CHLORIDE 10 MEQ: 750 CAPSULE, EXTENDED RELEASE ORAL at 09:13

## 2018-01-06 RX ADMIN — METOPROLOL SUCCINATE 50 MG: 50 TABLET, FILM COATED, EXTENDED RELEASE ORAL at 09:13

## 2018-01-06 RX ADMIN — SODIUM CHLORIDE 75 ML/HR: 9 INJECTION, SOLUTION INTRAVENOUS at 06:10

## 2018-01-06 RX ADMIN — OSELTAMIVIR PHOSPHATE 75 MG: 75 CAPSULE ORAL at 21:17

## 2018-01-06 RX ADMIN — VITAMIN D, TAB 1000IU (100/BT) 2000 UNITS: 25 TAB at 09:14

## 2018-01-06 RX ADMIN — Medication 1 CAPSULE: at 09:14

## 2018-01-06 RX ADMIN — WARFARIN SODIUM 1 MG: 1 TABLET ORAL at 17:27

## 2018-01-06 RX ADMIN — HYDRALAZINE HYDROCHLORIDE 25 MG: 25 TABLET, FILM COATED ORAL at 21:17

## 2018-01-06 RX ADMIN — POTASSIUM CHLORIDE 10 MEQ: 750 CAPSULE, EXTENDED RELEASE ORAL at 17:27

## 2018-01-06 RX ADMIN — DIGOXIN 125 MCG: 0.12 TABLET ORAL at 11:25

## 2018-01-06 RX ADMIN — OSELTAMIVIR PHOSPHATE 75 MG: 75 CAPSULE ORAL at 09:14

## 2018-01-06 RX ADMIN — HYDRALAZINE HYDROCHLORIDE 25 MG: 25 TABLET, FILM COATED ORAL at 09:14

## 2018-01-07 PROCEDURE — 97110 THERAPEUTIC EXERCISES: CPT

## 2018-01-07 RX ADMIN — WARFARIN SODIUM 1 MG: 1 TABLET ORAL at 18:09

## 2018-01-07 RX ADMIN — BENZOCAINE AND MENTHOL 1 LOZENGE: 15; 3.6 LOZENGE ORAL at 18:09

## 2018-01-07 RX ADMIN — OSELTAMIVIR PHOSPHATE 75 MG: 75 CAPSULE ORAL at 22:21

## 2018-01-07 RX ADMIN — POTASSIUM CHLORIDE 10 MEQ: 750 CAPSULE, EXTENDED RELEASE ORAL at 09:17

## 2018-01-07 RX ADMIN — OSELTAMIVIR PHOSPHATE 75 MG: 75 CAPSULE ORAL at 09:17

## 2018-01-07 RX ADMIN — Medication 2 SPRAY: at 18:09

## 2018-01-07 RX ADMIN — VITAMIN D, TAB 1000IU (100/BT) 2000 UNITS: 25 TAB at 09:18

## 2018-01-07 RX ADMIN — Medication 1 CAPSULE: at 09:18

## 2018-01-07 RX ADMIN — SODIUM CHLORIDE 75 ML/HR: 9 INJECTION, SOLUTION INTRAVENOUS at 22:31

## 2018-01-07 RX ADMIN — METOPROLOL SUCCINATE 50 MG: 50 TABLET, FILM COATED, EXTENDED RELEASE ORAL at 09:18

## 2018-01-07 RX ADMIN — POTASSIUM CHLORIDE 10 MEQ: 750 CAPSULE, EXTENDED RELEASE ORAL at 18:09

## 2018-01-07 RX ADMIN — HYDRALAZINE HYDROCHLORIDE 25 MG: 25 TABLET, FILM COATED ORAL at 09:18

## 2018-01-07 RX ADMIN — SODIUM CHLORIDE 75 ML/HR: 9 INJECTION, SOLUTION INTRAVENOUS at 09:21

## 2018-01-07 RX ADMIN — HYDRALAZINE HYDROCHLORIDE 25 MG: 25 TABLET, FILM COATED ORAL at 22:20

## 2018-01-07 RX ADMIN — DIGOXIN 125 MCG: 0.12 TABLET ORAL at 11:56

## 2018-01-08 ENCOUNTER — APPOINTMENT (OUTPATIENT)
Dept: GENERAL RADIOLOGY | Facility: HOSPITAL | Age: 83
End: 2018-01-08
Attending: HOSPITALIST

## 2018-01-08 LAB
ALBUMIN SERPL-MCNC: 3.7 G/DL (ref 3.5–5.2)
ANION GAP SERPL CALCULATED.3IONS-SCNC: 14.7 MMOL/L
BUN BLD-MCNC: 10 MG/DL (ref 8–23)
BUN/CREAT SERPL: 12.2 (ref 7–25)
CALCIUM SPEC-SCNC: 9 MG/DL (ref 8.6–10.5)
CHLORIDE SERPL-SCNC: 100 MMOL/L (ref 98–107)
CO2 SERPL-SCNC: 22.3 MMOL/L (ref 22–29)
CREAT BLD-MCNC: 0.82 MG/DL (ref 0.57–1)
GFR SERPL CREATININE-BSD FRML MDRD: 67 ML/MIN/1.73
GLUCOSE BLD-MCNC: 131 MG/DL (ref 65–99)
NT-PROBNP SERPL-MCNC: 6299 PG/ML (ref 0–1800)
PHOSPHATE SERPL-MCNC: 3.1 MG/DL (ref 2.5–4.5)
POTASSIUM BLD-SCNC: 3.9 MMOL/L (ref 3.5–5.2)
SODIUM BLD-SCNC: 137 MMOL/L (ref 136–145)
TROPONIN T SERPL-MCNC: <0.01 NG/ML (ref 0–0.03)

## 2018-01-08 PROCEDURE — 94640 AIRWAY INHALATION TREATMENT: CPT

## 2018-01-08 PROCEDURE — 99232 SBSQ HOSP IP/OBS MODERATE 35: CPT | Performed by: INTERNAL MEDICINE

## 2018-01-08 PROCEDURE — 25010000002 FUROSEMIDE PER 20 MG: Performed by: HOSPITALIST

## 2018-01-08 PROCEDURE — 97110 THERAPEUTIC EXERCISES: CPT

## 2018-01-08 PROCEDURE — 83880 ASSAY OF NATRIURETIC PEPTIDE: CPT | Performed by: HOSPITALIST

## 2018-01-08 PROCEDURE — 71045 X-RAY EXAM CHEST 1 VIEW: CPT

## 2018-01-08 PROCEDURE — 94799 UNLISTED PULMONARY SVC/PX: CPT

## 2018-01-08 PROCEDURE — 80069 RENAL FUNCTION PANEL: CPT | Performed by: HOSPITALIST

## 2018-01-08 PROCEDURE — 84484 ASSAY OF TROPONIN QUANT: CPT | Performed by: HOSPITALIST

## 2018-01-08 RX ORDER — OSELTAMIVIR PHOSPHATE 30 MG/1
30 CAPSULE ORAL 2 TIMES DAILY
Status: COMPLETED | OUTPATIENT
Start: 2018-01-08 | End: 2018-01-09

## 2018-01-08 RX ORDER — FUROSEMIDE 10 MG/ML
40 INJECTION INTRAMUSCULAR; INTRAVENOUS EVERY MORNING
Status: COMPLETED | OUTPATIENT
Start: 2018-01-09 | End: 2018-01-09

## 2018-01-08 RX ORDER — FUROSEMIDE 10 MG/ML
40 INJECTION INTRAMUSCULAR; INTRAVENOUS ONCE
Status: COMPLETED | OUTPATIENT
Start: 2018-01-08 | End: 2018-01-08

## 2018-01-08 RX ORDER — ALBUTEROL SULFATE 2.5 MG/3ML
2.5 SOLUTION RESPIRATORY (INHALATION) EVERY 6 HOURS PRN
Status: DISCONTINUED | OUTPATIENT
Start: 2018-01-08 | End: 2018-01-10 | Stop reason: HOSPADM

## 2018-01-08 RX ADMIN — HYDRALAZINE HYDROCHLORIDE 25 MG: 25 TABLET, FILM COATED ORAL at 08:00

## 2018-01-08 RX ADMIN — SODIUM CHLORIDE 75 ML/HR: 9 INJECTION, SOLUTION INTRAVENOUS at 11:19

## 2018-01-08 RX ADMIN — POTASSIUM CHLORIDE 10 MEQ: 750 CAPSULE, EXTENDED RELEASE ORAL at 17:18

## 2018-01-08 RX ADMIN — VITAMIN D, TAB 1000IU (100/BT) 2000 UNITS: 25 TAB at 08:00

## 2018-01-08 RX ADMIN — ALBUTEROL SULFATE 2.5 MG: 2.5 SOLUTION RESPIRATORY (INHALATION) at 16:51

## 2018-01-08 RX ADMIN — DIGOXIN 125 MCG: 0.12 TABLET ORAL at 11:11

## 2018-01-08 RX ADMIN — HYDRALAZINE HYDROCHLORIDE 25 MG: 25 TABLET, FILM COATED ORAL at 20:05

## 2018-01-08 RX ADMIN — OSELTAMIVIR PHOSPHATE 75 MG: 75 CAPSULE ORAL at 08:00

## 2018-01-08 RX ADMIN — Medication 1 CAPSULE: at 08:01

## 2018-01-08 RX ADMIN — OSELTAMIVIR PHOSPHATE 30 MG: 30 CAPSULE ORAL at 20:05

## 2018-01-08 RX ADMIN — FUROSEMIDE 40 MG: 10 INJECTION, SOLUTION INTRAMUSCULAR; INTRAVENOUS at 17:33

## 2018-01-08 RX ADMIN — WARFARIN SODIUM 1 MG: 1 TABLET ORAL at 17:18

## 2018-01-08 RX ADMIN — METOPROLOL SUCCINATE 50 MG: 50 TABLET, FILM COATED, EXTENDED RELEASE ORAL at 08:00

## 2018-01-08 RX ADMIN — POTASSIUM CHLORIDE 10 MEQ: 750 CAPSULE, EXTENDED RELEASE ORAL at 08:00

## 2018-01-09 LAB
ALBUMIN SERPL-MCNC: 3.3 G/DL (ref 3.5–5.2)
ANION GAP SERPL CALCULATED.3IONS-SCNC: 14.6 MMOL/L
BUN BLD-MCNC: 9 MG/DL (ref 8–23)
BUN/CREAT SERPL: 11.5 (ref 7–25)
CALCIUM SPEC-SCNC: 8.7 MG/DL (ref 8.6–10.5)
CHLORIDE SERPL-SCNC: 99 MMOL/L (ref 98–107)
CO2 SERPL-SCNC: 26.4 MMOL/L (ref 22–29)
CREAT BLD-MCNC: 0.78 MG/DL (ref 0.57–1)
DEPRECATED RDW RBC AUTO: 45.9 FL (ref 37–54)
ERYTHROCYTE [DISTWIDTH] IN BLOOD BY AUTOMATED COUNT: 13.1 % (ref 11.7–13)
GFR SERPL CREATININE-BSD FRML MDRD: 71 ML/MIN/1.73
GLUCOSE BLD-MCNC: 89 MG/DL (ref 65–99)
HCT VFR BLD AUTO: 39.1 % (ref 35.6–45.5)
HGB BLD-MCNC: 12.3 G/DL (ref 11.9–15.5)
INR PPP: 2.51 (ref 0.9–1.1)
MCH RBC QN AUTO: 30.3 PG (ref 26.9–32)
MCHC RBC AUTO-ENTMCNC: 31.5 G/DL (ref 32.4–36.3)
MCV RBC AUTO: 96.3 FL (ref 80.5–98.2)
NT-PROBNP SERPL-MCNC: 6124 PG/ML (ref 0–1800)
PHOSPHATE SERPL-MCNC: 3.6 MG/DL (ref 2.5–4.5)
PLATELET # BLD AUTO: 226 10*3/MM3 (ref 140–500)
PMV BLD AUTO: 11.6 FL (ref 6–12)
POTASSIUM BLD-SCNC: 3.4 MMOL/L (ref 3.5–5.2)
PROTHROMBIN TIME: 26.2 SECONDS (ref 11.7–14.2)
RBC # BLD AUTO: 4.06 10*6/MM3 (ref 3.9–5.2)
SODIUM BLD-SCNC: 140 MMOL/L (ref 136–145)
WBC NRBC COR # BLD: 6.86 10*3/MM3 (ref 4.5–10.7)

## 2018-01-09 PROCEDURE — 85027 COMPLETE CBC AUTOMATED: CPT | Performed by: HOSPITALIST

## 2018-01-09 PROCEDURE — 97110 THERAPEUTIC EXERCISES: CPT

## 2018-01-09 PROCEDURE — 25010000002 FUROSEMIDE PER 20 MG: Performed by: HOSPITALIST

## 2018-01-09 PROCEDURE — 80069 RENAL FUNCTION PANEL: CPT | Performed by: HOSPITALIST

## 2018-01-09 PROCEDURE — 85610 PROTHROMBIN TIME: CPT | Performed by: INTERNAL MEDICINE

## 2018-01-09 PROCEDURE — 83880 ASSAY OF NATRIURETIC PEPTIDE: CPT | Performed by: HOSPITALIST

## 2018-01-09 RX ORDER — DEXTROMETHORPHAN POLISTIREX 30 MG/5ML
60 SUSPENSION ORAL EVERY 12 HOURS SCHEDULED
Status: DISCONTINUED | OUTPATIENT
Start: 2018-01-09 | End: 2018-01-10 | Stop reason: HOSPADM

## 2018-01-09 RX ADMIN — POTASSIUM CHLORIDE 10 MEQ: 750 CAPSULE, EXTENDED RELEASE ORAL at 17:55

## 2018-01-09 RX ADMIN — DIGOXIN 125 MCG: 0.12 TABLET ORAL at 12:12

## 2018-01-09 RX ADMIN — HYDRALAZINE HYDROCHLORIDE 25 MG: 25 TABLET, FILM COATED ORAL at 20:27

## 2018-01-09 RX ADMIN — WARFARIN SODIUM 1 MG: 1 TABLET ORAL at 17:55

## 2018-01-09 RX ADMIN — Medication 2 SPRAY: at 23:47

## 2018-01-09 RX ADMIN — POTASSIUM CHLORIDE 10 MEQ: 750 CAPSULE, EXTENDED RELEASE ORAL at 10:28

## 2018-01-09 RX ADMIN — VITAMIN D, TAB 1000IU (100/BT) 2000 UNITS: 25 TAB at 10:27

## 2018-01-09 RX ADMIN — FUROSEMIDE 40 MG: 10 INJECTION, SOLUTION INTRAMUSCULAR; INTRAVENOUS at 10:30

## 2018-01-09 RX ADMIN — BENZOCAINE AND MENTHOL 1 LOZENGE: 15; 3.6 LOZENGE ORAL at 23:47

## 2018-01-09 RX ADMIN — Medication 1 CAPSULE: at 10:27

## 2018-01-09 RX ADMIN — HYDRALAZINE HYDROCHLORIDE 25 MG: 25 TABLET, FILM COATED ORAL at 10:27

## 2018-01-09 RX ADMIN — OSELTAMIVIR PHOSPHATE 30 MG: 30 CAPSULE ORAL at 10:26

## 2018-01-09 RX ADMIN — METOPROLOL SUCCINATE 50 MG: 50 TABLET, FILM COATED, EXTENDED RELEASE ORAL at 10:27

## 2018-01-09 RX ADMIN — DEXTROMETHORPHAN POLISTIREX 60 MG: 30 SUSPENSION ORAL at 16:32

## 2018-01-10 VITALS
TEMPERATURE: 96.7 F | RESPIRATION RATE: 20 BRPM | SYSTOLIC BLOOD PRESSURE: 167 MMHG | OXYGEN SATURATION: 94 % | BODY MASS INDEX: 26.14 KG/M2 | DIASTOLIC BLOOD PRESSURE: 108 MMHG | HEART RATE: 81 BPM | HEIGHT: 65 IN | WEIGHT: 156.9 LBS

## 2018-01-10 PROBLEM — I50.33 ACUTE ON CHRONIC DIASTOLIC HEART FAILURE (HCC): Status: ACTIVE | Noted: 2018-01-10

## 2018-01-10 LAB
ALBUMIN SERPL-MCNC: 3.1 G/DL (ref 3.5–5.2)
ANION GAP SERPL CALCULATED.3IONS-SCNC: 13.8 MMOL/L
BUN BLD-MCNC: 11 MG/DL (ref 8–23)
BUN/CREAT SERPL: 12.2 (ref 7–25)
CALCIUM SPEC-SCNC: 8.6 MG/DL (ref 8.6–10.5)
CHLORIDE SERPL-SCNC: 95 MMOL/L (ref 98–107)
CO2 SERPL-SCNC: 27.2 MMOL/L (ref 22–29)
CREAT BLD-MCNC: 0.9 MG/DL (ref 0.57–1)
GFR SERPL CREATININE-BSD FRML MDRD: 60 ML/MIN/1.73
GLUCOSE BLD-MCNC: 101 MG/DL (ref 65–99)
INR PPP: 2.55 (ref 0.9–1.1)
PHOSPHATE SERPL-MCNC: 3.8 MG/DL (ref 2.5–4.5)
POTASSIUM BLD-SCNC: 3.6 MMOL/L (ref 3.5–5.2)
PROTHROMBIN TIME: 26.6 SECONDS (ref 11.7–14.2)
SODIUM BLD-SCNC: 136 MMOL/L (ref 136–145)

## 2018-01-10 PROCEDURE — 80069 RENAL FUNCTION PANEL: CPT | Performed by: HOSPITALIST

## 2018-01-10 PROCEDURE — 85610 PROTHROMBIN TIME: CPT | Performed by: HOSPITALIST

## 2018-01-10 RX ORDER — DEXTROMETHORPHAN POLISTIREX 30 MG/5ML
60 SUSPENSION ORAL EVERY 12 HOURS SCHEDULED
Qty: 280 ML
Start: 2018-01-10 | End: 2018-07-23

## 2018-01-10 RX ORDER — ALBUTEROL SULFATE 2.5 MG/3ML
2.5 SOLUTION RESPIRATORY (INHALATION) EVERY 6 HOURS PRN
Refills: 12
Start: 2018-01-10 | End: 2018-03-22 | Stop reason: ALTCHOICE

## 2018-01-10 RX ADMIN — POTASSIUM CHLORIDE 10 MEQ: 750 CAPSULE, EXTENDED RELEASE ORAL at 10:02

## 2018-01-10 RX ADMIN — HYDROCODONE BITARTRATE AND HOMATROPINE METHYLBROMIDE 5 ML: 5; 1.5 SOLUTION ORAL at 10:01

## 2018-01-10 RX ADMIN — VITAMIN D, TAB 1000IU (100/BT) 2000 UNITS: 25 TAB at 10:02

## 2018-01-10 RX ADMIN — DEXTROMETHORPHAN POLISTIREX 60 MG: 30 SUSPENSION ORAL at 00:05

## 2018-01-10 RX ADMIN — HYDRALAZINE HYDROCHLORIDE 25 MG: 25 TABLET, FILM COATED ORAL at 10:02

## 2018-01-10 RX ADMIN — Medication 1 CAPSULE: at 10:02

## 2018-01-10 RX ADMIN — METOPROLOL SUCCINATE 50 MG: 50 TABLET, FILM COATED, EXTENDED RELEASE ORAL at 10:02

## 2018-01-10 RX ADMIN — METOPROLOL TARTRATE 50 MG: 25 TABLET ORAL at 00:40

## 2018-01-10 RX ADMIN — DEXTROMETHORPHAN POLISTIREX 60 MG: 30 SUSPENSION ORAL at 08:21

## 2018-01-11 ENCOUNTER — EPISODE CHANGES (OUTPATIENT)
Dept: CASE MANAGEMENT | Facility: OTHER | Age: 83
End: 2018-01-11

## 2018-01-11 ENCOUNTER — PATIENT OUTREACH (OUTPATIENT)
Dept: CASE MANAGEMENT | Facility: OTHER | Age: 83
End: 2018-01-11

## 2018-01-17 ENCOUNTER — TELEPHONE (OUTPATIENT)
Dept: CARDIOLOGY | Facility: CLINIC | Age: 83
End: 2018-01-17

## 2018-01-17 NOTE — TELEPHONE ENCOUNTER
The nurse practitioner at [facility] is wanting to start pt on a Medrol pack but the daughter said the last time she was on it that it sent her into Afib. They are wanting to know if it is ok to start.....Dacia

## 2018-01-18 ENCOUNTER — PATIENT OUTREACH (OUTPATIENT)
Dept: CASE MANAGEMENT | Facility: OTHER | Age: 83
End: 2018-01-18

## 2018-01-24 ENCOUNTER — PATIENT OUTREACH (OUTPATIENT)
Dept: CASE MANAGEMENT | Facility: OTHER | Age: 83
End: 2018-01-24

## 2018-01-24 NOTE — OUTREACH NOTE
Skilled Nursing Facility Discharge Flowsheet:     Skilled Nursing Facility Discharge Assessment 1/24/2018   Acute Facility Discharged From -   Acute Discharge Date -   Name of the Skilled Nursing Facility? -   Tier Level of the Skilled Nursing Facility -   Purpose of SNF Admission -   Estimated length of stay for the patient? To be Determined   Who is the insurance provider or payor of patient stay? -   Progression of Patient? Continues with therapies

## 2018-01-30 ENCOUNTER — PATIENT OUTREACH (OUTPATIENT)
Dept: CASE MANAGEMENT | Facility: OTHER | Age: 83
End: 2018-01-30

## 2018-01-30 NOTE — OUTREACH NOTE
Skilled Nursing Facility Discharge Flowsheet:     Skilled Nursing Facility Discharge Assessment 1/30/2018   Acute Facility Discharged From -   Acute Discharge Date -   Name of the Skilled Nursing Facility? -   Tier Level of the Skilled Nursing Facility -   Purpose of SNF Admission -   Estimated length of stay for the patient? -   Who is the insurance provider or payor of patient stay? -   Progression of Patient? -   Skilled Nursing Discharge Date? 1/31/2018   Where was the patient discharged to? Home   Home Health Agency at discharge? No   DME Needed at Discharge? No   Are there any medication concerns at Discharge No   Does the patient have a follow-up appointment? Yes   Comments Regarding F/U Appt. ? PCP, Dr. al 2/20/18

## 2018-02-01 ENCOUNTER — EPISODE CHANGES (OUTPATIENT)
Dept: CASE MANAGEMENT | Facility: OTHER | Age: 83
End: 2018-02-01

## 2018-02-02 ENCOUNTER — PATIENT OUTREACH (OUTPATIENT)
Dept: CASE MANAGEMENT | Facility: OTHER | Age: 83
End: 2018-02-02

## 2018-02-20 RX ORDER — METOPROLOL SUCCINATE 50 MG/1
TABLET, EXTENDED RELEASE ORAL
Qty: 90 TABLET | Refills: 1 | Status: SHIPPED | OUTPATIENT
Start: 2018-02-20 | End: 2018-04-26 | Stop reason: SDUPTHER

## 2018-02-27 ENCOUNTER — TELEPHONE (OUTPATIENT)
Dept: CARDIOLOGY | Facility: HOSPITAL | Age: 83
End: 2018-02-27

## 2018-02-27 RX ORDER — WARFARIN SODIUM 1 MG/1
TABLET ORAL
Qty: 40 TABLET | Refills: 2 | Status: SHIPPED | OUTPATIENT
Start: 2018-02-27 | End: 2018-08-14 | Stop reason: SDUPTHER

## 2018-03-22 ENCOUNTER — OFFICE VISIT (OUTPATIENT)
Dept: CARDIOLOGY | Facility: CLINIC | Age: 83
End: 2018-03-22

## 2018-03-22 VITALS
WEIGHT: 152.5 LBS | HEIGHT: 65 IN | HEART RATE: 83 BPM | SYSTOLIC BLOOD PRESSURE: 146 MMHG | DIASTOLIC BLOOD PRESSURE: 88 MMHG | BODY MASS INDEX: 25.41 KG/M2

## 2018-03-22 DIAGNOSIS — I50.32 CHRONIC DIASTOLIC HEART FAILURE (HCC): Primary | ICD-10-CM

## 2018-03-22 DIAGNOSIS — I48.20 CHRONIC ATRIAL FIBRILLATION (HCC): ICD-10-CM

## 2018-03-22 PROCEDURE — 99213 OFFICE O/P EST LOW 20 MIN: CPT | Performed by: INTERNAL MEDICINE

## 2018-03-22 PROCEDURE — 93000 ELECTROCARDIOGRAM COMPLETE: CPT | Performed by: INTERNAL MEDICINE

## 2018-03-22 RX ORDER — WARFARIN SODIUM 2 MG/1
2 TABLET ORAL TAKE AS DIRECTED
COMMUNITY
End: 2018-10-23 | Stop reason: SDUPTHER

## 2018-03-23 ENCOUNTER — EPISODE CHANGES (OUTPATIENT)
Dept: CASE MANAGEMENT | Facility: OTHER | Age: 83
End: 2018-03-23

## 2018-03-23 NOTE — PROGRESS NOTES
Subjective:     Encounter Date:01/04/2018      Patient ID: Citlali Solorio is a 82 y.o. female.    Chief Complaint:  Atrial Fibrillation   Presents for follow-up visit. Symptoms include shortness of breath. Symptoms are negative for chest pain, hypertension and palpitations. The symptoms have been stable. Past medical history includes atrial fibrillation and CHF.   Congestive Heart Failure   Presents for follow-up visit. Associated symptoms include fatigue and shortness of breath. Pertinent negatives include no chest pain or palpitations.       82-year-old female who presents today for evaluation.  Last time I saw patient she was extremely ill.  Patient ultimately had influenza as well as urinary tract infection rapid heart rate.  All these were treated in the hospital and things resolved and clinically she is doing well and back to baseline.    Review of Systems   Constitution: Positive for fatigue.   Cardiovascular: Negative for chest pain and palpitations.   Respiratory: Positive for cough and shortness of breath.    Musculoskeletal: Positive for joint pain.   Gastrointestinal: Positive for diarrhea.         ECG 12 Lead  Date/Time: 3/22/2018 5:28 PM  Performed by: DAVE CHASE  Authorized by: DAVE CHASE   Comparison: compared with previous ECG from 1/6/2018  Comparison to previous ECG: Her rate significantly lower  Rhythm: atrial fibrillation  Clinical impression: abnormal ECG               Objective:     Physical Exam   Constitutional: She is oriented to person, place, and time. She appears well-developed.   HENT:   Head: Normocephalic.   Eyes: Conjunctivae are normal.   Neck: Normal range of motion.   Cardiovascular: Normal rate and normal heart sounds.  An irregularly irregular rhythm present.   Pulmonary/Chest: Breath sounds normal.   Abdominal: Soft. Bowel sounds are normal.   Musculoskeletal: Normal range of motion. She exhibits no edema.   Neurological: She is alert and oriented to  person, place, and time.   Skin: Skin is warm and dry.   Psychiatric: She has a normal mood and affect. Her behavior is normal.   Vitals reviewed.      Lab Review:       Assessment:         No diagnosis found.       Plan:       1.  Atrial fibrillation  Heart rate well-controlled currently doing well.  2.  History of congestive heart failure stable  3.  Last time I saw patient she was having a lot of confusion.  Ultimately she had influenza and a urinary tract infection.  This is all cleared and she is doing remarkably well.  At this point I recommend no further changes of her medical regimen see her back in 6 months sooner if issues        Atrial Fibrillation and Atrial Flutter  Assessment  • The patient has persistent atrial fibrillation  • This is non-valvular in etiology  • The patient's CHADS2-VASc score is 4  • A [id number] score of 2 or more is considered a high risk for a thromboembolic event  • Warfarin prescribed    Plan  • Continue in atrial fibrillation with rate control  • Continue warfarin for antithrombotic therapy, bleeding issues discussed  • Continue beta blocker for rhythm control      Heart Failure  Assessment  • NYHA class II - There is slight limitation of physical activity. The patient is comfortable at rest, but physical activity results in fatigue, palpitations or shortness of breath.  • Left ventricular function is normal by qualitative assessment    Plan  • The patient has received heart failure education on the following topics: dietary sodium restriction and medication instructions  • The heart failure care plan was discussed with the patient today including: up-titrating HF medications    Subjective/Objective  • The patient reports dyspnea

## 2018-03-29 ENCOUNTER — APPOINTMENT (OUTPATIENT)
Dept: WOMENS IMAGING | Facility: HOSPITAL | Age: 83
End: 2018-03-29

## 2018-03-29 PROCEDURE — 77063 BREAST TOMOSYNTHESIS BI: CPT | Performed by: RADIOLOGY

## 2018-03-29 PROCEDURE — 77067 SCR MAMMO BI INCL CAD: CPT | Performed by: RADIOLOGY

## 2018-03-29 PROCEDURE — MDREVIEWSP: Performed by: RADIOLOGY

## 2018-04-03 RX ORDER — FUROSEMIDE 20 MG/1
TABLET ORAL
Qty: 30 TABLET | Refills: 4 | Status: SHIPPED | OUTPATIENT
Start: 2018-04-03 | End: 2018-08-21

## 2018-04-10 ENCOUNTER — TELEPHONE (OUTPATIENT)
Dept: CARDIOLOGY | Facility: CLINIC | Age: 83
End: 2018-04-10

## 2018-04-10 NOTE — TELEPHONE ENCOUNTER
Laura (daughter) called and was wanting to know what over the counter allergy medicine she can take from a cardiac stand point? Please advise    Laura #: [phone]    Thanks Brook

## 2018-04-26 RX ORDER — METOPROLOL SUCCINATE 50 MG/1
TABLET, EXTENDED RELEASE ORAL
Qty: 90 TABLET | Refills: 5 | Status: SHIPPED | OUTPATIENT
Start: 2018-04-26 | End: 2018-08-21

## 2018-05-22 RX ORDER — HYDRALAZINE HYDROCHLORIDE 25 MG/1
TABLET, FILM COATED ORAL
Qty: 60 TABLET | Refills: 5 | Status: SHIPPED | OUTPATIENT
Start: 2018-05-22 | End: 2018-08-21

## 2018-05-22 RX ORDER — WARFARIN SODIUM 2 MG/1
TABLET ORAL
Qty: 30 TABLET | Refills: 0 | Status: SHIPPED | OUTPATIENT
Start: 2018-05-22 | End: 2018-06-13 | Stop reason: SDUPTHER

## 2018-06-13 ENCOUNTER — OFFICE VISIT (OUTPATIENT)
Dept: INTERNAL MEDICINE | Facility: CLINIC | Age: 83
End: 2018-06-13

## 2018-06-13 VITALS
HEART RATE: 83 BPM | WEIGHT: 152 LBS | TEMPERATURE: 98.1 F | BODY MASS INDEX: 25.29 KG/M2 | SYSTOLIC BLOOD PRESSURE: 142 MMHG | OXYGEN SATURATION: 95 % | DIASTOLIC BLOOD PRESSURE: 84 MMHG

## 2018-06-13 DIAGNOSIS — J02.9 ACUTE PHARYNGITIS, UNSPECIFIED ETIOLOGY: Primary | ICD-10-CM

## 2018-06-13 LAB
EXPIRATION DATE: NORMAL
INTERNAL CONTROL: NORMAL
Lab: NORMAL
S PYO AG THROAT QL: NEGATIVE

## 2018-06-13 PROCEDURE — 99213 OFFICE O/P EST LOW 20 MIN: CPT | Performed by: INTERNAL MEDICINE

## 2018-06-13 PROCEDURE — 87880 STREP A ASSAY W/OPTIC: CPT | Performed by: INTERNAL MEDICINE

## 2018-06-13 RX ORDER — DOXYCYCLINE 100 MG/1
100 CAPSULE ORAL EVERY 12 HOURS SCHEDULED
Qty: 14 CAPSULE | Refills: 0 | Status: SHIPPED | OUTPATIENT
Start: 2018-06-13 | End: 2018-07-23

## 2018-06-13 NOTE — PROGRESS NOTES
Subjective     Citlali ARIANA Solorio is a 83 y.o. female who presents with   Chief Complaint   Patient presents with   • Sore Throat       History of Present Illness     Going on for one week.  Drainage.  No sinus pain.  Right ear pain.  Some cough.  NO SOA.  No CP.  OTC little help.      Review of Systems   Musculoskeletal: Positive for gait problem.       The following portions of the patient's history were reviewed and updated as appropriate: allergies, current medications and problem list.    Patient Active Problem List    Diagnosis Date Noted   • Acute on chronic diastolic heart failure 01/10/2018   • Influenza A 01/05/2018   • Diarrhea 01/04/2018   • Metabolic encephalopathy 01/04/2018   • Abnormal EKG 01/04/2018   • Cough 09/01/2017   • Atrial fibrillation with RVR 08/24/2017   • Tear of medial meniscus of right knee, current 06/15/2017   • Mitral valve insufficiency 07/06/2016   • Tricuspid valve insufficiency 07/06/2016   • Stress incontinence 05/23/2016   • Progressive supranuclear palsy 04/21/2016   • Anxiety 04/21/2016   • Atherosclerosis of both carotid arteries 04/21/2016     Note Last Updated: 5/31/2017     Description: plaque on screening last done 3/13, 11/13, 10/16     • Heart failure 04/21/2016     Note Last Updated: 4/21/2016     Description: admission 7/2013     • Gastroesophageal reflux disease 04/21/2016   • Hyperlipidemia 04/21/2016     Note Last Updated: 4/21/2016     Description: Patient tried Lipitor, Crestor, Zocor, Bacol and Livalo in the past and was intolerant of all of them.     • Impaired fasting glucose 04/21/2016   • Spinal stenosis of lumbar region 04/21/2016   • Cobalamin deficiency 04/21/2016     Note Last Updated: 5/31/2017     Description: told at [facility] that B12 was low.  Monthly shots recommended 5/5/15.  Now on oral replacement.      • Atrial fibrillation 06/24/2013   • Benign essential hypertension 06/24/2013       Current Outpatient Prescriptions on File Prior to Visit    Medication Sig Dispense Refill   • Cholecalciferol (VITAMIN D PO) Take 1,000 mg by mouth Daily.     • dextromethorphan polistirex ER (DELSYM) 30 MG/5ML Suspension Extended Release oral suspension Take 60 mg by mouth Every 12 (Twelve) Hours. 280 mL    • digoxin (LANOXIN) 125 MCG tablet Take 1 tablet by mouth Daily. 30 tablet 11   • furosemide (LASIX) 20 MG tablet TAKE ONE TABLET BY MOUTH DAILY 30 tablet 4   • hydrALAZINE (APRESOLINE) 25 MG tablet TAKE ONE TABLET BY MOUTH TWICE A DAY 60 tablet 5   • metoprolol succinate XL (TOPROL-XL) 50 MG 24 hr tablet TAKE ONE AND ONE-HALF TABLET BY MOUTH TWICE A DAY 90 tablet 5   • potassium chloride (K-DUR) 10 MEQ CR tablet Take 1 tablet by mouth Daily. 30 tablet 11   • vitamin B-12 (CYANOCOBALAMIN) 100 MCG tablet Take 50 mcg by mouth Daily.     • warfarin (COUMADIN) 1 MG tablet Take 2 mg on Tuesday & Friday, and 1 mg all other days OR as directed by physician 40 tablet 2   • warfarin (COUMADIN) 2 MG tablet Take 2 mg by mouth Take As Directed.     • [DISCONTINUED] warfarin (COUMADIN) 2 MG tablet TAKE ONE TABLET BY MOUTH DAILY AS DIRECTED 30 tablet 0     Current Facility-Administered Medications on File Prior to Visit   Medication Dose Route Frequency Provider Last Rate Last Dose   • nitroglycerin (NITROSTAT) SL tablet 0.4 mg  0.4 mg Sublingual Q5 Min PRN FAM Lopez           Objective     /84   Pulse 83   Temp 98.1 °F (36.7 °C)   Wt 68.9 kg (152 lb)   SpO2 95%   BMI 25.29 kg/m²     Physical Exam   Constitutional: She is oriented to person, place, and time. She appears well-developed and well-nourished.   HENT:   Head: Normocephalic and atraumatic.   Right Ear: Hearing and tympanic membrane normal.   Left Ear: Hearing and tympanic membrane normal.   Mouth/Throat: Posterior oropharyngeal erythema present. No oropharyngeal exudate.   Cardiovascular: Normal rate, regular rhythm and normal heart sounds.    Pulmonary/Chest: Effort normal and breath sounds normal.    Neurological: She is alert and oriented to person, place, and time.   Skin: Skin is warm and dry.   Psychiatric: She has a normal mood and affect. Her behavior is normal.       Assessment/Plan   Citlali was seen today for sore throat.    Diagnoses and all orders for this visit:    Acute pharyngitis, unspecified etiology  -     POC Rapid Strep A  -     Throat / Upper Respiratory Culture - Swab, Throat    Other orders  -     doxycycline (MONODOX) 100 MG capsule; Take 1 capsule by mouth Every 12 (Twelve) Hours.        Discussion  Patient presents with acute pharyngitis.  Results of the rapid group A strep in the office is negative.  We will send for culture and f/u on that. Rx for antibiotic provided.  Let us know if not feeling better over the next 48 hours.  Advised that she is infectious to others.           Future Appointments  Date Time Provider Department Center   7/23/2018 1:30 PM MD THIAGO [facility] None

## 2018-06-16 LAB
BACTERIA SPEC RESP CULT: NORMAL
BACTERIA SPEC RESP CULT: NORMAL

## 2018-07-13 ENCOUNTER — TELEPHONE (OUTPATIENT)
Dept: CARDIOLOGY | Facility: CLINIC | Age: 83
End: 2018-07-13

## 2018-07-13 NOTE — TELEPHONE ENCOUNTER
Pt needs to have surgery on her vocal cords July 26th and needs to know how long she can hold her warfarin?  You saw her last on 03-22-18.  Please advise.    Thanks,  Shu

## 2018-07-17 NOTE — TELEPHONE ENCOUNTER
Called daughter's number and mailbox was full unable to leave message at that number.    Spoke to pt's granddaughter (Marina) and she was notified of the below and voiced understanding. She had no further questions at this time.

## 2018-07-18 DIAGNOSIS — I10 HYPERTENSION, UNSPECIFIED TYPE: Primary | ICD-10-CM

## 2018-07-18 DIAGNOSIS — E78.5 HYPERLIPIDEMIA, UNSPECIFIED HYPERLIPIDEMIA TYPE: ICD-10-CM

## 2018-07-19 ENCOUNTER — APPOINTMENT (OUTPATIENT)
Dept: LAB | Facility: HOSPITAL | Age: 83
End: 2018-07-19

## 2018-07-19 LAB
ALBUMIN SERPL-MCNC: 4.4 G/DL (ref 3.5–5.2)
ALBUMIN/GLOB SERPL: 1.4 G/DL
ALP SERPL-CCNC: 53 U/L (ref 39–117)
ALT SERPL W P-5'-P-CCNC: 15 U/L (ref 1–33)
ANION GAP SERPL CALCULATED.3IONS-SCNC: 11.4 MMOL/L
AST SERPL-CCNC: 19 U/L (ref 1–32)
BACTERIA UR QL AUTO: ABNORMAL /HPF
BASOPHILS # BLD AUTO: 0.06 10*3/MM3 (ref 0–0.2)
BASOPHILS NFR BLD AUTO: 0.9 % (ref 0–1.5)
BILIRUB SERPL-MCNC: 1.1 MG/DL (ref 0.1–1.2)
BILIRUB UR QL STRIP: NEGATIVE
BUN BLD-MCNC: 15 MG/DL (ref 8–23)
BUN/CREAT SERPL: 14.2 (ref 7–25)
CALCIUM SPEC-SCNC: 9.6 MG/DL (ref 8.6–10.5)
CHLORIDE SERPL-SCNC: 100 MMOL/L (ref 98–107)
CHOLEST SERPL-MCNC: 180 MG/DL (ref 0–200)
CLARITY UR: CLEAR
CO2 SERPL-SCNC: 30.6 MMOL/L (ref 22–29)
COLOR UR: YELLOW
CREAT BLD-MCNC: 1.06 MG/DL (ref 0.57–1)
DEPRECATED RDW RBC AUTO: 46.4 FL (ref 37–54)
EOSINOPHIL # BLD AUTO: 0.12 10*3/MM3 (ref 0–0.7)
EOSINOPHIL NFR BLD AUTO: 1.8 % (ref 0.3–6.2)
ERYTHROCYTE [DISTWIDTH] IN BLOOD BY AUTOMATED COUNT: 13.2 % (ref 11.7–13)
GFR SERPL CREATININE-BSD FRML MDRD: 50 ML/MIN/1.73
GLOBULIN UR ELPH-MCNC: 3.2 GM/DL
GLUCOSE BLD-MCNC: 101 MG/DL (ref 65–99)
GLUCOSE UR STRIP-MCNC: NEGATIVE MG/DL
HCT VFR BLD AUTO: 41.1 % (ref 35.6–45.5)
HDLC SERPL-MCNC: 36 MG/DL (ref 40–60)
HGB BLD-MCNC: 13.8 G/DL (ref 11.9–15.5)
HGB UR QL STRIP.AUTO: NEGATIVE
HYALINE CASTS UR QL AUTO: ABNORMAL /LPF
IMM GRANULOCYTES # BLD: 0.03 10*3/MM3 (ref 0–0.03)
IMM GRANULOCYTES NFR BLD: 0.4 % (ref 0–0.5)
KETONES UR QL STRIP: NEGATIVE
LDLC SERPL CALC-MCNC: 98 MG/DL (ref 0–100)
LDLC/HDLC SERPL: 2.72 {RATIO}
LEUKOCYTE ESTERASE UR QL STRIP.AUTO: ABNORMAL
LYMPHOCYTES # BLD AUTO: 1.6 10*3/MM3 (ref 0.9–4.8)
LYMPHOCYTES NFR BLD AUTO: 23.4 % (ref 19.6–45.3)
MCH RBC QN AUTO: 32 PG (ref 26.9–32)
MCHC RBC AUTO-ENTMCNC: 33.6 G/DL (ref 32.4–36.3)
MCV RBC AUTO: 95.4 FL (ref 80.5–98.2)
MONOCYTES # BLD AUTO: 0.68 10*3/MM3 (ref 0.2–1.2)
MONOCYTES NFR BLD AUTO: 9.9 % (ref 5–12)
NEUTROPHILS # BLD AUTO: 4.35 10*3/MM3 (ref 1.9–8.1)
NEUTROPHILS NFR BLD AUTO: 63.6 % (ref 42.7–76)
NITRITE UR QL STRIP: NEGATIVE
NRBC BLD MANUAL-RTO: 0 /100 WBC (ref 0–0)
PH UR STRIP.AUTO: 6 [PH] (ref 5–8)
PLATELET # BLD AUTO: 238 10*3/MM3 (ref 140–500)
PMV BLD AUTO: 10.4 FL (ref 6–12)
POTASSIUM BLD-SCNC: 4.2 MMOL/L (ref 3.5–5.2)
PROT SERPL-MCNC: 7.6 G/DL (ref 6–8.5)
PROT UR QL STRIP: NEGATIVE
RBC # BLD AUTO: 4.31 10*6/MM3 (ref 3.9–5.2)
RBC # UR: ABNORMAL /HPF
REF LAB TEST METHOD: ABNORMAL
SODIUM BLD-SCNC: 142 MMOL/L (ref 136–145)
SP GR UR STRIP: 1.01 (ref 1–1.03)
SQUAMOUS #/AREA URNS HPF: ABNORMAL /HPF
TRIGL SERPL-MCNC: 231 MG/DL (ref 0–150)
TSH SERPL DL<=0.05 MIU/L-ACNC: 3.48 MIU/ML (ref 0.27–4.2)
UROBILINOGEN UR QL STRIP: ABNORMAL
VLDLC SERPL-MCNC: 46.2 MG/DL (ref 5–40)
WBC NRBC COR # BLD: 6.84 10*3/MM3 (ref 4.5–10.7)
WBC UR QL AUTO: ABNORMAL /HPF

## 2018-07-19 PROCEDURE — 81001 URINALYSIS AUTO W/SCOPE: CPT | Performed by: INTERNAL MEDICINE

## 2018-07-19 PROCEDURE — 84443 ASSAY THYROID STIM HORMONE: CPT | Performed by: INTERNAL MEDICINE

## 2018-07-19 PROCEDURE — 80061 LIPID PANEL: CPT | Performed by: INTERNAL MEDICINE

## 2018-07-19 PROCEDURE — 85025 COMPLETE CBC W/AUTO DIFF WBC: CPT | Performed by: INTERNAL MEDICINE

## 2018-07-19 PROCEDURE — 80053 COMPREHEN METABOLIC PANEL: CPT | Performed by: INTERNAL MEDICINE

## 2018-07-19 PROCEDURE — 36415 COLL VENOUS BLD VENIPUNCTURE: CPT

## 2018-07-23 ENCOUNTER — OFFICE VISIT (OUTPATIENT)
Dept: INTERNAL MEDICINE | Facility: CLINIC | Age: 83
End: 2018-07-23

## 2018-07-23 VITALS
SYSTOLIC BLOOD PRESSURE: 138 MMHG | WEIGHT: 152 LBS | OXYGEN SATURATION: 98 % | HEART RATE: 76 BPM | HEIGHT: 65 IN | DIASTOLIC BLOOD PRESSURE: 82 MMHG | BODY MASS INDEX: 25.33 KG/M2

## 2018-07-23 DIAGNOSIS — G23.1 PROGRESSIVE SUPRANUCLEAR PALSY (HCC): ICD-10-CM

## 2018-07-23 DIAGNOSIS — I48.20 CHRONIC ATRIAL FIBRILLATION (HCC): ICD-10-CM

## 2018-07-23 DIAGNOSIS — Z00.00 MEDICARE ANNUAL WELLNESS VISIT, SUBSEQUENT: Primary | ICD-10-CM

## 2018-07-23 DIAGNOSIS — I10 BENIGN ESSENTIAL HYPERTENSION: ICD-10-CM

## 2018-07-23 DIAGNOSIS — E78.5 HYPERLIPIDEMIA, UNSPECIFIED HYPERLIPIDEMIA TYPE: ICD-10-CM

## 2018-07-23 DIAGNOSIS — Z13.6 ENCOUNTER FOR SCREENING FOR VASCULAR DISEASE: ICD-10-CM

## 2018-07-23 PROBLEM — G93.41 METABOLIC ENCEPHALOPATHY: Status: RESOLVED | Noted: 2018-01-04 | Resolved: 2018-07-23

## 2018-07-23 PROBLEM — R05.9 COUGH: Status: RESOLVED | Noted: 2017-09-01 | Resolved: 2018-07-23

## 2018-07-23 PROBLEM — I50.33 ACUTE ON CHRONIC DIASTOLIC HEART FAILURE (HCC): Status: RESOLVED | Noted: 2018-01-10 | Resolved: 2018-07-23

## 2018-07-23 PROBLEM — R94.31 ABNORMAL EKG: Status: RESOLVED | Noted: 2018-01-04 | Resolved: 2018-07-23

## 2018-07-23 PROBLEM — J10.1 INFLUENZA A: Status: RESOLVED | Noted: 2018-01-05 | Resolved: 2018-07-23

## 2018-07-23 PROBLEM — R19.7 DIARRHEA: Status: RESOLVED | Noted: 2018-01-04 | Resolved: 2018-07-23

## 2018-07-23 PROCEDURE — G0439 PPPS, SUBSEQ VISIT: HCPCS | Performed by: INTERNAL MEDICINE

## 2018-07-23 PROCEDURE — 99214 OFFICE O/P EST MOD 30 MIN: CPT | Performed by: INTERNAL MEDICINE

## 2018-07-23 RX ORDER — ATROPINE SULFATE 10 MG/ML
SOLUTION/ DROPS OPHTHALMIC
COMMUNITY
Start: 2018-06-29 | End: 2018-08-21

## 2018-07-23 NOTE — PATIENT INSTRUCTIONS
Medicare Wellness  Personal Prevention Plan of Service     Date of Office Visit:  2018  Encounter Provider:  Cristal Lema MD  Place of Service:  [facility] INTER[facility]  Patient Name: Citlali Solorio  :  1935    As part of the Medicare Wellness portion of your visit today, we are providing you with this personalized preventive plan of services (PPPS). This plan is based upon recommendations of the [place] Preventive Services Task Force (USPSTF) and the Advisory Committee on Immunization Practices (ACIP).    This lists the preventive care services that should be considered, and provides dates of when you are due. Items listed as completed are up-to-date and do not require any further intervention.    Health Maintenance   Topic Date Due   • ZOSTER VACCINE (2 of 3) 2009   • MEDICARE ANNUAL WELLNESS  2018   • INFLUENZA VACCINE  2018   • LIPID PANEL  2019   • MAMMOGRAM  2020   • COLONOSCOPY  2022   • TDAP/TD VACCINES (2 - Td) 2023   • PNEUMOCOCCAL VACCINES (65+ LOW/MEDIUM RISK)  Completed       No orders of the defined types were placed in this encounter.      Return in about 1 year (around 2019) for Annual physical.

## 2018-07-23 NOTE — PROGRESS NOTES
Natalie Solorio is a 83 y.o. female who presents for an annual wellness visit as well as check up of htn, hld, afib.      History of Present Illness     HTN. Good control with current.   HLD. She is maintained on diet alone.   Afib/CHF.  She is maintained on warfarin under direction of cardiology. She is well-compensated.     Review of Systems   Respiratory: Negative.    Cardiovascular: Negative.        The following portions of the patient's history were reviewed and updated as appropriate: allergies, current medications, past family history, past medical history, past social history, past surgical history and problem list.  Health maintenance tab was reviewed and updated with the patient.       Patient Active Problem List    Diagnosis Date Noted   • Acute on chronic diastolic heart failure (CMS/HCC) 01/10/2018   • Influenza A 01/05/2018   • Diarrhea 01/04/2018   • Metabolic encephalopathy 01/04/2018   • Abnormal EKG 01/04/2018   • Cough 09/01/2017   • Atrial fibrillation with RVR (CMS/[facility]) 08/24/2017   • Tear of medial meniscus of right knee, current 06/15/2017   • Mitral valve insufficiency 07/06/2016   • Tricuspid valve insufficiency 07/06/2016   • Stress incontinence 05/23/2016   • Progressive supranuclear palsy (CMS/HCC) 04/21/2016   • Anxiety 04/21/2016   • Atherosclerosis of both carotid arteries 04/21/2016     Note Last Updated: 5/31/2017     Description: plaque on screening last done 3/13, 11/13, 10/16     • Heart failure (CMS/HCC) 04/21/2016     Note Last Updated: 4/21/2016     Description: admission 7/2013     • Gastroesophageal reflux disease 04/21/2016   • Hyperlipidemia 04/21/2016     Note Last Updated: 4/21/2016     Description: Patient tried Lipitor, Crestor, Zocor, Bacol and Livalo in the past and was intolerant of all of them.     • Impaired fasting glucose 04/21/2016   • Spinal stenosis of lumbar region 04/21/2016   • Cobalamin deficiency 04/21/2016     Note Last Updated: 5/31/2017  "    Description: told at [facility] that B12 was low.  Monthly shots recommended 5/5/15.  Now on oral replacement.      • Atrial fibrillation (CMS/HCC) 06/24/2013   • Benign essential hypertension 06/24/2013       Past Medical History:   Diagnosis Date   • Abnormal EKG    • Abnormal gait     \"frozen gait\"  Seen at [facility] with full work up.   • Acute heart failure (CMS/HCC)    • Acute on chronic diastolic heart failure (CMS/HCC)    • Anxiety    • Ataxic gait    • Atrial fibrillation (CMS/HCC)    • Atrial fibrillation with RVR (CMS/HCC)    • Benign essential hypertension    • Carotid artery stenosis     plaque on screening last done 3/13, 11/13   • Cough    • Diarrhea    • Diastolic congestive heart failure (CMS/HCC)     addmission 07/2013   • Dysuria    • Esophageal reflux    • H/O colonoscopy     normal 4/26/2012   • Heart failure (CMS/HCC)    • History of arthritis    • History of dysuria    • History of vitamin B deficiency    • Hypercholesterolemia     Patient tried Lipitor, Crestor, Zocor, Bacol and Livalo in the past and was intolerant of all of them.   • Hypertension    • IFG (impaired fasting glucose)    • Left shoulder pain    • Lumbar canal stenosis    • Metabolic encephalopathy    • PAF (paroxysmal atrial fibrillation) (CMS/HCC)    • Progressive supranuclear palsy (CMS/HCC)    • Transient cerebral ischemia     versus migraine in 11/11   • Vitamin B12 deficiency     told at [facility] that B12 was low.  Monthly shots recommended 5/5/15.       Past Surgical History:   Procedure Laterality Date   • BLADDER SURGERY     • CATARACT EXTRACTION     • HYSTERECTOMY     • KNEE ARTHROSCOPY Right 6/15/2017    Procedure: RT KNEE ARTHROSCOPIC PARTIAL MEDIAL AND LATERAL MENISECTOMY AND SYNOVECTOMY;  Surgeon: Sam Soni MD;  [facility] [facility] [place] [facility]  Service:    • KNEE SURGERY Left     Knee Replacement       Family History   Problem Relation Age of Onset   • Hypertension Mother    • Heart disease Mother    • " Heart disease Father    • Stroke Father    • Hypertension Father    • Hypertension Sister    • Malig Hyperthermia Neg Hx        Social History     Social History   • Marital status:      Spouse name: N/A   • Number of children: N/A   • Years of education: N/A     Occupational History   • Not on file.     Social History Main Topics   • Smoking status: Former Smoker     Packs/day: 0.50     Years: 15.00     Types: Cigarettes     Quit date: 1985   • Smokeless tobacco: Never Used      Comment: caffeine use   • Alcohol use Yes      Comment: Rare   • Drug use: No   • Sexual activity: Defer     Other Topics Concern   • Not on file     Social History Narrative   • No narrative on file       Current Outpatient Prescriptions on File Prior to Visit   Medication Sig Dispense Refill   • Cholecalciferol (VITAMIN D PO) Take 1,000 mg by mouth Daily.     • digoxin (LANOXIN) 125 MCG tablet Take 1 tablet by mouth Daily. 30 tablet 11   • furosemide (LASIX) 20 MG tablet TAKE ONE TABLET BY MOUTH DAILY 30 tablet 4   • hydrALAZINE (APRESOLINE) 25 MG tablet TAKE ONE TABLET BY MOUTH TWICE A DAY 60 tablet 5   • metoprolol succinate XL (TOPROL-XL) 50 MG 24 hr tablet TAKE ONE AND ONE-HALF TABLET BY MOUTH TWICE A DAY 90 tablet 5   • potassium chloride (K-DUR) 10 MEQ CR tablet Take 1 tablet by mouth Daily. 30 tablet 11   • vitamin B-12 (VITAMIN B-12) 1000 MCG tablet Take 1 tablet by mouth Daily.     • warfarin (COUMADIN) 1 MG tablet Take 2 mg on Tuesday & Friday, and 1 mg all other days OR as directed by physician 40 tablet 2   • warfarin (COUMADIN) 2 MG tablet Take 2 mg by mouth Take As Directed.     • [DISCONTINUED] dextromethorphan polistirex ER (DELSYM) 30 MG/5ML Suspension Extended Release oral suspension Take 60 mg by mouth Every 12 (Twelve) Hours. 280 mL    • [DISCONTINUED] doxycycline (MONODOX) 100 MG capsule Take 1 capsule by mouth Every 12 (Twelve) Hours. 14 capsule 0     Current Facility-Administered Medications on File Prior to  "Visit   Medication Dose Route Frequency Provider Last Rate Last Dose   • nitroglycerin (NITROSTAT) SL tablet 0.4 mg  0.4 mg Sublingual Q5 Min PRN FAM Lopez           Allergies   Allergen Reactions   • Atorvastatin    • Penicillins Itching   • Pitavastatin    • Rosuvastatin    • Simvastatin        Immunization History   Administered Date(s) Administered   • Pneumococcal Conjugate 13-Valent (PCV13) 05/23/2016   • Pneumococcal, Unspecified 01/01/2009   • Tdap 01/01/2013   • Zostavax 10/05/2009       Objective     /82   Pulse 76   Ht 165.1 cm (65\")   Wt 68.9 kg (152 lb)   SpO2 98%   BMI 25.29 kg/m²     Physical Exam   Constitutional: She is oriented to person, place, and time. She appears well-developed and well-nourished.   HENT:   Head: Normocephalic and atraumatic.   Right Ear: Hearing, tympanic membrane and external ear normal.   Left Ear: Hearing, tympanic membrane and external ear normal.   Nose: Nose normal.   Mouth/Throat: Oropharynx is clear and moist.   Neck: Neck supple. No thyromegaly present.   Cardiovascular: Normal rate and normal heart sounds.  An irregularly irregular rhythm present.   No murmur heard.  Pulmonary/Chest: Effort normal and breath sounds normal.   Abdominal: Soft. She exhibits no distension. There is no hepatosplenomegaly. There is no tenderness.   Lymphadenopathy:     She has no cervical adenopathy.   Neurological: She is alert and oriented to person, place, and time.   Skin: Skin is warm and dry.   Psychiatric: She has a normal mood and affect. Her speech is normal and behavior is normal. Judgment and thought content normal. Cognition and memory are normal.       Assessment/Plan   Citlali was seen today for medicare wellness visit.    Diagnoses and all orders for this visit:    Medicare annual wellness visit, subsequent    Encounter for screening for vascular disease  -     Vascular Screening (Bundle) CAR; Future    Progressive supranuclear palsy " (CMS/[facility])    Benign essential hypertension    Hyperlipidemia, unspecified hyperlipidemia type    Chronic atrial fibrillation (CMS/HCC)        Discussion    AWV.  See scanned forms for glynn history, PHQ-9, functional ability questionnaire, cognitive impairment screening.  Direct observation of cognitive abilities:  The patient does not exhibit  any impairment in cognitive abilities upon direct observation at today's visit.     These were all reviewed with the patient and the patient was provided with a personal prevention plan of service in patient instructions.  Patient was given advice or information on the following topics:  nutrition, exercise   HTN. Continue current.    HLD. Continue healthy diet.   Afib/CHF.  Continue current regimen.   H/o carotid plaque.  Vascular screen is ordered.      I have recommended that the patient get the new HZV shot called Shingrix.          Health Maintenance   Topic Date Due   • ZOSTER VACCINE (2 of 3) 11/30/2009   • MEDICARE ANNUAL WELLNESS  05/30/2018   • INFLUENZA VACCINE  08/01/2018   • LIPID PANEL  07/19/2019   • MAMMOGRAM  03/29/2020   • COLONOSCOPY  04/26/2022   • TDAP/TD VACCINES (2 - Td) 01/01/2023   • PNEUMOCOCCAL VACCINES (65+ LOW/MEDIUM RISK)  Completed            No future appointments.

## 2018-07-24 RX ORDER — DIGOXIN 125 UG/1
TABLET ORAL
Qty: 30 TABLET | Refills: 10 | Status: SHIPPED | OUTPATIENT
Start: 2018-07-24 | End: 2018-08-21

## 2018-08-06 ENCOUNTER — TELEPHONE (OUTPATIENT)
Dept: CARDIOLOGY | Facility: CLINIC | Age: 83
End: 2018-08-06

## 2018-08-06 NOTE — TELEPHONE ENCOUNTER
Jagruti with Dr. Gould's office called for surgery clearance.  Pt is going to have a Rt Knee arthroscopy on 08-28-18 and they are asking her to stop her Warfarin 5 days prior.  You saw her last on 03-22-18.  Please advise.    Jagruti # [phone]- ext [phone]    Thanks,  Shu

## 2018-08-15 RX ORDER — WARFARIN SODIUM 1 MG/1
TABLET ORAL
Qty: 60 TABLET | Refills: 1 | Status: SHIPPED | OUTPATIENT
Start: 2018-08-15 | End: 2018-08-21

## 2018-08-21 ENCOUNTER — APPOINTMENT (OUTPATIENT)
Dept: PREADMISSION TESTING | Facility: HOSPITAL | Age: 83
End: 2018-08-21

## 2018-08-21 ENCOUNTER — HOSPITAL ENCOUNTER (OUTPATIENT)
Dept: GENERAL RADIOLOGY | Facility: HOSPITAL | Age: 83
Discharge: HOME | End: 2018-08-21
Attending: ORTHOPAEDIC SURGERY | Admitting: ORTHOPAEDIC SURGERY

## 2018-08-21 VITALS
DIASTOLIC BLOOD PRESSURE: 86 MMHG | WEIGHT: 152 LBS | HEART RATE: 79 BPM | HEIGHT: 65 IN | BODY MASS INDEX: 25.33 KG/M2 | OXYGEN SATURATION: 97 % | SYSTOLIC BLOOD PRESSURE: 163 MMHG | RESPIRATION RATE: 20 BRPM | TEMPERATURE: 98 F

## 2018-08-21 LAB
ANION GAP SERPL CALCULATED.3IONS-SCNC: 14.7 MMOL/L
BACTERIA UR QL AUTO: ABNORMAL /HPF
BASOPHILS # BLD AUTO: 0.03 10*3/MM3 (ref 0–0.2)
BASOPHILS NFR BLD AUTO: 0.4 % (ref 0–1.5)
BILIRUB UR QL STRIP: NEGATIVE
BUN BLD-MCNC: 16 MG/DL (ref 8–23)
BUN/CREAT SERPL: 14.2 (ref 7–25)
CALCIUM SPEC-SCNC: 9.4 MG/DL (ref 8.6–10.5)
CHLORIDE SERPL-SCNC: 100 MMOL/L (ref 98–107)
CLARITY UR: CLEAR
CO2 SERPL-SCNC: 27.3 MMOL/L (ref 22–29)
COLOR UR: YELLOW
CREAT BLD-MCNC: 1.13 MG/DL (ref 0.57–1)
DEPRECATED RDW RBC AUTO: 46.5 FL (ref 37–54)
EOSINOPHIL # BLD AUTO: 0.14 10*3/MM3 (ref 0–0.7)
EOSINOPHIL NFR BLD AUTO: 2 % (ref 0.3–6.2)
ERYTHROCYTE [DISTWIDTH] IN BLOOD BY AUTOMATED COUNT: 12.9 % (ref 11.7–13)
GFR SERPL CREATININE-BSD FRML MDRD: 46 ML/MIN/1.73
GLUCOSE BLD-MCNC: 119 MG/DL (ref 65–99)
GLUCOSE UR STRIP-MCNC: NEGATIVE MG/DL
HCT VFR BLD AUTO: 44.5 % (ref 35.6–45.5)
HGB BLD-MCNC: 14 G/DL (ref 11.9–15.5)
HGB UR QL STRIP.AUTO: NEGATIVE
HYALINE CASTS UR QL AUTO: ABNORMAL /LPF
IMM GRANULOCYTES # BLD: 0 10*3/MM3 (ref 0–0.03)
IMM GRANULOCYTES NFR BLD: 0 % (ref 0–0.5)
KETONES UR QL STRIP: NEGATIVE
LEUKOCYTE ESTERASE UR QL STRIP.AUTO: ABNORMAL
LYMPHOCYTES # BLD AUTO: 1.83 10*3/MM3 (ref 0.9–4.8)
LYMPHOCYTES NFR BLD AUTO: 26.4 % (ref 19.6–45.3)
MCH RBC QN AUTO: 31.4 PG (ref 26.9–32)
MCHC RBC AUTO-ENTMCNC: 31.5 G/DL (ref 32.4–36.3)
MCV RBC AUTO: 99.8 FL (ref 80.5–98.2)
MONOCYTES # BLD AUTO: 0.69 10*3/MM3 (ref 0.2–1.2)
MONOCYTES NFR BLD AUTO: 10 % (ref 5–12)
NEUTROPHILS # BLD AUTO: 4.23 10*3/MM3 (ref 1.9–8.1)
NEUTROPHILS NFR BLD AUTO: 61.2 % (ref 42.7–76)
NITRITE UR QL STRIP: NEGATIVE
PH UR STRIP.AUTO: 6 [PH] (ref 5–8)
PLATELET # BLD AUTO: 250 10*3/MM3 (ref 140–500)
PMV BLD AUTO: 11.1 FL (ref 6–12)
POTASSIUM BLD-SCNC: 4 MMOL/L (ref 3.5–5.2)
PROT UR QL STRIP: NEGATIVE
RBC # BLD AUTO: 4.46 10*6/MM3 (ref 3.9–5.2)
RBC # UR: ABNORMAL /HPF
REF LAB TEST METHOD: ABNORMAL
SODIUM BLD-SCNC: 142 MMOL/L (ref 136–145)
SP GR UR STRIP: 1.02 (ref 1–1.03)
SQUAMOUS #/AREA URNS HPF: ABNORMAL /HPF
UROBILINOGEN UR QL STRIP: ABNORMAL
WBC NRBC COR # BLD: 6.92 10*3/MM3 (ref 4.5–10.7)
WBC UR QL AUTO: ABNORMAL /HPF

## 2018-08-21 PROCEDURE — 36415 COLL VENOUS BLD VENIPUNCTURE: CPT

## 2018-08-21 PROCEDURE — 81001 URINALYSIS AUTO W/SCOPE: CPT | Performed by: ORTHOPAEDIC SURGERY

## 2018-08-21 PROCEDURE — 71046 X-RAY EXAM CHEST 2 VIEWS: CPT

## 2018-08-21 PROCEDURE — 85025 COMPLETE CBC W/AUTO DIFF WBC: CPT | Performed by: ORTHOPAEDIC SURGERY

## 2018-08-21 PROCEDURE — 80048 BASIC METABOLIC PNL TOTAL CA: CPT | Performed by: ORTHOPAEDIC SURGERY

## 2018-08-21 RX ORDER — METOPROLOL SUCCINATE 50 MG/1
75 TABLET, EXTENDED RELEASE ORAL 2 TIMES DAILY
COMMUNITY
End: 2018-10-23 | Stop reason: SDUPTHER

## 2018-08-21 RX ORDER — DIGOXIN 125 MCG
125 TABLET ORAL
COMMUNITY
End: 2019-06-02 | Stop reason: SDUPTHER

## 2018-08-21 RX ORDER — HYDRALAZINE HYDROCHLORIDE 25 MG/1
25 TABLET, FILM COATED ORAL 2 TIMES DAILY
COMMUNITY
End: 2018-11-29 | Stop reason: SDUPTHER

## 2018-08-21 RX ORDER — FUROSEMIDE 20 MG/1
20 TABLET ORAL DAILY
COMMUNITY
End: 2018-09-25 | Stop reason: SDUPTHER

## 2018-08-21 RX ORDER — WARFARIN SODIUM 1 MG/1
1 TABLET ORAL
COMMUNITY
End: 2019-01-04 | Stop reason: SDUPTHER

## 2018-08-21 NOTE — DISCHARGE INSTRUCTIONS
Take the following medications the morning of surgery with a small sip of water:  METOPROLOL,DIGOXIN AND HYDRALAZINE      General Instructions:  • Do not eat solid food after midnight the night before surgery.  • You may drink clear liquids day of surgery but must stop at least one hour before your hospital arrival time.  • It is beneficial for you to have a clear drink that contains carbohydrates the day of surgery.  We suggest a 12 to 20 ounce bottle of Gatorade or Powerade for non-diabetic patients or a 12 to 20 ounce bottle of G2 or Powerade Zero for diabetic patients. (Pediatric patients, are not advised to drink a 12 to 20 ounce carbohydrate drink)    Clear liquids are liquids you can see through.  Nothing red in color.     Plain water                               Sports drinks  Sodas                                   Gelatin (Jell-O)  Fruit juices without pulp such as white grape juice and apple juice  Popsicles that contain no fruit or yogurt  Tea or coffee (no cream or milk added)  Gatorade / Powerade  G2 / Powerade Zero    • Infants may have breast milk up to four hours before surgery.  • Infants drinking formula may drink formula up to six hours before surgery.   • Patients who avoid smoking, chewing tobacco and alcohol for 4 weeks prior to surgery have a reduced risk of post-operative complications.  Quit smoking as many days before surgery as you can.  • Do not smoke, use chewing tobacco or drink alcohol the day of surgery.   • If applicable bring your C-PAP/ BI-PAP machine.  • Bring any papers given to you in the doctor’s office.  • Wear clean comfortable clothes and socks.  • Do not wear contact lenses or make-up.  Bring a case for your glasses.   • Bring crutches or walker if applicable.  • Remove all piercings.  Leave jewelry and any other valuables at home.  • Hair extensions with metal clips must be removed prior to surgery.  • The Pre-Admission Testing nurse will instruct you to bring  medications if unable to obtain an accurate list in Pre-Admission Testing.        If you were given a blood bank ID arm band remember to bring it with you the day of surgery.    Preventing a Surgical Site Infection:  • For 2 to 3 days before surgery, avoid shaving with a razor because the razor can irritate skin and make it easier to develop an infection.    • Any areas of open skin can increase the risk of a post-operative wound infection by allowing bacteria to enter and travel throughout the body.  Notify your surgeon if you have any skin wounds / rashes even if it is not near the expected surgical site.  The area will need assessed to determine if surgery should be delayed until it is healed.  • The night prior to surgery sleep in a clean bed with clean clothing.  Do not allow pets to sleep with you.  • Shower on the morning of surgery using a fresh bar of anti-bacterial soap (such as Dial) and clean washcloth.  Dry with a clean towel and dress in clean clothing.  • Ask your surgeon if you will be receiving antibiotics prior to surgery.  • Make sure you, your family, and all healthcare providers clean their hands with soap and water or an alcohol based hand  before caring for you or your wound.    Day of surgery:8/29/2018 ARRIVAL TIME  8:00 AM  Upon arrival, a Pre-op nurse and Anesthesiologist will review your health history, obtain vital signs, and answer questions you may have.  The only belongings needed at this time will be your home medications and if applicable your C-PAP/BI-PAP machine.  If you are staying overnight your family can leave the rest of your belongings in the car and bring them to your room later.  A Pre-op nurse will start an IV and you may receive medication in preparation for surgery, including something to help you relax.  Your family will be able to see you in the Pre-op area.  While you are in surgery your family should notify the waiting room  if they leave the  waiting room area and provide a contact phone number.    Please be aware that surgery does come with discomfort.  We want to make every effort to control your discomfort so please discuss any uncontrolled symptoms with your nurse.   Your doctor will most likely have prescribed pain medications.      If you are going home after surgery you will receive individualized written care instructions before being discharged.  A responsible adult must drive you to and from the hospital on the day of your surgery and stay with you for 24 hours.    If you are staying overnight following surgery, you will be transported to your hospital room following the recovery period.  [facility] has all private rooms.    You have received a list of surgical assistants for your reference.  If you have any questions please call Pre-Admission Testing at [phone].  Deductibles and co-payments are collected on the day of service. Please be prepared to pay the required co-pay, deductible or deposit on the day of service as defined by your plan.

## 2018-08-24 ENCOUNTER — TELEPHONE (OUTPATIENT)
Dept: CARDIOLOGY | Facility: CLINIC | Age: 83
End: 2018-08-24

## 2018-08-24 NOTE — TELEPHONE ENCOUNTER
Pt called and left a vm regarding her INR yesterday.   I called pt back and verified that she understood the instructions she received from the [facility].          Notes from [facility]:   left vm-pt to retest in 1 week  ---- Additional Notes entered on 8/24/2018 8:46 AM EDT by Nicole Wyatt : PT CALLED 8-24 AND STATES SHE DID NOT GET CALLED YESTERDAY FOR INR RESULTS. PT WAS ADVISED THAT VM MSG LEFT. SHE DID NOT HOLD A DOSE YESTERDAY BUT TOOK 1 MG INSTEAD. PT STATES SHE IS GOING TO BE OFF WARFARIN UNTIL WEDNESDAY FOR SURGERY. PT ADVISED TO START REGULAR DOSE AFTER SURGERY AS LONG AS OK WITH DOC AND RECHECK

## 2018-08-29 ENCOUNTER — HOSPITAL ENCOUNTER (OUTPATIENT)
Facility: HOSPITAL | Age: 83
Setting detail: HOSPITAL OUTPATIENT SURGERY
Discharge: HOME | End: 2018-08-29
Attending: ORTHOPAEDIC SURGERY | Admitting: ORTHOPAEDIC SURGERY

## 2018-08-29 ENCOUNTER — ANESTHESIA EVENT (OUTPATIENT)
Dept: PERIOP | Facility: HOSPITAL | Age: 83
End: 2018-08-29

## 2018-08-29 ENCOUNTER — ANESTHESIA (OUTPATIENT)
Dept: PERIOP | Facility: HOSPITAL | Age: 83
End: 2018-08-29

## 2018-08-29 VITALS
RESPIRATION RATE: 16 BRPM | BODY MASS INDEX: 25.31 KG/M2 | DIASTOLIC BLOOD PRESSURE: 84 MMHG | HEART RATE: 80 BPM | WEIGHT: 151.9 LBS | OXYGEN SATURATION: 95 % | SYSTOLIC BLOOD PRESSURE: 135 MMHG | TEMPERATURE: 98.5 F | HEIGHT: 65 IN

## 2018-08-29 DIAGNOSIS — S83.231D COMPLEX TEAR OF MEDIAL MENISCUS OF RIGHT KNEE AS CURRENT INJURY, SUBSEQUENT ENCOUNTER: Primary | ICD-10-CM

## 2018-08-29 LAB
INR PPP: 1.47 (ref 0.9–1.1)
PROTHROMBIN TIME: 17.6 SECONDS (ref 11.7–14.2)

## 2018-08-29 PROCEDURE — 85610 PROTHROMBIN TIME: CPT | Performed by: ORTHOPAEDIC SURGERY

## 2018-08-29 PROCEDURE — 25010000002 FENTANYL CITRATE (PF) 100 MCG/2ML SOLUTION: Performed by: ANESTHESIOLOGY

## 2018-08-29 PROCEDURE — 25010000002 PROPOFOL 10 MG/ML EMULSION: Performed by: ANESTHESIOLOGY

## 2018-08-29 RX ORDER — ONDANSETRON 2 MG/ML
4 INJECTION INTRAMUSCULAR; INTRAVENOUS ONCE AS NEEDED
Status: DISCONTINUED | OUTPATIENT
Start: 2018-08-29 | End: 2018-08-29 | Stop reason: HOSPADM

## 2018-08-29 RX ORDER — LIDOCAINE HYDROCHLORIDE 20 MG/ML
INJECTION, SOLUTION INFILTRATION; PERINEURAL AS NEEDED
Status: DISCONTINUED | OUTPATIENT
Start: 2018-08-29 | End: 2018-08-29 | Stop reason: SURG

## 2018-08-29 RX ORDER — LABETALOL HYDROCHLORIDE 5 MG/ML
5 INJECTION, SOLUTION INTRAVENOUS
Status: DISCONTINUED | OUTPATIENT
Start: 2018-08-29 | End: 2018-08-29 | Stop reason: HOSPADM

## 2018-08-29 RX ORDER — PROMETHAZINE HYDROCHLORIDE 25 MG/ML
12.5 INJECTION, SOLUTION INTRAMUSCULAR; INTRAVENOUS ONCE AS NEEDED
Status: DISCONTINUED | OUTPATIENT
Start: 2018-08-29 | End: 2018-08-29 | Stop reason: HOSPADM

## 2018-08-29 RX ORDER — BUPIVACAINE HYDROCHLORIDE 5 MG/ML
INJECTION, SOLUTION EPIDURAL; INTRACAUDAL AS NEEDED
Status: DISCONTINUED | OUTPATIENT
Start: 2018-08-29 | End: 2018-08-29 | Stop reason: HOSPADM

## 2018-08-29 RX ORDER — PROPOFOL 10 MG/ML
VIAL (ML) INTRAVENOUS AS NEEDED
Status: DISCONTINUED | OUTPATIENT
Start: 2018-08-29 | End: 2018-08-29 | Stop reason: SURG

## 2018-08-29 RX ORDER — DIGOXIN 250 MCG
125 TABLET ORAL
Status: DISCONTINUED | OUTPATIENT
Start: 2018-08-29 | End: 2018-08-29 | Stop reason: HOSPADM

## 2018-08-29 RX ORDER — FLUMAZENIL 0.1 MG/ML
0.2 INJECTION INTRAVENOUS AS NEEDED
Status: DISCONTINUED | OUTPATIENT
Start: 2018-08-29 | End: 2018-08-29 | Stop reason: HOSPADM

## 2018-08-29 RX ORDER — PROMETHAZINE HYDROCHLORIDE 25 MG/1
12.5 TABLET ORAL ONCE AS NEEDED
Status: DISCONTINUED | OUTPATIENT
Start: 2018-08-29 | End: 2018-08-29 | Stop reason: HOSPADM

## 2018-08-29 RX ORDER — FENTANYL CITRATE 50 UG/ML
50 INJECTION, SOLUTION INTRAMUSCULAR; INTRAVENOUS
Status: DISCONTINUED | OUTPATIENT
Start: 2018-08-29 | End: 2018-08-29 | Stop reason: HOSPADM

## 2018-08-29 RX ORDER — SODIUM CHLORIDE, SODIUM LACTATE, POTASSIUM CHLORIDE, CALCIUM CHLORIDE 600; 310; 30; 20 MG/100ML; MG/100ML; MG/100ML; MG/100ML
9 INJECTION, SOLUTION INTRAVENOUS CONTINUOUS
Status: DISCONTINUED | OUTPATIENT
Start: 2018-08-29 | End: 2018-08-29 | Stop reason: HOSPADM

## 2018-08-29 RX ORDER — PROMETHAZINE HYDROCHLORIDE 25 MG/1
25 SUPPOSITORY RECTAL ONCE AS NEEDED
Status: DISCONTINUED | OUTPATIENT
Start: 2018-08-29 | End: 2018-08-29 | Stop reason: HOSPADM

## 2018-08-29 RX ORDER — WARFARIN SODIUM 1 MG/1
1 TABLET ORAL
Status: DISCONTINUED | OUTPATIENT
Start: 2018-08-29 | End: 2018-08-29 | Stop reason: HOSPADM

## 2018-08-29 RX ORDER — NALOXONE HCL 0.4 MG/ML
0.2 VIAL (ML) INJECTION AS NEEDED
Status: DISCONTINUED | OUTPATIENT
Start: 2018-08-29 | End: 2018-08-29 | Stop reason: HOSPADM

## 2018-08-29 RX ORDER — LIDOCAINE HYDROCHLORIDE 10 MG/ML
0.5 INJECTION, SOLUTION EPIDURAL; INFILTRATION; INTRACAUDAL; PERINEURAL ONCE AS NEEDED
Status: DISCONTINUED | OUTPATIENT
Start: 2018-08-29 | End: 2018-08-29 | Stop reason: HOSPADM

## 2018-08-29 RX ORDER — EPHEDRINE SULFATE 50 MG/ML
5 INJECTION, SOLUTION INTRAVENOUS ONCE AS NEEDED
Status: DISCONTINUED | OUTPATIENT
Start: 2018-08-29 | End: 2018-08-29 | Stop reason: HOSPADM

## 2018-08-29 RX ORDER — MIDAZOLAM HYDROCHLORIDE 1 MG/ML
2 INJECTION INTRAMUSCULAR; INTRAVENOUS
Status: DISCONTINUED | OUTPATIENT
Start: 2018-08-29 | End: 2018-08-29 | Stop reason: HOSPADM

## 2018-08-29 RX ORDER — WARFARIN SODIUM 2 MG/1
2 TABLET ORAL TAKE AS DIRECTED
Status: DISCONTINUED | OUTPATIENT
Start: 2018-08-29 | End: 2018-08-29

## 2018-08-29 RX ORDER — SODIUM CHLORIDE, SODIUM LACTATE, POTASSIUM CHLORIDE, AND CALCIUM CHLORIDE .6; .31; .03; .02 G/100ML; G/100ML; G/100ML; G/100ML
IRRIGANT IRRIGATION AS NEEDED
Status: DISCONTINUED | OUTPATIENT
Start: 2018-08-29 | End: 2018-08-29 | Stop reason: HOSPADM

## 2018-08-29 RX ORDER — MIDAZOLAM HYDROCHLORIDE 1 MG/ML
1 INJECTION INTRAMUSCULAR; INTRAVENOUS
Status: DISCONTINUED | OUTPATIENT
Start: 2018-08-29 | End: 2018-08-29 | Stop reason: HOSPADM

## 2018-08-29 RX ORDER — OXYCODONE AND ACETAMINOPHEN 7.5; 325 MG/1; MG/1
1 TABLET ORAL ONCE AS NEEDED
Status: DISCONTINUED | OUTPATIENT
Start: 2018-08-29 | End: 2018-08-29 | Stop reason: HOSPADM

## 2018-08-29 RX ORDER — SODIUM CHLORIDE 0.9 % (FLUSH) 0.9 %
1-10 SYRINGE (ML) INJECTION AS NEEDED
Status: DISCONTINUED | OUTPATIENT
Start: 2018-08-29 | End: 2018-08-29 | Stop reason: HOSPADM

## 2018-08-29 RX ORDER — FUROSEMIDE 20 MG/1
20 TABLET ORAL DAILY
Status: DISCONTINUED | OUTPATIENT
Start: 2018-08-29 | End: 2018-08-29 | Stop reason: HOSPADM

## 2018-08-29 RX ORDER — DIPHENHYDRAMINE HYDROCHLORIDE 50 MG/ML
12.5 INJECTION INTRAMUSCULAR; INTRAVENOUS
Status: DISCONTINUED | OUTPATIENT
Start: 2018-08-29 | End: 2018-08-29 | Stop reason: HOSPADM

## 2018-08-29 RX ORDER — POTASSIUM CHLORIDE 750 MG/1
10 CAPSULE, EXTENDED RELEASE ORAL DAILY
Status: DISCONTINUED | OUTPATIENT
Start: 2018-08-29 | End: 2018-08-29 | Stop reason: HOSPADM

## 2018-08-29 RX ORDER — HYDRALAZINE HYDROCHLORIDE 25 MG/1
25 TABLET, FILM COATED ORAL 2 TIMES DAILY
Status: DISCONTINUED | OUTPATIENT
Start: 2018-08-29 | End: 2018-08-29 | Stop reason: HOSPADM

## 2018-08-29 RX ORDER — CHOLECALCIFEROL (VITAMIN D3) 125 MCG
1000 CAPSULE ORAL DAILY
Status: DISCONTINUED | OUTPATIENT
Start: 2018-08-29 | End: 2018-08-29 | Stop reason: HOSPADM

## 2018-08-29 RX ORDER — PROMETHAZINE HYDROCHLORIDE 25 MG/1
25 TABLET ORAL ONCE AS NEEDED
Status: DISCONTINUED | OUTPATIENT
Start: 2018-08-29 | End: 2018-08-29 | Stop reason: HOSPADM

## 2018-08-29 RX ORDER — MELATONIN
1000 DAILY
Status: DISCONTINUED | OUTPATIENT
Start: 2018-08-29 | End: 2018-08-29 | Stop reason: HOSPADM

## 2018-08-29 RX ORDER — FAMOTIDINE 10 MG/ML
20 INJECTION, SOLUTION INTRAVENOUS ONCE
Status: COMPLETED | OUTPATIENT
Start: 2018-08-29 | End: 2018-08-29

## 2018-08-29 RX ORDER — HYDROCODONE BITARTRATE AND ACETAMINOPHEN 7.5; 325 MG/1; MG/1
1 TABLET ORAL ONCE AS NEEDED
Status: COMPLETED | OUTPATIENT
Start: 2018-08-29 | End: 2018-08-29

## 2018-08-29 RX ORDER — WARFARIN SODIUM 2 MG/1
2 TABLET ORAL TAKE AS DIRECTED
Status: DISCONTINUED | OUTPATIENT
Start: 2018-08-29 | End: 2018-08-29 | Stop reason: HOSPADM

## 2018-08-29 RX ADMIN — PROPOFOL 130 MG: 10 INJECTION, EMULSION INTRAVENOUS at 10:29

## 2018-08-29 RX ADMIN — LIDOCAINE HYDROCHLORIDE 100 MG: 20 INJECTION, SOLUTION INFILTRATION; PERINEURAL at 10:28

## 2018-08-29 RX ADMIN — FENTANYL CITRATE 50 MCG: 50 INJECTION INTRAMUSCULAR; INTRAVENOUS at 10:36

## 2018-08-29 RX ADMIN — LABETALOL HYDROCHLORIDE 5 MG: 5 INJECTION, SOLUTION INTRAVENOUS at 11:18

## 2018-08-29 RX ADMIN — FENTANYL CITRATE 50 MCG: 50 INJECTION INTRAMUSCULAR; INTRAVENOUS at 10:35

## 2018-08-29 RX ADMIN — SODIUM CHLORIDE, POTASSIUM CHLORIDE, SODIUM LACTATE AND CALCIUM CHLORIDE 9 ML/HR: 600; 310; 30; 20 INJECTION, SOLUTION INTRAVENOUS at 08:30

## 2018-08-29 RX ADMIN — HYDROCODONE BITARTRATE AND ACETAMINOPHEN 1 TABLET: 7.5; 325 TABLET ORAL at 11:41

## 2018-08-29 RX ADMIN — FAMOTIDINE 20 MG: 10 INJECTION INTRAVENOUS at 09:48

## 2018-08-29 NOTE — ANESTHESIA POSTPROCEDURE EVALUATION
Patient: Citlali Solorio    Procedure Summary     Date:  08/29/18 Room / Location:  [facility] OR [address] [place] [address] MAIN OR    Anesthesia Start:  1019 Anesthesia Stop:  1101    Procedure:  RT KNEE ARTHROSCOPY WITH PARTIAL LATERAL MENISECTOMY (Right Knee) Diagnosis:      Surgeon:  Randy Tucker MD Provider:  Reddy Alfred MD    Anesthesia Type:  general ASA Status:  3          Anesthesia Type: general  Last vitals  BP   139/88 (08/29/18 1200)   Temp   36.9 °C (98.5 °F) (08/29/18 1057)   Pulse   88 (08/29/18 1200)   Resp   16 (08/29/18 1200)     SpO2   96 % (08/29/18 1200)     Post Anesthesia Care and Evaluation    Patient location during evaluation: PACU  Patient participation: complete - patient participated  Level of consciousness: awake and alert  Pain management: adequate  Airway patency: patent  Anesthetic complications: No anesthetic complications  PONV Status: none  Cardiovascular status: acceptable  Respiratory status: acceptable  Hydration status: acceptable

## 2018-08-29 NOTE — ANESTHESIA PROCEDURE NOTES
Airway  Urgency: elective    Airway not difficult    Indications and Patient Condition  Indications for airway management: airway protection    Preoxygenated: yes  Mask difficulty assessment: 1 - vent by mask    Final Airway Details  Final airway type: supraglottic airway      Successful airway: LMA  Size 4    Number of attempts at approach: 1

## 2018-09-03 NOTE — OP NOTE
PREOPERATIVE DIAGNOSIS: Lateral meniscus tear right knee []    POSTOPERATIVE DIAGNOSIS same with degenerative arthritis lateral compartment.: []    PROCEDURE PERFORMED: Arthroscopic partial lateral meniscectomy []    ANESTHESIA: Gen.  []    SURGEON:  Randy Tucker MD    ASSISTANT SURGEON: None []    BLOOD LOSS: 25 cc []    SPECIMEN: None []    [] Patient's brought to the operating room given a general anesthetic.  Tourniquet placed around the right leg and the right leg was prepped and draped in sterile fashion.  Tourniquet bur the leg was exsanguinated tourniquet inflated to 250 and placed in a legholder.  The arthroscope was introduced into the inferolateral portal after the knee had been prepped and draped.  Super patellar pouch was free of debris although the patient did have exposed bone in the trochlear groove.  Medial compartment was entered and the meniscus was intact the articular cartilage was fairly well maintained.  The lateral compartment was entered and there was a degenerative tear with a large flap component in the lateral compartment.  Once this was debrided with the shaver was then trimmed back using a straight and up-biting basket.  Was then debrided one last time.  This point there was exposed bone on the tibia and the femur in the most lateral portion of the compartment.  All the fluid was removed from the knee the joint was injected with 15 cc of half percent plain Marcaine sterile dressing applied and the tourniquet was released.  Patient was transferred to the recovery room in good condition.

## 2018-09-05 RX ORDER — POTASSIUM CHLORIDE 750 MG/1
TABLET, EXTENDED RELEASE ORAL
Qty: 30 TABLET | Refills: 2 | Status: SHIPPED | OUTPATIENT
Start: 2018-09-05 | End: 2018-12-11 | Stop reason: SDUPTHER

## 2018-09-26 RX ORDER — FUROSEMIDE 20 MG/1
TABLET ORAL
Qty: 30 TABLET | Refills: 3 | Status: SHIPPED | OUTPATIENT
Start: 2018-09-26 | End: 2019-04-24 | Stop reason: SDUPTHER

## 2018-10-09 ENCOUNTER — EPISODE CHANGES (OUTPATIENT)
Dept: CASE MANAGEMENT | Facility: OTHER | Age: 83
End: 2018-10-09

## 2018-10-23 RX ORDER — METOPROLOL SUCCINATE 50 MG/1
TABLET, EXTENDED RELEASE ORAL
Qty: 90 TABLET | Refills: 1 | Status: SHIPPED | OUTPATIENT
Start: 2018-10-23 | End: 2018-12-11 | Stop reason: SDUPTHER

## 2018-10-23 RX ORDER — WARFARIN SODIUM 2 MG/1
TABLET ORAL
Qty: 30 TABLET | Refills: 0 | Status: SHIPPED | OUTPATIENT
Start: 2018-10-23 | End: 2018-12-11 | Stop reason: SDUPTHER

## 2018-10-30 ENCOUNTER — TELEPHONE (OUTPATIENT)
Dept: CARDIOLOGY | Facility: CLINIC | Age: 83
End: 2018-10-30

## 2018-10-30 NOTE — TELEPHONE ENCOUNTER
pts daughter called and says that the pt never heard from anyone about her INR. She says she called in on 10/25/18 and reported it. It was 2.4. She would like someone to call the pt at [phone]......Dacia

## 2018-11-05 ENCOUNTER — TELEPHONE (OUTPATIENT)
Dept: INTERNAL MEDICINE | Facility: CLINIC | Age: 83
End: 2018-11-05

## 2018-11-05 ENCOUNTER — TELEPHONE (OUTPATIENT)
Dept: CARDIOLOGY | Facility: CLINIC | Age: 83
End: 2018-11-05

## 2018-11-05 RX ORDER — PROMETHAZINE HYDROCHLORIDE 25 MG/1
25 TABLET ORAL EVERY 6 HOURS PRN
Qty: 20 TABLET | Refills: 0 | Status: SHIPPED | OUTPATIENT
Start: 2018-11-05 | End: 2019-01-04

## 2018-11-05 NOTE — TELEPHONE ENCOUNTER
Daughter Agusto is calling in today stating yesterday her mother met them for nancy.  Patient at that time was c/o severe headache.  Her bp during that time was 180/90.  In the  middle of the night patient started with nausea, vomiting and diarrhea.  Bp188/93 this am.     Daughter was asking for phenergan. Encouraged the daughter to keep her mom hydrated, her HTN could be related to her not feeling well. Please advise. RJ    I called in phenergan.  However, it having a severe HA and N/v, it could be related to her very elevated hypertension, I would recommend ER.  LEOPOLDO

## 2018-11-05 NOTE — TELEPHONE ENCOUNTER
11/05/18  9:10 AM  Citlali Solorio  1935    Home Phone [phone]   Mobile [phone]       Citlali Solorio is a patient of Dr Apple who's daughter Agusto is calling in today stating yesterday her mother met them for nancy.  Patient at that time was c/o severe headache.  Her bp during that time was 180/90.  In the middle of the night patient started with nausea, vomiting and diarrhea.  Bp188/93 this am.    Discussed with daughter that it sounds like her mother has the GI virus.  Suggested she call her Mom's PCP.  Daughter was asking for phenergan, which I stated her PCP could order.  Encouraged the daughter to keep her mom hydrated, her HTN could be related to her not feeling well.  Daughter stated she would call PCP.    Is there anything else you would like to add at this time?    Yadira Luna RN  Triage nurse

## 2018-11-06 ENCOUNTER — TELEPHONE (OUTPATIENT)
Dept: INTERNAL MEDICINE | Facility: CLINIC | Age: 83
End: 2018-11-06

## 2018-11-06 DIAGNOSIS — I10 BENIGN ESSENTIAL HYPERTENSION: ICD-10-CM

## 2018-11-06 DIAGNOSIS — R41.0 CONFUSION: ICD-10-CM

## 2018-11-06 DIAGNOSIS — E78.5 HYPERLIPIDEMIA, UNSPECIFIED HYPERLIPIDEMIA TYPE: ICD-10-CM

## 2018-11-06 DIAGNOSIS — G23.1 PROGRESSIVE SUPRANUCLEAR PALSY (HCC): Primary | ICD-10-CM

## 2018-11-06 DIAGNOSIS — I48.20 CHRONIC ATRIAL FIBRILLATION (HCC): ICD-10-CM

## 2018-11-06 LAB
BILIRUB BLD-MCNC: NEGATIVE MG/DL
CLARITY, POC: ABNORMAL
COLOR UR: YELLOW
GLUCOSE UR STRIP-MCNC: NEGATIVE MG/DL
KETONES UR QL: NEGATIVE
LEUKOCYTE EST, POC: ABNORMAL
NITRITE UR-MCNC: NEGATIVE MG/ML
PH UR: 6 [PH] (ref 5–8)
PROT UR STRIP-MCNC: NEGATIVE MG/DL
RBC # UR STRIP: NEGATIVE /UL
SP GR UR: 1.01 (ref 1–1.03)
UROBILINOGEN UR QL: NORMAL

## 2018-11-06 PROCEDURE — 81003 URINALYSIS AUTO W/O SCOPE: CPT | Performed by: INTERNAL MEDICINE

## 2018-11-06 RX ORDER — SULFAMETHOXAZOLE AND TRIMETHOPRIM 800; 160 MG/1; MG/1
1 TABLET ORAL 2 TIMES DAILY
Qty: 14 TABLET | Refills: 0 | Status: SHIPPED | OUTPATIENT
Start: 2018-11-06 | End: 2019-08-05 | Stop reason: SDUPTHER

## 2018-11-06 NOTE — TELEPHONE ENCOUNTER
Patient daughter called states her mother is much better today with the help of the Phenergan. Her blood pressure is also much better.   Her daughter is calling today she is asking if they could bring a urine in from home. She thinks her mother might have a UTI.   I explained she would need to bring her mother in to be seen.She doesn't want to take her to the ED. She also states its very difficult to get her out.  I explained that Dr. Lema cannot treat her without seeing her. Daughter was very upset and ended the call. RJ    I d/w daughter.  They are unable to get her out.  Off and on more confused.  See neurology in two days.  They just want to make sure she doesn't have UTI.  Order for urine dip and culture is placed.  LEOPOLDO

## 2018-11-06 NOTE — TELEPHONE ENCOUNTER
Pt daughter called and they would like a referral to the [facility]  For Citlali. Please place a referral and fax it to them at [phone].    Order placed.  Marina noyola.

## 2018-11-09 LAB
BACTERIA UR CULT: NORMAL
BACTERIA UR CULT: NORMAL

## 2018-11-15 LAB — INR PPP: 2.6

## 2018-11-16 ENCOUNTER — ANTICOAGULATION VISIT (OUTPATIENT)
Dept: PHARMACY | Facility: HOSPITAL | Age: 83
End: 2018-11-16

## 2018-11-16 DIAGNOSIS — I48.91 ATRIAL FIBRILLATION WITH RVR (HCC): ICD-10-CM

## 2018-11-16 DIAGNOSIS — I48.20 CHRONIC ATRIAL FIBRILLATION (HCC): ICD-10-CM

## 2018-11-16 NOTE — PROGRESS NOTES
Anticoagulation Clinic Progress Note    Anticoagulation Summary  As of 2018    INR goal:   2.0-3.0   TTR:   --   INR used for dosin.60 (11/15/2018)   Warfarin maintenance plan:   2 mg on Tue, Fri; 1 mg all other days   Weekly warfarin total:   9 mg   Plan last modified:   Светлана Gurrola RPH (2018)   Next INR check:   2018   Priority:   Maintenance   Target end date:       Indications    Atrial fibrillation (CMS/HCC) [I48.91]  Atrial fibrillation with RVR (CMS/HCC) [I48.91]             Anticoagulation Episode Summary     INR check location:       Preferred lab:       Send INR reminders to:    [facility]  POOL    Comments:   *coaguchek* CALL FOR ALL RESULTS      Anticoagulation Care Providers     Provider Role Specialty Phone number    Vinh Apple MD Referring Cardiology [phone]          Drug interactions: has remained unchanged.  Diet: has remained unchanged.    Clinic Interview:  No pertinent clinical findings have been reported.    INR History:  Anticoagulation Monitoring 2018   INR 2.60   INR Date 11/15/2018   INR Goal 2.0-3.0   Last Week Total 0 mg   Next Week Total 9 mg   Sun 1 mg   Mon 1 mg   Tue 2 mg   Wed 1 mg   Thu 1 mg   Fri 2 mg   Sat 1 mg   Visit Report -       Plan:  1. INR is therapeutic today- see above in Anticoagulation Summary.    Citlali A Clore to continue their warfarin regimen- see above in Anticoagulation Summary.  2. Follow up in 2 weeks  3. Pt wants to be called regardless of range. They have been instructed to call if any changes in medications, doses, concerns, etc. Patient expresses understanding and has no further questions at this time.    Светлана Gurrola RPH

## 2018-11-29 ENCOUNTER — ANTICOAGULATION VISIT (OUTPATIENT)
Dept: PHARMACY | Facility: HOSPITAL | Age: 83
End: 2018-11-29

## 2018-11-29 DIAGNOSIS — I48.91 ATRIAL FIBRILLATION WITH RVR (HCC): ICD-10-CM

## 2018-11-29 DIAGNOSIS — I48.20 CHRONIC ATRIAL FIBRILLATION (HCC): ICD-10-CM

## 2018-11-29 LAB — INR PPP: 2.5

## 2018-11-29 NOTE — PROGRESS NOTES
Anticoagulation Clinic Progress Note    Anticoagulation Summary  As of 2018    INR goal:   2.0-3.0   TTR:   100.0 % (3 d)   INR used for dosin.50 (2018)   Warfarin maintenance plan:   2 mg on Tue, Fri; 1 mg all other days   Weekly warfarin total:   9 mg   No change documented:   Ana George RPH   Plan last modified:   Светлана Gurrola RPH (2018)   Next INR check:   2018   Priority:   Maintenance   Target end date:       Indications    Atrial fibrillation (CMS/HCC) [I48.91]  Atrial fibrillation with RVR (CMS/HCC) [I48.91]             Anticoagulation Episode Summary     INR check location:       Preferred lab:       Send INR reminders to:    [facility]  POOL    Comments:   *coaguchek* CALL FOR ALL RESULTS      Anticoagulation Care Providers     Provider Role Specialty Phone number    Vinh Apple MD Referring Cardiology [phone]          Clinic Interview:  No pertinent clinical findings have been reported.    INR History:  Anticoagulation Monitoring 2018   INR 2.60 2.50   INR Date 11/15/2018 2018   INR Goal 2.0-3.0 2.0-3.0   Trend - Same   Last Week Total 0 mg 9 mg   Next Week Total 9 mg 9 mg   Sun 1 mg 1 mg   Mon 1 mg 1 mg   Tue 2 mg 2 mg   Wed 1 mg 1 mg   Thu 1 mg 1 mg   Fri 2 mg 2 mg   Sat 1 mg 1 mg   Visit Report - -       Plan:  1. INR is Therapeutic today- see above in Anticoagulation Summary.   Will instruct Citlali Solorio to Continue their warfarin regimen- see above in Anticoagulation Summary.  2. Retest in 2 weeks  3. Patient will be called with results per daughterAgusto  and instructed to continue same dosage regimen. Patient  instructed to call if any changes in medications, doses, concerns, etc. Patient expresses understanding and has no further questions at this time.    Ana George RP

## 2018-12-03 RX ORDER — HYDRALAZINE HYDROCHLORIDE 25 MG/1
TABLET, FILM COATED ORAL
Qty: 60 TABLET | Refills: 4 | Status: SHIPPED | OUTPATIENT
Start: 2018-12-03 | End: 2018-12-04 | Stop reason: SDUPTHER

## 2018-12-04 RX ORDER — HYDRALAZINE HYDROCHLORIDE 25 MG/1
25 TABLET, FILM COATED ORAL 2 TIMES DAILY
Qty: 60 TABLET | Refills: 4 | Status: SHIPPED | OUTPATIENT
Start: 2018-12-04 | End: 2019-12-27

## 2018-12-13 RX ORDER — POTASSIUM CHLORIDE 750 MG/1
TABLET, EXTENDED RELEASE ORAL
Qty: 30 TABLET | Refills: 1 | Status: SHIPPED | OUTPATIENT
Start: 2018-12-13 | End: 2019-05-21 | Stop reason: SDUPTHER

## 2018-12-13 RX ORDER — METOPROLOL SUCCINATE 50 MG/1
TABLET, EXTENDED RELEASE ORAL
Qty: 60 TABLET | Refills: 1 | Status: SHIPPED | OUTPATIENT
Start: 2018-12-13 | End: 2019-05-13 | Stop reason: SDUPTHER

## 2018-12-13 RX ORDER — WARFARIN SODIUM 2 MG/1
TABLET ORAL
Qty: 30 TABLET | Refills: 1 | Status: SHIPPED | OUTPATIENT
Start: 2018-12-13 | End: 2019-05-13 | Stop reason: SDUPTHER

## 2018-12-13 RX ORDER — WARFARIN SODIUM 1 MG/1
TABLET ORAL
Qty: 60 TABLET | Refills: 1 | Status: SHIPPED | OUTPATIENT
Start: 2018-12-13 | End: 2019-07-04 | Stop reason: SDUPTHER

## 2018-12-14 ENCOUNTER — ANTICOAGULATION VISIT (OUTPATIENT)
Dept: PHARMACY | Facility: HOSPITAL | Age: 83
End: 2018-12-14

## 2018-12-14 DIAGNOSIS — I48.91 ATRIAL FIBRILLATION WITH RVR (HCC): ICD-10-CM

## 2018-12-14 DIAGNOSIS — I48.20 CHRONIC ATRIAL FIBRILLATION (HCC): ICD-10-CM

## 2018-12-14 LAB — INR PPP: 2.3

## 2018-12-14 NOTE — PROGRESS NOTES
Anticoagulation Clinic Progress Note    Anticoagulation Summary  As of 2018    INR goal:   2.0-3.0   TTR:   100.0 % (2.6 wk)   INR used for dosin.30 (2018)   Warfarin maintenance plan:   2 mg on Tue, Fri; 1 mg all other days   Weekly warfarin total:   9 mg   Plan last modified:   Светлана Gurrola RP (2018)   Next INR check:   2018   Priority:   Maintenance   Target end date:       Indications    Atrial fibrillation (CMS/HCC) [I48.91]  Atrial fibrillation with RVR (CMS/HCC) [I48.91]             Anticoagulation Episode Summary     INR check location:       Preferred lab:       Send INR reminders to:   [facility]  POOL    Comments:   *coaguchek* CALL FOR ALL RESULTS      Anticoagulation Care Providers     Provider Role Specialty Phone number    Vinh Apple MD Referring Cardiology [phone]          Drug interactions: has remained unchanged.  Diet: has remained unchanged.    Clinic Interview:  No pertinent clinical findings have been reported.    INR History:  Anticoagulation Monitoring 2018   INR 2.60 2.50 2.30   INR Date 11/15/2018 2018 2018   INR Goal 2.0-3.0 2.0-3.0 2.0-3.0   Trend - Same Same   Last Week Total 0 mg 9 mg 9 mg   Next Week Total 9 mg 9 mg 9 mg   Sun 1 mg 1 mg 1 mg   Mon 1 mg 1 mg 1 mg   Tue 2 mg 2 mg 2 mg   Wed 1 mg 1 mg 1 mg   Thu 1 mg 1 mg 1 mg   Fri 2 mg 2 mg 2 mg   Sat 1 mg 1 mg 1 mg   Visit Report - - -   Some recent data might be hidden       Plan:  1. INR is Therapeutic today- see above in Anticoagulation Summary.   Will instruct Citlali Solorio to Continue their warfarin regimen- see above in Anticoagulation Summary.  2. Follow up in 2 weeks  3. Pt has agreed to only be called if INR out of range. They have been instructed to call if any changes in medications, doses, concerns, etc. Patient expresses understanding and has no further questions at this time.    Pamela Jack [facility]

## 2018-12-31 ENCOUNTER — TELEPHONE (OUTPATIENT)
Dept: CARDIOLOGY | Facility: CLINIC | Age: 83
End: 2018-12-31

## 2019-01-03 ENCOUNTER — ANTICOAGULATION VISIT (OUTPATIENT)
Dept: PHARMACY | Facility: HOSPITAL | Age: 84
End: 2019-01-03

## 2019-01-03 DIAGNOSIS — I48.91 ATRIAL FIBRILLATION WITH RVR (HCC): ICD-10-CM

## 2019-01-03 LAB — INR PPP: 2.7

## 2019-01-03 NOTE — PROGRESS NOTES
Anticoagulation Clinic Progress Note    Anticoagulation Summary  As of 1/3/2019    INR goal:   2.0-3.0   TTR:   100.0 % (1.3 mo)   INR used for dosin.70 (1/3/2019)   Warfarin maintenance plan:   2 mg on Tue, Fri; 1 mg all other days   Weekly warfarin total:   9 mg   No change documented:   Hayes Roldan RPH   Plan last modified:   Светлана Gurrola RPH (2018)   Next INR check:   2019   Priority:   Maintenance   Target end date:       Indications    Atrial fibrillation (CMS/HCC) [I48.91]  Atrial fibrillation with RVR (CMS/HCC) [I48.91]             Anticoagulation Episode Summary     INR check location:       Preferred lab:       Send INR reminders to:    [facility]  POOL    Comments:   *coaguchek* CALL FOR ALL RESULTS      Anticoagulation Care Providers     Provider Role Specialty Phone number    Vinh Apple MD Referring Cardiology [phone]          Drug interactions: has remained unchanged.  Diet: has remained unchanged.    Clinic Interview:  No pertinent clinical findings have been reported.    INR History:  Anticoagulation Monitoring 2018 2018 1/3/2019   INR 2.50 2.30 2.70   INR Date 2018 2018 1/3/2019   INR Goal 2.0-3.0 2.0-3.0 2.0-3.0   Trend Same Same Same   Last Week Total 9 mg 9 mg 9 mg   Next Week Total 9 mg 9 mg 9 mg   Sun 1 mg 1 mg 1 mg   Mon 1 mg 1 mg 1 mg   Tue 2 mg 2 mg 2 mg   Wed 1 mg 1 mg 1 mg   Thu 1 mg 1 mg 1 mg   Fri 2 mg 2 mg 2 mg   Sat 1 mg 1 mg 1 mg   Visit Report - - -   Some recent data might be hidden       Plan:  1. INR is Therapeutic today- see above in Anticoagulation Summary.   Will instruct Citlali ARIANA Cloxavi to Continue their warfarin regimen- see above in Anticoagulation Summary.  2. Follow up in 2 weeks  3. Pt will be called with each INR result    Hayes Roldan RPH

## 2019-01-04 ENCOUNTER — HOSPITAL ENCOUNTER (OUTPATIENT)
Dept: GENERAL RADIOLOGY | Facility: HOSPITAL | Age: 84
Discharge: HOME | End: 2019-01-04
Admitting: NURSE PRACTITIONER

## 2019-01-04 ENCOUNTER — OFFICE VISIT (OUTPATIENT)
Dept: CARDIOLOGY | Facility: CLINIC | Age: 84
End: 2019-01-04

## 2019-01-04 ENCOUNTER — LAB (OUTPATIENT)
Dept: LAB | Facility: HOSPITAL | Age: 84
End: 2019-01-04

## 2019-01-04 VITALS
HEIGHT: 65 IN | SYSTOLIC BLOOD PRESSURE: 122 MMHG | OXYGEN SATURATION: 98 % | BODY MASS INDEX: 25.66 KG/M2 | WEIGHT: 154 LBS | DIASTOLIC BLOOD PRESSURE: 78 MMHG | HEART RATE: 67 BPM

## 2019-01-04 DIAGNOSIS — I50.32 CHRONIC DIASTOLIC HEART FAILURE (HCC): Primary | ICD-10-CM

## 2019-01-04 DIAGNOSIS — R05.9 COUGH: ICD-10-CM

## 2019-01-04 DIAGNOSIS — I10 BENIGN ESSENTIAL HYPERTENSION: ICD-10-CM

## 2019-01-04 DIAGNOSIS — I48.0 PAROXYSMAL ATRIAL FIBRILLATION (HCC): ICD-10-CM

## 2019-01-04 DIAGNOSIS — I50.32 CHRONIC DIASTOLIC HEART FAILURE (HCC): ICD-10-CM

## 2019-01-04 DIAGNOSIS — I36.1 NON-RHEUMATIC TRICUSPID VALVE INSUFFICIENCY: ICD-10-CM

## 2019-01-04 DIAGNOSIS — I34.0 NON-RHEUMATIC MITRAL REGURGITATION: ICD-10-CM

## 2019-01-04 LAB
ALBUMIN SERPL-MCNC: 3.8 G/DL (ref 3.5–5.2)
ALBUMIN/GLOB SERPL: 1.4 G/DL
ALP SERPL-CCNC: 64 U/L (ref 39–117)
ALT SERPL W P-5'-P-CCNC: 14 U/L (ref 1–33)
ANION GAP SERPL CALCULATED.3IONS-SCNC: 7.7 MMOL/L
AST SERPL-CCNC: 18 U/L (ref 1–32)
BASOPHILS # BLD AUTO: 0.04 10*3/MM3 (ref 0–0.2)
BASOPHILS NFR BLD AUTO: 0.4 % (ref 0–1.5)
BILIRUB SERPL-MCNC: 0.6 MG/DL (ref 0.1–1.2)
BUN BLD-MCNC: 12 MG/DL (ref 8–23)
BUN/CREAT SERPL: 15 (ref 7–25)
CALCIUM SPEC-SCNC: 9.3 MG/DL (ref 8.6–10.5)
CHLORIDE SERPL-SCNC: 99 MMOL/L (ref 98–107)
CO2 SERPL-SCNC: 30.3 MMOL/L (ref 22–29)
CREAT BLD-MCNC: 0.8 MG/DL (ref 0.57–1)
DEPRECATED RDW RBC AUTO: 45.2 FL (ref 37–54)
EOSINOPHIL # BLD AUTO: 0.22 10*3/MM3 (ref 0–0.7)
EOSINOPHIL NFR BLD AUTO: 2.2 % (ref 0.3–6.2)
ERYTHROCYTE [DISTWIDTH] IN BLOOD BY AUTOMATED COUNT: 12.8 % (ref 11.7–13)
GFR SERPL CREATININE-BSD FRML MDRD: 69 ML/MIN/1.73
GLOBULIN UR ELPH-MCNC: 2.7 GM/DL
GLUCOSE BLD-MCNC: 90 MG/DL (ref 65–99)
HCT VFR BLD AUTO: 41 % (ref 35.6–45.5)
HGB BLD-MCNC: 13.6 G/DL (ref 11.9–15.5)
IMM GRANULOCYTES # BLD AUTO: 0.03 10*3/MM3 (ref 0–0.03)
IMM GRANULOCYTES NFR BLD AUTO: 0.3 % (ref 0–0.5)
INR PPP: 2.44 (ref 0.9–1.1)
LYMPHOCYTES # BLD AUTO: 1.91 10*3/MM3 (ref 0.9–4.8)
LYMPHOCYTES NFR BLD AUTO: 18.9 % (ref 19.6–45.3)
MCH RBC QN AUTO: 32.2 PG (ref 26.9–32)
MCHC RBC AUTO-ENTMCNC: 33.2 G/DL (ref 32.4–36.3)
MCV RBC AUTO: 96.9 FL (ref 80.5–98.2)
MONOCYTES # BLD AUTO: 0.86 10*3/MM3 (ref 0.2–1.2)
MONOCYTES NFR BLD AUTO: 8.5 % (ref 5–12)
NEUTROPHILS # BLD AUTO: 7.08 10*3/MM3 (ref 1.9–8.1)
NEUTROPHILS NFR BLD AUTO: 70 % (ref 42.7–76)
NT-PROBNP SERPL-MCNC: 3918 PG/ML (ref 0–1800)
PLATELET # BLD AUTO: 312 10*3/MM3 (ref 140–500)
PMV BLD AUTO: 10.4 FL (ref 6–12)
POTASSIUM BLD-SCNC: 4.2 MMOL/L (ref 3.5–5.2)
PROT SERPL-MCNC: 6.5 G/DL (ref 6–8.5)
PROTHROMBIN TIME: 26.1 SECONDS (ref 11.7–14.2)
RBC # BLD AUTO: 4.23 10*6/MM3 (ref 3.9–5.2)
SODIUM BLD-SCNC: 137 MMOL/L (ref 136–145)
WBC NRBC COR # BLD: 10.11 10*3/MM3 (ref 4.5–10.7)

## 2019-01-04 PROCEDURE — 71046 X-RAY EXAM CHEST 2 VIEWS: CPT

## 2019-01-04 PROCEDURE — 36415 COLL VENOUS BLD VENIPUNCTURE: CPT

## 2019-01-04 PROCEDURE — 83880 ASSAY OF NATRIURETIC PEPTIDE: CPT

## 2019-01-04 PROCEDURE — 80053 COMPREHEN METABOLIC PANEL: CPT

## 2019-01-04 PROCEDURE — 85610 PROTHROMBIN TIME: CPT

## 2019-01-04 PROCEDURE — 85025 COMPLETE CBC W/AUTO DIFF WBC: CPT

## 2019-01-04 PROCEDURE — 99214 OFFICE O/P EST MOD 30 MIN: CPT | Performed by: NURSE PRACTITIONER

## 2019-01-04 NOTE — NURSING NOTE
Dr. Kimball read CXR and results called to FAM Mcnulty. Daughters informed as requested by FAM Mcnulty of no pneumonia and to take the Lasix daily as talked about in the office. Daughters took patient per own wheelchair to car.

## 2019-01-04 NOTE — PROGRESS NOTES
Patient Name: Citlali Solorio  :1935  Age: 83 y.o.  Primary Cardiologist: Vinh Apple MD  Encounter Provider:  FAM Case      Chief Complaint:   Chief Complaint   Patient presents with   • Follow-up   • Congestive Heart Failure         HPI  Citlali Solorio is a 83 y.o. female with a history significant for heart failure, atrial fibrillation, hypertension, mitral valve and tricuspid valve insufficiency.  Patient presents today for annual reevaluation.  Patient is new to me but I have reviewed her prior medical records.  Patient states that she has been battling a cough with some shortness of breath since around [place] time.  Reports that the cough is nonproductive and worse when laying flat.  Denies fever.  Patient reports that lower extremity edema is at baseline for her.  She also reports generalized fatigue.  Denies any episodes of chest pain, palpitations or lightheadedness.  Patient has been taking over-the-counter Delsym and Robitussin with minimal relief of the cough.  Daughters are concerned because patient had episode of heart failure with influenza approximately one year ago.  They're concerned that she could be having an exacerbation of heart failure.      The following portions of the patient's history were reviewed and updated as appropriate: allergies, current medications, past family history, past medical history, past social history, past surgical history and problem list.    Current Outpatient Medications on File Prior to Visit   Medication Sig   • Cholecalciferol (VITAMIN D PO) Take 1,000 mg by mouth Daily. HOLD PRIOR TO SURG   • digoxin (LANOXIN) 125 MCG tablet Take 125 mcg by mouth Daily.   • furosemide (LASIX) 20 MG tablet TAKE ONE TABLET BY MOUTH DAILY   • hydrALAZINE (APRESOLINE) 25 MG tablet Take 1 tablet by mouth 2 (Two) Times a Day.   • metoprolol succinate XL (TOPROL-XL) 50 MG 24 hr tablet TAKE ONE AND ONE-HALF TABLET BY MOUTH TWICE A DAY   • potassium  "chloride (K-DUR,KLOR-CON) 10 MEQ CR tablet TAKE ONE TABLET BY MOUTH DAILY   • sulfamethoxazole-trimethoprim (BACTRIM DS) 800-160 MG per tablet Take 1 tablet by mouth 2 (Two) Times a Day.   • warfarin (COUMADIN) 1 MG tablet TAKE TWO TABLETS BY MOUTH DAILY ON TUESDAY, THURSDAY, AND SATURDAY, AND 1 TABLET BY MOUTH ALL OTHER DAYS AS DIRECTED   • warfarin (COUMADIN) 2 MG tablet TAKE ONE TABLET BY MOUTH DAILY AS DIRECTED   • vitamin B-12 (VITAMIN B-12) 1000 MCG tablet Take 1 tablet by mouth Daily. (Patient taking differently: Take 1,000 mcg by mouth Daily. HOLD PRIOR TO SURG)   • [DISCONTINUED] promethazine (PHENERGAN) 25 MG tablet Take 1 tablet by mouth Every 6 (Six) Hours As Needed for Nausea or Vomiting.   • [DISCONTINUED] warfarin (COUMADIN) 1 MG tablet Take 1 mg by mouth Daily. MON,WED,FRI AND SUN/INSTRUCTED PT TO FOLLOW MD ORDERS REGARDING WHEN TO HOLD AND TO NOTIFY DR CHASE     No current facility-administered medications on file prior to visit.          Review of Systems   Constitution: Negative for malaise/fatigue.   HENT: Positive for hearing loss.    Cardiovascular: Positive for leg swelling. Negative for chest pain.   Respiratory: Positive for cough and shortness of breath.    Musculoskeletal: Positive for joint pain and joint swelling.   Neurological: Positive for paresthesias. Negative for light-headedness.   All other systems reviewed and are negative.      OBJECTIVE:   Vital Signs  Vitals:    01/04/19 1257   BP: 122/78   Pulse: 67   SpO2: 98%     Estimated body mass index is 25.63 kg/m² as calculated from the following:    Height as of this encounter: 165.1 cm (65\").    Weight as of this encounter: 69.9 kg (154 lb).    Physical Exam   Constitutional: She is oriented to person, place, and time. Vital signs are normal. She appears well-developed and well-nourished. She is active.   Eyes: Conjunctivae are normal.   Neck: Carotid bruit is not present.   Cardiovascular: Normal rate and normal heart sounds. An " irregularly irregular rhythm present.   Pulmonary/Chest: Breath sounds normal.   Crackles throughout lung fields   Abdominal: Normal appearance.   Musculoskeletal:   2+ bilateral pitting edema  Normal ROM   Neurological: She is alert and oriented to person, place, and time. GCS eye subscore is 4. GCS verbal subscore is 5. GCS motor subscore is 6.   Skin: Skin is warm, dry and intact.   Psychiatric: She has a normal mood and affect. Her speech is normal and behavior is normal. Judgment and thought content normal. Cognition and memory are normal.       Procedures    Cardiac Procedures:  1. Echocardiogram 8/23/17: EF 53.8%.  Right ventricular cavity is mild to moderately dilated.  Left atrial cavity is severely dilated.  Mild to moderate MVR.  Moderate tricuspid valve regurgitation.          ASSESSMENT:      Diagnosis Plan   1. Chronic diastolic heart failure (CMS/HCC)  Adult Transthoracic Echo Complete W/ Cont if Necessary Per Protocol    CBC & Differential    Comprehensive Metabolic Panel    BNP    XR Chest 2 View   2. Cough  CBC & Differential    Comprehensive Metabolic Panel    BNP    XR Chest 2 View   3. Paroxysmal atrial fibrillation (CMS/HCC)     4. Benign essential hypertension     5. Non-rheumatic mitral regurgitation     6. Non-rheumatic tricuspid valve insufficiency           PLAN OF CARE:     1. Chronic diastolic heart failure: Patient has had shortness of breath with an associated nonproductive cough times one to 2 weeks.  Cough and shortness of breath are worse with laying flat.  Patient denies any fevers.  She is taking over-the-counter Delsym and Robitussin without relief.  Patient does have crackles in the lungs as well as 2+ bilateral pitting edema.  I'm concerned that this could be pneumonia versus congestive heart failure exacerbation.  We'll send patient for CBC, CMP, BNP, two-view chest x-ray to further differentiate.  Patient will wait for results so that I can recommend further treatment based  on these studies.  2. Cough: As above plan for heart failure.  3. Paroxysmal atrial fibrillation: Rate has been controlled with metoprolol and digoxin.  She is anticoagulated on warfarin.  Denies any chest pain or heart palpitations.  Shortness of breath as documented above for diastolic heart failure  4. Hypertension: Blood pressure controlled today 122/78.  Continue current regimen.  5. Mitral valve regurgitation: Patient having increasing shortness of breath.  Last echocardiogram July 2017.  Will recheck echocardiogram.  6. Tricuspid valve regurgitation: As above plan for mitral regurgitation.  7. Follow-up will be determined based on outpatient testing to be done today.    Atrial Fibrillation and Atrial Flutter  Assessment  • The patient has persistent atrial fibrillation  • The patient's CHADS2-VASc score is 5  • A [id number] score of 2 or more is considered a high risk for a thromboembolic event  • Warfarin prescribed    Plan  • Continue in atrial fibrillation with rate control  • Continue warfarin for antithrombotic therapy, bleeding issues discussed  • Continue beta blocker for rhythm control  • Continue beta blocker and digoxin for rate control    Heart Failure  Assessment  • Beta blocker prescribed  • The most recent ejection fraction is 53%  • Left ventricular function is normal by qualitative assessment  • The left ventricle was last assessed on 8/23/2017    Plan  • The heart failure care plan was discussed with the patient today including: continuing the current program    Subjective/Objective  • The patient reports dyspnea          Thank you for allowing me to participate in the care of your patient,      Sincerely,   FAM Case  [facility]  01/04/19  1:30 PM    **[place] Disclaimer:**  Much of this encounter note is an electronic transcription/translation of spoken language to printed text. The electronic translation of spoken language may permit erroneous, or at times,  nonsensical words or phrases to be inadvertently transcribed. Although I have reviewed the note for such errors, some may still exist.

## 2019-01-16 LAB — INR PPP: 2.4

## 2019-01-17 ENCOUNTER — ANTICOAGULATION VISIT (OUTPATIENT)
Dept: PHARMACY | Facility: HOSPITAL | Age: 84
End: 2019-01-17

## 2019-01-17 DIAGNOSIS — I48.91 ATRIAL FIBRILLATION WITH RVR (HCC): ICD-10-CM

## 2019-01-17 DIAGNOSIS — I48.0 PAROXYSMAL ATRIAL FIBRILLATION (HCC): ICD-10-CM

## 2019-01-21 ENCOUNTER — TELEPHONE (OUTPATIENT)
Dept: CARDIOLOGY | Facility: CLINIC | Age: 84
End: 2019-01-21

## 2019-01-21 ENCOUNTER — HOSPITAL ENCOUNTER (OUTPATIENT)
Dept: CARDIOLOGY | Facility: HOSPITAL | Age: 84
Discharge: HOME | End: 2019-01-21
Admitting: NURSE PRACTITIONER

## 2019-01-21 VITALS
HEIGHT: 65 IN | HEART RATE: 89 BPM | BODY MASS INDEX: 25.66 KG/M2 | WEIGHT: 154 LBS | DIASTOLIC BLOOD PRESSURE: 70 MMHG | SYSTOLIC BLOOD PRESSURE: 122 MMHG

## 2019-01-21 DIAGNOSIS — I50.32 CHRONIC DIASTOLIC HEART FAILURE (HCC): ICD-10-CM

## 2019-01-21 LAB
ASCENDING AORTA: 3.1 CM
BH CV ECHO MEAS - ACS: 1.7 CM
BH CV ECHO MEAS - AO MAX PG (FULL): 4.1 MMHG
BH CV ECHO MEAS - AO MAX PG: 6.3 MMHG
BH CV ECHO MEAS - AO MEAN PG (FULL): 1.8 MMHG
BH CV ECHO MEAS - AO MEAN PG: 3.1 MMHG
BH CV ECHO MEAS - AO ROOT AREA (BSA CORRECTED): 1.5
BH CV ECHO MEAS - AO ROOT AREA: 5.7 CM^2
BH CV ECHO MEAS - AO ROOT DIAM: 2.7 CM
BH CV ECHO MEAS - AO V2 MAX: 126 CM/SEC
BH CV ECHO MEAS - AO V2 MEAN: 80.3 CM/SEC
BH CV ECHO MEAS - AO V2 VTI: 24.3 CM
BH CV ECHO MEAS - AVA(I,A): 1.8 CM^2
BH CV ECHO MEAS - AVA(I,D): 1.8 CM^2
BH CV ECHO MEAS - AVA(V,A): 1.8 CM^2
BH CV ECHO MEAS - AVA(V,D): 1.8 CM^2
BH CV ECHO MEAS - BSA(HAYCOCK): 1.8 M^2
BH CV ECHO MEAS - BSA: 1.8 M^2
BH CV ECHO MEAS - BZI_BMI: 25.6 KILOGRAMS/M^2
BH CV ECHO MEAS - BZI_METRIC_HEIGHT: 165.1 CM
BH CV ECHO MEAS - BZI_METRIC_WEIGHT: 69.9 KG
BH CV ECHO MEAS - EDV(MOD-SP2): 86 ML
BH CV ECHO MEAS - EDV(MOD-SP4): 78 ML
BH CV ECHO MEAS - EDV(TEICH): 101.7 ML
BH CV ECHO MEAS - EF(CUBED): 68.5 %
BH CV ECHO MEAS - EF(MOD-BP): 56 %
BH CV ECHO MEAS - EF(MOD-SP2): 52.3 %
BH CV ECHO MEAS - EF(MOD-SP4): 57.7 %
BH CV ECHO MEAS - EF(TEICH): 60.1 %
BH CV ECHO MEAS - ESV(MOD-SP2): 41 ML
BH CV ECHO MEAS - ESV(MOD-SP4): 33 ML
BH CV ECHO MEAS - ESV(TEICH): 40.6 ML
BH CV ECHO MEAS - FS: 32 %
BH CV ECHO MEAS - IVS/LVPW: 0.97
BH CV ECHO MEAS - IVSD: 1.3 CM
BH CV ECHO MEAS - LAT PEAK E' VEL: 13 CM/SEC
BH CV ECHO MEAS - LV DIASTOLIC VOL/BSA (35-75): 44.1 ML/M^2
BH CV ECHO MEAS - LV MASS(C)D: 233.3 GRAMS
BH CV ECHO MEAS - LV MASS(C)DI: 131.8 GRAMS/M^2
BH CV ECHO MEAS - LV MAX PG: 2.2 MMHG
BH CV ECHO MEAS - LV MEAN PG: 1.3 MMHG
BH CV ECHO MEAS - LV SYSTOLIC VOL/BSA (12-30): 18.6 ML/M^2
BH CV ECHO MEAS - LV V1 MAX: 74.2 CM/SEC
BH CV ECHO MEAS - LV V1 MEAN: 53.8 CM/SEC
BH CV ECHO MEAS - LV V1 VTI: 14.4 CM
BH CV ECHO MEAS - LVIDD: 4.7 CM
BH CV ECHO MEAS - LVIDS: 3.2 CM
BH CV ECHO MEAS - LVLD AP2: 7.2 CM
BH CV ECHO MEAS - LVLD AP4: 6.2 CM
BH CV ECHO MEAS - LVLS AP2: 5.5 CM
BH CV ECHO MEAS - LVLS AP4: 5.4 CM
BH CV ECHO MEAS - LVOT AREA (M): 3.1 CM^2
BH CV ECHO MEAS - LVOT AREA: 3 CM^2
BH CV ECHO MEAS - LVOT DIAM: 2 CM
BH CV ECHO MEAS - LVPWD: 1.3 CM
BH CV ECHO MEAS - MED PEAK E' VEL: 6 CM/SEC
BH CV ECHO MEAS - MR MAX PG: 90.7 MMHG
BH CV ECHO MEAS - MR MAX VEL: 476.3 CM/SEC
BH CV ECHO MEAS - MV DEC SLOPE: 646.7 CM/SEC^2
BH CV ECHO MEAS - MV DEC TIME: 0.12 SEC
BH CV ECHO MEAS - MV E MAX VEL: 82.9 CM/SEC
BH CV ECHO MEAS - MV MAX PG: 5.7 MMHG
BH CV ECHO MEAS - MV MEAN PG: 1.5 MMHG
BH CV ECHO MEAS - MV P1/2T MAX VEL: 84.4 CM/SEC
BH CV ECHO MEAS - MV P1/2T: 38.2 MSEC
BH CV ECHO MEAS - MV V2 MAX: 119.4 CM/SEC
BH CV ECHO MEAS - MV V2 MEAN: 50.5 CM/SEC
BH CV ECHO MEAS - MV V2 VTI: 19.9 CM
BH CV ECHO MEAS - MVA P1/2T LCG: 2.6 CM^2
BH CV ECHO MEAS - MVA(P1/2T): 5.8 CM^2
BH CV ECHO MEAS - MVA(VTI): 2.2 CM^2
BH CV ECHO MEAS - PA ACC TIME: 0.09 SEC
BH CV ECHO MEAS - PA MAX PG (FULL): 0.08 MMHG
BH CV ECHO MEAS - PA MAX PG: 1.8 MMHG
BH CV ECHO MEAS - PA PR(ACCEL): 37.8 MMHG
BH CV ECHO MEAS - PA V2 MAX: 66.5 CM/SEC
BH CV ECHO MEAS - PVA(V,A): 3 CM^2
BH CV ECHO MEAS - PVA(V,D): 3 CM^2
BH CV ECHO MEAS - QP/QS: 0.74
BH CV ECHO MEAS - RAP SYSTOLE: 8 MMHG
BH CV ECHO MEAS - RV MAX PG: 1.7 MMHG
BH CV ECHO MEAS - RV MEAN PG: 0.92 MMHG
BH CV ECHO MEAS - RV V1 MAX: 65 CM/SEC
BH CV ECHO MEAS - RV V1 MEAN: 43.6 CM/SEC
BH CV ECHO MEAS - RV V1 VTI: 10.3 CM
BH CV ECHO MEAS - RVOT AREA: 3.1 CM^2
BH CV ECHO MEAS - RVOT DIAM: 2 CM
BH CV ECHO MEAS - RVSP: 44.2 MMHG
BH CV ECHO MEAS - SI(AO): 77.8 ML/M^2
BH CV ECHO MEAS - SI(CUBED): 39.8 ML/M^2
BH CV ECHO MEAS - SI(LVOT): 24.4 ML/M^2
BH CV ECHO MEAS - SI(MOD-SP2): 25.4 ML/M^2
BH CV ECHO MEAS - SI(MOD-SP4): 25.4 ML/M^2
BH CV ECHO MEAS - SI(TEICH): 34.5 ML/M^2
BH CV ECHO MEAS - SV(AO): 137.8 ML
BH CV ECHO MEAS - SV(CUBED): 70.5 ML
BH CV ECHO MEAS - SV(LVOT): 43.2 ML
BH CV ECHO MEAS - SV(MOD-SP2): 45 ML
BH CV ECHO MEAS - SV(MOD-SP4): 45 ML
BH CV ECHO MEAS - SV(RVOT): 31.9 ML
BH CV ECHO MEAS - SV(TEICH): 61.1 ML
BH CV ECHO MEAS - TAPSE (>1.6): 1.7 CM2
BH CV ECHO MEAS - TR MAX VEL: 300.8 CM/SEC
BH CV ECHO MEASUREMENTS AVERAGE E/E' RATIO: 8.73
BH CV XLRA - RV BASE: 3.3 CM
BH CV XLRA - TDI S': 12 CM/SEC
LEFT ATRIUM VOLUME INDEX: 70 ML/M2
LV EF 2D ECHO EST: 56 %
SINUS: 2.8 CM
STJ: 2.5 CM

## 2019-01-21 PROCEDURE — 93306 TTE W/DOPPLER COMPLETE: CPT | Performed by: INTERNAL MEDICINE

## 2019-01-21 PROCEDURE — 93306 TTE W/DOPPLER COMPLETE: CPT

## 2019-01-21 NOTE — TELEPHONE ENCOUNTER
Patient's daughter (Laura Luu), who is listed on \[facility] [place] [facility]\"" for returned your call for the echo results.      Laura's phone number is [phone]/ ED[place]AR

## 2019-01-21 NOTE — TELEPHONE ENCOUNTER
01/21/19  3:53 PM    Left message for patient to call regarding echocardiogram results.      FAM Mcnulty  [facility]

## 2019-01-22 NOTE — TELEPHONE ENCOUNTER
Resending just in case there was a problem getting this to you.  Patient would like the result of the echo.  Se below./ EDGAR

## 2019-01-23 ENCOUNTER — TELEPHONE (OUTPATIENT)
Dept: CARDIOLOGY | Facility: CLINIC | Age: 84
End: 2019-01-23

## 2019-01-23 NOTE — TELEPHONE ENCOUNTER
01/23/19  9:17 AM    Left message for patient's daughter , Donna Behl in regards to echo.     FAM Mcnulty  [facility]

## 2019-01-24 ENCOUNTER — TELEPHONE (OUTPATIENT)
Dept: CARDIOLOGY | Facility: CLINIC | Age: 84
End: 2019-01-24

## 2019-01-24 ENCOUNTER — TELEPHONE (OUTPATIENT)
Dept: PHARMACY | Facility: HOSPITAL | Age: 84
End: 2019-01-24

## 2019-01-30 LAB — INR PPP: 1.7

## 2019-01-31 ENCOUNTER — ANTICOAGULATION VISIT (OUTPATIENT)
Dept: PHARMACY | Facility: HOSPITAL | Age: 84
End: 2019-01-31

## 2019-01-31 DIAGNOSIS — I48.91 ATRIAL FIBRILLATION WITH RVR (HCC): ICD-10-CM

## 2019-01-31 NOTE — PROGRESS NOTES
Anticoagulation Clinic Progress Note    Anticoagulation Summary  As of 2019    INR goal:   2.0-3.0   TTR:   90.8 % (2.2 mo)   INR used for dosin.70! (2019)   Warfarin maintenance plan:   2 mg on Tue, Fri; 1 mg all other days   Weekly warfarin total:   9 mg   Plan last modified:   Светлана Gurrola RP (2018)   Next INR check:   2019   Priority:   Maintenance   Target end date:   Indefinite    Indications    Atrial fibrillation (CMS/HCC) [I48.91]  Atrial fibrillation with RVR (CMS/HCC) [I48.91]             Anticoagulation Episode Summary     INR check location:       Preferred lab:       Send INR reminders to:    ABBIE ORTIZ  POOL    Comments:   lab per [facility]--they will fax to us (phone: [phone])      Anticoagulation Care Providers     Provider Role Specialty Phone number    Vinh Apple MD Referring Cardiology [phone]          Drug interactions: has remained unchanged.  Diet: has remained unchanged.    Clinic Interview:  No pertinent clinical findings have been reported.    INR History:  Anticoagulation Monitoring 1/3/2019 2019 2019   INR 2.70 2.40 1.70   INR Date 1/3/2019 2019 2019   INR Goal 2.0-3.0 2.0-3.0 2.0-3.0   Trend Same Same Same   Last Week Total 9 mg 9 mg 9 mg   Next Week Total 9 mg 9 mg 10 mg   Sun 1 mg 1 mg 1 mg   Mon 1 mg 1 mg 1 mg   Tue 2 mg 2 mg 2 mg   Wed 1 mg 1 mg 1 mg   Thu 1 mg 1 mg 2 mg (); Otherwise 1 mg   Fri 2 mg 2 mg 2 mg   Sat 1 mg 1 mg 1 mg   Visit Report - - -   Some recent data might be hidden       Plan:  1. INR is Subtherapeutic today- see above in Anticoagulation Summary.   Will instruct Citlali A Clore to Change their warfarin regimen- see above in Anticoagulation Summary.  2. Follow up in 2 weeks  3. Pt will be called with each INR result    Hayes Roldan [facility]

## 2019-02-06 ENCOUNTER — ANTICOAGULATION VISIT (OUTPATIENT)
Dept: PHARMACY | Facility: HOSPITAL | Age: 84
End: 2019-02-06

## 2019-02-06 DIAGNOSIS — I48.91 ATRIAL FIBRILLATION WITH RVR (HCC): ICD-10-CM

## 2019-02-06 LAB — INR PPP: 2.2

## 2019-02-06 NOTE — PROGRESS NOTES
Anticoagulation Clinic Progress Note    Anticoagulation Summary  As of 2019    INR goal:   2.0-3.0   TTR:   85.8 % (2.4 mo)   INR used for dosin.20 (2019)   Warfarin maintenance plan:   2 mg on Tue, Fri; 1 mg all other days   Weekly warfarin total:   9 mg   No change documented:   Hayes Roldan RPH   Plan last modified:   Светлана Gurrola RPH (2018)   Next INR check:   2019   Priority:   Maintenance   Target end date:   Indefinite    Indications    Atrial fibrillation (CMS/HCC) [I48.91]  Atrial fibrillation with RVR (CMS/HCC) [I48.91]             Anticoagulation Episode Summary     INR check location:       Preferred lab:       [facility]    Comments:   lab per [facility]--they will fax to us (phone: [phone])      Anticoagulation Care Providers     Provider Role Specialty Phone number    Vinh Apple MD Referring Cardiology [phone]          Drug interactions: has remained unchanged.  Diet: has remained unchanged.    Clinic Interview:  No pertinent clinical findings have been reported.    INR History:  Anticoagulation Monitoring 2019   INR 2.40 1.70 2.20   INR Date 2019   INR Goal 2.0-3.0 2.0-3.0 2.0-3.0   Trend Same Same Same   Last Week Total 9 mg 9 mg 10 mg   Next Week Total 9 mg 10 mg 9 mg   Sun 1 mg 1 mg 1 mg   Mon 1 mg 1 mg 1 mg   Tue 2 mg 2 mg 2 mg   Wed 1 mg 1 mg 1 mg   Thu 1 mg 2 mg (); Otherwise 1 mg 1 mg   Fri 2 mg 2 mg 2 mg   Sat 1 mg 1 mg 1 mg   Visit Report - - -   Some recent data might be hidden       Plan:  1. INR is Therapeutic today- see above in Anticoagulation Summary.   Will instruct Citlali LANE Cloxavi to Continue their warfarin regimen- see above in Anticoagulation Summary.  2. Follow up in 2 weeks  3. Pt will be called with each INR result.    Hayes Roldan RPH

## 2019-02-20 ENCOUNTER — ANTICOAGULATION VISIT (OUTPATIENT)
Dept: PHARMACY | Facility: HOSPITAL | Age: 84
End: 2019-02-20

## 2019-02-20 DIAGNOSIS — I48.91 ATRIAL FIBRILLATION WITH RVR (HCC): ICD-10-CM

## 2019-02-20 LAB — INR PPP: 1.6

## 2019-02-20 NOTE — PROGRESS NOTES
Anticoagulation Clinic Progress Note    Anticoagulation Summary  As of 2019    INR goal:   2.0-3.0   TTR:   77.3 % (2.9 mo)   INR used for dosin.60! (2019)   Warfarin maintenance plan:   2 mg on Mon, Wed, Fri; 1 mg all other days   Weekly warfarin total:   10 mg   Plan last modified:   Aisha Salazar RPH (2019)   Next INR check:   2019   Priority:   Maintenance   Target end date:   Indefinite    Indications    Atrial fibrillation (CMS/HCC) [I48.91]  Atrial fibrillation with RVR (CMS/HCC) [I48.91]             Anticoagulation Episode Summary     INR check location:       Preferred lab:       [facility]    Comments:   lab per [facility]ine, Susan. [facility]""--they will fax to us (phone: [phone])      Anticoagulation Care Providers     Provider Role Specialty Phone number    Vinh Apple MD Referring Cardiology [phone]          INR History:  Anticoagulation Monitoring 2019   INR 1.70 2.20 1.60   INR Date 2019   INR Goal 2.0-3.0 2.0-3.0 2.0-3.0   Trend Same Same Up   Last Week Total 9 mg 10 mg 9 mg   Next Week Total 10 mg 9 mg 10 mg   Sun 1 mg 1 mg 1 mg   Mon 1 mg 1 mg 2 mg   Tue 2 mg 2 mg 1 mg   Wed 1 mg 1 mg 2 mg   Thu 2 mg (); Otherwise 1 mg 1 mg 1 mg   Fri 2 mg 2 mg 2 mg   Sat 1 mg 1 mg 1 mg   Visit Report - - -   Some recent data might be hidden       Plan:  1. INR is Subtherapeutic today- see above in Anticoagulation Summary.   Provided instructions to increase their warfarin regimen- see above in Anticoagulation Summary.  2. Follow up in 1 week  3. Called Levine, Susan. \[facility]"" with updated orders.  Aisha Salazar RPH

## 2019-03-06 LAB — INR PPP: 2.7

## 2019-03-07 ENCOUNTER — ANTICOAGULATION VISIT (OUTPATIENT)
Dept: PHARMACY | Facility: HOSPITAL | Age: 84
End: 2019-03-07

## 2019-03-07 DIAGNOSIS — I48.91 ATRIAL FIBRILLATION WITH RVR (HCC): ICD-10-CM

## 2019-03-07 NOTE — PROGRESS NOTES
Anticoagulation Clinic Progress Note    Anticoagulation Summary  As of 3/7/2019    INR goal:   2.0-3.0   TTR:   75.4 % (3.3 mo)   INR used for dosin.70 (3/6/2019)   Warfarin maintenance plan:   2 mg on Mon, Wed, Fri; 1 mg all other days   Weekly warfarin total:   10 mg   No change documented:   Daren Osman RPH   Plan last modified:   Aisha Salazar RPH (2019)   Next INR check:   3/20/2019   Priority:   Maintenance   Target end date:   Indefinite    Indications    Atrial fibrillation (CMS/HCC) [I48.91]  Atrial fibrillation with RVR (CMS/HCC) [I48.91]             Anticoagulation Episode Summary     INR check location:       Preferred lab:       Send INR reminders to:    ABBIE ORTIZ  POOL    Comments:   lab per [facility]--they will fax to us (phone: [phone])      Anticoagulation Care Providers     Provider Role Specialty Phone number    Vinh Apple MD Referring Cardiology [phone]          Drug interactions: has remained unchanged.  Diet: has remained unchanged.    Clinic Interview:  Patient Findings     Negatives:   Signs/symptoms of thrombosis, Signs/symptoms of bleeding,   Laboratory test error suspected, Change in health, Change in alcohol use,   Change in activity, Upcoming invasive procedure, Emergency department   visit, Upcoming dental procedure, Missed doses, Extra doses, Change in   medications, Change in diet/appetite, Hospital admission, Bruising, Other   complaints      Clinical Outcomes     Negatives:   Major bleeding event, Thromboembolic event,   Anticoagulation-related hospital admission, Anticoagulation-related ED   visit, Anticoagulation-related fatality        INR History:  Anticoagulation Monitoring 2019 2019 3/7/2019   INR 2.20 1.60 2.70   INR Date 2019 2019 3/6/2019   INR Goal 2.0-3.0 2.0-3.0 2.0-3.0   Trend Same Up Same   Last Week Total 10 mg 9 mg 10 mg   Next Week Total 9 mg 10 mg 10 mg   Sun 1 mg 1 mg 1 mg   Mon 1 mg 2 mg  2 mg   Tue 2 mg 1 mg 1 mg   Wed 1 mg 2 mg 2 mg   Thu 1 mg 1 mg 1 mg   Fri 2 mg 2 mg 2 mg   Sat 1 mg 1 mg 1 mg   Visit Report - - -   Some recent data might be hidden       Plan:  1. INR is Therapeutic today- see above in Anticoagulation Summary.   Will instruct Citlali LANE Cloxavi to Continue their warfarin regimen- see above in Anticoagulation Summary.  2. Follow up in 2 weeks  3. Delivered instructions by phone to [facility] nurse, Bonny.   Daren Osman [facility]

## 2019-03-20 ENCOUNTER — ANTICOAGULATION VISIT (OUTPATIENT)
Dept: PHARMACY | Facility: HOSPITAL | Age: 84
End: 2019-03-20

## 2019-03-20 DIAGNOSIS — I48.91 ATRIAL FIBRILLATION WITH RVR (HCC): ICD-10-CM

## 2019-03-20 LAB — INR PPP: 2.6

## 2019-03-20 NOTE — PROGRESS NOTES
Anticoagulation Clinic Progress Note    Anticoagulation Summary  As of 3/20/2019    INR goal:   2.0-3.0   TTR:   78.4 % (3.8 mo)   INR used for dosin.60 (3/20/2019)   Warfarin maintenance plan:   2 mg every Mon, Wed, Fri; 1 mg all other days   Weekly warfarin total:   10 mg   No change documented:   Daren Osman RPH   Plan last modified:   Aisha Salazar RPH (2019)   Next INR check:   4/3/2019   Priority:   Maintenance   Target end date:   Indefinite    Indications    Atrial fibrillation (CMS/HCC) [I48.91]  Atrial fibrillation with RVR (CMS/HCC) [I48.91]             Anticoagulation Episode Summary     INR check location:       Preferred lab:       Send INR reminders to:    ABBIE ORTIZ  POOL    Comments:   lab per [facility]--they will fax to us (phone: [phone])      Anticoagulation Care Providers     Provider Role Specialty Phone number    Vinh Apple MD Referring Cardiology [phone]          Clinic Interview:  Patient Findings     Negatives:   Signs/symptoms of thrombosis, Signs/symptoms of bleeding,   Laboratory test error suspected, Change in health, Change in alcohol use,   Change in activity, Upcoming invasive procedure, Emergency department   visit, Upcoming dental procedure, Missed doses, Extra doses, Change in   medications, Change in diet/appetite, Hospital admission, Bruising, Other   complaints      Clinical Outcomes     Negatives:   Major bleeding event, Thromboembolic event,   Anticoagulation-related hospital admission, Anticoagulation-related ED   visit, Anticoagulation-related fatality        INR History:  Anticoagulation Monitoring 2019 3/7/2019 3/20/2019   INR 1.60 2.70 2.60   INR Date 2019 3/6/2019 3/20/2019   INR Goal 2.0-3.0 2.0-3.0 2.0-3.0   Trend Up Same Same   Last Week Total 9 mg 10 mg 10 mg   Next Week Total 10 mg 10 mg 10 mg   Sun 1 mg 1 mg 1 mg   Mon 2 mg 2 mg 2 mg   Tue 1 mg 1 mg 1 mg   Wed 2 mg 2 mg 2 mg   Thu 1 mg 1 mg 1 mg    Fri 2 mg 2 mg 2 mg   Sat 1 mg 1 mg 1 mg   Visit Report - - -   Some recent data might be hidden       Plan:  1. INR is Therapeutic today- see above in Anticoagulation Summary.   Will instruct Citlali A Cloxavi to Continue their warfarin regimen- see above in Anticoagulation Summary.  2. Follow up in 2 weeks  3. Orders/instructions delivered by phone to [facility] nurseBonny.     Daren Osman [facility]

## 2019-04-03 LAB — INR PPP: 2.7

## 2019-04-04 ENCOUNTER — ANTICOAGULATION VISIT (OUTPATIENT)
Dept: PHARMACY | Facility: HOSPITAL | Age: 84
End: 2019-04-04

## 2019-04-04 DIAGNOSIS — I48.91 ATRIAL FIBRILLATION WITH RVR (HCC): ICD-10-CM

## 2019-04-04 NOTE — PROGRESS NOTES
Anticoagulation Clinic Progress Note    Anticoagulation Summary  As of 2019    INR goal:   2.0-3.0   TTR:   80.8 % (4.3 mo)   INR used for dosin.70 (4/3/2019)   Warfarin maintenance plan:   2 mg every Mon, Wed, Fri; 1 mg all other days   Weekly warfarin total:   10 mg   No change documented:   Aisha Salazar RPH   Plan last modified:   Aisha Salazar RPH (2019)   Next INR check:   2019   Priority:   Maintenance   Target end date:   Indefinite    Indications    Atrial fibrillation (CMS/HCC) [I48.91]  Atrial fibrillation with RVR (CMS/HCC) [I48.91]             Anticoagulation Episode Summary     INR check location:       Preferred lab:       Send INR reminders to:    ABBIE ORTIZ  POOL    Comments:   lab per [facility]--they will fax to us (phone: [phone])      Anticoagulation Care Providers     Provider Role Specialty Phone number    Vinh Apple MD Referring Cardiology [phone]          Clinic Interview:      INR History:  Anticoagulation Monitoring 3/7/2019 3/20/2019 2019   INR 2.70 2.60 2.70   INR Date 3/6/2019 3/20/2019 4/3/2019   INR Goal 2.0-3.0 2.0-3.0 2.0-3.0   Trend Same Same Same   Last Week Total 10 mg 10 mg 10 mg   Next Week Total 10 mg 10 mg 10 mg   Sun 1 mg 1 mg 1 mg   Mon 2 mg 2 mg 2 mg   Tue 1 mg 1 mg 1 mg   Wed 2 mg 2 mg 2 mg   Thu 1 mg 1 mg 1 mg   Fri 2 mg 2 mg 2 mg   Sat 1 mg 1 mg 1 mg   Visit Report - - -   Some recent data might be hidden       Plan:  1. INR is Therapeutic today- see above in Anticoagulation Summary.   Will instruct Citlalibonilla Solorio to Continue their warfarin regimen- see above in Anticoagulation Summary.  2. Follow up in 3 weeks  3. They have been instructed to call if any changes in medications, doses, concerns, etc. Spoke with Ofelia at [facility].  Aisha Salazar RPH

## 2019-04-08 ENCOUNTER — APPOINTMENT (OUTPATIENT)
Dept: WOMENS IMAGING | Facility: HOSPITAL | Age: 84
End: 2019-04-08

## 2019-04-08 PROCEDURE — 77067 SCR MAMMO BI INCL CAD: CPT | Performed by: RADIOLOGY

## 2019-04-08 PROCEDURE — 77063 BREAST TOMOSYNTHESIS BI: CPT | Performed by: RADIOLOGY

## 2019-04-08 PROCEDURE — MDREVIEWSP: Performed by: RADIOLOGY

## 2019-04-24 LAB — INR PPP: 2.4

## 2019-04-24 RX ORDER — FUROSEMIDE 20 MG/1
TABLET ORAL
Qty: 30 TABLET | Refills: 2 | Status: SHIPPED | OUTPATIENT
Start: 2019-04-24 | End: 2019-05-13 | Stop reason: SDUPTHER

## 2019-05-13 RX ORDER — METOPROLOL SUCCINATE 50 MG/1
TABLET, EXTENDED RELEASE ORAL
Qty: 180 TABLET | Refills: 3 | Status: SHIPPED | OUTPATIENT
Start: 2019-05-13 | End: 2020-01-12

## 2019-05-13 RX ORDER — WARFARIN SODIUM 2 MG/1
TABLET ORAL
Qty: 30 TABLET | Refills: 2 | Status: SHIPPED | OUTPATIENT
Start: 2019-05-13 | End: 2019-08-05 | Stop reason: SDUPTHER

## 2019-05-13 RX ORDER — FUROSEMIDE 20 MG/1
20 TABLET ORAL DAILY
Qty: 90 TABLET | Refills: 2 | Status: SHIPPED | OUTPATIENT
Start: 2019-05-13 | End: 2020-04-27

## 2019-05-20 ENCOUNTER — ANTICOAGULATION VISIT (OUTPATIENT)
Dept: PHARMACY | Facility: HOSPITAL | Age: 84
End: 2019-05-20

## 2019-05-20 DIAGNOSIS — I48.91 ATRIAL FIBRILLATION WITH RVR (HCC): ICD-10-CM

## 2019-05-20 NOTE — PROGRESS NOTES
Anticoagulation Clinic Progress Note    Anticoagulation Summary  As of 2019    INR goal:   2.0-3.0   TTR:   83.5 % (5 mo)   INR used for dosin.40 (2019)   Warfarin maintenance plan:   2 mg every Mon, Wed, Fri; 1 mg all other days   Weekly warfarin total:   10 mg   No change documented:   Daren Osman RPH   Plan last modified:   Aisha Salazar RPH (2019)   Next INR check:   2019   Priority:   Maintenance   Target end date:   Indefinite    Indications    Atrial fibrillation (CMS/HCC) [I48.91]  Atrial fibrillation with RVR (CMS/HCC) [I48.91]             Anticoagulation Episode Summary     INR check location:       Preferred lab:       Send INR reminders to:    ABBIE ORTIZ  POOL    Comments:   lab per [facility]--they will fax to us (phone: [phone])      Anticoagulation Care Providers     Provider Role Specialty Phone number    Vinh Apple MD Referring Cardiology [phone]        Contacted [facility] r/t overdue INR. INR from  received.    INR History:  Anticoagulation Monitoring 3/20/2019 2019 2019   INR 2.60 2.70 2.40   INR Date 3/20/2019 4/3/2019 2019   INR Goal 2.0-3.0 2.0-3.0 2.0-3.0   Trend Same Same Same   Last Week Total 10 mg 10 mg 10 mg   Next Week Total 10 mg 10 mg 10 mg   Sun 1 mg 1 mg -   Mon 2 mg 2 mg 2 mg   Tue 1 mg 1 mg 1 mg   Wed 2 mg 2 mg -   Thu 1 mg 1 mg -   Fri 2 mg 2 mg -   Sat 1 mg 1 mg -   Visit Report - - -   Some recent data might be hidden       Plan:  1. INR was Therapeutic 19- see above in Anticoagulation Summary.   Provided instructions to Ofelia with [facility] to Continue their warfarin regimen- see above in Anticoagulation Summary.  2. Follow up in 2 days (4 wks from last INR check)      Daren Osman RPH

## 2019-05-21 RX ORDER — POTASSIUM CHLORIDE 750 MG/1
TABLET, EXTENDED RELEASE ORAL
Qty: 30 TABLET | Refills: 5 | Status: SHIPPED | OUTPATIENT
Start: 2019-05-21 | End: 2019-11-15 | Stop reason: SDUPTHER

## 2019-05-22 ENCOUNTER — ANTICOAGULATION VISIT (OUTPATIENT)
Dept: PHARMACY | Facility: HOSPITAL | Age: 84
End: 2019-05-22

## 2019-05-22 DIAGNOSIS — I48.91 ATRIAL FIBRILLATION WITH RVR (HCC): ICD-10-CM

## 2019-05-22 LAB — INR PPP: 2.7

## 2019-06-03 RX ORDER — DIGOXIN 125 UG/1
TABLET ORAL
Qty: 90 TABLET | Refills: 0 | Status: SHIPPED | OUTPATIENT
Start: 2019-06-03 | End: 2019-09-25 | Stop reason: SDUPTHER

## 2019-06-19 ENCOUNTER — ANTICOAGULATION VISIT (OUTPATIENT)
Dept: PHARMACY | Facility: HOSPITAL | Age: 84
End: 2019-06-19

## 2019-06-19 DIAGNOSIS — I48.91 ATRIAL FIBRILLATION WITH RVR (HCC): ICD-10-CM

## 2019-06-19 LAB — INR PPP: 1.9

## 2019-06-19 NOTE — PROGRESS NOTES
Anticoagulation Clinic Progress Note    Anticoagulation Summary  As of 2019    INR goal:   2.0-3.0   TTR:   86.3 % (6.8 mo)   INR used for dosin.90! (2019)   Warfarin maintenance plan:   2 mg every Mon, Wed, Fri; 1 mg all other days   Weekly warfarin total:   10 mg   No change documented:   Daren Osman RPH   Plan last modified:   Aisha Salazar RPH (2019)   Next INR check:   2019   Priority:   Maintenance   Target end date:   Indefinite    Indications    Atrial fibrillation (CMS/HCC) [I48.91]  Atrial fibrillation with RVR (CMS/HCC) [I48.91]             Anticoagulation Episode Summary     INR check location:       Preferred lab:       Send INR reminders to:    ABBIE BAUMANN  POOL    Comments:   lab per [facility]--they will fax to us (phone: [phone])      Anticoagulation Care Providers     Provider Role Specialty Phone number    Vinh Apple MD Referring Cardiology [phone]          INR History:  Anticoagulation Monitoring 2019   INR 2.40 2.70 1.90   INR Date 2019   INR Goal 2.0-3.0 2.0-3.0 2.0-3.0   Trend Same Same Same   Last Week Total 10 mg 10 mg 10 mg   Next Week Total 10 mg 10 mg 10 mg   Sun - 1 mg 1 mg   Mon 2 mg 2 mg 2 mg   Tue 1 mg 1 mg 1 mg   Wed - 2 mg 2 mg   Thu - 1 mg 1 mg   Fri - 2 mg 2 mg   Sat - 1 mg 1 mg   Visit Report - - -   Some recent data might be hidden       Plan:  1. INR is Subtherapeutic today- see above in Anticoagulation Summary.   Provided instructions to Melonie [facility] to Continue their warfarin regimen (due for higher 2 mg dose today)- see above in Anticoagulation Summary.  2. Follow up in 2 weeks      Daren Osman RPH

## 2019-06-20 ENCOUNTER — TELEPHONE (OUTPATIENT)
Dept: CARDIOLOGY | Facility: CLINIC | Age: 84
End: 2019-06-20

## 2019-06-20 NOTE — TELEPHONE ENCOUNTER
Pt is having vocal cord surgery next Thursday with Dr. Abad and needs to know how long she can hold warfarin prior?  Please advise.    Thanks,  Shu

## 2019-07-01 LAB — INR PPP: 1.2

## 2019-07-02 ENCOUNTER — ANTICOAGULATION VISIT (OUTPATIENT)
Dept: PHARMACY | Facility: HOSPITAL | Age: 84
End: 2019-07-02

## 2019-07-02 DIAGNOSIS — I48.91 ATRIAL FIBRILLATION WITH RVR (HCC): ICD-10-CM

## 2019-07-02 NOTE — PROGRESS NOTES
Anticoagulation Clinic Progress Note    Anticoagulation Summary  As of 2019    INR goal:   2.0-3.0   TTR:   81.5 % (7.2 mo)   INR used for dosin.20! (2019)   Warfarin maintenance plan:   2 mg every Mon, Wed, Fri; 1 mg all other days   Weekly warfarin total:   10 mg   Plan last modified:   Aisha Salazar RPH (2019)   Next INR check:   7/10/2019   Priority:   Maintenance   Target end date:   Indefinite    Indications    Atrial fibrillation (CMS/HCC) [I48.91]  Atrial fibrillation with RVR (CMS/HCC) [I48.91]             Anticoagulation Episode Summary     INR check location:       Preferred lab:       [facility]    Comments:   [facility]they will fax to us (phone: [phone])      Anticoagulation Care Providers     Provider Role Specialty Phone number    Vinh Apple MD Referring Cardiology [phone]        Pertinent information:  Off warfarin 4 days prior to throat surgery on ; did not resume warfarin until .    INR History:  Anticoagulation Monitoring 2019   INR 2.70 1.90 1.20   INR Date 2019   INR Goal 2.0-3.0 2.0-3.0 2.0-3.0   Trend Same Same Same   Last Week Total 10 mg 10 mg 4 mg   Next Week Total 10 mg 10 mg 11 mg   Sun 1 mg 1 mg 1 mg   Mon 2 mg 2 mg 2 mg   Tue 1 mg 1 mg 2 mg (); Otherwise 1 mg   Wed 2 mg 2 mg 2 mg   Thu 1 mg 1 mg 1 mg   Fri 2 mg 2 mg 2 mg   Sat 1 mg 1 mg 1 mg   Visit Report - - -   Some recent data might be hidden       Plan:  1. INR is Subtherapeutic today- see above in Anticoagulation Summary.   Provided instructions to Melonie with [facility]to Change their warfarin regimen- see above in Anticoagulation Summary.  2. Follow up in 1 week      Daren Osman RPH

## 2019-07-05 RX ORDER — WARFARIN SODIUM 1 MG/1
TABLET ORAL
Qty: 60 TABLET | Refills: 0 | Status: SHIPPED | OUTPATIENT
Start: 2019-07-05 | End: 2019-08-03 | Stop reason: SDUPTHER

## 2019-07-10 ENCOUNTER — ANTICOAGULATION VISIT (OUTPATIENT)
Dept: PHARMACY | Facility: HOSPITAL | Age: 84
End: 2019-07-10

## 2019-07-10 DIAGNOSIS — I48.91 ATRIAL FIBRILLATION WITH RVR (HCC): ICD-10-CM

## 2019-07-10 LAB — INR PPP: 1.9

## 2019-07-10 NOTE — PROGRESS NOTES
Anticoagulation Clinic Progress Note    Anticoagulation Summary  As of 7/10/2019    INR goal:   2.0-3.0   TTR:   78.3 % (7.5 mo)   INR used for dosin.90! (7/10/2019)   Warfarin maintenance plan:   2 mg every Mon, Wed, Fri; 1 mg all other days   Weekly warfarin total:   10 mg   Plan last modified:   Aisha Salazar RPH (2019)   Next INR check:   2019   Priority:   Maintenance   Target end date:   Indefinite    Indications    Atrial fibrillation (CMS/HCC) [I48.91]  Atrial fibrillation with RVR (CMS/HCC) [I48.91]             Anticoagulation Episode Summary     INR check location:       Preferred lab:       Send INR reminders to:    ABBIE ORTIZ  POOL    Comments:   lab per [facility]. [facility]""--they will fax to us (phone: [phone])      Anticoagulation Care Providers     Provider Role Specialty Phone number    Vinh Apple MD Referring Cardiology [phone]          Clinic Interview:      INR History:  Anticoagulation Monitoring 2019 2019 7/10/2019   INR 1.90 1.20 1.90   INR Date 2019 2019 7/10/2019   INR Goal 2.0-3.0 2.0-3.0 2.0-3.0   Trend Same Same Same   Last Week Total 10 mg 4 mg 10 mg   Next Week Total 10 mg 11 mg 10 mg   Sun 1 mg 1 mg 1 mg   Mon 2 mg 2 mg 2 mg   Tue 1 mg 2 mg (); Otherwise 1 mg 1 mg   Wed 2 mg 2 mg 2 mg   Thu 1 mg 1 mg 1 mg   Fri 2 mg 2 mg 2 mg   Sat 1 mg 1 mg 1 mg   Visit Report - - -   Some recent data might be hidden       Plan:  1. INR is Subtherapeutic today- see above in Anticoagulation Summary.   Will instruct Citlali A Clore to Continue their warfarin regimen- see above in Anticoagulation Summary.  2. Follow up in 2 weeks  3.  Called orders to Melonie at [place] [phone]  Aisha Salazar RPH

## 2019-07-24 ENCOUNTER — ANTICOAGULATION VISIT (OUTPATIENT)
Dept: PHARMACY | Facility: HOSPITAL | Age: 84
End: 2019-07-24

## 2019-07-24 DIAGNOSIS — I48.91 ATRIAL FIBRILLATION WITH RVR (HCC): ICD-10-CM

## 2019-07-24 LAB — INR PPP: 2.2

## 2019-07-24 NOTE — PROGRESS NOTES
Anticoagulation Clinic Progress Note    Anticoagulation Summary  As of 2019    INR goal:   2.0-3.0   TTR:   77.6 % (8 mo)   INR used for dosin.20 (2019)   Warfarin maintenance plan:   2 mg every Mon, Wed, Fri; 1 mg all other days   Weekly warfarin total:   10 mg   No change documented:   Daren Osman RPH   Plan last modified:   Aisha Salazar RPH (2019)   Next INR check:   2019   Priority:   Maintenance   Target end date:   Indefinite    Indications    Atrial fibrillation (CMS/HCC) [I48.91]  Atrial fibrillation with RVR (CMS/HCC) [I48.91]             Anticoagulation Episode Summary     INR check location:       Preferred lab:       Send INR reminders to:    ABBIE ORTIZ  POOL    Comments:   lab per [facility]--they will fax to us (phone: [phone])      Anticoagulation Care Providers     Provider Role Specialty Phone number    Vinh Apple MD Referring Cardiology [phone]          INR History:  Anticoagulation Monitoring 2019 7/10/2019 2019   INR 1.20 1.90 2.20   INR Date 2019 7/10/2019 2019   INR Goal 2.0-3.0 2.0-3.0 2.0-3.0   Trend Same Same Same   Last Week Total 4 mg 10 mg 10 mg   Next Week Total 11 mg 10 mg 10 mg   Sun 1 mg 1 mg 1 mg   Mon 2 mg 2 mg 2 mg   Tue 2 mg (); Otherwise 1 mg 1 mg 1 mg   Wed 2 mg 2 mg 2 mg   Thu 1 mg 1 mg 1 mg   Fri 2 mg 2 mg 2 mg   Sat 1 mg 1 mg 1 mg   Visit Report - - -   Some recent data might be hidden       Plan:  1. INR is Therapeutic today- see above in Anticoagulation Summary.   Provided instructions to Keke with [facility] to Continue their warfarin regimen- see above in Anticoagulation Summary.  2. Follow up in 2 weeks      Daren Osman RPH

## 2019-08-05 ENCOUNTER — ANTICOAGULATION VISIT (OUTPATIENT)
Dept: PHARMACY | Facility: HOSPITAL | Age: 84
End: 2019-08-05

## 2019-08-05 DIAGNOSIS — I48.91 ATRIAL FIBRILLATION WITH RVR (HCC): ICD-10-CM

## 2019-08-05 LAB — INR PPP: 2

## 2019-08-05 RX ORDER — WARFARIN SODIUM 1 MG/1
TABLET ORAL
Qty: 130 TABLET | Refills: 0 | Status: SHIPPED | OUTPATIENT
Start: 2019-08-05 | End: 2019-09-24

## 2019-08-05 NOTE — PROGRESS NOTES
Anticoagulation Clinic Progress Note    Anticoagulation Summary  As of 2019    INR goal:   2.0-3.0   TTR:   78.7 % (8.4 mo)   INR used for dosin.00 (2019)   Warfarin maintenance plan:   2 mg every Mon, Wed, Fri; 1 mg all other days   Weekly warfarin total:   10 mg   Plan last modified:   Aisha Salazar RPH (2019)   Next INR check:   2019   Priority:   Maintenance   Target end date:   Indefinite    Indications    Atrial fibrillation (CMS/HCC) [I48.91]  Atrial fibrillation with RVR (CMS/HCC) [I48.91]             Anticoagulation Episode Summary     INR check location:       Preferred lab:       Send INR reminders to:   [facility]  POOL    Comments:   lab per [facility]--they will fax to us (phone: [phone])      Anticoagulation Care Providers     Provider Role Specialty Phone number    Vinh Apple MD Referring Cardiology [phone]          Clinic Interview:      INR History:  Anticoagulation Monitoring 7/10/2019 2019 2019   INR 1.90 2.20 2.00   INR Date 7/10/2019 2019 2019   INR Goal 2.0-3.0 2.0-3.0 2.0-3.0   Trend Same Same Same   Last Week Total 10 mg 10 mg 10 mg   Next Week Total 10 mg 10 mg 10 mg   Sun 1 mg 1 mg 1 mg   Mon 2 mg 2 mg 2 mg   Tue 1 mg 1 mg 1 mg   Wed 2 mg 2 mg 2 mg   Thu 1 mg 1 mg 1 mg   Fri 2 mg 2 mg 2 mg   Sat 1 mg 1 mg 1 mg   Visit Report - - -   Some recent data might be hidden       Plan:  1. INR is Therapeutic today- see above in Anticoagulation Summary.   Will instruct Citlali [address] [place] to Continue their warfarin regimen- see above in Anticoagulation Summary.  2. Follow up in 3 weeks  3. Called orders to [facility] at  [phone].    Aisha Salazar RPH

## 2019-08-13 ENCOUNTER — OFFICE VISIT (OUTPATIENT)
Dept: INTERNAL MEDICINE | Facility: CLINIC | Age: 84
End: 2019-08-13

## 2019-08-13 VITALS
DIASTOLIC BLOOD PRESSURE: 76 MMHG | SYSTOLIC BLOOD PRESSURE: 118 MMHG | BODY MASS INDEX: 24.66 KG/M2 | HEART RATE: 74 BPM | HEIGHT: 65 IN | WEIGHT: 148 LBS | OXYGEN SATURATION: 98 %

## 2019-08-13 DIAGNOSIS — I65.23 ATHEROSCLEROSIS OF BOTH CAROTID ARTERIES: ICD-10-CM

## 2019-08-13 DIAGNOSIS — I10 BENIGN ESSENTIAL HYPERTENSION: ICD-10-CM

## 2019-08-13 DIAGNOSIS — G23.1 PROGRESSIVE SUPRANUCLEAR PALSY (HCC): ICD-10-CM

## 2019-08-13 DIAGNOSIS — I48.91 ATRIAL FIBRILLATION, UNSPECIFIED TYPE (HCC): ICD-10-CM

## 2019-08-13 DIAGNOSIS — E78.5 HYPERLIPIDEMIA, UNSPECIFIED HYPERLIPIDEMIA TYPE: ICD-10-CM

## 2019-08-13 DIAGNOSIS — I50.30 HEART FAILURE WITH PRESERVED EJECTION FRACTION (HCC): ICD-10-CM

## 2019-08-13 DIAGNOSIS — Z00.00 MEDICARE ANNUAL WELLNESS VISIT, SUBSEQUENT: Primary | ICD-10-CM

## 2019-08-13 DIAGNOSIS — Z78.0 MENOPAUSE: ICD-10-CM

## 2019-08-13 LAB
ALBUMIN SERPL-MCNC: 4.3 G/DL (ref 3.5–5.2)
ALBUMIN/GLOB SERPL: 1.5 G/DL
ALP SERPL-CCNC: 58 U/L (ref 39–117)
ALT SERPL-CCNC: 11 U/L (ref 1–33)
AST SERPL-CCNC: 17 U/L (ref 1–32)
BASOPHILS # BLD AUTO: 0.07 10*3/MM3 (ref 0–0.2)
BASOPHILS NFR BLD AUTO: 0.9 % (ref 0–1.5)
BILIRUB SERPL-MCNC: 1.2 MG/DL (ref 0.2–1.2)
BUN SERPL-MCNC: 13 MG/DL (ref 8–23)
BUN/CREAT SERPL: 13.7 (ref 7–25)
CALCIUM SERPL-MCNC: 9.4 MG/DL (ref 8.6–10.5)
CHLORIDE SERPL-SCNC: 100 MMOL/L (ref 98–107)
CHOLEST SERPL-MCNC: 174 MG/DL (ref 0–200)
CO2 SERPL-SCNC: 30.8 MMOL/L (ref 22–29)
CREAT SERPL-MCNC: 0.95 MG/DL (ref 0.57–1)
EOSINOPHIL # BLD AUTO: 0.18 10*3/MM3 (ref 0–0.4)
EOSINOPHIL NFR BLD AUTO: 2.3 % (ref 0.3–6.2)
ERYTHROCYTE [DISTWIDTH] IN BLOOD BY AUTOMATED COUNT: 12.7 % (ref 12.3–15.4)
GLOBULIN SER CALC-MCNC: 2.8 GM/DL
GLUCOSE SERPL-MCNC: 102 MG/DL (ref 65–99)
HCT VFR BLD AUTO: 44 % (ref 34–46.6)
HDLC SERPL-MCNC: 35 MG/DL (ref 40–60)
HGB BLD-MCNC: 13.9 G/DL (ref 12–15.9)
IMM GRANULOCYTES # BLD AUTO: 0.02 10*3/MM3 (ref 0–0.05)
IMM GRANULOCYTES NFR BLD AUTO: 0.3 % (ref 0–0.5)
LDLC SERPL CALC-MCNC: 102 MG/DL (ref 0–100)
LYMPHOCYTES # BLD AUTO: 1.43 10*3/MM3 (ref 0.7–3.1)
LYMPHOCYTES NFR BLD AUTO: 18.4 % (ref 19.6–45.3)
MCH RBC QN AUTO: 31.1 PG (ref 26.6–33)
MCHC RBC AUTO-ENTMCNC: 31.6 G/DL (ref 31.5–35.7)
MCV RBC AUTO: 98.4 FL (ref 79–97)
MONOCYTES # BLD AUTO: 0.77 10*3/MM3 (ref 0.1–0.9)
MONOCYTES NFR BLD AUTO: 9.9 % (ref 5–12)
NEUTROPHILS # BLD AUTO: 5.29 10*3/MM3 (ref 1.7–7)
NEUTROPHILS NFR BLD AUTO: 68.2 % (ref 42.7–76)
NRBC BLD AUTO-RTO: 0 /100 WBC (ref 0–0.2)
PLATELET # BLD AUTO: 255 10*3/MM3 (ref 140–450)
POTASSIUM SERPL-SCNC: 4.3 MMOL/L (ref 3.5–5.2)
PROT SERPL-MCNC: 7.1 G/DL (ref 6–8.5)
RBC # BLD AUTO: 4.47 10*6/MM3 (ref 3.77–5.28)
SODIUM SERPL-SCNC: 142 MMOL/L (ref 136–145)
TRIGL SERPL-MCNC: 187 MG/DL (ref 0–150)
TSH SERPL DL<=0.005 MIU/L-ACNC: 2.72 MIU/ML (ref 0.27–4.2)
VLDLC SERPL CALC-MCNC: 37.4 MG/DL
WBC # BLD AUTO: 7.76 10*3/MM3 (ref 3.4–10.8)

## 2019-08-13 PROCEDURE — 99213 OFFICE O/P EST LOW 20 MIN: CPT | Performed by: INTERNAL MEDICINE

## 2019-08-13 PROCEDURE — G0439 PPPS, SUBSEQ VISIT: HCPCS | Performed by: INTERNAL MEDICINE

## 2019-08-13 NOTE — PROGRESS NOTES
Natalie Solorio is a 84 y.o. female who presents for an annual wellness visit as well as check up of htn, hld, afib/chf.      History of Present Illness     HTN. Good control with current.   HLD. She is maintained on diet alone.   Afib/CHF.  She is maintained on warfarin under direction of cardiology. She is well-compensated.   Progressive supranuclear palsy.  She is followed by neurology.      Review of Systems   Respiratory: Negative.    Cardiovascular: Negative.        The following portions of the patient's history were reviewed and updated as appropriate: allergies, current medications, past family history, past medical history, past social history, past surgical history and problem list.  Health maintenance tab was reviewed and updated with the patient.       Patient Active Problem List    Diagnosis Date Noted   • Heart failure with preserved ejection fraction (CMS/HCC) 08/13/2019   • Tear of medial meniscus of right knee, current 06/15/2017   • Mitral valve insufficiency 07/06/2016   • Tricuspid valve insufficiency 07/06/2016   • Progressive supranuclear palsy (CMS/HCC) 04/21/2016   • Atherosclerosis of both carotid arteries 04/21/2016     Note Last Updated: 5/31/2017     Description: plaque on screening last done 3/13, 11/13, 10/16     • Heart failure (CMS/HCC) 04/21/2016     Note Last Updated: 4/21/2016     Description: admission 7/2013     • Gastroesophageal reflux disease 04/21/2016   • Hyperlipidemia 04/21/2016     Note Last Updated: 4/21/2016     Description: Patient tried Lipitor, Crestor, Zocor, Bacol and Livalo in the past and was intolerant of all of them.     • Spinal stenosis of lumbar region 04/21/2016   • Cobalamin deficiency 04/21/2016     Note Last Updated: 5/31/2017     Description: told at [facility] that B12 was low.  Monthly shots recommended 5/5/15.  Now on oral replacement.      • Atrial fibrillation (CMS/HCC) 06/24/2013   • Benign essential hypertension 06/24/2013  "      Past Medical History:   Diagnosis Date   • Abnormal EKG    • Abnormal gait     \"frozen gait\"  Seen at [facility] with full work up.   • Acute on chronic diastolic heart failure (CMS/HCC)    • Anxiety    • Arthritis    • Ataxic gait    • Atrial fibrillation with RVR (CMS/HCC)    • Benign essential hypertension    • Carotid artery stenosis     plaque on screening last done 3/13, 11/13   • Diarrhea     OCCASIONALLY   • Diastolic congestive heart failure (CMS/HCC)     addmission 07/2013   • Esophageal reflux    • History of vitamin B deficiency    • [place] (hard of hearing)    • Hypercholesterolemia     Patient tried Lipitor, Crestor, Zocor, Bacol and Livalo in the past and was intolerant of all of them.   • Hypertension    • IFG (impaired fasting glucose)    • Lumbar canal stenosis    • Metabolic encephalopathy    • PAF (paroxysmal atrial fibrillation) (CMS/HCC)    • Progressive supranuclear palsy (CMS/HCC)    • Right knee pain    • Right shoulder pain    • Urinary incontinence    • Vitamin B12 deficiency     told at [facility] that B12 was low.  Monthly shots recommended 5/5/15.       Past Surgical History:   Procedure Laterality Date   • BLADDER SURGERY     • CATARACT EXTRACTION     • HYSTERECTOMY     • KNEE ARTHROPLASTY Left    • KNEE ARTHROSCOPY Right 6/15/2017    Procedure: RT KNEE ARTHROSCOPIC PARTIAL MEDIAL AND LATERAL MENISECTOMY AND SYNOVECTOMY;  Surgeon: Sam Soni MD;  Location: [facility];  Service:    • KNEE ARTHROSCOPY Right 8/29/2018    Procedure: RT KNEE ARTHROSCOPY WITH PARTIAL LATERAL MENISECTOMY;  Surgeon: Randy Tucker MD;  Location: [facility] OR;  Service: Orthopedics       Family History   Problem Relation Age of Onset   • Hypertension Mother    • Heart disease Mother    • Heart disease Father    • Stroke Father    • Hypertension Father    • Hypertension Sister    • Malig Hyperthermia Neg Hx        Social History     Socioeconomic History   • Marital status:      Spouse " "name: Not on file   • Number of children: Not on file   • Years of education: Not on file   • Highest education level: Not on file   Tobacco Use   • Smoking status: Former Smoker     Packs/day: 0.50     Years: 15.00     Pack years: 7.50     Types: Cigarettes     Last attempt to quit: 1985     Years since quittin.6   • Smokeless tobacco: Never Used   Substance and Sexual Activity   • Alcohol use: Yes     Comment: Rare   • Drug use: No       Current Outpatient Medications on File Prior to Visit   Medication Sig Dispense Refill   • Cholecalciferol (VITAMIN D PO) Take 1,000 mg by mouth Daily. HOLD PRIOR TO SURG     • DIGOX 125 MCG tablet TAKE ONE TABLET BY MOUTH DAILY 90 tablet 0   • furosemide (LASIX) 20 MG tablet Take 1 tablet by mouth Daily. 90 tablet 2   • hydrALAZINE (APRESOLINE) 25 MG tablet Take 1 tablet by mouth 2 (Two) Times a Day. 60 tablet 4   • metoprolol succinate XL (TOPROL-XL) 50 MG 24 hr tablet TAKE ONE AND ONE-HALF TABLET BY MOUTH TWICE A  tablet 3   • potassium chloride (K-DUR,KLOR-CON) 10 MEQ CR tablet TAKE ONE TABLET BY MOUTH DAILY 30 tablet 5   • vitamin B-12 (VITAMIN B-12) 1000 MCG tablet Take 1 tablet by mouth Daily. (Patient taking differently: Take 1,000 mcg by mouth Daily. HOLD PRIOR TO SURG)     • warfarin (COUMADIN) 1 MG tablet TAKE 2 TABLETS BY MOUTH DAILY ON MON, WED & FRI  AND 1 TABLET BY MOUTH ALL OTHER DAYS OR AS DIRECTED BY MED MANAGEMENT CLINIC 130 tablet 0     No current facility-administered medications on file prior to visit.        Allergies   Allergen Reactions   • Atorvastatin    • Penicillins Itching   • Pitavastatin    • Rosuvastatin    • Simvastatin        Immunization History   Administered Date(s) Administered   • Pneumococcal Conjugate 13-Valent (PCV13) 2016   • Pneumococcal Polysaccharide (PPSV23) 2009   • Tdap 2013   • Zostavax 10/05/2009       Objective     /76   Pulse 74   Ht 165.1 cm (65\")   Wt 67.1 kg (148 lb)   LMP  (LMP " Unknown)   SpO2 98%   BMI 24.63 kg/m²     Physical Exam   Constitutional: She is oriented to person, place, and time. She appears well-developed and well-nourished.   HENT:   Head: Normocephalic and atraumatic.   Right Ear: Hearing and tympanic membrane normal.   Left Ear: Hearing and tympanic membrane normal.   Mouth/Throat: No oropharyngeal exudate or posterior oropharyngeal erythema.   Cardiovascular: Normal rate, regular rhythm and normal heart sounds.   Pulmonary/Chest: Effort normal and breath sounds normal.   Abdominal: Soft. She exhibits no distension and no mass. There is no tenderness. There is no rebound and no guarding.   Neurological: She is alert and oriented to person, place, and time.   Skin: Skin is warm and dry.   Psychiatric: She has a normal mood and affect. Her behavior is normal.       Assessment/Plan   Citlali was seen today for medicare wellness-subsequent.    Diagnoses and all orders for this visit:    Medicare annual wellness visit, subsequent    Hyperlipidemia, unspecified hyperlipidemia type  -     CBC & Differential  -     Comprehensive Metabolic Panel  -     Lipid Panel  -     TSH Rfx On Abnormal To Free T4    Benign essential hypertension  -     CBC & Differential  -     Comprehensive Metabolic Panel  -     Lipid Panel  -     TSH Rfx On Abnormal To Free T4    Atherosclerosis of both carotid arteries  -     Duplex Carotid Ultrasound CAR; Future    Progressive supranuclear palsy (CMS/HCC)    Menopause  -     DEXA Bone Density Axial    Atrial fibrillation, unspecified type (CMS/HCC)    Heart failure with preserved ejection fraction (CMS/HCC)        Discussion    AWV.  See scanned forms and/or computer for glynn history, PHQ-9, functional ability questionnaire, cognitive impairment screening.  Direct observation of cognitive abilities:  The patient does not exhibit any impairment in cognitive abilities upon direct observation at today's visit.   These were all reviewed with the patient  and the patient was provided with a personal prevention plan of service in patient instructions.  Patient was given advice or information on the following topics:  nutrition, exercise  HTN. Continue current.    HLD. Continue healthy diet.   Afib/CHF.  Continue current regimen.   H/o carotid plaque.  Carotid doppler is ordered.     Progressive supranuclear palsy.  Continue with neurology.       Health Maintenance   Topic Date Due   • ZOSTER VACCINE (2 of 3) 11/30/2009   • LIPID PANEL  07/19/2019   • MEDICARE ANNUAL WELLNESS  07/23/2019   • INFLUENZA VACCINE  08/01/2019   • MAMMOGRAM  04/08/2021   • COLONOSCOPY  04/26/2022   • TDAP/TD VACCINES (2 - Td) 01/01/2023   • PNEUMOCOCCAL VACCINES (65+ LOW/MEDIUM RISK)  Completed            Future Appointments   Date Time Provider Department Center   2/17/2020 10:45 AM Cristal Lema MD [facility] None

## 2019-08-13 NOTE — PATIENT INSTRUCTIONS
Medicare Wellness  Personal Prevention Plan of Service     Date of Office Visit:  2019  Encounter Provider:  Cristal Lema MD  Place of Service:  [facility] INTERNAL MEDICINE  Patient Name: Citlali Solorio  :  1935    As part of the Medicare Wellness portion of your visit today, we are providing you with this personalized preventive plan of services (PPPS). This plan is based upon recommendations of the [place] Preventive Services Task Force (USPSTF) and the Advisory Committee on Immunization Practices (ACIP).    This lists the preventive care services that should be considered, and provides dates of when you are due. Items listed as completed are up-to-date and do not require any further intervention.    Health Maintenance   Topic Date Due   • ZOSTER VACCINE (2 of 3) 2009   • LIPID PANEL  2019   • MEDICARE ANNUAL WELLNESS  2019   • INFLUENZA VACCINE  2019   • MAMMOGRAM  2021   • COLONOSCOPY  2022   • TDAP/TD VACCINES (2 - Td) 2023   • PNEUMOCOCCAL VACCINES (65+ LOW/MEDIUM RISK)  Completed       Orders Placed This Encounter   Procedures   • DEXA Bone Density Axial     Order Specific Question:   Reason for Exam:     Answer:   screen   • Comprehensive Metabolic Panel   • Lipid Panel   • TSH Rfx On Abnormal To Free T4   • CBC & Differential     Order Specific Question:   Manual Differential     Answer:   No       Return in about 6 months (around 2020).

## 2019-08-19 ENCOUNTER — ANTICOAGULATION VISIT (OUTPATIENT)
Dept: PHARMACY | Facility: HOSPITAL | Age: 84
End: 2019-08-19

## 2019-08-19 ENCOUNTER — TELEPHONE (OUTPATIENT)
Dept: INTERNAL MEDICINE | Facility: CLINIC | Age: 84
End: 2019-08-19

## 2019-08-19 LAB — INR PPP: 2

## 2019-08-19 RX ORDER — BENZONATATE 100 MG/1
200 CAPSULE ORAL 3 TIMES DAILY PRN
Qty: 30 CAPSULE | Refills: 0 | Status: SHIPPED | OUTPATIENT
Start: 2019-08-19 | End: 2019-08-29 | Stop reason: SDUPTHER

## 2019-08-19 NOTE — PROGRESS NOTES
Anticoagulation Clinic Progress Note    Anticoagulation Summary  As of 2019    INR goal:   2.0-3.0   TTR:   79.8 % (8.9 mo)   INR used for dosin.00 (2019)   Warfarin maintenance plan:   2 mg every Mon, Wed, Fri; 1 mg all other days   Weekly warfarin total:   10 mg   No change documented:   Daren Osman RPH   Plan last modified:   Aisha Salazar RPH (2019)   Next INR check:   9/3/2019   Priority:   Maintenance   Target end date:   Indefinite    Indications    Atrial fibrillation (CMS/HCC) [I48.91]  Atrial fibrillation with RVR (CMS/HCC) (Resolved) [I48.91]             Anticoagulation Episode Summary     INR check location:       Preferred lab:       [facility]    Comments:   lab per [facility]--they will fax to us (phone: [phone])      Anticoagulation Care Providers     Provider Role Specialty Phone number    Vinh Apple MD Referring Cardiology [phone]          INR History:  Anticoagulation Monitoring 2019   INR 2.20 2.00 2.00   INR Date 2019   INR Goal 2.0-3.0 2.0-3.0 2.0-3.0   Trend Same Same Same   Last Week Total 10 mg 10 mg 10 mg   Next Week Total 10 mg 10 mg 10 mg   Sun 1 mg 1 mg 1 mg   Mon 2 mg 2 mg 2 mg   Tue 1 mg 1 mg 1 mg   Wed 2 mg 2 mg 2 mg   Thu 1 mg 1 mg 1 mg   Fri 2 mg 2 mg 2 mg   Sat 1 mg 1 mg 1 mg   Visit Report - - -   Some recent data might be hidden       Plan:  1. INR is Therapeutic today- see above in Anticoagulation Summary.   [facility]- see above in Anticoagulation Summary.  2. Follow up in 2 weeks      Daren Osman RPH

## 2019-08-21 ENCOUNTER — OFFICE VISIT (OUTPATIENT)
Dept: INTERNAL MEDICINE | Facility: CLINIC | Age: 84
End: 2019-08-21

## 2019-08-21 VITALS
WEIGHT: 148 LBS | DIASTOLIC BLOOD PRESSURE: 76 MMHG | SYSTOLIC BLOOD PRESSURE: 124 MMHG | HEART RATE: 73 BPM | BODY MASS INDEX: 24.66 KG/M2 | HEIGHT: 65 IN | OXYGEN SATURATION: 96 % | TEMPERATURE: 97.6 F

## 2019-08-21 DIAGNOSIS — J20.8 ACUTE BRONCHITIS DUE TO OTHER SPECIFIED ORGANISMS: Primary | ICD-10-CM

## 2019-08-21 DIAGNOSIS — R05.9 COUGH: ICD-10-CM

## 2019-08-21 LAB
HCT VFR BLDA CALC: 44 % (ref 38–51)
HGB BLDA-MCNC: 13.8 G/DL (ref 12–17)
LYMPHOCYTES # BLD: 32.1 %
MCH, POC: 30.1
MCHC, POC: 31.4
MCV, POC: 95.9
MONOCYTES # BLD: 8.2 %
PLATELET # BLD: 228 10*3/MM3
PMV BLD: 8.6 FL
POC NEUTROPHIL: 59.7 %
RBC, POC: 4.59
RDW, POC: 14.4
WBC # BLD: 6.6 10*3/UL

## 2019-08-21 PROCEDURE — 71046 X-RAY EXAM CHEST 2 VIEWS: CPT | Performed by: INTERNAL MEDICINE

## 2019-08-21 PROCEDURE — 99213 OFFICE O/P EST LOW 20 MIN: CPT | Performed by: INTERNAL MEDICINE

## 2019-08-21 PROCEDURE — 85025 COMPLETE CBC W/AUTO DIFF WBC: CPT | Performed by: INTERNAL MEDICINE

## 2019-08-21 RX ORDER — DOXYCYCLINE 100 MG/1
100 CAPSULE ORAL EVERY 12 HOURS SCHEDULED
Qty: 20 CAPSULE | Refills: 0 | Status: SHIPPED | OUTPATIENT
Start: 2019-08-21 | End: 2020-01-24

## 2019-08-21 NOTE — PROGRESS NOTES
Subjective     Citlali ARIANA Solorio is a 84 y.o. female who presents with   Chief Complaint   Patient presents with   • Cough   • Fatigue       History of Present Illness     Cough for five days.  Cough with some production.  No SOA.  No fever.  Throat is sore.      Review of Systems   Constitutional: Positive for fatigue.       The following portions of the patient's history were reviewed and updated as appropriate: allergies, current medications and problem list.    Patient Active Problem List    Diagnosis Date Noted   • Heart failure with preserved ejection fraction (CMS/[facility]) 08/13/2019   • Tear of medial meniscus of right knee, current 06/15/2017   • Mitral valve insufficiency 07/06/2016   • Tricuspid valve insufficiency 07/06/2016   • Progressive supranuclear palsy (CMS/[facility]) 04/21/2016   • Atherosclerosis of both carotid arteries 04/21/2016     Note Last Updated: 5/31/2017     Description: plaque on screening last done 3/13, 11/13, 10/16     • Heart failure (CMS/[facility]) 04/21/2016     Note Last Updated: 4/21/2016     Description: admission 7/2013     • Gastroesophageal reflux disease 04/21/2016   • Hyperlipidemia 04/21/2016     Note Last Updated: 4/21/2016     Description: Patient tried Lipitor, Crestor, Zocor, Bacol and Livalo in the past and was intolerant of all of them.     • Spinal stenosis of lumbar region 04/21/2016   • Cobalamin deficiency 04/21/2016     Note Last Updated: 5/31/2017     Description: told at [facility] that B12 was low.  Monthly shots recommended 5/5/15.  Now on oral replacement.      • Atrial fibrillation (CMS/[facility]) 06/24/2013   • Benign essential hypertension 06/24/2013       Current Outpatient Medications on File Prior to Visit   Medication Sig Dispense Refill   • benzonatate (TESSALON PERLES) 100 MG capsule Take 2 capsules by mouth 3 (Three) Times a Day As Needed for Cough. 30 capsule 0   • Cholecalciferol (VITAMIN D PO) Take 1,000 mg by mouth Daily. HOLD PRIOR TO SURG     • DIGOX 125 MCG  "tablet TAKE ONE TABLET BY MOUTH DAILY 90 tablet 0   • furosemide (LASIX) 20 MG tablet Take 1 tablet by mouth Daily. 90 tablet 2   • hydrALAZINE (APRESOLINE) 25 MG tablet Take 1 tablet by mouth 2 (Two) Times a Day. 60 tablet 4   • metoprolol succinate XL (TOPROL-XL) 50 MG 24 hr tablet TAKE ONE AND ONE-HALF TABLET BY MOUTH TWICE A  tablet 3   • potassium chloride (K-DUR,KLOR-CON) 10 MEQ CR tablet TAKE ONE TABLET BY MOUTH DAILY 30 tablet 5   • vitamin B-12 (VITAMIN B-12) 1000 MCG tablet Take 1 tablet by mouth Daily. (Patient taking differently: Take 1,000 mcg by mouth Daily. HOLD PRIOR TO SURG)     • warfarin (COUMADIN) 1 MG tablet TAKE 2 TABLETS BY MOUTH DAILY ON MON, WED & FRI  AND 1 TABLET BY MOUTH ALL OTHER DAYS OR AS DIRECTED BY MED MANAGEMENT CLINIC 130 tablet 0     No current facility-administered medications on file prior to visit.        Objective     /76   Pulse 73   Temp 97.6 °F (36.4 °C)   Ht 165.1 cm (65\")   Wt 67.1 kg (148 lb)   LMP  (LMP Unknown)   SpO2 96%   BMI 24.63 kg/m²     Physical Exam   Constitutional: She is oriented to person, place, and time. She appears well-developed and well-nourished.   HENT:   Head: Normocephalic and atraumatic.   Right Ear: Hearing and tympanic membrane normal.   Left Ear: Hearing and tympanic membrane normal.   Mouth/Throat: No oropharyngeal exudate or posterior oropharyngeal erythema.   Cardiovascular: Normal rate, regular rhythm and normal heart sounds.   Pulmonary/Chest: Effort normal and breath sounds normal.   Neurological: She is alert and oriented to person, place, and time.   Skin: Skin is warm and dry.   Psychiatric: She has a normal mood and affect. Her behavior is normal.     X-rays of the chest  performed today for following indication:   cough.  X-ray reveal nad.  No change 1/4/2019.  X-ray sent to radiology for official interpretation and findings.        Assessment/Plan   Citlali was seen today for cough and fatigue.    Diagnoses and " all orders for this visit:    Acute bronchitis due to other specified organisms    Cough  -     POC CBC With / Auto Diff  -     XR Chest PA & Lateral    Other orders  -     doxycycline (MONODOX) 100 MG capsule; Take 1 capsule by mouth Every 12 (Twelve) Hours.        Discussion    Patient presents with episode of acute bronchitis.  CXR and CBC are normal.  A prescription for antibiotics is provided today.  The patient is instructed to take along with Mucinex DM and prn tessalon perrles.  Let me know they are not feeling better over the next 3 days or if there is any change in symptoms.             Future Appointments   Date Time Provider Department Center   9/6/2019  2:30 PM [facility] DEXA 1  [facility] DEX E [facility]   9/6/2019  3:45 PM [facility] V[facility] DIAG CART  [facility] OVEP [facility]   2/17/2020 10:45 AM Cristal Lema MD [facility] None

## 2019-08-23 ENCOUNTER — TELEPHONE (OUTPATIENT)
Dept: INTERNAL MEDICINE | Facility: CLINIC | Age: 84
End: 2019-08-23

## 2019-08-23 NOTE — TELEPHONE ENCOUNTER
Laura called said that antibiotic is causing diarrhea and wants to know if she can take imodium? CLC    Yes that is fine.  CRISTHIANW

## 2019-08-25 ENCOUNTER — DOCUMENTATION (OUTPATIENT)
Dept: INTERNAL MEDICINE | Facility: CLINIC | Age: 84
End: 2019-08-25

## 2019-08-26 ENCOUNTER — TELEPHONE (OUTPATIENT)
Dept: INTERNAL MEDICINE | Facility: CLINIC | Age: 84
End: 2019-08-26

## 2019-08-26 RX ORDER — LEVOFLOXACIN 500 MG/1
500 TABLET, FILM COATED ORAL DAILY
Qty: 7 TABLET | Refills: 0 | Status: SHIPPED | OUTPATIENT
Start: 2019-08-26 | End: 2020-01-24

## 2019-08-26 NOTE — TELEPHONE ENCOUNTER
----- Message from Anita Poole MD sent at 8/25/2019  9:28 PM EDT -----  Patient reported diarrhea on doxy but cough had improved. She stopped med and both diarrhea and cough resolved. She will f/u if having symptoms return.   BEN

## 2019-08-26 NOTE — PROGRESS NOTES
Patient called on 8/24/19 reports diarrhea on antibiotic. She reports that her cough had greatly improved after only 2 doses of the medication. She was advised to stop antibiotic and start a probiotic. Contacted patient on 8/25 and she stated that both diarrhea and cough had dramatically improved. She was advised to follow up this week if symptoms return.   BEN

## 2019-08-26 NOTE — TELEPHONE ENCOUNTER
Antibiotic cause sever diarrhea. Can you call in a different antibiotic? CLC    Call and advise.  I called in Levaquin instead.  Call [facility] and give order to check stool for c.diff.  Also add Florator probiotic OTC to her regimen.  SLW    Patient daughter advised. CLC

## 2019-08-29 ENCOUNTER — ANTICOAGULATION VISIT (OUTPATIENT)
Dept: PHARMACY | Facility: HOSPITAL | Age: 84
End: 2019-08-29

## 2019-08-29 LAB — INR PPP: 3

## 2019-08-29 RX ORDER — BENZONATATE 100 MG/1
CAPSULE ORAL
Qty: 30 CAPSULE | Refills: 0 | Status: SHIPPED | OUTPATIENT
Start: 2019-08-29 | End: 2020-01-24

## 2019-08-29 NOTE — PROGRESS NOTES
Anticoagulation Clinic Progress Note    Anticoagulation Summary  As of 8/29/2019    INR goal:   2.0-3.0   TTR:   80.5 % (9.2 mo)   INR used for dosing:   3.00 (8/29/2019)   Warfarin maintenance plan:   2 mg every Mon, Wed, Fri; 1 mg all other days   Weekly warfarin total:   10 mg   Plan last modified:   Aisha Salazar RPH (2/20/2019)   Next INR check:   9/4/2019   Priority:   Maintenance   Target end date:   Indefinite    Indications    Atrial fibrillation (CMS/HCC) [I48.91]  Atrial fibrillation with RVR (CMS/HCC) (Resolved) [I48.91]             Anticoagulation Episode Summary     INR check location:       Preferred lab:       Send INR reminders to:    [facility]  POOL    Comments:   lab per [facility]--they will fax to us (phone: [phone])      Anticoagulation Care Providers     Provider Role Specialty Phone number    Vinh Apple MD Referring Cardiology [phone]        Pertinent information:  levofloxacin x 7 days started 8/26.    INR History:  Anticoagulation Monitoring 8/5/2019 8/19/2019 8/29/2019   INR 2.00 2.00 3.00   INR Date 8/5/2019 8/19/2019 8/29/2019   INR Goal 2.0-3.0 2.0-3.0 2.0-3.0   Trend Same Same Same   Last Week Total 10 mg 10 mg 10 mg   Next Week Total 10 mg 10 mg 9 mg   Sun 1 mg 1 mg 1 mg   Mon 2 mg 2 mg 2 mg   Tue 1 mg 1 mg 1 mg   Wed 2 mg 2 mg -   Thu 1 mg 1 mg 1 mg   Fri 2 mg 2 mg 1 mg (8/30)   Sat 1 mg 1 mg 1 mg   Visit Report - - -   Some recent data might be hidden       Plan:  1. INR is Therapeutic today- see above in Anticoagulation Summary.   Provided instructions to Melonie with [facility] to Change their warfarin regimen- see above in Anticoagulation Summary.  2. Follow up in 6 days      Daren Osman RPH

## 2019-09-04 ENCOUNTER — ANTICOAGULATION VISIT (OUTPATIENT)
Dept: PHARMACY | Facility: HOSPITAL | Age: 84
End: 2019-09-04

## 2019-09-04 LAB — INR PPP: 1.9

## 2019-09-04 NOTE — PROGRESS NOTES
Anticoagulation Clinic Progress Note    Anticoagulation Summary  As of 2019    INR goal:   2.0-3.0   TTR:   80.7 % (9.4 mo)   INR used for dosin.90! (2019)   Warfarin maintenance plan:   2 mg every Mon, Wed, Fri; 1 mg all other days   Weekly warfarin total:   10 mg   No change documented:   Daren Osman RPH   Plan last modified:   Aisha Salazar RPH (2019)   Next INR check:   2019   Priority:   Maintenance   Target end date:   Indefinite    Indications    Atrial fibrillation (CMS/HCC) [I48.91]  Atrial fibrillation with RVR (CMS/HCC) (Resolved) [I48.91]             Anticoagulation Episode Summary     INR check location:       Preferred lab:       Send INR reminders to:    [facility]  POOL    Comments:   lab per [facility]--they will fax to us (phone: [phone])      Anticoagulation Care Providers     Provider Role Specialty Phone number    Vinh Apple MD Referring Cardiology [phone]          INR History:  Anticoagulation Monitoring 2019   INR 2.00 3.00 1.90   INR Date 2019   INR Goal 2.0-3.0 2.0-3.0 2.0-3.0   Trend Same Same Same   Last Week Total 10 mg 10 mg 9 mg   Next Week Total 10 mg 9 mg 10 mg   Sun 1 mg 1 mg 1 mg   Mon 2 mg 2 mg -   Tue 1 mg 1 mg -   Wed 2 mg - 2 mg   Thu 1 mg 1 mg 1 mg   Fri 2 mg 1 mg () 2 mg   Sat 1 mg 1 mg 1 mg   Visit Report - - -   Some recent data might be hidden       Plan:  1. INR is Subtherapeutic today- see above in Anticoagulation Summary.   Provided instructions to Melonie with [facility] to Continue their warfarin regimen (due for higher 2 mg dose today) - see above in Anticoagulation Summary.  2. Follow up in 1 week      Daren Osman RPH

## 2019-09-05 ENCOUNTER — ANTICOAGULATION VISIT (OUTPATIENT)
Dept: PHARMACY | Facility: HOSPITAL | Age: 84
End: 2019-09-05

## 2019-09-05 NOTE — PROGRESS NOTES
A user error has taken place: encounter opened in error, closed for administrative reasons. Yesterday's INR result was called in by [facility] again today. Spoke with Ofelia, and confirmed plan as detailed during yesterday's Anticoag Visit.

## 2019-09-06 ENCOUNTER — HOSPITAL ENCOUNTER (OUTPATIENT)
Dept: BONE DENSITY | Facility: HOSPITAL | Age: 84
End: 2019-09-06

## 2019-09-06 ENCOUNTER — APPOINTMENT (OUTPATIENT)
Dept: CARDIOLOGY | Facility: HOSPITAL | Age: 84
End: 2019-09-06

## 2019-09-09 ENCOUNTER — ANTICOAGULATION VISIT (OUTPATIENT)
Dept: PHARMACY | Facility: HOSPITAL | Age: 84
End: 2019-09-09

## 2019-09-09 LAB — INR PPP: 1.9

## 2019-09-09 NOTE — PROGRESS NOTES
Anticoagulation Clinic Progress Note    Anticoagulation Summary  As of 2019    INR goal:   2.0-3.0   TTR:   79.3 % (9.6 mo)   INR used for dosin.90! (2019)   Warfarin maintenance plan:   1 mg every Sun, Tue, Thu; 2 mg all other days   Weekly warfarin total:   11 mg   Plan last modified:   Daren Osman RPH (2019)   Next INR check:   2019   Priority:   Maintenance   Target end date:   Indefinite    Indications    Atrial fibrillation (CMS/HCC) [I48.91]  Atrial fibrillation with RVR (CMS/HCC) (Resolved) [I48.91]             Anticoagulation Episode Summary     INR check location:       Preferred lab:       Send INR reminders to:    [facility]  POOL    Comments:   lab per [facility]--they will fax to us (phone: [phone])      Anticoagulation Care Providers     Provider Role Specialty Phone number    Vinh Apple MD Referring Cardiology [phone]          INR History:  Anticoagulation Monitoring 2019   INR 3.00 1.90 1.90   INR Date 2019   INR Goal 2.0-3.0 2.0-3.0 2.0-3.0   Trend Same Same Up   Last Week Total 10 mg 9 mg 10 mg   Next Week Total 9 mg 10 mg 11 mg   Sun 1 mg 1 mg 1 mg   Mon 2 mg - 2 mg   Tue 1 mg - 1 mg   Wed - 2 mg 2 mg   Thu 1 mg 1 mg 1 mg   Fri 1 mg () 2 mg 2 mg   Sat 1 mg 1 mg 2 mg   Visit Report - - -   Some recent data might be hidden       Plan:  1. INR is Subtherapeutic today- see above in Anticoagulation Summary.   Provided instructions to Robin with [facility] to Increase their warfarin regimen- see above in Anticoagulation Summary.  2. Follow up in 1 week      Daren Osman RPH

## 2019-09-16 ENCOUNTER — ANTICOAGULATION VISIT (OUTPATIENT)
Dept: PHARMACY | Facility: HOSPITAL | Age: 84
End: 2019-09-16

## 2019-09-16 LAB — INR PPP: 2.4

## 2019-09-16 NOTE — PROGRESS NOTES
Anticoagulation Clinic Progress Note    Anticoagulation Summary  As of 2019    INR goal:   2.0-3.0   TTR:   79.3 % (9.8 mo)   INR used for dosin.40 (2019)   Warfarin maintenance plan:   1 mg every Sun, Tue, Thu; 2 mg all other days   Weekly warfarin total:   11 mg   No change documented:   Daren Osman RPH   Plan last modified:   Daren Osman RPH (2019)   Next INR check:   2019   Priority:   Maintenance   Target end date:   Indefinite    Indications    Atrial fibrillation (CMS/HCC) [I48.91]  Atrial fibrillation with RVR (CMS/HCC) (Resolved) [I48.91]             Anticoagulation Episode Summary     INR check location:       Preferred lab:       Send INR reminders to:   [facility]  POOL    Comments:   lab per [facility]--they will fax to us (phone: [phone])      Anticoagulation Care Providers     Provider Role Specialty Phone number    Vinh Apple MD Referring Cardiology [phone]          INR History:  Anticoagulation Monitoring 2019   INR 1.90 1.90 2.40   INR Date 2019   INR Goal 2.0-3.0 2.0-3.0 2.0-3.0   Trend Same Up Same   Last Week Total 9 mg 10 mg 11 mg   Next Week Total 10 mg 11 mg 11 mg   Sun 1 mg 1 mg 1 mg   Mon - 2 mg 2 mg   Tue - 1 mg 1 mg   Wed 2 mg 2 mg 2 mg   Thu 1 mg 1 mg 1 mg   Fri 2 mg 2 mg 2 mg   Sat 1 mg 2 mg 2 mg   Visit Report - - -   Some recent data might be hidden       Plan:  1. INR is Therapeutic today- see above in Anticoagulation Summary.   [facility] see above in Anticoagulation Summary.  2. Follow up in 1 week      Daren Osman RPH

## 2019-09-23 ENCOUNTER — ANTICOAGULATION VISIT (OUTPATIENT)
Dept: PHARMACY | Facility: HOSPITAL | Age: 84
End: 2019-09-23

## 2019-09-23 LAB — INR PPP: 2.6

## 2019-09-23 NOTE — PROGRESS NOTES
Anticoagulation Clinic Progress Note    Anticoagulation Summary  As of 2019    INR goal:   2.0-3.0   TTR:   79.8 % (10 mo)   INR used for dosin.60 (2019)   Warfarin maintenance plan:   1 mg every Sun, Tue, Thu; 2 mg all other days   Weekly warfarin total:   11 mg   No change documented:   Daren Osman RPH   Plan last modified:   Daren Osman RPH (2019)   Next INR check:   10/7/2019   Priority:   Maintenance   Target end date:   Indefinite    Indications    Atrial fibrillation (CMS/HCC) [I48.91]  Atrial fibrillation with RVR (CMS/HCC) (Resolved) [I48.91]             Anticoagulation Episode Summary     INR check location:       Preferred lab:       Send INR reminders to:   [facility]  POOL    Comments:   lab per [facility]--they will fax to us (phone: [phone])      Anticoagulation Care Providers     Provider Role Specialty Phone number    Vinh Apple MD Referring Cardiology [phone]          INR History:  Anticoagulation Monitoring 2019   INR 1.90 2.40 2.60   INR Date 2019   INR Goal 2.0-3.0 2.0-3.0 2.0-3.0   Trend Up Same Same   Last Week Total 10 mg 11 mg 11 mg   Next Week Total 11 mg 11 mg 11 mg   Sun 1 mg 1 mg 1 mg   Mon 2 mg 2 mg 2 mg   Tue 1 mg 1 mg 1 mg   Wed 2 mg 2 mg 2 mg   Thu 1 mg 1 mg 1 mg   Fri 2 mg 2 mg 2 mg   Sat 2 mg 2 mg 2 mg   Visit Report - - -   Some recent data might be hidden       Plan:  1. INR is Therapeutic today- see above in Anticoagulation Summary.   [facility] see above in Anticoagulation Summary.  2. Follow up in 2 weeks      aDren Osman RPH

## 2019-09-24 RX ORDER — WARFARIN SODIUM 1 MG/1
TABLET ORAL
Qty: 130 TABLET | Refills: 0 | Status: SHIPPED | OUTPATIENT
Start: 2019-09-24 | End: 2019-11-13 | Stop reason: SDUPTHER

## 2019-09-26 RX ORDER — DIGOXIN 125 UG/1
TABLET ORAL
Qty: 90 TABLET | Refills: 0 | Status: SHIPPED | OUTPATIENT
Start: 2019-09-26 | End: 2019-12-30

## 2019-10-02 ENCOUNTER — HOSPITAL ENCOUNTER (OUTPATIENT)
Dept: CARDIOLOGY | Facility: HOSPITAL | Age: 84
Discharge: HOME | End: 2019-10-02

## 2019-10-02 ENCOUNTER — HOSPITAL ENCOUNTER (OUTPATIENT)
Dept: BONE DENSITY | Facility: HOSPITAL | Age: 84
Discharge: HOME | End: 2019-10-02
Admitting: INTERNAL MEDICINE

## 2019-10-02 DIAGNOSIS — I65.23 ATHEROSCLEROSIS OF BOTH CAROTID ARTERIES: ICD-10-CM

## 2019-10-02 LAB
BH CV XLRA MEAS LEFT CCA RATIO VEL: -72.1 CM/SEC
BH CV XLRA MEAS LEFT DIST CCA EDV: -18 CM/SEC
BH CV XLRA MEAS LEFT DIST CCA PSV: -72.1 CM/SEC
BH CV XLRA MEAS LEFT DIST ICA EDV: -20 CM/SEC
BH CV XLRA MEAS LEFT DIST ICA PSV: -75.5 CM/SEC
BH CV XLRA MEAS LEFT ICA RATIO VEL: -84.4 CM/SEC
BH CV XLRA MEAS LEFT ICA/CCA RATIO: 1.2
BH CV XLRA MEAS LEFT MID ICA EDV: -14.2 CM/SEC
BH CV XLRA MEAS LEFT MID ICA PSV: -52.4 CM/SEC
BH CV XLRA MEAS LEFT PROX CCA EDV: 17.1 CM/SEC
BH CV XLRA MEAS LEFT PROX CCA PSV: 63.5 CM/SEC
BH CV XLRA MEAS LEFT PROX ECA EDV: -5.7 CM/SEC
BH CV XLRA MEAS LEFT PROX ECA PSV: -70.2 CM/SEC
BH CV XLRA MEAS LEFT PROX ICA EDV: -19.9 CM/SEC
BH CV XLRA MEAS LEFT PROX ICA PSV: -84.4 CM/SEC
BH CV XLRA MEAS LEFT PROX SCLA PSV: 98.4 CM/SEC
BH CV XLRA MEAS LEFT VERTEBRAL A EDV: 19.9 CM/SEC
BH CV XLRA MEAS LEFT VERTEBRAL A PSV: 60.7 CM/SEC
BH CV XLRA MEAS RIGHT CCA RATIO VEL: -63 CM/SEC
BH CV XLRA MEAS RIGHT DIST CCA EDV: -17 CM/SEC
BH CV XLRA MEAS RIGHT DIST CCA PSV: -63 CM/SEC
BH CV XLRA MEAS RIGHT DIST ICA EDV: -16.1 CM/SEC
BH CV XLRA MEAS RIGHT DIST ICA PSV: -88.2 CM/SEC
BH CV XLRA MEAS RIGHT ICA RATIO VEL: -88.2 CM/SEC
BH CV XLRA MEAS RIGHT ICA/CCA RATIO: 1.4
BH CV XLRA MEAS RIGHT MID ICA EDV: -13.4 CM/SEC
BH CV XLRA MEAS RIGHT MID ICA PSV: -62.3 CM/SEC
BH CV XLRA MEAS RIGHT PROX CCA EDV: 15.2 CM/SEC
BH CV XLRA MEAS RIGHT PROX CCA PSV: 67.1 CM/SEC
BH CV XLRA MEAS RIGHT PROX ECA EDV: -5 CM/SEC
BH CV XLRA MEAS RIGHT PROX ECA PSV: -66.5 CM/SEC
BH CV XLRA MEAS RIGHT PROX ICA EDV: -19 CM/SEC
BH CV XLRA MEAS RIGHT PROX ICA PSV: -79.4 CM/SEC
BH CV XLRA MEAS RIGHT PROX SCLA PSV: 95.8 CM/SEC
BH CV XLRA MEAS RIGHT VERTEBRAL A EDV: 9 CM/SEC
BH CV XLRA MEAS RIGHT VERTEBRAL A PSV: 44.6 CM/SEC
LEFT ARM BP: NORMAL MMHG
RIGHT ARM BP: NORMAL MMHG

## 2019-10-02 PROCEDURE — 77080 DXA BONE DENSITY AXIAL: CPT

## 2019-10-02 PROCEDURE — 93880 EXTRACRANIAL BILAT STUDY: CPT

## 2019-10-07 ENCOUNTER — ANTICOAGULATION VISIT (OUTPATIENT)
Dept: PHARMACY | Facility: HOSPITAL | Age: 84
End: 2019-10-07

## 2019-10-07 LAB — INR PPP: 2.5

## 2019-10-07 NOTE — PROGRESS NOTES
Anticoagulation Clinic Progress Note    Anticoagulation Summary  As of 10/7/2019    INR goal:   2.0-3.0   TTR:   80.7 % (10.5 mo)   INR used for dosin.50 (10/7/2019)   Warfarin maintenance plan:   1 mg every Sun, Tue, Thu; 2 mg all other days   Weekly warfarin total:   11 mg   No change documented:   Daren Osman RPH   Plan last modified:   Daren Osman RPH (2019)   Next INR check:   10/21/2019   Priority:   Maintenance   Target end date:   Indefinite    Indications    Atrial fibrillation (CMS/HCC) [I48.91]  Atrial fibrillation with RVR (CMS/HCC) (Resolved) [I48.91]             Anticoagulation Episode Summary     INR check location:       Preferred lab:       Send INR reminders to:   [facility]  POOL    Comments:   lab per [facility]ine, Susan. [facility]""--they will fax to us (phone: [phone])      Anticoagulation Care Providers     Provider Role Specialty Phone number    Vinh Apple MD Referring Cardiology [phone]          INR History:  Anticoagulation Monitoring 2019 2019 10/7/2019   INR 2.40 2.60 2.50   INR Date 2019 2019 10/7/2019   INR Goal 2.0-3.0 2.0-3.0 2.0-3.0   Trend Same Same Same   Last Week Total 11 mg 11 mg 11 mg   Next Week Total 11 mg 11 mg 11 mg   Sun 1 mg 1 mg 1 mg   Mon 2 mg 2 mg 2 mg   Tue 1 mg 1 mg 1 mg   Wed 2 mg 2 mg 2 mg   Thu 1 mg 1 mg 1 mg   Fri 2 mg 2 mg 2 mg   Sat 2 mg 2 mg 2 mg   Visit Report - - -   Some recent data might be hidden       Plan:  1. INR is Therapeutic today- see above in Anticoagulation Summary.   [facility] see above in Anticoagulation Summary.  2. Follow up in 2 weeks      Daren Osman RPH

## 2019-10-18 ENCOUNTER — TELEPHONE (OUTPATIENT)
Dept: CARDIOLOGY | Facility: CLINIC | Age: 84
End: 2019-10-18

## 2019-10-18 NOTE — TELEPHONE ENCOUNTER
Amber Majano left msg saying pt is having a spot removed from her leg Monday and wants to know how long & is it OK to hold her Warfarin?  She isn't on the release form so I called pt's daughter Agusto to see what's going on.  Agusto said that's the caregiver.  Mrs. Solorio had a small spot of melanoma removed from near her ankle previously and this time they are taking off more-it will be more invasive.    Please advise.    Shu

## 2019-10-21 ENCOUNTER — ANTICOAGULATION VISIT (OUTPATIENT)
Dept: PHARMACY | Facility: HOSPITAL | Age: 84
End: 2019-10-21

## 2019-10-21 NOTE — PROGRESS NOTES
Anticoagulation Clinic Progress Note    Anticoagulation Summary  As of 10/21/2019    INR goal:   2.0-3.0   TTR:   80.7 % (10.5 mo)   INR used for dosing:   No new INR was available at the time of this encounter.   Warfarin maintenance plan:   1 mg every Sun, Tue, Thu; 2 mg all other days   Weekly warfarin total:   11 mg   Plan last modified:   Daren Osman [facility] (9/9/2019)   Next INR check:   11/4/2019   Priority:   Maintenance   Target end date:   Indefinite    Indications    Atrial fibrillation (CMS/HCC) [I48.91]  Atrial fibrillation with RVR (CMS/HCC) (Resolved) [I48.91]             Anticoagulation Episode Summary     INR check location:       Preferred lab:       Send INR reminders to:    [facility]  POOL    Comments:   lab per [facility]--they will fax to us (phone: [phone])      Anticoagulation Care Providers     Provider Role Specialty Phone number    Vinh Apple MD Referring Cardiology [phone]        Pertinent info:  Warfarin held past 3 days for procedure; however, it has been rescheduled for next week. Proceduralist has advised holding warfarin 10/25-28 for procedure.    INR History:  Anticoagulation Monitoring 9/23/2019 10/7/2019 10/21/2019   INR 2.60 2.50 -   INR Date 9/23/2019 10/7/2019 -   INR Goal 2.0-3.0 2.0-3.0 2.0-3.0   Trend Same Same Same   Last Week Total 11 mg 11 mg 6 mg   Next Week Total 11 mg 11 mg 6 mg   Sun 1 mg 1 mg Hold (10/27); Otherwise 1 mg   Mon 2 mg 2 mg Hold (10/28); Otherwise 2 mg   Tue 1 mg 1 mg 1 mg   Wed 2 mg 2 mg 2 mg   Thu 1 mg 1 mg 1 mg   Fri 2 mg 2 mg Hold (10/25); Otherwise 2 mg   Sat 2 mg 2 mg Hold (10/26); Otherwise 2 mg   Visit Report - - -   Some recent data might be hidden       Plan:  1. INR was not evaluated today- see above in Anticoagulation Summary.   Provided instructions to Melonie with [facility] to Change their warfarin regimen- see above in Anticoagulation Summary.  2. Follow up in 2 weeks (~1 wk  following procedure)      Daren Osman RP

## 2019-11-04 ENCOUNTER — ANTICOAGULATION VISIT (OUTPATIENT)
Dept: PHARMACY | Facility: HOSPITAL | Age: 84
End: 2019-11-04

## 2019-11-04 LAB — INR PPP: 1.4

## 2019-11-04 NOTE — PROGRESS NOTES
Anticoagulation Clinic Progress Note    Anticoagulation Summary  As of 2019    INR goal:   2.0-3.0   TTR:   77.8 % (11.4 mo)   INR used for dosin.40! (2019)   Warfarin maintenance plan:   1 mg every Sun, Tue, Thu; 2 mg all other days   Weekly warfarin total:   11 mg   Plan last modified:   Daren Osman [facility] (2019)   Next INR check:   2019   Priority:   Maintenance   Target end date:   Indefinite    Indications    Atrial fibrillation (CMS/HCC) [I48.91]  Atrial fibrillation with RVR (CMS/HCC) (Resolved) [I48.91]             Anticoagulation Episode Summary     INR check location:       Preferred lab:       [facility]    Comments:   lab per [facility]--they will fax to us (phone: [phone])      Anticoagulation Care Providers     Provider Role Specialty Phone number    Vinh Apple MD Referring Cardiology [phone]          INR History:  Anticoagulation Monitoring 10/7/2019 10/21/2019 2019   INR 2.50 - 1.40   INR Date 10/7/2019 - 2019   INR Goal 2.0-3.0 2.0-3.0 2.0-3.0   Trend Same Same Same   Last Week Total 11 mg 6 mg 9 mg   Next Week Total 11 mg 6 mg 13 mg   Sun 1 mg Hold (10/27); Otherwise 1 mg -   Mon 2 mg Hold (10/28); Otherwise 2 mg 3 mg ()   Tue 1 mg 1 mg 2 mg ()   Wed 2 mg 2 mg 2 mg   Thu 1 mg 1 mg 1 mg   Fri 2 mg Hold (10/25); Otherwise 2 mg -   Sat 2 mg Hold (10/26); Otherwise 2 mg -   Visit Report - - -   Some recent data might be hidden       Plan:  1. INR is Subtherapeutic today- see above in Anticoagulation Summary.   Provided instructions to Melonie Rita with [facility] to Increase her warfarin regimen- see above in Anticoagulation Summary.  2. Follow up in 4 days,       Emma Hong [facility]

## 2019-11-08 ENCOUNTER — ANTICOAGULATION VISIT (OUTPATIENT)
Dept: PHARMACY | Facility: HOSPITAL | Age: 84
End: 2019-11-08

## 2019-11-08 LAB — INR PPP: 2.4

## 2019-11-08 NOTE — PROGRESS NOTES
Anticoagulation Clinic Progress Note    Anticoagulation Summary  As of 2019    INR goal:   2.0-3.0   TTR:   77.4 % (11.6 mo)   INR used for dosin.40 (2019)   Warfarin maintenance plan:   1 mg every Sun, Tue, Thu; 2 mg all other days   Weekly warfarin total:   11 mg   No change documented:   Daren Osman RPH   Plan last modified:   Daren Osman RPH (2019)   Next INR check:   2019   Priority:   Maintenance   Target end date:   Indefinite    Indications    Atrial fibrillation (CMS/HCC) [I48.91]  Atrial fibrillation with RVR (CMS/HCC) (Resolved) [I48.91]             Anticoagulation Episode Summary     INR check location:       Preferred lab:       Send INR reminders to:   [facility]  POOL    Comments:   lab per [facility]--they will fax to us (phone: [phone])      Anticoagulation Care Providers     Provider Role Specialty Phone number    Vinh Apple MD Referring Cardiology [phone]          INR History:  Anticoagulation Monitoring 10/21/2019 2019 2019   INR - 1.40 2.40   INR Date - 2019   INR Goal 2.0-3.0 2.0-3.0 2.0-3.0   Trend Same Same Same   Last Week Total 6 mg 9 mg 13 mg   Next Week Total 6 mg 13 mg 11 mg   Sun Hold (10/27); Otherwise 1 mg - 1 mg   Mon Hold (10/28); Otherwise 2 mg 3 mg () 2 mg   Tue 1 mg 2 mg () 1 mg   Wed 2 mg 2 mg 2 mg   Thu 1 mg 1 mg 1 mg   Fri Hold (10/25); Otherwise 2 mg - 2 mg   Sat Hold (10/26); Otherwise 2 mg - 2 mg   Visit Report - - -   Some recent data might be hidden       Plan:  1. INR is Therapeutic today- see above in Anticoagulation Summary.   [facility]- see above in Anticoagulation Summary.  2. Follow up in 1.5 weeks      Daren Osman RPH

## 2019-11-12 ENCOUNTER — TELEPHONE (OUTPATIENT)
Dept: CARDIOLOGY | Facility: CLINIC | Age: 84
End: 2019-11-12

## 2019-11-13 RX ORDER — WARFARIN SODIUM 1 MG/1
TABLET ORAL
Qty: 145 TABLET | Refills: 0 | Status: SHIPPED | OUTPATIENT
Start: 2019-11-13 | End: 2020-02-13 | Stop reason: SDUPTHER

## 2019-11-15 RX ORDER — POTASSIUM CHLORIDE 750 MG/1
TABLET, EXTENDED RELEASE ORAL
Qty: 90 TABLET | Refills: 0 | Status: SHIPPED | OUTPATIENT
Start: 2019-11-15 | End: 2020-02-10

## 2019-11-18 ENCOUNTER — ANTICOAGULATION VISIT (OUTPATIENT)
Dept: PHARMACY | Facility: HOSPITAL | Age: 84
End: 2019-11-18

## 2019-11-18 LAB — INR PPP: 2.9

## 2019-11-18 NOTE — PROGRESS NOTES
Anticoagulation Clinic Progress Note    Anticoagulation Summary  As of 2019    INR goal:   2.0-3.0   TTR:   78.0 % (11.9 mo)   INR used for dosin.90 (2019)   Warfarin maintenance plan:   1 mg every Sun, Tue, Thu; 2 mg all other days   Weekly warfarin total:   11 mg   Plan last modified:   Daren Osman RP (2019)   Next INR check:   2019   Priority:   Maintenance   Target end date:   Indefinite    Indications    Atrial fibrillation (CMS/HCC) [I48.91]  Atrial fibrillation with RVR (CMS/HCC) (Resolved) [I48.91]             Anticoagulation Episode Summary     INR check location:       Preferred lab:       Send INR reminders to:    ABBIE ORTIZ  POOL    Comments:   lab per [facility]--they will fax to us (phone: [phone])      Anticoagulation Care Providers     Provider Role Specialty Phone number    Vinh Apple MD Referring Cardiology [phone]          Clinic Interview:      INR History:  Anticoagulation Monitoring 2019   INR 1.40 2.40 2.90   INR Date 2019   INR Goal 2.0-3.0 2.0-3.0 2.0-3.0   Trend Same Same Same   Last Week Total 9 mg 13 mg 11 mg   Next Week Total 13 mg 11 mg 11 mg   Sun - 1 mg 1 mg   Mon 3 mg () 2 mg 2 mg   Tue 2 mg () 1 mg 1 mg   Wed 2 mg 2 mg 2 mg   Thu 1 mg 1 mg 1 mg   Fri - 2 mg 2 mg   Sat - 2 mg 2 mg   Visit Report - - -   Some recent data might be hidden       Plan:  1. INR is Therapeutic today- see above in Anticoagulation Summary.   Will instruct [facility] ( Citlali Solorio ) to Continue their warfarin regimen- see above in Anticoagulation Summary.  2. Follow up in 2 weeks  3. Spoke to Wellness nurse on  at 0815am.    Aisha Salazar RP

## 2019-12-02 LAB — INR PPP: 2.8

## 2019-12-03 ENCOUNTER — ANTICOAGULATION VISIT (OUTPATIENT)
Dept: PHARMACY | Facility: HOSPITAL | Age: 84
End: 2019-12-03

## 2019-12-03 NOTE — PROGRESS NOTES
Anticoagulation Clinic Progress Note    Anticoagulation Summary  As of 12/3/2019    INR goal:   2.0-3.0   TTR:   78.9 % (1 y)   INR used for dosin.80 (2019)   Warfarin maintenance plan:   1 mg every Sun, Tue, Thu; 2 mg all other days   Weekly warfarin total:   11 mg   No change documented:   Emma Hong [facility]   Plan last modified:   Daren Osman RPH (2019)   Next INR check:   2019   Priority:   Maintenance   Target end date:   Indefinite    Indications    Atrial fibrillation (CMS/HCC) [I48.91]  Atrial fibrillation with RVR (CMS/HCC) (Resolved) [I48.91]             Anticoagulation Episode Summary     INR check location:       Preferred lab:       Send INR reminders to:   [facility]  POOL    Comments:   lab per [facility]--they will fax to us (phone: [phone])      Anticoagulation Care Providers     Provider Role Specialty Phone number    Vinh Apple MD Referring Cardiology [phone]          INR History:  Anticoagulation Monitoring 2019 2019 12/3/2019   INR 2.40 2.90 2.80   INR Date 2019   INR Goal 2.0-3.0 2.0-3.0 2.0-3.0   Trend Same Same Same   Last Week Total 13 mg 11 mg 11 mg   Next Week Total 11 mg 11 mg 11 mg   Sun 1 mg 1 mg 1 mg   Mon 2 mg 2 mg 2 mg   Tue 1 mg 1 mg 1 mg   Wed 2 mg 2 mg 2 mg   Thu 1 mg 1 mg 1 mg   Fri 2 mg 2 mg 2 mg   Sat 2 mg 2 mg 2 mg   Visit Report - - -   Some recent data might be hidden       Plan:  1. INR is Therapeutic today- see above in Anticoagulation Summary.   Provided instructions to Beverly with [facility] to continue her warfarin regimen of 11mg./week.  2. Follow up in 2 weeks      Emma Hong [facility]

## 2019-12-16 LAB — INR PPP: 2.5

## 2019-12-18 ENCOUNTER — ANTICOAGULATION VISIT (OUTPATIENT)
Dept: PHARMACY | Facility: HOSPITAL | Age: 84
End: 2019-12-18

## 2019-12-18 NOTE — PROGRESS NOTES
Anticoagulation Clinic Progress Note    Anticoagulation Summary  As of 2019    INR goal:   2.0-3.0   TTR:   79.6 % (1.1 y)   INR used for dosin.50 (2019)   Warfarin maintenance plan:   1 mg every Sun, Tue, Thu; 2 mg all other days   Weekly warfarin total:   11 mg   Plan last modified:   [facility] Osman [facility] (2019)   Next INR check:   2019   Priority:   Maintenance   Target end date:   Indefinite    Indications    Atrial fibrillation (CMS/HCC) [I48.91]  Atrial fibrillation with RVR (CMS/HCC) (Resolved) [I48.91]             Anticoagulation Episode Summary     INR check location:       Preferred lab:       Send INR reminders to:    ABBIE ORTIZ  POOL    Comments:   lab per [facility]--they will fax to us (phone: [phone])      Anticoagulation Care Providers     Provider Role Specialty Phone number    Vinh Apple MD Referring Cardiology [phone]          INR History:  Anticoagulation Monitoring 2019 12/3/2019 2019   INR 2.90 2.80 2.50   INR Date 2019   INR Goal 2.0-3.0 2.0-3.0 2.0-3.0   Trend Same Same Same   Last Week Total 11 mg 11 mg 11 mg   Next Week Total 11 mg 11 mg 11 mg   Sun 1 mg 1 mg 1 mg   Mon 2 mg 2 mg 2 mg   Tue 1 mg 1 mg 1 mg   Wed 2 mg 2 mg 2 mg   Thu 1 mg 1 mg 1 mg   Fri 2 mg 2 mg 2 mg   Sat 2 mg 2 mg 2 mg   Visit Report - - -   Some recent data might be hidden       Plan:  1. INR is Therapeutic today- see above in Anticoagulation Summary.   Provided instructions to Bab[facility] to continue their warfarin regimen- of 11mg/week-  see above in Anticoagulation Summary.  2. Follow up in 2 weeks      Emma Hong [facility]

## 2019-12-27 RX ORDER — HYDRALAZINE HYDROCHLORIDE 25 MG/1
TABLET, FILM COATED ORAL
Qty: 60 TABLET | Refills: 3 | Status: SHIPPED | OUTPATIENT
Start: 2019-12-27 | End: 2020-04-27

## 2019-12-30 RX ORDER — DIGOXIN 125 MCG
TABLET ORAL
Qty: 90 TABLET | Refills: 0 | Status: SHIPPED | OUTPATIENT
Start: 2019-12-30 | End: 2020-03-30

## 2020-01-12 RX ORDER — METOPROLOL SUCCINATE 50 MG/1
TABLET, EXTENDED RELEASE ORAL
Qty: 90 TABLET | Refills: 2 | Status: SHIPPED | OUTPATIENT
Start: 2020-01-12 | End: 2020-04-13

## 2020-01-23 ENCOUNTER — TELEPHONE (OUTPATIENT)
Dept: PHARMACY | Facility: HOSPITAL | Age: 85
End: 2020-01-23

## 2020-01-24 ENCOUNTER — ANTICOAGULATION VISIT (OUTPATIENT)
Dept: PHARMACY | Facility: HOSPITAL | Age: 85
End: 2020-01-24

## 2020-01-24 ENCOUNTER — OFFICE VISIT (OUTPATIENT)
Dept: INTERNAL MEDICINE | Facility: CLINIC | Age: 85
End: 2020-01-24

## 2020-01-24 VITALS
WEIGHT: 148 LBS | BODY MASS INDEX: 24.66 KG/M2 | SYSTOLIC BLOOD PRESSURE: 120 MMHG | HEART RATE: 85 BPM | OXYGEN SATURATION: 98 % | DIASTOLIC BLOOD PRESSURE: 76 MMHG | HEIGHT: 65 IN

## 2020-01-24 DIAGNOSIS — M54.50 ACUTE RIGHT-SIDED LOW BACK PAIN WITHOUT SCIATICA: Primary | ICD-10-CM

## 2020-01-24 LAB
BILIRUB BLD-MCNC: NEGATIVE MG/DL
CLARITY, POC: CLEAR
COLOR UR: YELLOW
GLUCOSE UR STRIP-MCNC: NEGATIVE MG/DL
INR PPP: 3.7
KETONES UR QL: NEGATIVE
LEUKOCYTE EST, POC: NEGATIVE
NITRITE UR-MCNC: NEGATIVE MG/ML
PROT UR STRIP-MCNC: NEGATIVE MG/DL
RBC # UR STRIP: NEGATIVE /UL
UROBILINOGEN UR QL: NORMAL

## 2020-01-24 PROCEDURE — 81003 URINALYSIS AUTO W/O SCOPE: CPT | Performed by: INTERNAL MEDICINE

## 2020-01-24 PROCEDURE — 72110 X-RAY EXAM L-2 SPINE 4/>VWS: CPT | Performed by: INTERNAL MEDICINE

## 2020-01-24 PROCEDURE — 99213 OFFICE O/P EST LOW 20 MIN: CPT | Performed by: INTERNAL MEDICINE

## 2020-01-24 RX ORDER — GENTAMICIN SULFATE 1 MG/G
OINTMENT TOPICAL
COMMUNITY
Start: 2019-12-20 | End: 2020-06-17 | Stop reason: ALTCHOICE

## 2020-01-24 RX ORDER — METHYLPREDNISOLONE 4 MG/1
TABLET ORAL
Qty: 21 TABLET | Refills: 0 | Status: SHIPPED | OUTPATIENT
Start: 2020-01-24 | End: 2020-02-17

## 2020-01-24 NOTE — PROGRESS NOTES
Anticoagulation Clinic Progress Note    Anticoagulation Summary  As of 1/24/2020    INR goal:   2.0-3.0   TTR:   76.1 % (1.2 y)   INR used for dosing:   3.70! (1/24/2020)   Warfarin maintenance plan:   2 mg every Mon, Wed, Fri; 1 mg all other days   Weekly warfarin total:   10 mg   Plan last modified:   Aisha Salazar RPH (1/24/2020)   Next INR check:   2/3/2020   Priority:   Maintenance   Target end date:   Indefinite    Indications    Atrial fibrillation (CMS/HCC) [I48.91]  Atrial fibrillation with RVR (CMS/HCC) (Resolved) [I48.91]             Anticoagulation Episode Summary     INR check location:       Preferred lab:       Send INR reminders to:    ABBIE ORTIZ  POOL    Comments:   lab per [facility]they will fax to us (phone: [phone])      Anticoagulation Care Providers     Provider Role Specialty Phone number    Vinh Apple MD Referring Cardiology [phone]          Clinic Interview:      INR History:  Anticoagulation Monitoring 12/3/2019 12/18/2019 1/24/2020   INR 2.80 2.50 3.70   INR Date 12/2/2019 12/16/2019 1/24/2020   INR Goal 2.0-3.0 2.0-3.0 2.0-3.0   Trend Same Same Down   Last Week Total 11 mg 11 mg 11 mg   Next Week Total 11 mg 11 mg 8 mg   Sun 1 mg 1 mg 1 mg   Mon 2 mg 2 mg 2 mg   Tue 1 mg 1 mg 1 mg   Wed 2 mg 2 mg 2 mg   Thu 1 mg 1 mg 1 mg   Fri 2 mg 2 mg Hold (1/24); Otherwise 2 mg   Sat 2 mg 2 mg 1 mg   Visit Report - - -   Some recent data might be hidden       Plan:  1. INR is Supratherapeutic today- see above in Anticoagulation Summary.   Will instruct Beverly/wellness nurse to have Citlali A Clore Change their warfarin regimen- see above in Anticoagulation Summary.  2. Follow up in 1.5 weeks  3. [facility] also requiring faxed, signed order to [phone]    Aisha Salazar RPH

## 2020-01-24 NOTE — PROGRESS NOTES
Subjective     Citlali ARIANA Solorio is a 84 y.o. female who presents with   Chief Complaint   Patient presents with   • Flank Pain       History of Present Illness     Right sided LBP.  Started Sunday.  Started at rest.  No radiation for the pain.  Sharp.  Worse with movement.      Review of Systems   Respiratory: Negative.    Cardiovascular: Negative.        The following portions of the patient's history were reviewed and updated as appropriate: allergies, current medications and problem list.    Patient Active Problem List    Diagnosis Date Noted   • Heart failure with preserved ejection fraction (CMS/[facility]) 08/13/2019   • Tear of medial meniscus of right knee, current 06/15/2017   • Mitral valve insufficiency 07/06/2016   • Tricuspid valve insufficiency 07/06/2016   • Progressive supranuclear palsy (CMS/[facility]) 04/21/2016   • Carotid artery plaque, bilateral 04/21/2016     Note Last Updated: 5/31/2017     Description: plaque on screening last done 3/13, 11/13, 10/16     • Heart failure (CMS/[facility]) 04/21/2016     Note Last Updated: 4/21/2016     Description: admission 7/2013     • Gastroesophageal reflux disease 04/21/2016   • Hyperlipidemia 04/21/2016     Note Last Updated: 4/21/2016     Description: Patient tried Lipitor, Crestor, Zocor, Bacol and Livalo in the past and was intolerant of all of them.     • Spinal stenosis of lumbar region 04/21/2016   • Cobalamin deficiency 04/21/2016     Note Last Updated: 5/31/2017     Description: told at [facility] that B12 was low.  Monthly shots recommended 5/5/15.  Now on oral replacement.      • Atrial fibrillation (CMS/[facility]) 06/24/2013   • Benign essential hypertension 06/24/2013       Current Outpatient Medications on File Prior to Visit   Medication Sig Dispense Refill   • Cholecalciferol (VITAMIN D PO) Take 1,000 mg by mouth Daily. HOLD PRIOR TO SURG     • digoxin (LANOXIN) 125 MCG tablet TAKE ONE TABLET BY MOUTH DAILY 90 tablet 0   • furosemide (LASIX) 20 MG tablet Take 1  "tablet by mouth Daily. 90 tablet 2   • gentamicin (GARAMYCIN) 0.1 % ointment      • hydrALAZINE (APRESOLINE) 25 MG tablet TAKE ONE TABLET BY MOUTH TWICE A DAY 60 tablet 3   • metoprolol succinate XL (TOPROL-XL) 50 MG 24 hr tablet TAKE ONE AND ONE HALF TABLET BY MOUTH TWO TIMES A DAY 90 tablet 2   • potassium chloride (K-DUR,KLOR-CON) 10 MEQ CR tablet TAKE ONE TABLET BY MOUTH DAILY 90 tablet 0   • vitamin B-12 (VITAMIN B-12) 1000 MCG tablet Take 1 tablet by mouth Daily. (Patient taking differently: Take 1,000 mcg by mouth Daily. HOLD PRIOR TO SURG)     • warfarin (COUMADIN) 1 MG tablet TAKE 1 TABLET BY MOUTH DAILY ON SUN, TUE & THUR  AND 2 TABLETS BY MOUTH ALL OTHER DAYS OR AS DIRECTED BY MED MANAGEMENT CLINIC 145 tablet 0   • [DISCONTINUED] benzonatate (TESSALON) 100 MG capsule TAKE TWO CAPSULES BY MOUTH THREE TIMES A DAY AS NEEDED FOR COUGH 30 capsule 0   • [DISCONTINUED] doxycycline (MONODOX) 100 MG capsule Take 1 capsule by mouth Every 12 (Twelve) Hours. 20 capsule 0   • [DISCONTINUED] levoFLOXacin (LEVAQUIN) 500 MG tablet Take 1 tablet by mouth Daily. 7 tablet 0     No current facility-administered medications on file prior to visit.        Objective     /76   Pulse 85   Ht 165.1 cm (65\")   Wt 67.1 kg (148 lb)   LMP  (LMP Unknown)   SpO2 98%   BMI 24.63 kg/m²     Physical Exam   Constitutional: She is oriented to person, place, and time. She appears well-developed and well-nourished.   HENT:   Head: Normocephalic and atraumatic.   Pulmonary/Chest: Effort normal.   Musculoskeletal:        Lumbar back: She exhibits tenderness and pain. She exhibits normal range of motion, no bony tenderness, no swelling, no edema, no deformity and no spasm.   Neurological: She is alert and oriented to person, place, and time.   Psychiatric: She has a normal mood and affect. Her behavior is normal.     X-rays of the l/s spine  performed today for following indication:   pain.  X-ray reveal nad.  There is no available " x-ray for comparison.  X-ray sent to radiology for official interpretation and findings.        Assessment/Plan   Citlali was seen today for flank pain.    Diagnoses and all orders for this visit:    Acute right-sided low back pain without sciatica  -     XR Spine Lumbar Complete 4+VW  -     POC Urinalysis Dipstick, Automated    Other orders  -     methylPREDNISolone (MEDROL, NERY,) 4 MG tablet; Take as directed on package instructions.        Discussion    Patient presents with LBP likey of musculoskeletal etiology.  UA is negative.   I discussed with the patient a trial of conservative management with:   rest, physical therapy and medrol dose nery.  Let me know if not feeling better over the next several weeks or if there is any change in symptoms.         Future Appointments   Date Time Provider Department Center   2/17/2020 10:45 AM Cristal Lema MD [facility] None

## 2020-01-29 RX ORDER — BENZONATATE 100 MG/1
CAPSULE ORAL
Qty: 30 CAPSULE | Refills: 0 | Status: SHIPPED | OUTPATIENT
Start: 2020-01-29 | End: 2020-10-12

## 2020-02-03 ENCOUNTER — ANTICOAGULATION VISIT (OUTPATIENT)
Dept: PHARMACY | Facility: HOSPITAL | Age: 85
End: 2020-02-03

## 2020-02-03 LAB — INR PPP: 3.8

## 2020-02-03 NOTE — PROGRESS NOTES
Anticoagulation Clinic Progress Note    Anticoagulation Summary  As of 2/3/2020    INR goal:   2.0-3.0   TTR:   74.4 % (1.2 y)   INR used for dosing:   3.80! (2/3/2020)   Warfarin maintenance plan:   2 mg every Mon, Wed, Fri; 1 mg all other days   Weekly warfarin total:   10 mg   Plan last modified:   Daren Osman RPH (2/3/2020)   Next INR check:   2/10/2020   Priority:   Maintenance   Target end date:   Indefinite    Indications    Atrial fibrillation (CMS/HCC) [I48.91]  Atrial fibrillation with RVR (CMS/HCC) (Resolved) [I48.91]             Anticoagulation Episode Summary     INR check location:       Preferred lab:       Send INR reminders to:    ABBIE ORTIZ  POOL    Comments:   lab per [facility]--they will fax to us (phone: [phone])      Anticoagulation Care Providers     Provider Role Specialty Phone number    Vinh Apple MD Referring Cardiology [phone]        Pertinent info:  Reports continuing 1 mg Sun/Tues/Thurs, 2 mg rest of wk (11 mg/wk) rather than dose change instructed last time of 2 mg MWF, 1 mg rest of wk (10 mg/wk).    INR History:  Anticoagulation Monitoring 12/18/2019 1/24/2020 2/3/2020   INR 2.50 3.70 3.80   INR Date 12/16/2019 1/24/2020 2/3/2020   INR Goal 2.0-3.0 2.0-3.0 2.0-3.0   Trend Same Down Same   Last Week Total 11 mg 11 mg 11 mg   Next Week Total 11 mg 8 mg 8 mg   Sun 1 mg 1 mg 1 mg   Mon 2 mg 2 mg Hold (2/3)   Tue 1 mg 1 mg 1 mg   Wed 2 mg 2 mg 2 mg   Thu 1 mg 1 mg 1 mg   Fri 2 mg Hold (1/24); Otherwise 2 mg 2 mg   Sat 2 mg 1 mg 1 mg   Visit Report - - -   Some recent data might be hidden       Plan:  1. INR is Supratherapeutic today- see above in Anticoagulation Summary.   Provided instructions to Melonie with [facility] to Change their warfarin regimen- see above in Anticoagulation Summary.  2. Follow up in 1 week      Daren Osman RPH

## 2020-02-10 ENCOUNTER — TELEPHONE (OUTPATIENT)
Dept: INTERNAL MEDICINE | Facility: CLINIC | Age: 85
End: 2020-02-10

## 2020-02-10 ENCOUNTER — ANTICOAGULATION VISIT (OUTPATIENT)
Dept: PHARMACY | Facility: HOSPITAL | Age: 85
End: 2020-02-10

## 2020-02-10 LAB — INR PPP: 3.2

## 2020-02-10 RX ORDER — POTASSIUM CHLORIDE 750 MG/1
TABLET, EXTENDED RELEASE ORAL
Qty: 30 TABLET | Refills: 0 | Status: SHIPPED | OUTPATIENT
Start: 2020-02-10 | End: 2020-03-11

## 2020-02-10 NOTE — PROGRESS NOTES
Anticoagulation Clinic Progress Note    Anticoagulation Summary  As of 2/10/2020    INR goal:   2.0-3.0   TTR:   73.2 % (1.2 y)   INR used for dosing:   3.20! (2/10/2020)   Warfarin maintenance plan:   2 mg every Tue, Fri; 1 mg all other days   Weekly warfarin total:   9 mg   Plan last modified:   Emma Hong [facility] (2/10/2020)   Next INR check:      Priority:   Maintenance   Target end date:   Indefinite    Indications    Atrial fibrillation (CMS/HCC) [I48.91]  Atrial fibrillation with RVR (CMS/HCC) (Resolved) [I48.91]             Anticoagulation Episode Summary     INR check location:       Preferred lab:       Send INR reminders to:    ABBIE [facility]  POOL    Comments:   lab per [facility]--they will fax to us (phone: [phone])      Anticoagulation Care Providers     Provider Role Specialty Phone number    Vinh Apple MD Referring Cardiology [phone]          INR History:  Anticoagulation Monitoring 1/24/2020 2/3/2020 2/10/2020   INR 3.70 3.80 3.20   INR Date 1/24/2020 2/3/2020 2/10/2020   INR Goal 2.0-3.0 2.0-3.0 2.0-3.0   Trend Down Same Down   Last Week Total 11 mg 11 mg 8 mg   Next Week Total 8 mg 8 mg 9 mg   Sun 1 mg 1 mg 1 mg   Mon 2 mg Hold (2/3) 1 mg   Tue 1 mg 1 mg 2 mg   Wed 2 mg 2 mg 1 mg   Thu 1 mg 1 mg 1 mg   Fri Hold (1/24); Otherwise 2 mg 2 mg 2 mg   Sat 1 mg 1 mg 1 mg   Visit Report - - -   Some recent data might be hidden       Plan:  1. INR is Supratherapeutic today- see above in Anticoagulation Summary.   Provided instructions to [facility]. Decrease weekly dose to 9mg. Faxed orders.  2. Follow up in 1 week      Emma Hong [facility]

## 2020-02-10 NOTE — TELEPHONE ENCOUNTER
Still having side pain, is there something other then tylenol she can take?  CC    She could try an OTC lidoderm patch.  She can't take aleve,ibuprofen because of coumadin.  Narcotics I would be concerned because of fall risk.  LEOPOLDO

## 2020-02-13 RX ORDER — WARFARIN SODIUM 1 MG/1
TABLET ORAL
Qty: 145 TABLET | Refills: 0 | Status: SHIPPED | OUTPATIENT
Start: 2020-02-13 | End: 2020-05-05 | Stop reason: SDUPTHER

## 2020-02-17 ENCOUNTER — OFFICE VISIT (OUTPATIENT)
Dept: INTERNAL MEDICINE | Facility: CLINIC | Age: 85
End: 2020-02-17

## 2020-02-17 VITALS
BODY MASS INDEX: 24.66 KG/M2 | DIASTOLIC BLOOD PRESSURE: 80 MMHG | HEIGHT: 65 IN | OXYGEN SATURATION: 98 % | HEART RATE: 67 BPM | WEIGHT: 148 LBS | SYSTOLIC BLOOD PRESSURE: 122 MMHG

## 2020-02-17 DIAGNOSIS — I48.91 ATRIAL FIBRILLATION, UNSPECIFIED TYPE (HCC): ICD-10-CM

## 2020-02-17 DIAGNOSIS — I10 BENIGN ESSENTIAL HYPERTENSION: Primary | ICD-10-CM

## 2020-02-17 DIAGNOSIS — G23.1 PROGRESSIVE SUPRANUCLEAR PALSY (HCC): ICD-10-CM

## 2020-02-17 DIAGNOSIS — I50.30 HEART FAILURE WITH PRESERVED EJECTION FRACTION, UNSPECIFIED HF CHRONICITY (HCC): ICD-10-CM

## 2020-02-17 DIAGNOSIS — M25.512 ACUTE PAIN OF LEFT SHOULDER: ICD-10-CM

## 2020-02-17 DIAGNOSIS — E78.5 HYPERLIPIDEMIA, UNSPECIFIED HYPERLIPIDEMIA TYPE: ICD-10-CM

## 2020-02-17 PROCEDURE — 99214 OFFICE O/P EST MOD 30 MIN: CPT | Performed by: INTERNAL MEDICINE

## 2020-02-17 NOTE — PROGRESS NOTES
Subjective     Citlali ARIANA Solorio is a 84 y.o. female who presents with   Chief Complaint   Patient presents with   • Hypertension   • Hyperlipidemia       History of Present Illness     HTN. Good control with current.   HLD. She is maintained on diet alone.   Afib/CHF.  She is maintained on warfarin under direction of cardiology. She is well-compensated.   Progressive supranuclear palsy.  She is followed by neurology.  She is manages fairly well.     Left shoulder pain.  No injury.  Hurts to cough, sneeze.  ROM is preserved.     Review of Systems   Respiratory: Negative for cough and shortness of breath.    Cardiovascular: Negative for chest pain.   Musculoskeletal: Positive for back pain.       The following portions of the patient's history were reviewed and updated as appropriate: allergies, current medications and problem list.    Patient Active Problem List    Diagnosis Date Noted   • Heart failure with preserved ejection fraction (CMS/HCC) 08/13/2019   • Tear of medial meniscus of right knee, current 06/15/2017   • Mitral valve insufficiency 07/06/2016   • Tricuspid valve insufficiency 07/06/2016   • Progressive supranuclear palsy (CMS/HCC) 04/21/2016   • Carotid artery plaque, bilateral 04/21/2016     Note Last Updated: 5/31/2017     Description: plaque on screening last done 3/13, 11/13, 10/16     • Heart failure (CMS/HCC) 04/21/2016     Note Last Updated: 4/21/2016     Description: admission 7/2013     • Gastroesophageal reflux disease 04/21/2016   • Hyperlipidemia 04/21/2016     Note Last Updated: 4/21/2016     Description: Patient tried Lipitor, Crestor, Zocor, Bacol and Livalo in the past and was intolerant of all of them.     • Spinal stenosis of lumbar region 04/21/2016   • Cobalamin deficiency 04/21/2016     Note Last Updated: 5/31/2017     Description: told at [facility] that B12 was low.  Monthly shots recommended 5/5/15.  Now on oral replacement.      • Atrial fibrillation (CMS/HCC) 06/24/2013   •  "Benign essential hypertension 06/24/2013       Current Outpatient Medications on File Prior to Visit   Medication Sig Dispense Refill   • benzonatate (TESSALON) 100 MG capsule TAKE TWO CAPSULES BY MOUTH THREE TIMES A DAY AS NEEDED FOR COUGH 30 capsule 0   • Cholecalciferol (VITAMIN D PO) Take 1,000 mg by mouth Daily. HOLD PRIOR TO SURG     • digoxin (LANOXIN) 125 MCG tablet TAKE ONE TABLET BY MOUTH DAILY 90 tablet 0   • furosemide (LASIX) 20 MG tablet Take 1 tablet by mouth Daily. 90 tablet 2   • gentamicin (GARAMYCIN) 0.1 % ointment      • hydrALAZINE (APRESOLINE) 25 MG tablet TAKE ONE TABLET BY MOUTH TWICE A DAY 60 tablet 3   • metoprolol succinate XL (TOPROL-XL) 50 MG 24 hr tablet TAKE ONE AND ONE HALF TABLET BY MOUTH TWO TIMES A DAY 90 tablet 2   • potassium chloride (K-DUR,KLOR-CON) 10 MEQ CR tablet TAKE 1 TABLET DAILY.  PLEASE MAKE APPT TO AVOID FUTURE REFILL DENIAL. 30 tablet 0   • vitamin B-12 (VITAMIN B-12) 1000 MCG tablet Take 1 tablet by mouth Daily. (Patient taking differently: Take 1,000 mcg by mouth Daily. HOLD PRIOR TO SURG)     • warfarin (COUMADIN) 1 MG tablet TAKE 1 TABLET BY MOUTH DAILY ON SUN, TUE & THUR  AND 2 TABLETS BY MOUTH ALL OTHER DAYS OR AS DIRECTED BY MED MANAGEMENT CLINIC 145 tablet 0   • [DISCONTINUED] methylPREDNISolone (MEDROL, NERY,) 4 MG tablet Take as directed on package instructions. 21 tablet 0     No current facility-administered medications on file prior to visit.        Objective     /80   Pulse 67   Ht 165.1 cm (65\")   Wt 67.1 kg (148 lb)   LMP  (LMP Unknown)   SpO2 98%   BMI 24.63 kg/m²     Physical Exam   Constitutional: She is oriented to person, place, and time. She appears well-developed and well-nourished.   HENT:   Head: Normocephalic and atraumatic.   Cardiovascular: Normal rate and normal heart sounds. An irregularly irregular rhythm present.   Pulmonary/Chest: Effort normal and breath sounds normal.   Musculoskeletal:        Left shoulder: She exhibits " tenderness and bony tenderness. She exhibits normal range of motion, no swelling, no effusion, no crepitus and no deformity.   Neurological: She is alert and oriented to person, place, and time.   Skin: Skin is warm and dry.   Psychiatric: She has a normal mood and affect. Her behavior is normal.     X-rays of the left shoulder  performed today for following indication:   pain.  X-ray reveal DJD AC joint.  There is no available x-ray for comparison.  X-ray sent to radiology for official interpretation and findings.        Assessment/Plan   Citlali was seen today for hypertension and hyperlipidemia.    Diagnoses and all orders for this visit:    Benign essential hypertension  -     CBC & Differential  -     Comprehensive Metabolic Panel  -     Lipid Panel  -     TSH Rfx On Abnormal To Free T4    Hyperlipidemia, unspecified hyperlipidemia type  -     CBC & Differential  -     Comprehensive Metabolic Panel  -     Lipid Panel  -     TSH Rfx On Abnormal To Free T4    Progressive supranuclear palsy (CMS/HCC)    Heart failure with preserved ejection fraction, unspecified HF chronicity (CMS/HCC)    Atrial fibrillation, unspecified type (CMS/HCC)    Acute pain of left shoulder  -     XR Shoulder 2+ View Left (In Office)        Discussion  HTN. Continue current.    HLD. Continue healthy diet.   Afib/CHF.  Continue current regimen.    Progressive supranuclear palsy.  Continue with neurology.    Left shoudler pain.  I discussed with the patient a trial of conservative management with:   tylenol and physical therapy.  Let me know if not feeling better over the next several weeks or if there is any change in symptoms.         No future appointments.

## 2020-02-18 LAB
ALBUMIN SERPL-MCNC: 4.1 G/DL (ref 3.5–5.2)
ALBUMIN/GLOB SERPL: 1.3 G/DL
ALP SERPL-CCNC: 51 U/L (ref 39–117)
ALT SERPL-CCNC: 18 U/L (ref 1–33)
AST SERPL-CCNC: 19 U/L (ref 1–32)
BASOPHILS # BLD AUTO: 0.04 10*3/MM3 (ref 0–0.2)
BASOPHILS NFR BLD AUTO: 0.7 % (ref 0–1.5)
BILIRUB SERPL-MCNC: 1 MG/DL (ref 0.2–1.2)
BUN SERPL-MCNC: 20 MG/DL (ref 8–23)
BUN/CREAT SERPL: 20.2 (ref 7–25)
CALCIUM SERPL-MCNC: 9.5 MG/DL (ref 8.6–10.5)
CHLORIDE SERPL-SCNC: 99 MMOL/L (ref 98–107)
CHOLEST SERPL-MCNC: 189 MG/DL (ref 0–200)
CO2 SERPL-SCNC: 28.5 MMOL/L (ref 22–29)
CREAT SERPL-MCNC: 0.99 MG/DL (ref 0.57–1)
EOSINOPHIL # BLD AUTO: 0.12 10*3/MM3 (ref 0–0.4)
EOSINOPHIL NFR BLD AUTO: 2.1 % (ref 0.3–6.2)
ERYTHROCYTE [DISTWIDTH] IN BLOOD BY AUTOMATED COUNT: 12.6 % (ref 12.3–15.4)
GLOBULIN SER CALC-MCNC: 3.1 GM/DL
GLUCOSE SERPL-MCNC: 89 MG/DL (ref 65–99)
HCT VFR BLD AUTO: 40.9 % (ref 34–46.6)
HDLC SERPL-MCNC: 41 MG/DL (ref 40–60)
HGB BLD-MCNC: 14.3 G/DL (ref 12–15.9)
IMM GRANULOCYTES # BLD AUTO: 0.02 10*3/MM3 (ref 0–0.05)
IMM GRANULOCYTES NFR BLD AUTO: 0.4 % (ref 0–0.5)
LDLC SERPL CALC-MCNC: 115 MG/DL (ref 0–100)
LYMPHOCYTES # BLD AUTO: 1.43 10*3/MM3 (ref 0.7–3.1)
LYMPHOCYTES NFR BLD AUTO: 25 % (ref 19.6–45.3)
MCH RBC QN AUTO: 32.4 PG (ref 26.6–33)
MCHC RBC AUTO-ENTMCNC: 35 G/DL (ref 31.5–35.7)
MCV RBC AUTO: 92.5 FL (ref 79–97)
MONOCYTES # BLD AUTO: 0.52 10*3/MM3 (ref 0.1–0.9)
MONOCYTES NFR BLD AUTO: 9.1 % (ref 5–12)
NEUTROPHILS # BLD AUTO: 3.58 10*3/MM3 (ref 1.7–7)
NEUTROPHILS NFR BLD AUTO: 62.7 % (ref 42.7–76)
NRBC BLD AUTO-RTO: 0 /100 WBC (ref 0–0.2)
PLATELET # BLD AUTO: 226 10*3/MM3 (ref 140–450)
POTASSIUM SERPL-SCNC: 4 MMOL/L (ref 3.5–5.2)
PROT SERPL-MCNC: 7.2 G/DL (ref 6–8.5)
RBC # BLD AUTO: 4.42 10*6/MM3 (ref 3.77–5.28)
SODIUM SERPL-SCNC: 141 MMOL/L (ref 136–145)
TRIGL SERPL-MCNC: 165 MG/DL (ref 0–150)
TSH SERPL DL<=0.005 MIU/L-ACNC: 2.12 UIU/ML (ref 0.27–4.2)
VLDLC SERPL CALC-MCNC: 33 MG/DL
WBC # BLD AUTO: 5.71 10*3/MM3 (ref 3.4–10.8)

## 2020-03-04 ENCOUNTER — ANTICOAGULATION VISIT (OUTPATIENT)
Dept: PHARMACY | Facility: HOSPITAL | Age: 85
End: 2020-03-04

## 2020-03-04 DIAGNOSIS — I48.91 ATRIAL FIBRILLATION, UNSPECIFIED TYPE (HCC): ICD-10-CM

## 2020-03-04 LAB — INR PPP: 1.8

## 2020-03-04 NOTE — PROGRESS NOTES
Anticoagulation Clinic Progress Note    Anticoagulation Summary  As of 3/4/2020    INR goal:   2.0-3.0   TTR:   73.1 % (1.3 y)   INR used for dosin.80! (3/4/2020)   Warfarin maintenance plan:   2 mg every Mon, Wed, Fri; 1 mg all other days   Weekly warfarin total:   10 mg   Plan last modified:   Aisha Salazar RPH (3/4/2020)   Next INR check:      Priority:   Maintenance   Target end date:   Indefinite    Indications    Atrial fibrillation (CMS/HCC) [I48.91]  Atrial fibrillation with RVR (CMS/HCC) (Resolved) [I48.91]             Anticoagulation Episode Summary     INR check location:       Preferred lab:       Send INR reminders to:    ABBIE ORTIZ  POOL    Comments:   lab per [facility]--they will fax to us (phone: [phone])      Anticoagulation Care Providers     Provider Role Specialty Phone number    Vinh Apple MD Referring Cardiology [phone]          Clinic Interview:      INR History:  Anticoagulation Monitoring 2/3/2020 2/10/2020 3/4/2020   INR 3.80 3.20 1.80   INR Date 2/3/2020 2/10/2020 3/4/2020   INR Goal 2.0-3.0 2.0-3.0 2.0-3.0   Trend Same Down Up   Last Week Total 11 mg 8 mg 9 mg   Next Week Total 8 mg 9 mg 10 mg   Sun 1 mg 1 mg 1 mg   Mon Hold (2/3) 1 mg 2 mg   Tue 1 mg 2 mg 1 mg   Wed 2 mg 1 mg 2 mg   Thu 1 mg 1 mg 1 mg   Fri 2 mg 2 mg 2 mg   Sat 1 mg 1 mg 1 mg   Visit Report - - -   Some recent data might be hidden       Plan:  1. INR is Subtherapeutic today- see above in Anticoagulation Summary.   Will instruct Citlali LANE Clore to Change their warfarin regimen- see above in Anticoagulation Summary.  2. Follow up in 2 weeks.  3. Spoke with Melonie at [place].  Will also fax orders.    Aisha Salazar RPH

## 2020-03-11 RX ORDER — POTASSIUM CHLORIDE 750 MG/1
TABLET, EXTENDED RELEASE ORAL
Qty: 30 TABLET | Refills: 0 | Status: SHIPPED | OUTPATIENT
Start: 2020-03-11 | End: 2020-04-13

## 2020-03-18 ENCOUNTER — ANTICOAGULATION VISIT (OUTPATIENT)
Dept: PHARMACY | Facility: HOSPITAL | Age: 85
End: 2020-03-18

## 2020-03-18 DIAGNOSIS — I48.91 ATRIAL FIBRILLATION, UNSPECIFIED TYPE (HCC): ICD-10-CM

## 2020-03-18 LAB — INR PPP: 2.1

## 2020-03-18 NOTE — PROGRESS NOTES
Anticoagulation Clinic Progress Note    Anticoagulation Summary  As of 3/18/2020    INR goal:   2.0-3.0   TTR:   72.0 % (1.3 y)   INR used for dosin.10 (3/18/2020)   Warfarin maintenance plan:   2 mg every Mon, Wed, Fri; 1 mg all other days   Weekly warfarin total:   10 mg   Plan last modified:   Aisha Salazar RPH (3/4/2020)   Next INR check:   2020   Priority:   Maintenance   Target end date:   Indefinite    Indications    Atrial fibrillation (CMS/HCC) [I48.91]  Atrial fibrillation with RVR (CMS/HCC) (Resolved) [I48.91]             Anticoagulation Episode Summary     INR check location:       Preferred lab:       Send INR reminders to:    ABBIE ORTIZ  POOL    Comments:   lab per [facility]--they will fax to us (phone: [phone])      Anticoagulation Care Providers     Provider Role Specialty Phone number    Vinh Apple MD Referring Cardiology [phone]          Clinic Interview:      INR History:  Anticoagulation Monitoring 2/10/2020 3/4/2020 3/18/2020   INR 3.20 1.80 2.10   INR Date 2/10/2020 3/4/2020 3/18/2020   INR Goal 2.0-3.0 2.0-3.0 2.0-3.0   Trend Down Up Same   Last Week Total 8 mg 9 mg 10 mg   Next Week Total 9 mg 10 mg 10 mg   Sun 1 mg 1 mg 1 mg   Mon 1 mg 2 mg 2 mg   Tue 2 mg 1 mg 1 mg   Wed 1 mg 2 mg 2 mg   Thu 1 mg 1 mg 1 mg   Fri 2 mg 2 mg 2 mg   Sat 1 mg 1 mg 1 mg   Visit Report - - -   Some recent data might be hidden       Plan:  1. INR is Therapeutic today- see above in Anticoagulation Summary.   Will instruct Teja Godwin (Ilene) [facility] to Continue their warfarin regimen- see above in Anticoagulation Summary.  2. Follow up in 2 weeks  3. Called orders to [facility] and faxed orders also.  Aisha Salazar RPH

## 2020-03-30 RX ORDER — DIGOXIN 125 MCG
TABLET ORAL
Qty: 90 TABLET | Refills: 0 | Status: SHIPPED | OUTPATIENT
Start: 2020-03-30 | End: 2020-07-01

## 2020-04-01 ENCOUNTER — ANTICOAGULATION VISIT (OUTPATIENT)
Dept: PHARMACY | Facility: HOSPITAL | Age: 85
End: 2020-04-01

## 2020-04-01 DIAGNOSIS — I48.91 ATRIAL FIBRILLATION, UNSPECIFIED TYPE (HCC): ICD-10-CM

## 2020-04-01 LAB — INR PPP: 2.1

## 2020-04-01 NOTE — PROGRESS NOTES
Anticoagulation Clinic Progress Note    Anticoagulation Summary  As of 2020    INR goal:   2.0-3.0   TTR:   72.8 % (1.3 y)   INR used for dosin.10 (2020)   Warfarin maintenance plan:   2 mg every Mon, Wed, Fri; 1 mg all other days   Weekly warfarin total:   10 mg   Plan last modified:   Aisha Salazar RPH (3/4/2020)   Next INR check:   2020   Priority:   Maintenance   Target end date:   Indefinite    Indications    Atrial fibrillation (CMS/HCC) [I48.91]  Atrial fibrillation with RVR (CMS/HCC) (Resolved) [I48.91]             Anticoagulation Episode Summary     INR check location:       Preferred lab:       Send INR reminders to:    ABBIE ORTIZ  POOL    Comments:   lab per [facility]they will fax to us (phone: [phone])      Anticoagulation Care Providers     Provider Role Specialty Phone number    Vinh Apple MD Referring Cardiology [phone]          INR History:  Anticoagulation Monitoring 3/4/2020 3/18/2020 2020   INR 1.80 2.10 2.10   INR Date 3/4/2020 3/18/2020 2020   INR Goal 2.0-3.0 2.0-3.0 2.0-3.0   Trend Up Same Same   Last Week Total 9 mg 10 mg 10 mg   Next Week Total 10 mg 10 mg 10 mg   Sun 1 mg 1 mg 1 mg   Mon 2 mg 2 mg 2 mg   Tue 1 mg 1 mg 1 mg   Wed 2 mg 2 mg 2 mg   Thu 1 mg 1 mg 1 mg   Fri 2 mg 2 mg 2 mg   Sat 1 mg 1 mg 1 mg   Visit Report - - -   Some recent data might be hidden       Plan:  1. INR is Therapeutic today- see above in Anticoagulation Summary.   Provided instructions to Melonie with [facility] and faxed orders to Continue their warfarin regimen- see above in Anticoagulation Summary.  2. Follow up in 1 month      Aisha Salazar RPH

## 2020-04-13 RX ORDER — POTASSIUM CHLORIDE 750 MG/1
TABLET, EXTENDED RELEASE ORAL
Qty: 30 TABLET | Refills: 0 | Status: SHIPPED | OUTPATIENT
Start: 2020-04-13 | End: 2020-05-12

## 2020-04-13 RX ORDER — METOPROLOL SUCCINATE 50 MG/1
TABLET, EXTENDED RELEASE ORAL
Qty: 90 TABLET | Refills: 0 | Status: SHIPPED | OUTPATIENT
Start: 2020-04-13 | End: 2020-05-12

## 2020-04-27 RX ORDER — FUROSEMIDE 20 MG/1
TABLET ORAL
Qty: 30 TABLET | Refills: 1 | Status: SHIPPED | OUTPATIENT
Start: 2020-04-27 | End: 2020-07-01

## 2020-04-27 RX ORDER — HYDRALAZINE HYDROCHLORIDE 25 MG/1
TABLET, FILM COATED ORAL
Qty: 60 TABLET | Refills: 2 | Status: SHIPPED | OUTPATIENT
Start: 2020-04-27 | End: 2020-07-27

## 2020-04-29 ENCOUNTER — ANTICOAGULATION VISIT (OUTPATIENT)
Dept: PHARMACY | Facility: HOSPITAL | Age: 85
End: 2020-04-29

## 2020-04-29 DIAGNOSIS — I48.91 ATRIAL FIBRILLATION, UNSPECIFIED TYPE (HCC): ICD-10-CM

## 2020-04-29 LAB — INR PPP: 2.5

## 2020-04-29 NOTE — PROGRESS NOTES
Anticoagulation Clinic Progress Note    Anticoagulation Summary  As of 2020    INR goal:   2.0-3.0   TTR:   74.2 % (1.4 y)   INR used for dosin.50 (2020)   Warfarin maintenance plan:   2 mg every Mon, Wed, Fri; 1 mg all other days   Weekly warfarin total:   10 mg   Plan last modified:   Aisha Salazar RPH (3/4/2020)   Next INR check:   2020   Priority:   Maintenance   Target end date:   Indefinite    Indications    Atrial fibrillation (CMS/HCC) [I48.91]  Atrial fibrillation with RVR (CMS/HCC) (Resolved) [I48.91]             Anticoagulation Episode Summary     INR check location:       Preferred lab:       Send INR reminders to:    ABBIE ORTIZ  POOL    Comments:   lab per [facility]--they will fax to us (phone: [phone])      Anticoagulation Care Providers     Provider Role Specialty Phone number    Vinh Apple MD Referring Cardiology [phone]          Clinic Interview:      INR History:  Anticoagulation Monitoring 3/18/2020 2020 2020   INR 2.10 2.10 2.50   INR Date 3/18/2020 2020 2020   INR Goal 2.0-3.0 2.0-3.0 2.0-3.0   Trend Same Same Same   Last Week Total 10 mg 10 mg 10 mg   Next Week Total 10 mg 10 mg 10 mg   Sun 1 mg 1 mg 1 mg   Mon 2 mg 2 mg 2 mg   Tue 1 mg 1 mg 1 mg   Wed 2 mg 2 mg 2 mg   Thu 1 mg 1 mg 1 mg   Fri 2 mg 2 mg 2 mg   Sat 1 mg 1 mg 1 mg   Visit Report - - -   Some recent data might be hidden       Plan:  1. INR is Therapeutic today- see above in Anticoagulation Summary.   Will instruct Citlali L[facility] to Continue their warfarin regimen- see above in Anticoagulation Summary.  2. Follow up in 4 weeks  3. Called and left message for Melonie at [facility] and faxed orders to them. They have been instructed to call if any changes in medications, doses, concerns, etc.   Aisha Salazar RPH

## 2020-05-05 RX ORDER — WARFARIN SODIUM 1 MG/1
TABLET ORAL
Qty: 145 TABLET | Refills: 0 | Status: SHIPPED | OUTPATIENT
Start: 2020-05-05 | End: 2020-08-10 | Stop reason: SDUPTHER

## 2020-05-12 RX ORDER — POTASSIUM CHLORIDE 750 MG/1
TABLET, EXTENDED RELEASE ORAL
Qty: 30 TABLET | Refills: 0 | Status: SHIPPED | OUTPATIENT
Start: 2020-05-12 | End: 2020-06-10

## 2020-05-12 RX ORDER — METOPROLOL SUCCINATE 50 MG/1
TABLET, EXTENDED RELEASE ORAL
Qty: 90 TABLET | Refills: 0 | Status: SHIPPED | OUTPATIENT
Start: 2020-05-12 | End: 2020-06-10

## 2020-05-14 ENCOUNTER — TELEPHONE (OUTPATIENT)
Dept: CARDIOLOGY | Facility: CLINIC | Age: 85
End: 2020-05-14

## 2020-06-10 RX ORDER — POTASSIUM CHLORIDE 750 MG/1
TABLET, EXTENDED RELEASE ORAL
Qty: 30 TABLET | Refills: 0 | Status: SHIPPED | OUTPATIENT
Start: 2020-06-10 | End: 2020-07-09

## 2020-06-10 RX ORDER — METOPROLOL SUCCINATE 50 MG/1
TABLET, EXTENDED RELEASE ORAL
Qty: 90 TABLET | Refills: 0 | Status: SHIPPED | OUTPATIENT
Start: 2020-06-10 | End: 2020-07-09

## 2020-06-17 ENCOUNTER — OFFICE VISIT (OUTPATIENT)
Dept: CARDIOLOGY | Facility: CLINIC | Age: 85
End: 2020-06-17

## 2020-06-17 VITALS
HEIGHT: 65 IN | BODY MASS INDEX: 23.66 KG/M2 | WEIGHT: 142 LBS | DIASTOLIC BLOOD PRESSURE: 60 MMHG | HEART RATE: 73 BPM | SYSTOLIC BLOOD PRESSURE: 142 MMHG

## 2020-06-17 DIAGNOSIS — I10 BENIGN ESSENTIAL HYPERTENSION: Primary | ICD-10-CM

## 2020-06-17 DIAGNOSIS — I48.91 ATRIAL FIBRILLATION, UNSPECIFIED TYPE (HCC): ICD-10-CM

## 2020-06-17 PROCEDURE — 99441 PR PHYS/QHP TELEPHONE EVALUATION 5-10 MIN: CPT | Performed by: INTERNAL MEDICINE

## 2020-06-17 NOTE — PROGRESS NOTES
Subjective:     Encounter Date:06/17/2020      Patient ID: Citlali Solorio is a 85 y.o. female.    Chief Complaint:  Atrial Fibrillation   Presents for follow-up visit. The symptoms have been stable. Past medical history includes atrial fibrillation.   Cardiomyopathy   This is a chronic problem. The problem has been unchanged.       85-year-old female who is evaluated today via telephone visit.  This was done with the assistance of a caregiver at the nursing home she is in.  She complains of some shoulder discomfort that is been going on for the last 6 months.  From a heart standpoint she is been doing well she had no complaints.  No shortness of breath dizziness or chest discomforts.    Review of Systems   All other systems reviewed and are negative.      Procedures       Objective:     Physical Exam  Telephone visit  Lab Review:       Assessment:          Diagnosis Plan   1. Benign essential hypertension     2. Atrial fibrillation, unspecified type (CMS/HCC)            Plan:         1.  Hypertension blood pressure good  2.  Atrial fibrillation stable  3.  This point nothing further to change we will follow-up with her about a year.    This patient has consented to a telehealth visit via telephone. The visit was scheduled as a telephone visit to comply with patient safety concerns in accordance with CDC recommendations.  All vitals recorded within this visit are reported by the patient.  I spent 6 minutes in total including but not limited to the 3 minutes spent in direct conversation with this patient.

## 2020-06-22 ENCOUNTER — TELEPHONE (OUTPATIENT)
Dept: PHARMACY | Facility: HOSPITAL | Age: 85
End: 2020-06-22

## 2020-06-22 NOTE — TELEPHONE ENCOUNTER
Yoselyn (P: [phone]) with [facility] called the [facility] to ask if we received the INR result from 5/27/20 - INR 1.9. This was not received, unfortunately. New order provided for INR check tomorrow. Yoselyn confirmed she would be in touch with us tomorrow to ensure new warfarin orders are given.

## 2020-06-23 ENCOUNTER — APPOINTMENT (OUTPATIENT)
Dept: WOMENS IMAGING | Facility: HOSPITAL | Age: 85
End: 2020-06-23

## 2020-06-23 PROCEDURE — 77067 SCR MAMMO BI INCL CAD: CPT | Performed by: RADIOLOGY

## 2020-06-23 PROCEDURE — 77063 BREAST TOMOSYNTHESIS BI: CPT | Performed by: RADIOLOGY

## 2020-06-24 ENCOUNTER — ANTICOAGULATION VISIT (OUTPATIENT)
Dept: PHARMACY | Facility: HOSPITAL | Age: 85
End: 2020-06-24

## 2020-06-24 DIAGNOSIS — I48.91 ATRIAL FIBRILLATION, UNSPECIFIED TYPE (HCC): ICD-10-CM

## 2020-06-24 LAB — INR PPP: 2.3

## 2020-06-24 NOTE — PROGRESS NOTES
Anticoagulation Clinic Progress Note    Anticoagulation Summary  As of 2020    INR goal:   2.0-3.0   TTR:   76.7 % (1.6 y)   INR used for dosin.30 (2020)   Warfarin maintenance plan:   2 mg every Mon, Wed, Fri; 1 mg all other days   Weekly warfarin total:   10 mg   Plan last modified:   Aisha Salazar RPH (3/4/2020)   Next INR check:   2020   Priority:   Maintenance   Target end date:   Indefinite    Indications    Atrial fibrillation (CMS/HCC) [I48.91]  Atrial fibrillation with RVR (CMS/HCC) (Resolved) [I48.91]             Anticoagulation Episode Summary     INR check location:       Preferred lab:       Send INR reminders to:    ABBIE ORTIZ  POOL    Comments:   lab per [facility]--they will fax to us (phone: [phone])      Anticoagulation Care Providers     Provider Role Specialty Phone number    Vinh Apple MD Referring Cardiology [phone]          Clinic Interview:      INR History:  Anticoagulation Monitoring 2020   INR 2.10 2.50 2.30   INR Date 2020   INR Goal 2.0-3.0 2.0-3.0 2.0-3.0   Trend Same Same Same   Last Week Total 10 mg 10 mg 10 mg   Next Week Total 10 mg 10 mg 10 mg   Sun 1 mg 1 mg 1 mg   Mon 2 mg 2 mg 2 mg   Tue 1 mg 1 mg 1 mg   Wed 2 mg 2 mg 2 mg   Thu 1 mg 1 mg 1 mg   Fri 2 mg 2 mg 2 mg   Sat 1 mg 1 mg 1 mg   Visit Report - - -   Some recent data might be hidden       Plan:  1. INR is Therapeutic today- see above in Anticoagulation Summary.   Will instruct Tjea/ Citlali Solorio to Continue their warfarin regimen- see above in Anticoagulation Summary.  2. Follow up in 4 weeks  3. Spoke with Yoselyn nurse and faxed orders to facility also. They have been instructed to call if any changes in medications, doses, concerns, etc.   Aisha Salazar RPH

## 2020-07-01 RX ORDER — FUROSEMIDE 20 MG/1
TABLET ORAL
Qty: 30 TABLET | Refills: 3 | Status: SHIPPED | OUTPATIENT
Start: 2020-07-01 | End: 2020-10-30

## 2020-07-01 RX ORDER — DIGOXIN 125 MCG
TABLET ORAL
Qty: 90 TABLET | Refills: 1 | Status: SHIPPED | OUTPATIENT
Start: 2020-07-01 | End: 2020-11-30

## 2020-07-09 RX ORDER — POTASSIUM CHLORIDE 750 MG/1
TABLET, EXTENDED RELEASE ORAL
Qty: 30 TABLET | Refills: 6 | Status: SHIPPED | OUTPATIENT
Start: 2020-07-09 | End: 2021-01-08 | Stop reason: SDUPTHER

## 2020-07-09 RX ORDER — METOPROLOL SUCCINATE 50 MG/1
TABLET, EXTENDED RELEASE ORAL
Qty: 90 TABLET | Refills: 1 | Status: SHIPPED | OUTPATIENT
Start: 2020-07-09 | End: 2020-09-08

## 2020-07-22 ENCOUNTER — ANTICOAGULATION VISIT (OUTPATIENT)
Dept: PHARMACY | Facility: HOSPITAL | Age: 85
End: 2020-07-22

## 2020-07-22 DIAGNOSIS — I48.91 ATRIAL FIBRILLATION, UNSPECIFIED TYPE (HCC): ICD-10-CM

## 2020-07-22 LAB — INR PPP: 2.6

## 2020-07-22 NOTE — PROGRESS NOTES
Anticoagulation Clinic Progress Note    Anticoagulation Summary  As of 2020    INR goal:   2.0-3.0   TTR:   77.8 % (1.7 y)   INR used for dosin.60 (2020)   Warfarin maintenance plan:   2 mg every Mon, Wed, Fri; 1 mg all other days   Weekly warfarin total:   10 mg   No change documented:   Aisha Salazar RPH   Plan last modified:   Aisha Salazar RPH (3/4/2020)   Next INR check:   2020   Priority:   Maintenance   Target end date:   Indefinite    Indications    Atrial fibrillation (CMS/HCC) [I48.91]  Atrial fibrillation with RVR (CMS/HCC) (Resolved) [I48.91]             Anticoagulation Episode Summary     INR check location:       Preferred lab:       Send INR reminders to:    ABBIE ORTIZ  POOL    Comments:   lab per [facility]--they will fax to us (phone: [phone])      Anticoagulation Care Providers     Provider Role Specialty Phone number    Vinh Apple MD Referring Cardiology [phone]          Clinic Interview:      INR History:  Anticoagulation Monitoring 2020   INR 2.50 2.30 2.60   INR Date 2020   INR Goal 2.0-3.0 2.0-3.0 2.0-3.0   Trend Same Same Same   Last Week Total 10 mg 10 mg 10 mg   Next Week Total 10 mg 10 mg 10 mg   Sun 1 mg 1 mg 1 mg   Mon 2 mg 2 mg 2 mg   Tue 1 mg 1 mg 1 mg   Wed 2 mg 2 mg 2 mg   Thu 1 mg 1 mg 1 mg   Fri 2 mg 2 mg 2 mg   Sat 1 mg 1 mg 1 mg   Visit Report - - -   Some recent data might be hidden       Plan:  1. INR is Therapeutic today- see above in Anticoagulation Summary.   Will instruct Citlalibonilla Solorio to Continue their warfarin regimen- see above in Anticoagulation Summary.  2. Follow up in 4 weeks  3. Spoke with [facility] and faxed order also. They have been instructed to call if any changes in medications, doses, concerns, etc.   Aisha Salazar RPH

## 2020-07-27 RX ORDER — HYDRALAZINE HYDROCHLORIDE 25 MG/1
TABLET, FILM COATED ORAL
Qty: 180 TABLET | Refills: 1 | Status: SHIPPED | OUTPATIENT
Start: 2020-07-27 | End: 2021-01-25

## 2020-08-10 ENCOUNTER — TELEPHONE (OUTPATIENT)
Dept: CARDIOLOGY | Facility: CLINIC | Age: 85
End: 2020-08-10

## 2020-08-10 RX ORDER — WARFARIN SODIUM 1 MG/1
TABLET ORAL
Qty: 130 TABLET | Refills: 1 | Status: ON HOLD | OUTPATIENT
Start: 2020-08-10 | End: 2020-11-09

## 2020-08-10 NOTE — TELEPHONE ENCOUNTER
[facility] a [facility]farin 1 MG Tablet # 145.  Is this ok?    Thank you,  Gerson HATFIELD for Dr. Apple

## 2020-08-21 ENCOUNTER — ANTICOAGULATION VISIT (OUTPATIENT)
Dept: PHARMACY | Facility: HOSPITAL | Age: 85
End: 2020-08-21

## 2020-08-21 LAB — INR PPP: 2.9

## 2020-08-21 NOTE — PROGRESS NOTES
Anticoagulation Clinic Progress Note    Anticoagulation Summary  As of 2020    INR goal:   2.0-3.0   TTR:   78.9 % (1.7 y)   INR used for dosin.90 (2020)   Warfarin maintenance plan:   2 mg every Mon, Wed, Fri; 1 mg all other days   Weekly warfarin total:   10 mg   No change documented:   Daren Osman RPH   Plan last modified:   Aisha Salazar RPH (3/4/2020)   Next INR check:   2020   Priority:   Maintenance   Target end date:   Indefinite    Indications    Atrial fibrillation (CMS/HCC) [I48.91]  Atrial fibrillation with RVR (CMS/HCC) (Resolved) [I48.91]             Anticoagulation Episode Summary     INR check location:       Preferred lab:       Send INR reminders to:    ABBIE [facility]  POOL    Comments:   lab per [facility]--they will fax to us (phone: [phone])      Anticoagulation Care Providers     Provider Role Specialty Phone number    Vinh Apple MD Referring Cardiology [phone]          INR History:  Anticoagulation Monitoring 2020   INR 2.30 2.60 2.90   INR Date 2020   INR Goal 2.0-3.0 2.0-3.0 2.0-3.0   Trend Same Same Same   Last Week Total 10 mg 10 mg 10 mg   Next Week Total 10 mg 10 mg 10 mg   Sun 1 mg 1 mg 1 mg   Mon 2 mg 2 mg 2 mg   Tue 1 mg 1 mg 1 mg   Wed 2 mg 2 mg 2 mg   Thu 1 mg 1 mg 1 mg   Fri 2 mg 2 mg 2 mg   Sat 1 mg 1 mg 1 mg   Visit Report - - -   Some recent data might be hidden       Plan:  1. INR is Therapeutic today- see above in Anticoagulation Summary.   Provided instructions to Ofelia with [facility] to Continue their warfarin regimen- see above in Anticoagulation Summary.  2. Follow up in 4 weeks      Daren Osman RPH

## 2020-09-08 RX ORDER — METOPROLOL SUCCINATE 50 MG/1
TABLET, EXTENDED RELEASE ORAL
Qty: 270 TABLET | Refills: 2 | Status: SHIPPED | OUTPATIENT
Start: 2020-09-08 | End: 2021-05-24

## 2020-09-16 ENCOUNTER — ANTICOAGULATION VISIT (OUTPATIENT)
Dept: PHARMACY | Facility: HOSPITAL | Age: 85
End: 2020-09-16

## 2020-09-16 ENCOUNTER — OFFICE VISIT (OUTPATIENT)
Dept: INTERNAL MEDICINE | Facility: CLINIC | Age: 85
End: 2020-09-16

## 2020-09-16 VITALS
TEMPERATURE: 97.8 F | HEIGHT: 65 IN | DIASTOLIC BLOOD PRESSURE: 80 MMHG | BODY MASS INDEX: 23.63 KG/M2 | SYSTOLIC BLOOD PRESSURE: 120 MMHG | HEART RATE: 74 BPM | OXYGEN SATURATION: 95 %
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
## 2020-09-16 DIAGNOSIS — M54.50 ACUTE BILATERAL LOW BACK PAIN WITHOUT SCIATICA: ICD-10-CM

## 2020-09-16 LAB — INR PPP: 4

## 2020-09-16 PROCEDURE — 99214 OFFICE O/P EST MOD 30 MIN: CPT | Performed by: NURSE PRACTITIONER

## 2020-09-16 PROCEDURE — 72100 X-RAY EXAM L-S SPINE 2/3 VWS: CPT | Performed by: NURSE PRACTITIONER

## 2020-09-16 RX ORDER — ONDANSETRON 4 MG/1
TABLET, FILM COATED ORAL
COMMUNITY
Start: 2020-08-28 | End: 2020-11-06

## 2020-09-16 RX ORDER — TRIAMCINOLONE ACETONIDE 1 MG/G
CREAM TOPICAL
COMMUNITY
Start: 2020-07-29 | End: 2020-11-06

## 2020-09-16 RX ORDER — HYDROCODONE BITARTRATE AND ACETAMINOPHEN 5; 325 MG/1; MG/1
1 TABLET ORAL EVERY 8 HOURS PRN
Qty: 10 TABLET | Refills: 0 | Status: SHIPPED | OUTPATIENT
Start: 2020-09-16 | End: 2020-10-29

## 2020-09-16 NOTE — PROGRESS NOTES
Subjective   Citlali Solorio is a 85 y.o. female.   Chief Complaint   Patient presents with   • Fall     Last Friday   • Back Pain     Vitals:    09/16/20 1355   BP: 120/80   Pulse: 74   Temp: 97.8 °F (36.6 °C)   SpO2: 95%     No LMP recorded (lmp unknown). Patient is postmenopausal.    Citllai is a 85 year old female patient of Dr Lema who is here for an acute visit. She is here with her daughter today. Her daughter states that she fell 5 days ago and has complained of low back pain since. She has been taking tylenol with some relief.     Back Pain  This is a new problem. The current episode started in the past 7 days. The problem occurs constantly. The problem is unchanged. Pain location: bilateral lower back  The quality of the pain is described as aching. The pain does not radiate. The pain is moderate. The pain is worse during the night. The symptoms are aggravated by lying down and sitting. Pertinent negatives include no abdominal pain, chest pain, dysuria, fever, leg pain, numbness, pelvic pain, tingling or weakness. Risk factors include recent trauma. She has tried bed rest (tylenol ) for the symptoms. The treatment provided mild relief.   She had a normal bone density in august of 2019     The following portions of the patient's history were reviewed and updated as appropriate: allergies, current medications, past family history, past medical history, past social history, past surgical history and problem list.    Review of Systems   Constitutional: Negative for chills, fatigue and fever.   HENT: Negative.    Respiratory: Negative for cough and shortness of breath.    Cardiovascular: Negative for chest pain.   Gastrointestinal: Negative for abdominal pain.   Genitourinary: Negative for dysuria and pelvic pain.   Musculoskeletal: Positive for back pain. Negative for gait problem.   Neurological: Negative for tingling, weakness and numbness.   Psychiatric/Behavioral: Positive for sleep disturbance (due to  pain ).       Objective   Physical Exam  Vitals signs and nursing note reviewed.   Constitutional:       General: She is awake.      Comments: Elderly and seated in wheelchair    HENT:      Head: Normocephalic.   Cardiovascular:      Rate and Rhythm: Normal rate and regular rhythm.   Pulmonary:      Effort: Pulmonary effort is normal.      Breath sounds: Normal breath sounds.   Musculoskeletal:      Lumbar back: She exhibits tenderness, bony tenderness and pain. She exhibits no swelling, no edema, no deformity and no laceration.   Skin:     General: Skin is warm and dry.      Findings: No abrasion or bruising.   Neurological:      Mental Status: She is alert.   Psychiatric:         Behavior: Behavior is cooperative.         Assessment/Plan   Citlali was seen today for fall and back pain.    Diagnoses and all orders for this visit:    Fall, initial encounter  -     Cancel: XR Spine Lumbar 4+ View (In Office)  -     HYDROcodone-acetaminophen (NORCO) 5-325 MG per tablet; Take 1 tablet by mouth Every 8 (Eight) Hours As Needed for Severe Pain .  -     XR Spine Lumbar 2 or 3 View (In Office)    Acute bilateral low back pain without sciatica  -     Cancel: XR Spine Lumbar 4+ View (In Office)  -     HYDROcodone-acetaminophen (NORCO) 5-325 MG per tablet; Take 1 tablet by mouth Every 8 (Eight) Hours As Needed for Severe Pain .  -     XR Spine Lumbar 2 or 3 View (In Office)      Will check an xray of the lumbar spine and call the patient with radiology report  Recommend heat or ice as needed   Hydrocodone as needed for severe pain  Follow up if symptoms persist, worsen or if new symptom develop   She had her flu vaccine at her assisted living facility

## 2020-09-16 NOTE — PROGRESS NOTES
Anticoagulation Clinic Progress Note    Anticoagulation Summary  As of 2020    INR goal:  2.0-3.0   TTR:  76.1 % (1.8 y)   INR used for dosin.00 (2020)   Warfarin maintenance plan:  2 mg every Mon, Wed, Fri; 1 mg all other days   Weekly warfarin total:  10 mg   Plan last modified:  Aisha Salazar RPH (3/4/2020)   Next INR check:  2020   Priority:  Maintenance   Target end date:  Indefinite    Indications    Atrial fibrillation (CMS/HCC) [I48.91]  Atrial fibrillation with RVR (CMS/HCC) (Resolved) [I48.91]             Anticoagulation Episode Summary     INR check location:      Preferred lab:      Send INR reminders to:   ABBIE ORTIZ  POOL    Comments:  lab per [facility]--they will fax to us (phone: [phone])      Anticoagulation Care Providers     Provider Role Specialty Phone number    Vinh Apple MD Referring Cardiology [phone]          Clinic Interview:    Fell last week, on acetaminophen and has pain (seen by PCP today).      INR History:  Anticoagulation Monitoring 2020   INR 2.60 2.90 4.00   INR Date 2020   INR Goal 2.0-3.0 2.0-3.0 2.0-3.0   Trend Same Same Same   Last Week Total 10 mg 10 mg 10 mg   Next Week Total 10 mg 10 mg 8 mg   Sun 1 mg 1 mg 1 mg   Mon 2 mg 2 mg 2 mg   Tue 1 mg 1 mg 1 mg   Wed 2 mg 2 mg Hold ()   Thu 1 mg 1 mg 1 mg   Fri 2 mg 2 mg 2 mg   Sat 1 mg 1 mg 1 mg   Visit Report - - -   Some recent data might be hidden       Plan:  1. INR is Supratherapeutic today- see above in Anticoagulation Summary.   Will instruct Teja/ Citlali Solorio to HOLD warfarin today, then continue their warfarin regimen- see above in Anticoagulation Summary.  2. Follow up in 1 weeks  3. Spoke with Ofelia at [facility] today. Faxed orders also. They have been instructed to call if any changes in medications, doses, concerns, etc.     Aisha Salazar RPH

## 2020-09-18 ENCOUNTER — TELEPHONE (OUTPATIENT)
Dept: INTERNAL MEDICINE | Facility: CLINIC | Age: 85
End: 2020-09-18

## 2020-09-18 DIAGNOSIS — M54.50 ACUTE BILATERAL LOW BACK PAIN WITHOUT SCIATICA: Primary | ICD-10-CM

## 2020-09-18 DIAGNOSIS — M43.9 COMPRESSION DEFORMITY OF VERTEBRA: ICD-10-CM

## 2020-09-18 NOTE — TELEPHONE ENCOUNTER
I called the patient and left a detailed voicemail with her results.     Patient is to call back and let us know she received message

## 2020-09-18 NOTE — TELEPHONE ENCOUNTER
Please call the patient or her daughter and let her know that   The xray showed   a new compression deformity at l1, and osteoporosis, there are also severe degenerative changes  I discussed with Dr Lema and she recommends getting an MRI so I placed that order

## 2020-09-18 NOTE — TELEPHONE ENCOUNTER
----- Message from Cristal Lema MD sent at 9/18/2020  8:28 AM EDT -----  I would do MRI  ----- Message -----  From: Yoselyn Aldrich APRN  Sent: 9/17/2020   4:26 PM EDT  To: Cristal Lema MD    I reviewed her xray. Thoughts?  She has mild compression deformity that is new.   Referral? Further imaging? Wait and see?   Thank you   Yoselyn   ----- Message -----  From: Maria G Mcmullen MA  Sent: 9/17/2020   2:30 PM EDT  To: FAM Lind

## 2020-09-21 ENCOUNTER — TELEPHONE (OUTPATIENT)
Dept: INTERNAL MEDICINE | Facility: CLINIC | Age: 85
End: 2020-09-21

## 2020-09-21 NOTE — TELEPHONE ENCOUNTER
Daughter Agusto is calling to check on XRAY results, is there anything that needs to be done?     phone: [phone]

## 2020-09-23 ENCOUNTER — ANTICOAGULATION VISIT (OUTPATIENT)
Dept: PHARMACY | Facility: HOSPITAL | Age: 85
End: 2020-09-23

## 2020-09-23 LAB — INR PPP: 2.3

## 2020-09-23 NOTE — PROGRESS NOTES
Anticoagulation Clinic Progress Note    Anticoagulation Summary  As of 2020    INR goal:  2.0-3.0   TTR:  75.7 % (1.8 y)   INR used for dosin.30 (2020)   Warfarin maintenance plan:  2 mg every Mon, Wed, Fri; 1 mg all other days   Weekly warfarin total:  10 mg   No change documented:  Daren Osman RPH   Plan last modified:  Aisha Salazar RPH (3/4/2020)   Next INR check:  2020   Priority:  Maintenance   Target end date:  Indefinite    Indications    Atrial fibrillation (CMS/HCC) [I48.91]  Atrial fibrillation with RVR (CMS/HCC) (Resolved) [I48.91]             Anticoagulation Episode Summary     INR check location:      Preferred lab:      Send INR reminders to:   ABBIE ORTIZ  POOL    Comments:  lab per [facility]--they will fax to us (phone: [phone])      Anticoagulation Care Providers     Provider Role Specialty Phone number    Vinh Apple MD Referring Cardiology [phone]          INR History:  Anticoagulation Monitoring 2020   INR 2.90 4.00 2.30   INR Date 2020   INR Goal 2.0-3.0 2.0-3.0 2.0-3.0   Trend Same Same Same   Last Week Total 10 mg 10 mg 8 mg   Next Week Total 10 mg 8 mg 10 mg   Sun 1 mg 1 mg 1 mg   Mon 2 mg 2 mg 2 mg   Tue 1 mg 1 mg 1 mg   Wed 2 mg Hold () 2 mg   Thu 1 mg 1 mg 1 mg   Fri 2 mg 2 mg 2 mg   Sat 1 mg 1 mg 1 mg   Visit Report - - -   Some recent data might be hidden       Plan:  1. INR is Therapeutic today- see above in Anticoagulation Summary.   [facility] see above in Anticoagulation Summary. Also faxed order.  2. Follow up in 1 week      Daren Osman RPH

## 2020-09-30 ENCOUNTER — ANTICOAGULATION VISIT (OUTPATIENT)
Dept: PHARMACY | Facility: HOSPITAL | Age: 85
End: 2020-09-30

## 2020-09-30 LAB — INR PPP: 2.8

## 2020-10-02 ENCOUNTER — APPOINTMENT (OUTPATIENT)
Dept: MRI IMAGING | Facility: HOSPITAL | Age: 85
End: 2020-10-02

## 2020-10-06 ENCOUNTER — HOSPITAL ENCOUNTER (OUTPATIENT)
Dept: MRI IMAGING | Facility: HOSPITAL | Age: 85
Discharge: HOME | End: 2020-10-06
Admitting: NURSE PRACTITIONER

## 2020-10-06 ENCOUNTER — TELEPHONE (OUTPATIENT)
Dept: INTERNAL MEDICINE | Facility: CLINIC | Age: 85
End: 2020-10-06

## 2020-10-06 PROCEDURE — 72148 MRI LUMBAR SPINE W/O DYE: CPT

## 2020-10-06 NOTE — TELEPHONE ENCOUNTER
PT IS REQUESTING A CALL BACK TO LT HER KNOW IF ITS OK TO NOT TAKE HER WATER PILL TWO DAYS IN A ROW. HER BP WAS LOW YESTERDAY SO SHE WAS INSTRUCTED TO NOT TAKE IT, IT WENT BACK UP TODAY. SHE IS ALSO GETTING AN MRI TODAY AT 4:00    CALL BACK [phone]

## 2020-10-06 NOTE — TELEPHONE ENCOUNTER
Well, challenging to assess.  I do not know who told to hold diuretic. Why having low pressure? Was she seen somewhere?

## 2020-10-08 ENCOUNTER — TELEPHONE (OUTPATIENT)
Dept: INTERNAL MEDICINE | Facility: CLINIC | Age: 85
End: 2020-10-08

## 2020-10-08 NOTE — TELEPHONE ENCOUNTER
Please call the patient or her daughter to schedule an appt with Dr Lema to discuss the MRI results and follow up on her pain   Thanks

## 2020-10-12 ENCOUNTER — OFFICE VISIT (OUTPATIENT)
Dept: INTERNAL MEDICINE | Facility: CLINIC | Age: 85
End: 2020-10-12

## 2020-10-12 VITALS
HEIGHT: 65 IN | DIASTOLIC BLOOD PRESSURE: 84 MMHG | WEIGHT: 142 LBS | BODY MASS INDEX: 23.66 KG/M2 | OXYGEN SATURATION: 96 % | HEART RATE: 78 BPM | SYSTOLIC BLOOD PRESSURE: 120 MMHG

## 2020-10-12 DIAGNOSIS — S32.010A CLOSED COMPRESSION FRACTURE OF BODY OF L1 VERTEBRA (HCC): Primary | ICD-10-CM

## 2020-10-12 PROCEDURE — 99213 OFFICE O/P EST LOW 20 MIN: CPT | Performed by: INTERNAL MEDICINE

## 2020-10-12 NOTE — PROGRESS NOTES
Subjective     Citlali ARIANA Solorio is a 85 y.o. female who presents with   Chief Complaint   Patient presents with   • Results   • Back Pain       History of Present Illness     Reviewed results with the patient.  MRI reveals an L1 compression deformity which is mild and acute to subacute.  She is still having pain but it is improved.  Tylenol is helping and she no longer needs hydrocodone.      Review of Systems   Respiratory: Negative.    Cardiovascular: Negative.        The following portions of the patient's history were reviewed and updated as appropriate: allergies, current medications and problem list.    Patient Active Problem List    Diagnosis Date Noted   • Heart failure with preserved ejection fraction (CMS/[facility]) 08/13/2019   • Tear of medial meniscus of right knee, current 06/15/2017   • Mitral valve insufficiency 07/06/2016   • Tricuspid valve insufficiency 07/06/2016   • Progressive supranuclear palsy (CMS/[facility]) 04/21/2016   • Carotid artery plaque, bilateral 04/21/2016     Note Last Updated: 5/31/2017     Description: plaque on screening last done 3/13, 11/13, 10/16     • Heart failure (CMS/[facility]) 04/21/2016     Note Last Updated: 4/21/2016     Description: admission 7/2013     • Gastroesophageal reflux disease 04/21/2016   • Hyperlipidemia 04/21/2016     Note Last Updated: 4/21/2016     Description: Patient tried Lipitor, Crestor, Zocor, Bacol and Livalo in the past and was intolerant of all of them.     • Spinal stenosis of lumbar region 04/21/2016   • Cobalamin deficiency 04/21/2016     Note Last Updated: 5/31/2017     Description: told at [facility] that B12 was low.  Monthly shots recommended 5/5/15.  Now on oral replacement.      • Atrial fibrillation (CMS/HCC) 06/24/2013   • Benign essential hypertension 06/24/2013       Current Outpatient Medications on File Prior to Visit   Medication Sig Dispense Refill   • carbidopa-levodopa (SINEMET)  MG per tablet      • Cholecalciferol (VITAMIN D PO) Take  "1,000 mg by mouth Daily. HOLD PRIOR TO SURG     • digoxin (LANOXIN) 125 MCG tablet TAKE ONE TABLET BY MOUTH DAILY 90 tablet 1   • furosemide (LASIX) 20 MG tablet TAKE ONE TABLET BY MOUTH DAILY 30 tablet 3   • hydrALAZINE (APRESOLINE) 25 MG tablet TAKE ONE TABLET BY MOUTH TWICE A  tablet 1   • HYDROcodone-acetaminophen (NORCO) 5-325 MG per tablet Take 1 tablet by mouth Every 8 (Eight) Hours As Needed for Severe Pain . 10 tablet 0   • metoprolol succinate XL (TOPROL-XL) 50 MG 24 hr tablet TAKE 1 AND 1/2 TABLET BY MOUTH TWO TIMES A  tablet 2   • ondansetron (ZOFRAN) 4 MG tablet      • potassium chloride (K-DUR,KLOR-CON) 10 MEQ CR tablet TAKE ONE TABLET BY MOUTH DAILY 30 tablet 6   • triamcinolone (KENALOG) 0.1 % cream      • vitamin B-12 (VITAMIN B-12) 1000 MCG tablet Take 1 tablet by mouth Daily. (Patient taking differently: Take 1,000 mcg by mouth Daily. HOLD PRIOR TO SURG)     • warfarin (COUMADIN) 1 MG tablet TAKE 1 TABLET BY MOUTH DAILY ON SUN, TUES, THURS & SAT  AND 2 TABLETS ON MON, WED & FRI OR AS DIRECTED BY MED MANAGEMENT CLINIC 130 tablet 1   • benzonatate (TESSALON) 100 MG capsule TAKE TWO CAPSULES BY MOUTH THREE TIMES A DAY AS NEEDED FOR COUGH 30 capsule 0     No current facility-administered medications on file prior to visit.        Objective     /84   Pulse 78   Ht 165.1 cm (65\")   Wt 64.4 kg (142 lb)   LMP  (LMP Unknown)   SpO2 96%   BMI 23.63 kg/m²     Physical Exam  Constitutional:       Appearance: She is well-developed.   HENT:      Head: Normocephalic and atraumatic.   Pulmonary:      Effort: Pulmonary effort is normal.   Neurological:      Mental Status: She is alert and oriented to person, place, and time.   Psychiatric:         Behavior: Behavior normal.         Assessment/Plan   Citlali was seen today for results and back pain.    Diagnoses and all orders for this visit:    Closed compression fracture of body of L1 vertebra (CMS/HCC)  -     Ambulatory Referral to " Neurosurgery        Discussion    Patient presents in f/u of L1 compression fracture.  Her pain is improved.  Continue tylenol.  Sent to neurosurgery for completeness sake but I she really is not a good surgical candidate.         Future Appointments   Date Time Provider Department Center   10/29/2020  2:30 PM Pardeep Voss MD [id number]

## 2020-10-13 NOTE — PROGRESS NOTES
Subjective   Patient ID: Citlali Solorio is a 85 y.o. female is being seen for consultation today at the request of Cristal Lema MD for L1 VCF.  MRI lumbar done on 10/6/20.  Today pt reports back pain and issues with her gait as well as pain that keeps her up at night on occasion.      85 year old female patient here for an acute vertebral compression fracture. She is here with her daughter today. Her daughter states that she fell 3 weeks ago and has complained of low back pain since. She has been taking tylenol with some relief.  The current pain is constant and unchanged. Pain location: bilateral lower back  The quality of the pain is described as aching. The pain does not radiate. The pain is moderate. The pain is worse during the night. The symptoms are aggravated by lying down and sitting. Pertinent negatives include no abdominal pain, chest pain, dysuria, fever, leg pain, numbness, pelvic pain, tingling or weakness. Risk factors include recent trauma. She has tried bed rest (tylenol ) for the symptoms. The treatment provided mild relief. She had a normal bone density in august of 2019.       The following portions of the patient's history were reviewed and updated as appropriate: allergies, current medications, past family history, past medical history, past social history, past surgical history and problem list.    Review of Systems   Constitutional: Negative for chills and fever.   HENT: Negative for ear pain and tinnitus.    Eyes: Negative for pain and visual disturbance.   Respiratory: Positive for cough. Negative for shortness of breath.    Cardiovascular: Positive for palpitations (occasionally). Negative for chest pain.   Gastrointestinal: Positive for abdominal pain. Negative for nausea.        No b/b incontinence   Genitourinary: Negative for difficulty urinating and enuresis.   Musculoskeletal: Positive for back pain and gait problem.   Skin: Negative for rash.   Neurological: Negative for  weakness and numbness.   Psychiatric/Behavioral: Positive for sleep disturbance (sometimes).       Objective   Physical Exam  Vitals signs and nursing note reviewed.   Constitutional:       General: She is not in acute distress.  HENT:      Head: Normocephalic and atraumatic.      Nose: Nose normal.      Mouth/Throat:      Mouth: Mucous membranes are moist.   Eyes:      Extraocular Movements: Extraocular movements intact.      Conjunctiva/sclera: Conjunctivae normal.      Pupils: Pupils are equal, round, and reactive to light.   Neck:      Musculoskeletal: Normal range of motion.   Cardiovascular:      Rate and Rhythm: Normal rate and regular rhythm.      Pulses: Normal pulses.   Pulmonary:      Effort: Pulmonary effort is normal.      Breath sounds: Normal breath sounds.   Musculoskeletal: Normal range of motion.   Skin:     General: Skin is warm and dry.   Neurological:      General: No focal deficit present.      Mental Status: She is alert and oriented to person, place, and time.      Cranial Nerves: No cranial nerve deficit.      Sensory: No sensory deficit.      Motor: No weakness.      Coordination: Coordination normal.       Neurologic Exam     Mental Status   Oriented to person, place, and time.     Cranial Nerves     CN III, IV, VI   Pupils are equal, round, and reactive to light.      Assessment/Plan   Independent Review of Radiographic Studies:    The MRI of the lumbar spine without contrast dated 10/6/2020 was reviewed with the patient and does indeed show edema within the compressed L1 vertebral body, but no canal compromise is seen.  Multilevel degenerative changes are present but no disc herniation is identified.  Medical Decision Makin-year-old female with recent fall and persistent back pain.  A compression fracture that is acute to subacute is seen on the MRI involving L1.  The risks, alternatives and procedure for a kyphoplasty were reviewed.  Patient is to decide on whether to proceed or  not and will notify our office whether she wants to try a brace and continued conservative therapy or undergo kyphoplasty.  Diagnoses and all orders for this visit:    1. Compression fracture of L1 vertebra with delayed healing (Primary)      Return in about 1 week (around 11/5/2020) for Recheck, the patient is deciding on possible kyphoplasty..

## 2020-10-14 ENCOUNTER — ANTICOAGULATION VISIT (OUTPATIENT)
Dept: PHARMACY | Facility: HOSPITAL | Age: 85
End: 2020-10-14

## 2020-10-14 LAB — INR PPP: 1.8

## 2020-10-14 NOTE — PROGRESS NOTES
Anticoagulation Clinic Progress Note    Anticoagulation Summary  As of 10/14/2020    INR goal:  2.0-3.0   TTR:  76.1 % (1.9 y)   INR used for dosin.80 (10/14/2020)   Warfarin maintenance plan:  2 mg every Mon, Wed, Fri; 1 mg all other days   Weekly warfarin total:  10 mg   No change documented:  Daren Osman RPH   Plan last modified:  Aisha Salazar RPH (3/4/2020)   Next INR check:  10/21/2020   Priority:  Maintenance   Target end date:  Indefinite    Indications    Atrial fibrillation (CMS/HCC) [I48.91]  Atrial fibrillation with RVR (CMS/HCC) (Resolved) [I48.91]             Anticoagulation Episode Summary     INR check location:      Preferred lab:      Send INR reminders to:   [facility]  POOL    Comments:  lab per [facility]--they will fax to us (phone: [phone])      Anticoagulation Care Providers     Provider Role Specialty Phone number    Vinh Apple MD Referring Cardiology [phone]          INR History:  Anticoagulation Monitoring 2020 2020 10/14/2020   INR 2.30 2.80 1.80   INR Date 2020 2020 10/14/2020   INR Goal 2.0-3.0 2.0-3.0 2.0-3.0   Trend Same Same Same   Last Week Total 8 mg 10 mg 10 mg   Next Week Total 10 mg 10 mg 10 mg   Sun 1 mg 1 mg 1 mg   Mon 2 mg 2 mg 2 mg   Tue 1 mg 1 mg 1 mg   Wed 2 mg 2 mg 2 mg   Thu 1 mg 1 mg 1 mg   Fri 2 mg 2 mg 2 mg   Sat 1 mg 1 mg 1 mg   Visit Report - - -   Some recent data might be hidden       Plan:  1. INR is Subtherapeutic today- see above in Anticoagulation Summary.   Provided instructions to Ofelia with [facility] to Continue their warfarin regimen- see above in Anticoagulation Summary.  2. Follow up in 1 week      Daren Osman RPH

## 2020-10-21 LAB — INR PPP: 2.2

## 2020-10-22 ENCOUNTER — ANTICOAGULATION VISIT (OUTPATIENT)
Dept: PHARMACY | Facility: HOSPITAL | Age: 85
End: 2020-10-22

## 2020-10-22 NOTE — PROGRESS NOTES
Anticoagulation Clinic Progress Note    Anticoagulation Summary  As of 10/22/2020    INR goal:  2.0-3.0   TTR:  75.8 % (1.9 y)   INR used for dosin.20 (10/21/2020)   Warfarin maintenance plan:  2 mg every Mon, Wed, Fri; 1 mg all other days   Weekly warfarin total:  10 mg   No change documented:  Daren Osman RPH   Plan last modified:  Aisha Salazar RPH (3/4/2020)   Next INR check:  2020   Priority:  Maintenance   Target end date:  Indefinite    Indications    Atrial fibrillation (CMS/HCC) [I48.91]  Atrial fibrillation with RVR (CMS/HCC) (Resolved) [I48.91]             Anticoagulation Episode Summary     INR check location:      Preferred lab:      Send INR reminders to:   [facility]  POOL    Comments:  lab per [facility]--they will fax to us (phone: [phone])      Anticoagulation Care Providers     Provider Role Specialty Phone number    Vinh Apple MD Referring Cardiology [phone]          INR History:  Anticoagulation Monitoring 2020 10/14/2020 10/22/2020   INR 2.80 1.80 2.20   INR Date 2020 10/14/2020 10/21/2020   INR Goal 2.0-3.0 2.0-3.0 2.0-3.0   Trend Same Same Same   Last Week Total 10 mg 10 mg 10 mg   Next Week Total 10 mg 10 mg 10 mg   Sun 1 mg 1 mg 1 mg   Mon 2 mg 2 mg 2 mg   Tue 1 mg 1 mg 1 mg   Wed 2 mg 2 mg 2 mg   Thu 1 mg 1 mg 1 mg   Fri 2 mg 2 mg 2 mg   Sat 1 mg 1 mg 1 mg   Visit Report - - -   Some recent data might be hidden       Plan:  1. INR is Therapeutic today- see above in Anticoagulation Summary.   Provided instructions to [facility] to Continue their warfarin regimen- see above in Anticoagulation Summary.  2. Follow up in 2 weeks      Daren Osman RPH

## 2020-10-29 ENCOUNTER — OFFICE VISIT (OUTPATIENT)
Dept: NEUROSURGERY | Facility: CLINIC | Age: 85
End: 2020-10-29

## 2020-10-29 VITALS
SYSTOLIC BLOOD PRESSURE: 120 MMHG | TEMPERATURE: 98.2 F | HEART RATE: 60 BPM | DIASTOLIC BLOOD PRESSURE: 86 MMHG | HEIGHT: 65 IN | RESPIRATION RATE: 16 BRPM | BODY MASS INDEX: 23.63 KG/M2

## 2020-10-29 DIAGNOSIS — S32.010G COMPRESSION FRACTURE OF L1 VERTEBRA WITH DELAYED HEALING: Primary | ICD-10-CM

## 2020-10-29 PROCEDURE — 99204 OFFICE O/P NEW MOD 45 MIN: CPT | Performed by: RADIOLOGY

## 2020-10-29 RX ORDER — SENNOSIDES 8.6 MG
650 CAPSULE ORAL EVERY 8 HOURS PRN
COMMUNITY

## 2020-10-30 ENCOUNTER — TELEPHONE (OUTPATIENT)
Dept: NEUROSURGERY | Facility: CLINIC | Age: 85
End: 2020-10-30

## 2020-10-30 RX ORDER — FUROSEMIDE 20 MG/1
TABLET ORAL
Qty: 30 TABLET | Refills: 2 | Status: SHIPPED | OUTPATIENT
Start: 2020-10-30 | End: 2021-02-16

## 2020-10-30 RX ORDER — CLINDAMYCIN PHOSPHATE 900 MG/50ML
900 INJECTION INTRAVENOUS ONCE
Status: CANCELLED | OUTPATIENT
Start: 2020-11-09 | End: 2020-10-30

## 2020-10-30 NOTE — TELEPHONE ENCOUNTER
Patient is needing an L1 kyphoplasty with Dr. Voss. Is it ok for her to be off her coumadin 5 days prior to this procedure? Thanks.

## 2020-10-30 NOTE — TELEPHONE ENCOUNTER
PATIENT CALLED TO SCHEDULE SURGERY AS PREVIOUSLY MENTIONED FROM LAST APPT YESTERDAY (10/30/20). ATTEMPTED TO WARM TRANSFER TO OFFICE AT 3:15PM  BUT NO ANSWER. PLEASE CONTACT KENIA [phone] FOR FURTHER ASSISTANCE.

## 2020-11-04 ENCOUNTER — ANTICOAGULATION VISIT (OUTPATIENT)
Dept: PHARMACY | Facility: HOSPITAL | Age: 85
End: 2020-11-04

## 2020-11-04 LAB — INR PPP: 3

## 2020-11-04 NOTE — PROGRESS NOTES
Anticoagulation Clinic Progress Note    Anticoagulation Summary  As of 11/4/2020    INR goal:  2.0-3.0   TTR:  76.3 % (1.9 y)   INR used for dosing:  3.00 (11/4/2020)   Warfarin maintenance plan:  2 mg every Mon, Wed, Fri; 1 mg all other days   Weekly warfarin total:  10 mg   Plan last modified:  Aisha Salazar RPH (3/4/2020)   Next INR check:  11/18/2020   Priority:  Maintenance   Target end date:  Indefinite    Indications    Atrial fibrillation (CMS/HCC) [I48.91]  Atrial fibrillation with RVR (CMS/HCC) (Resolved) [I48.91]             Anticoagulation Episode Summary     INR check location:      Preferred lab:      Send INR reminders to:   [facility]  POOL    Comments:  lab per [facility]--they will fax to us (phone: [phone])      Anticoagulation Care Providers     Provider Role Specialty Phone number    Vinh Apple MD Referring Cardiology [phone]          Clinic Interview:      INR History:  Anticoagulation Monitoring 10/14/2020 10/22/2020 11/4/2020   INR 1.80 2.20 3.00   INR Date 10/14/2020 10/21/2020 11/4/2020   INR Goal 2.0-3.0 2.0-3.0 2.0-3.0   Trend Same Same Same   Last Week Total 10 mg 10 mg 10 mg   Next Week Total 10 mg 10 mg 4 mg   Sun 1 mg 1 mg Hold (11/8); Otherwise 1 mg   Mon 2 mg 2 mg 2 mg   Tue 1 mg 1 mg 2 mg (11/10); Otherwise 1 mg   Wed 2 mg 2 mg Hold (11/4); Otherwise 2 mg   Thu 1 mg 1 mg Hold (11/5), 2 mg (11/12)   Fri 2 mg 2 mg Hold (11/6); Otherwise 2 mg   Sat 1 mg 1 mg Hold (11/7); Otherwise 1 mg   Visit Report - - -   Some recent data might be hidden       Plan:  1. INR is Therapeutic today- see above in Anticoagulation Summary.   Will instruct Citlali Solorio to HOLD their warfarin regimen x 5d then resume after procedure at 2mg daily through 11/13.  Then back to warfarin 2mg MWF, 1mg all other days- see above in Anticoagulation Summary.  2. Follow up in 2 weeks  3. Left VM for Yoselyn at [facility] and faxed order. They have been instructed to call if  any changes in medications, doses, concerns, etc.     Aisha Salazar RPH

## 2020-11-06 ENCOUNTER — APPOINTMENT (OUTPATIENT)
Dept: PREADMISSION TESTING | Facility: HOSPITAL | Age: 85
End: 2020-11-06

## 2020-11-06 VITALS
RESPIRATION RATE: 16 BRPM | TEMPERATURE: 97.2 F | BODY MASS INDEX: 22.11 KG/M2 | HEART RATE: 76 BPM | SYSTOLIC BLOOD PRESSURE: 138 MMHG | HEIGHT: 65 IN | DIASTOLIC BLOOD PRESSURE: 67 MMHG | WEIGHT: 132.7 LBS | OXYGEN SATURATION: 97 %

## 2020-11-06 DIAGNOSIS — S32.010G COMPRESSION FRACTURE OF L1 VERTEBRA WITH DELAYED HEALING: ICD-10-CM

## 2020-11-06 LAB
ANION GAP SERPL CALCULATED.3IONS-SCNC: 9.4 MMOL/L (ref 5–15)
BUN SERPL-MCNC: 20 MG/DL (ref 8–23)
BUN/CREAT SERPL: 19.2 (ref 7–25)
CALCIUM SPEC-SCNC: 9.5 MG/DL (ref 8.6–10.5)
CHLORIDE SERPL-SCNC: 100 MMOL/L (ref 98–107)
CO2 SERPL-SCNC: 29.6 MMOL/L (ref 22–29)
CREAT SERPL-MCNC: 1.04 MG/DL (ref 0.57–1)
DEPRECATED RDW RBC AUTO: 43.9 FL (ref 37–54)
ERYTHROCYTE [DISTWIDTH] IN BLOOD BY AUTOMATED COUNT: 12.5 % (ref 12.3–15.4)
GFR SERPL CREATININE-BSD FRML MDRD: 51 ML/MIN/1.73
GLUCOSE SERPL-MCNC: 111 MG/DL (ref 65–99)
HCT VFR BLD AUTO: 37.3 % (ref 34–46.6)
HGB BLD-MCNC: 12.6 G/DL (ref 12–15.9)
INR PPP: 1.75 (ref 0.9–1.1)
MCH RBC QN AUTO: 32.2 PG (ref 26.6–33)
MCHC RBC AUTO-ENTMCNC: 33.8 G/DL (ref 31.5–35.7)
MCV RBC AUTO: 95.4 FL (ref 79–97)
PLATELET # BLD AUTO: 236 10*3/MM3 (ref 140–450)
PMV BLD AUTO: 11.1 FL (ref 6–12)
POTASSIUM SERPL-SCNC: 4.2 MMOL/L (ref 3.5–5.2)
PROTHROMBIN TIME: 20.1 SECONDS (ref 11.7–14.2)
QT INTERVAL: 394 MS
RBC # BLD AUTO: 3.91 10*6/MM3 (ref 3.77–5.28)
SODIUM SERPL-SCNC: 139 MMOL/L (ref 136–145)
WBC # BLD AUTO: 6.3 10*3/MM3 (ref 3.4–10.8)

## 2020-11-06 PROCEDURE — 93005 ELECTROCARDIOGRAM TRACING: CPT

## 2020-11-06 PROCEDURE — U0004 COV-19 TEST NON-CDC HGH THRU: HCPCS | Performed by: NURSE PRACTITIONER

## 2020-11-06 PROCEDURE — 36415 COLL VENOUS BLD VENIPUNCTURE: CPT

## 2020-11-06 PROCEDURE — 85027 COMPLETE CBC AUTOMATED: CPT | Performed by: RADIOLOGY

## 2020-11-06 PROCEDURE — 80048 BASIC METABOLIC PNL TOTAL CA: CPT | Performed by: RADIOLOGY

## 2020-11-06 PROCEDURE — 85610 PROTHROMBIN TIME: CPT | Performed by: RADIOLOGY

## 2020-11-06 PROCEDURE — C9803 HOPD COVID-19 SPEC COLLECT: HCPCS | Performed by: NURSE PRACTITIONER

## 2020-11-06 PROCEDURE — 93010 ELECTROCARDIOGRAM REPORT: CPT | Performed by: INTERNAL MEDICINE

## 2020-11-06 NOTE — DISCHARGE INSTRUCTIONS
Take the following medications the morning of surgery: DIGOXIN, HYDRALAZINE, METOPROLOL      Take the following medications the morning of surgery with a small sip of water:      If you are on prescription narcotic pain medication to control your pain you may also take that medication the morning of surgery.    General Instructions:  • Do not eat or drink anything after midnight the night before surgery.  • Bring any papers given to you in the doctor’s office.  • Wear clean comfortable clothes.  • Do not wear contact lenses, false eyelashes or make-up.  Bring a case for your glasses.   • Bring crutches or walker if applicable.  • Remove all piercings.  Leave jewelry and any other valuables at home.  • Hair extensions with metal clips must be removed prior to surgery.  • The Pre-Admission Testing nurse will instruct you to bring medications if unable to obtain an accurate list in Pre-Admission Testing.            Preventing a Surgical Site Infection:  • For 2 to 3 days before surgery, avoid shaving with a razor because the razor can irritate skin and make it easier to develop an infection.    • Any areas of open skin can increase the risk of a post-operative wound infection by allowing bacteria to enter and travel throughout the body.  Notify your surgeon if you have any skin wounds / rashes even if it is not near the expected surgical site.  The area will need assessed to determine if surgery should be delayed until it is healed.  • The night prior to surgery shower using a fresh bar of anti-bacterial soap (such as Dial) and clean washcloth.  Sleep in a clean bed with clean clothing.  Do not allow pets to sleep with you.  • Shower on the morning of surgery using a fresh bar of anti-bacterial soap (such as Dial) and clean washcloth.  Dry with a clean towel and dress in clean clothing.  • Ask your surgeon if you will be receiving antibiotics prior to surgery.  • Make sure you, your family, and all healthcare providers  clean their hands with soap and water or an alcohol based hand  before caring for you or your wound.    Day of surgery: 11/9/2020. MAIN OR. ARRIVAL TIME 6 AM  Your arrival time is approximately two hours before your scheduled surgery time.  Upon arrival, a Pre-op nurse and Anesthesiologist will review your health history, obtain vital signs, and answer questions you may have.  The only belongings needed at this time will be your home medications and if applicable your C-PAP/BI-PAP machine.  If you are staying overnight your family can leave the rest of your belongings in the car and bring them to your room later.  A Pre-op nurse will start an IV and you may receive medication in preparation for surgery, including something to help you relax.  While you are in surgery your family should notify the waiting room  if they leave the waiting room area and provide a contact phone number.    Please be aware that surgery does come with discomfort.  We want to make every effort to control your discomfort so please discuss any uncontrolled symptoms with your nurse.   Your doctor will most likely have prescribed pain medications.      If you are going home after surgery you will receive individualized written care instructions before being discharged.  A responsible adult must drive you to and from the hospital on the day of your surgery and stay with you for 24 hours.    If you are staying overnight following surgery, you will be transported to your hospital room following the recovery period.  [facility] has all private rooms.    If you have any questions please call Pre-Admission Testing at [phone].  Deductibles and co-payments are collected on the day of service. Please be prepared to pay the required co-pay, deductible or deposit on the day of service as defined by your plan.    Patient Education for Self-Quarantine Process    Following your COVID testing, we strongly recommend  that you do not leave your home after you have been tested for COVID except to get medical care. This includes not going to work, school or to public areas.  If this is not possible for you to do please limit your activities to only required outings.  Be sure to wear a mask when you are with other people, practice social distancing and wash your hands frequently.      The following items provide additional details to keep you safe.  • Wash your hands with soap and water frequently for at least 20 seconds.   • Avoid touching your eyes, nose and mouth with unwashed hands.  • Do not share anything - utensils, towels, food from the same bowl.   • Have your own utensils, drinking glass, dishes, towels and bedding.   • Do not have visitors.   • Do use FaceTime to stay in touch with family and friends.  • You should stay in a specific room away from others if possible.   • Stay at least 6 feet away from others in the home if you cannot have a dedicated room to yourself.   • Do not snuggle with your pet. While the CDC says there is no evidence that pets can spread COVID-19 or be infected from humans, it is probably best to avoid “petting, snuggling, being kissed or licked and sharing food (during self-quarantine)”, according to the CDC.   • Sanitize household surfaces daily. Include all high touch areas (door handles, light switches, phones, countertops, etc.)  • Do not share a bathroom with others, if possible.   • Wear a mask around others in your home if you are unable to stay in a separate room or 6 feet apart. If  you are unable to wear a mask, have your family member wear a mask if they must be within 6 feet of you.   Call your surgeon immediately if you experience any of the following symptoms:  • Sore Throat  • Shortness of Breath or difficulty breathing  • Cough  • Chills  • Body soreness or muscle pain  • Headache  • Fever  • New loss of taste or smell  • Do not arrive for your surgery ill.  Your procedure will  need to be rescheduled to another time.  You will need to call your physician before the day of surgery to avoid any unnecessary exposure to hospital staff as well as other patients.

## 2020-11-07 LAB — SARS-COV-2 RNA RESP QL NAA+PROBE: NOT DETECTED

## 2020-11-09 ENCOUNTER — ANESTHESIA EVENT (OUTPATIENT)
Dept: PERIOP | Facility: HOSPITAL | Age: 85
End: 2020-11-09

## 2020-11-09 ENCOUNTER — APPOINTMENT (OUTPATIENT)
Dept: GENERAL RADIOLOGY | Facility: HOSPITAL | Age: 85
End: 2020-11-09

## 2020-11-09 ENCOUNTER — ANESTHESIA (OUTPATIENT)
Dept: PERIOP | Facility: HOSPITAL | Age: 85
End: 2020-11-09

## 2020-11-09 ENCOUNTER — HOSPITAL ENCOUNTER (OUTPATIENT)
Facility: HOSPITAL | Age: 85
Setting detail: HOSPITAL OUTPATIENT SURGERY
Discharge: HOME | End: 2020-11-09
Attending: RADIOLOGY | Admitting: RADIOLOGY

## 2020-11-09 VITALS
TEMPERATURE: 97.5 F | RESPIRATION RATE: 16 BRPM | DIASTOLIC BLOOD PRESSURE: 75 MMHG | OXYGEN SATURATION: 97 % | BODY MASS INDEX: 21.79 KG/M2 | HEIGHT: 65 IN | SYSTOLIC BLOOD PRESSURE: 135 MMHG | WEIGHT: 130.8 LBS | HEART RATE: 70 BPM

## 2020-11-09 DIAGNOSIS — S32.010G COMPRESSION FRACTURE OF L1 VERTEBRA WITH DELAYED HEALING: ICD-10-CM

## 2020-11-09 LAB
INR PPP: 1.34 (ref 0.9–1.1)
PROTHROMBIN TIME: 16.3 SECONDS (ref 11.7–14.2)

## 2020-11-09 PROCEDURE — C1713 ANCHOR/SCREW BN/BN,TIS/BN: HCPCS | Performed by: RADIOLOGY

## 2020-11-09 PROCEDURE — 0 IOPAMIDOL 41 % SOLUTION: Performed by: RADIOLOGY

## 2020-11-09 PROCEDURE — 88307 TISSUE EXAM BY PATHOLOGIST: CPT | Performed by: RADIOLOGY

## 2020-11-09 PROCEDURE — 25010000002 DEXAMETHASONE PER 1 MG: Performed by: ANESTHESIOLOGY

## 2020-11-09 PROCEDURE — 22514 PERQ VERTEBRAL AUGMENTATION: CPT | Performed by: RADIOLOGY

## 2020-11-09 PROCEDURE — 25010000002 FENTANYL CITRATE (PF) 100 MCG/2ML SOLUTION: Performed by: ANESTHESIOLOGY

## 2020-11-09 PROCEDURE — 25010000003 LIDOCAINE 1 % SOLUTION: Performed by: RADIOLOGY

## 2020-11-09 PROCEDURE — 25010000002 ONDANSETRON PER 1 MG: Performed by: ANESTHESIOLOGY

## 2020-11-09 PROCEDURE — 85610 PROTHROMBIN TIME: CPT | Performed by: RADIOLOGY

## 2020-11-09 PROCEDURE — 88311 DECALCIFY TISSUE: CPT | Performed by: RADIOLOGY

## 2020-11-09 PROCEDURE — 88342 IMHCHEM/IMCYTCHM 1ST ANTB: CPT | Performed by: RADIOLOGY

## 2020-11-09 PROCEDURE — 25010000002 PROPOFOL 10 MG/ML EMULSION: Performed by: ANESTHESIOLOGY

## 2020-11-09 PROCEDURE — 25010000002 PHENYLEPHRINE PER 1 ML: Performed by: ANESTHESIOLOGY

## 2020-11-09 DEVICE — CEMENT C01A KYPHX HV-R BONE CEMENT EN
Type: IMPLANTABLE DEVICE | Site: SPINE LUMBAR | Status: FUNCTIONAL
Brand: KYPHON® HV-R® BONE CEMENT

## 2020-11-09 RX ORDER — SODIUM CHLORIDE 0.9 % (FLUSH) 0.9 %
10 SYRINGE (ML) INJECTION EVERY 12 HOURS SCHEDULED
Status: DISCONTINUED | OUTPATIENT
Start: 2020-11-09 | End: 2020-11-09 | Stop reason: HOSPADM

## 2020-11-09 RX ORDER — EPHEDRINE SULFATE 50 MG/ML
5 INJECTION, SOLUTION INTRAVENOUS ONCE AS NEEDED
Status: DISCONTINUED | OUTPATIENT
Start: 2020-11-09 | End: 2020-11-09 | Stop reason: HOSPADM

## 2020-11-09 RX ORDER — PROMETHAZINE HYDROCHLORIDE 25 MG/1
25 SUPPOSITORY RECTAL ONCE AS NEEDED
Status: DISCONTINUED | OUTPATIENT
Start: 2020-11-09 | End: 2020-11-09 | Stop reason: HOSPADM

## 2020-11-09 RX ORDER — PROPOFOL 10 MG/ML
VIAL (ML) INTRAVENOUS AS NEEDED
Status: DISCONTINUED | OUTPATIENT
Start: 2020-11-09 | End: 2020-11-09 | Stop reason: SURG

## 2020-11-09 RX ORDER — LIDOCAINE HYDROCHLORIDE 10 MG/ML
0.5 INJECTION, SOLUTION EPIDURAL; INFILTRATION; INTRACAUDAL; PERINEURAL ONCE AS NEEDED
Status: DISCONTINUED | OUTPATIENT
Start: 2020-11-09 | End: 2020-11-09 | Stop reason: HOSPADM

## 2020-11-09 RX ORDER — SODIUM CHLORIDE 0.9 % (FLUSH) 0.9 %
3 SYRINGE (ML) INJECTION EVERY 12 HOURS SCHEDULED
Status: DISCONTINUED | OUTPATIENT
Start: 2020-11-09 | End: 2020-11-09 | Stop reason: HOSPADM

## 2020-11-09 RX ORDER — DIPHENHYDRAMINE HYDROCHLORIDE 50 MG/ML
12.5 INJECTION INTRAMUSCULAR; INTRAVENOUS
Status: DISCONTINUED | OUTPATIENT
Start: 2020-11-09 | End: 2020-11-09 | Stop reason: HOSPADM

## 2020-11-09 RX ORDER — FENTANYL CITRATE 50 UG/ML
INJECTION, SOLUTION INTRAMUSCULAR; INTRAVENOUS AS NEEDED
Status: DISCONTINUED | OUTPATIENT
Start: 2020-11-09 | End: 2020-11-09 | Stop reason: SURG

## 2020-11-09 RX ORDER — LABETALOL HYDROCHLORIDE 5 MG/ML
5 INJECTION, SOLUTION INTRAVENOUS
Status: DISCONTINUED | OUTPATIENT
Start: 2020-11-09 | End: 2020-11-09 | Stop reason: HOSPADM

## 2020-11-09 RX ORDER — ONDANSETRON 2 MG/ML
INJECTION INTRAMUSCULAR; INTRAVENOUS AS NEEDED
Status: DISCONTINUED | OUTPATIENT
Start: 2020-11-09 | End: 2020-11-09 | Stop reason: SURG

## 2020-11-09 RX ORDER — PROMETHAZINE HYDROCHLORIDE 25 MG/1
25 TABLET ORAL ONCE AS NEEDED
Status: DISCONTINUED | OUTPATIENT
Start: 2020-11-09 | End: 2020-11-09 | Stop reason: HOSPADM

## 2020-11-09 RX ORDER — FENTANYL CITRATE 50 UG/ML
25 INJECTION, SOLUTION INTRAMUSCULAR; INTRAVENOUS
Status: DISCONTINUED | OUTPATIENT
Start: 2020-11-09 | End: 2020-11-09 | Stop reason: HOSPADM

## 2020-11-09 RX ORDER — SODIUM CHLORIDE 0.9 % (FLUSH) 0.9 %
10 SYRINGE (ML) INJECTION AS NEEDED
Status: DISCONTINUED | OUTPATIENT
Start: 2020-11-09 | End: 2020-11-09 | Stop reason: HOSPADM

## 2020-11-09 RX ORDER — ONDANSETRON 2 MG/ML
4 INJECTION INTRAMUSCULAR; INTRAVENOUS ONCE AS NEEDED
Status: DISCONTINUED | OUTPATIENT
Start: 2020-11-09 | End: 2020-11-09 | Stop reason: HOSPADM

## 2020-11-09 RX ORDER — LIDOCAINE HYDROCHLORIDE 10 MG/ML
INJECTION, SOLUTION INFILTRATION; PERINEURAL AS NEEDED
Status: DISCONTINUED | OUTPATIENT
Start: 2020-11-09 | End: 2020-11-09 | Stop reason: HOSPADM

## 2020-11-09 RX ORDER — WARFARIN SODIUM 1 MG/1
2 TABLET ORAL
COMMUNITY
End: 2021-02-11

## 2020-11-09 RX ORDER — FLUMAZENIL 0.1 MG/ML
0.2 INJECTION INTRAVENOUS AS NEEDED
Status: DISCONTINUED | OUTPATIENT
Start: 2020-11-09 | End: 2020-11-09 | Stop reason: HOSPADM

## 2020-11-09 RX ORDER — WARFARIN SODIUM 1 MG/1
1 TABLET ORAL
COMMUNITY
End: 2021-02-11

## 2020-11-09 RX ORDER — NALOXONE HCL 0.4 MG/ML
0.2 VIAL (ML) INJECTION AS NEEDED
Status: DISCONTINUED | OUTPATIENT
Start: 2020-11-09 | End: 2020-11-09 | Stop reason: HOSPADM

## 2020-11-09 RX ORDER — CLINDAMYCIN PHOSPHATE 900 MG/50ML
900 INJECTION INTRAVENOUS ONCE
Status: COMPLETED | OUTPATIENT
Start: 2020-11-09 | End: 2020-11-09

## 2020-11-09 RX ORDER — DIPHENHYDRAMINE HCL 25 MG
25 CAPSULE ORAL
Status: DISCONTINUED | OUTPATIENT
Start: 2020-11-09 | End: 2020-11-09 | Stop reason: HOSPADM

## 2020-11-09 RX ORDER — DEXAMETHASONE SODIUM PHOSPHATE 10 MG/ML
INJECTION INTRAMUSCULAR; INTRAVENOUS AS NEEDED
Status: DISCONTINUED | OUTPATIENT
Start: 2020-11-09 | End: 2020-11-09 | Stop reason: SURG

## 2020-11-09 RX ORDER — TRIAMCINOLONE ACETONIDE 1 MG/G
1 CREAM TOPICAL 2 TIMES DAILY PRN
COMMUNITY
End: 2020-11-19

## 2020-11-09 RX ORDER — SODIUM CHLORIDE, SODIUM LACTATE, POTASSIUM CHLORIDE, CALCIUM CHLORIDE 600; 310; 30; 20 MG/100ML; MG/100ML; MG/100ML; MG/100ML
9 INJECTION, SOLUTION INTRAVENOUS CONTINUOUS
Status: DISCONTINUED | OUTPATIENT
Start: 2020-11-09 | End: 2020-11-09 | Stop reason: HOSPADM

## 2020-11-09 RX ORDER — HYDRALAZINE HYDROCHLORIDE 20 MG/ML
5 INJECTION INTRAMUSCULAR; INTRAVENOUS
Status: DISCONTINUED | OUTPATIENT
Start: 2020-11-09 | End: 2020-11-09 | Stop reason: HOSPADM

## 2020-11-09 RX ORDER — HYDROCODONE BITARTRATE AND ACETAMINOPHEN 7.5; 325 MG/1; MG/1
1 TABLET ORAL ONCE AS NEEDED
Status: DISCONTINUED | OUTPATIENT
Start: 2020-11-09 | End: 2020-11-09 | Stop reason: HOSPADM

## 2020-11-09 RX ORDER — OXYCODONE AND ACETAMINOPHEN 7.5; 325 MG/1; MG/1
1 TABLET ORAL ONCE AS NEEDED
Status: DISCONTINUED | OUTPATIENT
Start: 2020-11-09 | End: 2020-11-09 | Stop reason: HOSPADM

## 2020-11-09 RX ADMIN — PROPOFOL 180 MCG/KG/MIN: 10 INJECTION, EMULSION INTRAVENOUS at 08:22

## 2020-11-09 RX ADMIN — PHENYLEPHRINE HYDROCHLORIDE 100 MCG: 10 INJECTION INTRAVENOUS at 08:56

## 2020-11-09 RX ADMIN — SODIUM CHLORIDE, POTASSIUM CHLORIDE, SODIUM LACTATE AND CALCIUM CHLORIDE 9 ML/HR: 600; 310; 30; 20 INJECTION, SOLUTION INTRAVENOUS at 06:43

## 2020-11-09 RX ADMIN — ONDANSETRON HYDROCHLORIDE 4 MG: 2 SOLUTION INTRAMUSCULAR; INTRAVENOUS at 08:46

## 2020-11-09 RX ADMIN — PHENYLEPHRINE HYDROCHLORIDE 100 MCG: 10 INJECTION INTRAVENOUS at 08:43

## 2020-11-09 RX ADMIN — CLINDAMYCIN PHOSPHATE 900 MG: 18 INJECTION, SOLUTION INTRAMUSCULAR; INTRAVENOUS at 08:18

## 2020-11-09 RX ADMIN — PROPOFOL 80 MG: 10 INJECTION, EMULSION INTRAVENOUS at 08:21

## 2020-11-09 RX ADMIN — FENTANYL CITRATE 50 MCG: 50 INJECTION INTRAMUSCULAR; INTRAVENOUS at 08:40

## 2020-11-09 RX ADMIN — DEXAMETHASONE SODIUM PHOSPHATE 4 MG: 10 INJECTION INTRAMUSCULAR; INTRAVENOUS at 08:46

## 2020-11-09 NOTE — ANESTHESIA PREPROCEDURE EVALUATION
Anesthesia Evaluation     no history of anesthetic complications:  NPO Solid Status: > 8 hours             Airway   Mallampati: II  TM distance: >3 FB  Neck ROM: full  Dental - normal exam     Pulmonary - negative pulmonary ROS and normal exam   Cardiovascular     ECG reviewed  Rhythm: regular    (+) hypertension, valvular problems/murmurs MR, dysrhythmias Atrial Fib, CHF , hyperlipidemia,       Neuro/Psych- negative ROS  GI/Hepatic/Renal/Endo    (+)  GERD,      Musculoskeletal     Abdominal    Substance History      OB/GYN          Other                        Anesthesia Plan    ASA 3     general   (  D/W R&B of GA including but not limited to: heart, lung, liver, kidney, neurologic problems, positioning injuries, dental damage, corneal abrasion and TMJ.  .)

## 2020-11-09 NOTE — ANESTHESIA POSTPROCEDURE EVALUATION
Patient: Citlali Solorio    Procedure Summary     Date: 11/09/20 Room / Location: [facility] OR 19 INV / [facility] HYBRID OR 18/19    Anesthesia Start: 0817 Anesthesia Stop: 0921    Procedure: KYPHOPLASTY at L 1 (Bilateral Spine Lumbar) Diagnosis:       Compression fracture of L1 vertebra with delayed healing      (Compression fracture of L1 vertebra with delayed healing [S32.010G])    Surgeon: Pardeep Voss MD Provider: Jose F Allison MD    Anesthesia Type: general ASA Status: 3          Anesthesia Type: general    Vitals  Vitals Value Taken Time   /73 11/09/20 1045   Temp 36.4 °C (97.5 °F) 11/09/20 0919   Pulse 63 11/09/20 1058   Resp 16 11/09/20 1045   SpO2 97 % 11/09/20 1059   Vitals shown include unvalidated device data.        Post Anesthesia Care and Evaluation    Patient location during evaluation: PACU  Patient participation: complete - patient participated  Level of consciousness: awake and alert  Pain management: adequate  Airway patency: patent  Anesthetic complications: No anesthetic complications    Cardiovascular status: acceptable  Respiratory status: acceptable  Hydration status: acceptable    Comments: --------------------            11/09/20               1050     --------------------   BP:                  Pulse:      55       Resp:                Temp:                SpO2:      97%      --------------------

## 2020-11-09 NOTE — ADDENDUM NOTE
Addendum  created 11/09/20 1220 by Yadira Garvin MD    Attestation recorded in Intraprocedure, Intraprocedure Attestations filed

## 2020-11-09 NOTE — OP NOTE
Preoperative diagnosis: L1 osteoporotic compression fracture     Postoperative diagnosis: Same as above     Procedures performed: L1 Kyphoplasty and L1 Bone Biopsy     Surgeon: Pardeep Voss MD     Anesthesia: General endotracheal     EBL: 10 cc     Complications: None     Specimen sent: L1 bone from right vertebral body     Findings: Subacute fractures     Postoperative condition: Good     Indications for the operation:  The patient is an 85 year old female patient here for an acute vertebral compression fracture. She is here with her daughter today. Her daughter states that she fell 3 weeks ago and has complained of low back pain since. She has been taking tylenol with some relief.  The current pain is constant and unchanged. Pain location: bilateral lower back  The quality of the pain is described as aching. The pain does not radiate. The pain is moderate. The pain is worse during the night. The symptoms are aggravated by lying down and sitting. Pertinent negatives include no abdominal pain, chest pain, dysuria, fever, leg pain, numbness, pelvic pain, tingling or weakness. Risk factors include recent trauma. She has tried bed rest (tylenol ) for the symptoms. The treatment provided mild relief. She had a normal bone density in august of 2019, but her current mechanism of injury was mild for the degree of vertebral trauma plus the delayed healing and osteoporosis is suspected.   Patient is here now for a kyphoplasty for pain relief and vertebral body stabilization.     Informed consent: She understood that the goal of surgery is stabilization of the fracture to decrease pain and improve the quality of life.  The patient knows that the surgery will not prevent another fracture and that he or she may need additional surgery in the future.  The risks include, but are not limited to, infection, hemorrhage requiring transfusion or reoperation, extravasation of cement causing spinal cord injury, pulmonary embolus,  incomplete relief of symptoms, potential need for additional surgeries in the future, stroke, paralysis, coma, and death.  The patient agrees to proceed.     Details of the operation:   After medical clearance, the patient was taken to the operating room and remained in his gurney in the supine position. After induction and endotracheal intubation, she got Clindamycin  as per the SCIP protocol. SCDs were placed. No Galindo was necessary. Venous access was secured. She was rolled over in the prone position on the radiolucent x-ray table. All pressure points were padded including the brachial plexus. The thoracolumbar region was then prepped and draped in the usual sterile fashion as were the biplanar C-arms. We did a surgical timeout. We aligned the C-arms in orthogonal projections at L1. I identified the pedicle entry sites and then made a stab incision at each site with a #11 skin knife. Using a modified Jamshidi 10 gauge needle, I cannulated first the right then the left L1 pedicle using transpedicular technique and getting 2 mm in the posterior cortex. I got a bone biopsy from the right side at L1 which was sent to pathology. I put 15 mm balloons into the vertebral body and then inflated the balloons to peak PSI of about 350 PSI. We got good amount of expansion, but not much elevation of the endplate. I deflated the balloons and proceeded to put in 4 cc worth of cement in the right and left sides at L1 with good fill and no significant extravasation. I took the cannulas out and got permanent radiographic records. The needles were removed. The skin was closed with the appropriate sterile, clean, dry dressing.  The patient tolerated the procedure well. She was rolled over in supine position, extubated, and taken to the recovery room in satisfactory condition. No immediate complications encountered.

## 2020-11-09 NOTE — ANESTHESIA PROCEDURE NOTES
Airway  Urgency: elective    Date/Time: 11/9/2020 8:24 AM    General Information and Staff    Patient location during procedure: OR  Anesthesiologist: Render, Jose F Ray, MD    Indications and Patient Condition  Indications for airway management: airway protection    Preoxygenated: yes  MILS maintained throughout  Mask difficulty assessment: 1 - vent by mask    Final Airway Details  Final airway type: endotracheal airway      Successful airway: ETT    Successful intubation technique: direct laryngoscopy  Blade: Winters  Blade size: 3  ETT size (mm): 6.0  Placement verified by: chest auscultation and capnometry   Number of attempts at approach: 1

## 2020-11-09 NOTE — BRIEF OP NOTE
KYPHOPLASTY 1-2 LEVELS  Progress Note    Citlali ARIANA Solorio  11/9/2020    Pre-op Diagnosis:   Compression fracture of L1 vertebra with delayed healing [S32.010G]       Post-Op Diagnosis Codes:     * Compression fracture of L1 vertebra with delayed healing [S32.010G]    Procedure/CPT® Codes:        Procedure(s):  KYPHOPLASTY at L 1    Surgeon(s):  Pardeep Voss MD    Anesthesia: General    Staff:   Circulator: Lydia Dominique RN  Scrub Person: Marina Amaro  Vascular Radiology Technician: David Brock         Estimated Blood Loss: minimal    Urine Voided: * No values recorded between 11/9/2020  8:16 AM and 11/9/2020  9:05 AM *    Specimens:                Specimens     ID Source Type Tests Collected By Collected At Frozen?      A Spine, Lumbar Bone · TISSUE PATHOLOGY EXAM   Pardeep Voss MD 11/9/20 0851      Description: L1 VETEBRAL BODY    This specimen was not marked as sent.                Drains: * No LDAs found *    Findings: L1 VCF with good bilateral cement deposition.    Complications: None encountered          Pardeep Voss MD     Date: 11/9/2020  Time: 09:08 EST

## 2020-11-11 LAB
CYTO UR: NORMAL
LAB AP CASE REPORT: NORMAL
LAB AP DIAGNOSIS COMMENT: NORMAL
PATH REPORT.FINAL DX SPEC: NORMAL
PATH REPORT.GROSS SPEC: NORMAL

## 2020-11-17 ENCOUNTER — TELEPHONE (OUTPATIENT)
Dept: NEUROSURGERY | Facility: CLINIC | Age: 85
End: 2020-11-17

## 2020-11-17 NOTE — TELEPHONE ENCOUNTER
Caller: KENIA GONZALEZ    Relationship to patient: PT'S DAUGHTER    Best call back number: [phone]    Chief complaint: PT IS NOT FEELING WELL, UNABLE TO MAKE IT TO OFFICE TODAY.    Type of visit: 1ST POST OP    Requested date: OPEN    If rescheduling, when is the original appointment: 11/17/20 1:30 PM    Additional notes: PLEASE CALL EKNIA TO RESCHEDULE.    HUB UNABLE TO WARM TRANSFER AT TIME OF CALL.    THANK YOU.

## 2020-11-18 ENCOUNTER — ANTICOAGULATION VISIT (OUTPATIENT)
Dept: PHARMACY | Facility: HOSPITAL | Age: 85
End: 2020-11-18

## 2020-11-18 LAB — INR PPP: 1.5

## 2020-11-18 NOTE — PROGRESS NOTES
Subjective   Patient ID: Citlali Solorio is a 85 y.o. female is here today for a P/O 1 L1 kyphoplasty on 11/9/20.  Pt has no back pain, only L sided pain.    85 year old female patient who had an acute vertebral compression fracture and is now status post kyphoplasty. She is here with her daughter today. Her daughter states that she has had resolution of the severe back pain after performing the kyphoplasty on 11/9/2020. She has a recent complaint over the past few days of left lower quadrant abdominal pain that is intermittent and of uncertain etiology.  The current pain is different from the bone pain previously experienced.       The following portions of the patient's history were reviewed and updated as appropriate: allergies, current medications, past family history, past medical history, past social history, past surgical history and problem list.    Review of Systems   Musculoskeletal: Negative for back pain and gait problem.        L sided pain   Neurological: Negative for weakness and numbness.   Psychiatric/Behavioral: Negative for sleep disturbance.       Objective   Physical Exam  Vitals signs and nursing note reviewed.   Constitutional:       General: She is not in acute distress.     Appearance: Normal appearance. She is normal weight.   HENT:      Head: Normocephalic and atraumatic.      Nose: Nose normal.      Mouth/Throat:      Mouth: Mucous membranes are moist.   Eyes:      Extraocular Movements: Extraocular movements intact.      Conjunctiva/sclera: Conjunctivae normal.      Pupils: Pupils are equal, round, and reactive to light.   Neck:      Musculoskeletal: Normal range of motion.   Cardiovascular:      Rate and Rhythm: Normal rate and regular rhythm.      Pulses: Normal pulses.   Pulmonary:      Effort: Pulmonary effort is normal.      Breath sounds: Normal breath sounds.   Abdominal:      General: Abdomen is flat.   Musculoskeletal: Normal range of motion.   Skin:     General: Skin is warm  and dry.   Neurological:      General: No focal deficit present.      Mental Status: She is oriented to person, place, and time.      Cranial Nerves: No cranial nerve deficit.      Sensory: No sensory deficit.      Motor: No weakness.      Coordination: Coordination normal.   Psychiatric:         Mood and Affect: Mood normal.         Behavior: Behavior normal.         Thought Content: Thought content normal.         Judgment: Judgment normal.       Neurologic Exam     Mental Status   Oriented to person, place, and time.     Cranial Nerves     CN III, IV, VI   Pupils are equal, round, and reactive to light.      Assessment/Plan   Independent Review of Radiographic Studies:    The kyphoplasty procedure dated 2020 was reviewed with the patient and shows good cement deposition within the L1 vertebral body and no evidence of a complication.  Medical Decision Makin-year-old female with resolution of the patient's severe back pain secondary to fracture stabilization and methylmethacrylate deposition.  Patient is having some anterior left lower abdominal pain that is intermittent and may be muscle related.  Patient was instructed to follow-up with her primary care physician if this worsens or does not resolve in the next week.  Diagnoses and all orders for this visit:    1. Compression fracture of L1 vertebra with delayed healing (Primary)    2. Age-related osteoporosis with current pathological fracture with delayed healing      Return if symptoms worsen or fail to improve.

## 2020-11-18 NOTE — PROGRESS NOTES
Anticoagulation Clinic Progress Note    Anticoagulation Summary  As of 2020    INR goal:  2.0-3.0   TTR:  75.1 % (2 y)   INR used for dosin.50 (2020)   Warfarin maintenance plan:  2 mg every Mon, Wed, Fri; 1 mg all other days   Weekly warfarin total:  10 mg   Plan last modified:  Ester Hunter RPH (2020)   Next INR check:  2020   Priority:  Maintenance   Target end date:  Indefinite    Indications    Atrial fibrillation (CMS/HCC) [I48.91]  Atrial fibrillation with RVR (CMS/HCC) (Resolved) [I48.91]             Anticoagulation Episode Summary     INR check location:      Preferred lab:      Send INR reminders to:  GLENNY ORTIZ  POOL    Comments:  lab per [facility]--they will fax to us (phone: [phone])      Anticoagulation Care Providers     Provider Role Specialty Phone number    Vinh Apple MD Referring Cardiology [phone]          Clinic Interview:  Patient Findings- no changes     Negatives:  Signs/symptoms of thrombosis, Signs/symptoms of bleeding,   Laboratory test error suspected, Change in health, Change in alcohol use,   Change in activity, Upcoming invasive procedure, Emergency department   visit, Upcoming dental procedure, Missed doses, Extra doses, Change in   medications, Change in diet/appetite, Hospital admission, Bruising, Other   complaints      Clinical Outcomes     Negatives:  Major bleeding event, Thromboembolic event,   Anticoagulation-related hospital admission, Anticoagulation-related ED   visit, Anticoagulation-related fatality        INR History:  Anticoagulation Monitoring 10/22/2020 2020 2020   INR 2.20 3.00 1.50   INR Date 10/21/2020 2020 2020   INR Goal 2.0-3.0 2.0-3.0 2.0-3.0   Trend Same Same Same   Last Week Total 10 mg 10 mg 11 mg   Next Week Total 10 mg 4 mg 12 mg   Sun 1 mg Hold (); Otherwise 1 mg 1 mg   Mon 2 mg 2 mg -   Tue 1 mg 2 mg (11/10); Otherwise 1 mg -   Wed 2 mg Hold ();  Otherwise 2 mg 3 mg (11/18)   Thu 1 mg Hold (11/5), 2 mg (11/12) 2 mg (11/19)   Fri 2 mg Hold (11/6); Otherwise 2 mg 2 mg   Sat 1 mg Hold (11/7); Otherwise 1 mg 1 mg   Visit Report - - -   Some recent data might be hidden       Plan:  1. INR is Subtherapeutic today- see above in Anticoagulation Summary.   Will instruct Citlali A Clore to Change their warfarin regimen- see above in Anticoagulation Summary.  2. Follow up in 5 days.  3. Spoke with patient's nurse, Yoselyn and sent fax to [facility]. They have been instructed to call if any changes in medications, doses, concerns, etc. Patient expresses understanding and has no further questions at this time.    Ester Hunter [facility]

## 2020-11-19 ENCOUNTER — OFFICE VISIT (OUTPATIENT)
Dept: NEUROSURGERY | Facility: CLINIC | Age: 85
End: 2020-11-19

## 2020-11-19 VITALS
BODY MASS INDEX: 21.77 KG/M2 | HEART RATE: 68 BPM | DIASTOLIC BLOOD PRESSURE: 64 MMHG | SYSTOLIC BLOOD PRESSURE: 112 MMHG | RESPIRATION RATE: 16 BRPM | TEMPERATURE: 97.8 F | HEIGHT: 65 IN

## 2020-11-19 DIAGNOSIS — M80.00XG AGE-RELATED OSTEOPOROSIS WITH CURRENT PATHOLOGICAL FRACTURE WITH DELAYED HEALING: ICD-10-CM

## 2020-11-19 DIAGNOSIS — S32.010G COMPRESSION FRACTURE OF L1 VERTEBRA WITH DELAYED HEALING: Primary | ICD-10-CM

## 2020-11-19 PROCEDURE — 99024 POSTOP FOLLOW-UP VISIT: CPT | Performed by: RADIOLOGY

## 2020-11-19 PROCEDURE — 99213 OFFICE O/P EST LOW 20 MIN: CPT | Performed by: RADIOLOGY

## 2020-11-30 ENCOUNTER — ANTICOAGULATION VISIT (OUTPATIENT)
Dept: PHARMACY | Facility: HOSPITAL | Age: 85
End: 2020-11-30

## 2020-11-30 LAB — INR PPP: 1.9

## 2020-11-30 RX ORDER — DIGOXIN 125 MCG
TABLET ORAL
Qty: 90 TABLET | Refills: 3 | Status: SHIPPED | OUTPATIENT
Start: 2020-11-30 | End: 2021-06-30 | Stop reason: SDUPTHER

## 2020-11-30 NOTE — PROGRESS NOTES
Anticoagulation Clinic Progress Note    Anticoagulation Summary  As of 2020    INR goal:  2.0-3.0   TTR:  73.9 % (2 y)   INR used for dosin.90 (2020)   Warfarin maintenance plan:  2 mg every Mon, Wed, Fri; 1 mg all other days   Weekly warfarin total:  10 mg   Plan last modified:  Ester Hunter RPH (2020)   Next INR check:  2020   Priority:  Maintenance   Target end date:  Indefinite    Indications    Atrial fibrillation (CMS/HCC) [I48.91]  Atrial fibrillation with RVR (CMS/HCC) (Resolved) [I48.91]             Anticoagulation Episode Summary     INR check location:      Preferred lab:      Send INR reminders to:  GLENNY ORTIZ  POOL    Comments:  lab per [facility]--they will fax to us (phone: [phone])      Anticoagulation Care Providers     Provider Role Specialty Phone number    Vinh Apple MD Referring Cardiology [phone]          Clinic Interview:  Patient Findings     Negatives:  Signs/symptoms of thrombosis, Signs/symptoms of bleeding,   Laboratory test error suspected, Change in health, Change in alcohol use,   Change in activity, Upcoming invasive procedure, Emergency department   visit, Upcoming dental procedure, Missed doses, Extra doses, Change in   medications, Change in diet/appetite, Hospital admission, Bruising, Other   complaints      Clinical Outcomes     Negatives:  Major bleeding event, Thromboembolic event,   Anticoagulation-related hospital admission, Anticoagulation-related ED   visit, Anticoagulation-related fatality        INR History:  Anticoagulation Monitoring 2020   INR 3.00 1.50 1.90   INR Date 2020   INR Goal 2.0-3.0 2.0-3.0 2.0-3.0   Trend Same Same Same   Last Week Total 10 mg 11 mg 10 mg   Next Week Total 4 mg 12 mg 11 mg   Sun Hold (); Otherwise 1 mg 1 mg 1 mg   Mon 2 mg - 2 mg   Tue 2 mg (11/10); Otherwise 1 mg - 2 mg (); Otherwise 1 mg   Wed Hold  (11/4); Otherwise 2 mg 3 mg (11/18) 2 mg   Thu Hold (11/5), 2 mg (11/12) 2 mg (11/19) 1 mg   Fri Hold (11/6); Otherwise 2 mg 2 mg 2 mg   Sat Hold (11/7); Otherwise 1 mg 1 mg 1 mg   Visit Report - - -   Some recent data might be hidden       Plan:  1. INR is Subtherapeutic today- see above in Anticoagulation Summary.   Will instruct Citlali Solorio to take 2 mg boost x once on 12/1, then continue 2 mg Mon, Wed, Fri, 1 mg all other days  2. Follow up in 2 weeks  3. Spoke with nurse at [facility] and sent fax to facility. They have been instructed to call if any changes in medications, doses, concerns, etc. Patient expresses understanding and has no further questions at this time.    Ester Hunter [facility]   Yes - the patient is able to be screened

## 2020-12-18 ENCOUNTER — ANTICOAGULATION VISIT (OUTPATIENT)
Dept: PHARMACY | Facility: HOSPITAL | Age: 85
End: 2020-12-18

## 2020-12-18 LAB — INR PPP: 2

## 2020-12-18 NOTE — PROGRESS NOTES
Anticoagulation Clinic Progress Note    Anticoagulation Summary  As of 2020    INR goal:  2.0-3.0   TTR:  72.1 % (2.1 y)   INR used for dosin.00 (2020)   Warfarin maintenance plan:  2 mg every Mon, Wed, Fri; 1 mg all other days   Weekly warfarin total:  10 mg   No change documented:  Daren Osman RPH   Plan last modified:  Ester Hunter RPH (2020)   Next INR check:  2021   Priority:  Maintenance   Target end date:  Indefinite    Indications    Atrial fibrillation (CMS/HCC) [I48.91]  Atrial fibrillation with RVR (CMS/HCC) (Resolved) [I48.91]             Anticoagulation Episode Summary     INR check location:      Preferred lab:      Send INR reminders to:   ABBIE ORTIZ  POOL    Comments:  lab per [facility]--they will fax to us (phone: [phone])      Anticoagulation Care Providers     Provider Role Specialty Phone number    Vinh Apple MD Referring Cardiology [phone]          INR History:  Anticoagulation Monitoring 2020   INR 1.50 1.90 2.00   INR Date 2020   INR Goal 2.0-3.0 2.0-3.0 2.0-3.0   Trend Same Same Same   Last Week Total 11 mg 10 mg 10 mg   Next Week Total 12 mg 11 mg 10 mg   Sun 1 mg 1 mg 1 mg   Mon - 2 mg 2 mg   Tue - 2 mg (); Otherwise 1 mg 1 mg   Wed 3 mg () 2 mg 2 mg   Thu 2 mg () 1 mg 1 mg   Fri 2 mg 2 mg 2 mg   Sat 1 mg 1 mg 1 mg   Visit Report - - -   Some recent data might be hidden       Plan:  1. INR is Therapeutic today- see above in Anticoagulation Summary.   Provided instructions to nurse at [facility] to Continue their warfarin regimen- see above in Anticoagulation Summary. Also faxed order.   2. Follow up in 2.5 weeks      Daren Osman RPH

## 2021-01-04 ENCOUNTER — TELEPHONE (OUTPATIENT)
Dept: INTERNAL MEDICINE | Facility: CLINIC | Age: 86
End: 2021-01-04

## 2021-01-04 LAB — INR PPP: 1.9

## 2021-01-04 NOTE — TELEPHONE ENCOUNTER
[address] NURSE AT [facility] IS CALLING TO ASK IF ITS OKAY FOR THE PATIENT TO RECEIVE THE COVID VACCINE, PFIZER.     NURSE SAID YOU CAN LEAVE VOICE MAIL    CALL BACK: [phone]

## 2021-01-08 ENCOUNTER — OFFICE VISIT (OUTPATIENT)
Dept: INTERNAL MEDICINE | Facility: CLINIC | Age: 86
End: 2021-01-08

## 2021-01-08 VITALS
HEART RATE: 88 BPM | WEIGHT: 130 LBS | DIASTOLIC BLOOD PRESSURE: 70 MMHG | TEMPERATURE: 96.8 F | SYSTOLIC BLOOD PRESSURE: 126 MMHG | HEIGHT: 65 IN | OXYGEN SATURATION: 95 % | BODY MASS INDEX: 21.66 KG/M2

## 2021-01-08 DIAGNOSIS — N63.10 BREAST MASS, RIGHT: Primary | ICD-10-CM

## 2021-01-08 PROCEDURE — 99213 OFFICE O/P EST LOW 20 MIN: CPT | Performed by: INTERNAL MEDICINE

## 2021-01-08 RX ORDER — POTASSIUM CHLORIDE 750 MG/1
TABLET, EXTENDED RELEASE ORAL
Qty: 90 TABLET | Refills: 3 | Status: SHIPPED | OUTPATIENT
Start: 2021-01-08 | End: 2021-06-30 | Stop reason: SDUPTHER

## 2021-01-08 RX ORDER — HYDROCODONE BITARTRATE AND ACETAMINOPHEN 5; 325 MG/1; MG/1
1 TABLET ORAL EVERY 6 HOURS PRN
COMMUNITY
End: 2021-04-13

## 2021-01-08 NOTE — PROGRESS NOTES
Subjective     Citlali ARIANA Solorio is a 85 y.o. female who presents with   Chief Complaint   Patient presents with   • Breast Problem     right side        History of Present Illness     She is having a right breast mass.  She states it has been there for months.  No pain.  Not growing.  Negative mammogram in June.      The following data was reviewed by: Cristal Lema MD on 01/08/2021:    Data reviewed: Radiologic studies mammogram 6/23/2020     Review of Systems   Respiratory: Negative.    Cardiovascular: Negative.        The following portions of the patient's history were reviewed and updated as appropriate: allergies, current medications and problem list.    Patient Active Problem List    Diagnosis Date Noted   • Age-related osteoporosis with current pathological fracture with delayed healing 11/19/2020   • Compression fracture of L1 vertebra with delayed healing 10/29/2020   • Heart failure with preserved ejection fraction (CMS/HCC) 08/13/2019   • Tear of medial meniscus of right knee, current 06/15/2017   • Mitral valve insufficiency 07/06/2016   • Tricuspid valve insufficiency 07/06/2016   • Progressive supranuclear palsy (CMS/HCC) 04/21/2016   • Carotid artery plaque, bilateral 04/21/2016     Note Last Updated: 5/31/2017     Description: plaque on screening last done 3/13, 11/13, 10/16     • Heart failure (CMS/HCC) 04/21/2016     Note Last Updated: 4/21/2016     Description: admission 7/2013     • Gastroesophageal reflux disease 04/21/2016   • Hyperlipidemia 04/21/2016     Note Last Updated: 4/21/2016     Description: Patient tried Lipitor, Crestor, Zocor, Bacol and Livalo in the past and was intolerant of all of them.     • Spinal stenosis of lumbar region 04/21/2016   • Cobalamin deficiency 04/21/2016     Note Last Updated: 5/31/2017     Description: told at [facility] that B12 was low.  Monthly shots recommended 5/5/15.  Now on oral replacement.      • Atrial fibrillation (CMS/HCC) 06/24/2013   • Benign  "essential hypertension 06/24/2013       Current Outpatient Medications on File Prior to Visit   Medication Sig Dispense Refill   • acetaminophen (TYLENOL) 650 MG 8 hr tablet Take 650 mg by mouth Every 8 (Eight) Hours As Needed for Mild Pain .     • Cholecalciferol (VITAMIN D PO) Take 1,000 mg by mouth Every Evening.     • digoxin (LANOXIN) 125 MCG tablet TAKE ONE TABLET BY MOUTH DAILY 90 tablet 3   • furosemide (LASIX) 20 MG tablet TAKE ONE TABLET BY MOUTH DAILY (Patient taking differently: Take 20 mg by mouth Daily.) 30 tablet 2   • hydrALAZINE (APRESOLINE) 25 MG tablet TAKE ONE TABLET BY MOUTH TWICE A DAY (Patient taking differently: Take 25 mg by mouth 2 (two) times a day.) 180 tablet 1   • HYDROcodone-acetaminophen (NORCO) 5-325 MG per tablet Take 1 tablet by mouth Every 6 (Six) Hours As Needed.     • metoprolol succinate XL (TOPROL-XL) 50 MG 24 hr tablet TAKE 1 AND 1/2 TABLET BY MOUTH TWO TIMES A DAY (Patient taking differently: Take 75 mg by mouth 2 (two) times a day.) 270 tablet 2   • potassium chloride (K-DUR,KLOR-CON) 10 MEQ CR tablet TAKE ONE TABLET BY MOUTH DAILY (Patient taking differently: Take 10 mEq by mouth Daily. TAKE ONE TABLET BY MOUTH DAILY) 30 tablet 6   • vitamin B-12 (VITAMIN B-12) 1000 MCG tablet Take 1 tablet by mouth Daily. (Patient taking differently: Take 1,000 mcg by mouth Every Evening. HOLD PRIOR TO SURG)     • warfarin (COUMADIN) 1 MG tablet Take 2 mg by mouth Daily. Monday, Wednesday and Friday     • warfarin (COUMADIN) 1 MG tablet Take 1 mg by mouth Daily. Sun, Tues, Thurs, and Sat       No current facility-administered medications on file prior to visit.        Objective     /70 (BP Location: Right arm, Patient Position: Sitting, Cuff Size: Adult)   Pulse 88   Temp 96.8 °F (36 °C) (Temporal)   Ht 165.1 cm (65\")   Wt 59 kg (130 lb)   LMP  (LMP Unknown)   SpO2 95%   BMI 21.63 kg/m²     Physical Exam  Constitutional:       Appearance: She is well-developed.   HENT:      " Head: Normocephalic and atraumatic.   Pulmonary:      Effort: Pulmonary effort is normal.   Chest:      Breasts:         Right: Mass present.         Left: Mass present.      Comments: Fibrocystic changes in bilateral breast.  Right breast more prominent.   Neurological:      Mental Status: She is alert and oriented to person, place, and time.   Psychiatric:         Behavior: Behavior normal.         Assessment/Plan   Diagnoses and all orders for this visit:    1. Breast mass, right (Primary)  -     Mammo Diagnostic Bilateral With CAD; Future  -     US Breast Right Limited; Future        Discussion    Patient presents with right breast changes.  Further evaluation with diagnostic mammogram US.         Future Appointments   Date Time Provider Department Center   4/13/2021  1:30 PM Cristal Lema MD [facility]

## 2021-01-25 RX ORDER — HYDRALAZINE HYDROCHLORIDE 25 MG/1
TABLET, FILM COATED ORAL
Qty: 180 TABLET | Refills: 3 | Status: SHIPPED | OUTPATIENT
Start: 2021-01-25 | End: 2021-06-30 | Stop reason: SDUPTHER

## 2021-01-26 ENCOUNTER — ANTICOAGULATION VISIT (OUTPATIENT)
Dept: PHARMACY | Facility: HOSPITAL | Age: 86
End: 2021-01-26

## 2021-01-26 NOTE — PROGRESS NOTES
Anticoagulation Clinic Progress Note    Anticoagulation Summary  As of 2021    INR goal:  2.0-3.0   TTR:  70.5 % (2.1 y)   INR used for dosin.90 (2021)   Warfarin maintenance plan:  2 mg every Mon, Wed, Fri; 1 mg all other days   Weekly warfarin total:  10 mg   No change documented:  Daren Osman RPH   Plan last modified:  Ester Hunter RPH (2020)   Next INR check:  2021   Priority:  Maintenance   Target end date:  Indefinite    Indications    Atrial fibrillation (CMS/HCC) [I48.91]  Atrial fibrillation with RVR (CMS/HCC) (Resolved) [I48.91]             Anticoagulation Episode Summary     INR check location:      Preferred lab:      Send INR reminders to:   ABBIE ORTIZ  POOL    Comments:  lab per [facility]--they will fax to us (phone: [phone])      Anticoagulation Care Providers     Provider Role Specialty Phone number    Vinh Apple MD Referring Cardiology [phone]        Pertinent info:  INR 1.9 from 21 was not faxed to us.    INR History:  Anticoagulation Monitoring 2020   INR 1.90 2.00 1.90   INR Date 2020   INR Goal 2.0-3.0 2.0-3.0 2.0-3.0   Trend Same Same Same   Last Week Total 10 mg 10 mg 10 mg   Next Week Total 11 mg 10 mg 10 mg   Sun 1 mg 1 mg -   Mon 2 mg 2 mg -   Tue 2 mg (); Otherwise 1 mg 1 mg 1 mg   Wed 2 mg 2 mg -   Thu 1 mg 1 mg -   Fri 2 mg 2 mg -   Sat 1 mg 1 mg -   Visit Report - - -   Some recent data might be hidden       Plan:  1. INR is Subtherapeutic today- see above in Anticoagulation Summary.   Provided instructions via verbal order and faxed order to Ofelia with [facility] to Continue their warfarin regimen- see above in Anticoagulation Summary.  2. Follow up in 1 day      Daren Osman RPH

## 2021-01-27 ENCOUNTER — ANTICOAGULATION VISIT (OUTPATIENT)
Dept: PHARMACY | Facility: HOSPITAL | Age: 86
End: 2021-01-27

## 2021-01-27 LAB — INR PPP: 1.6

## 2021-01-27 NOTE — PROGRESS NOTES
Anticoagulation Clinic Progress Note    Anticoagulation Summary  As of 2021    INR goal:  2.0-3.0   TTR:  68.5 % (2.2 y)   INR used for dosin.60 (2021)   Warfarin maintenance plan:  1 mg every Sun, Tue, Thu; 2 mg all other days   Weekly warfarin total:  11 mg   Plan last modified:  Daren Osman RPH (2021)   Next INR check:  2/3/2021   Priority:  Maintenance   Target end date:  Indefinite    Indications    Atrial fibrillation (CMS/HCC) [I48.91]  Atrial fibrillation with RVR (CMS/HCC) (Resolved) [I48.91]             Anticoagulation Episode Summary     INR check location:      Preferred lab:      Send INR reminders to:   ABBIE ORTIZ  POOL    Comments:  lab per Levine, Susan. \[facility].\""--they will fax to us (phone: [phone])      Anticoagulation Care Providers     Provider Role Specialty Phone number    Vinh Apple MD Referring Cardiology [phone]          INR History:  Anticoagulation Monitoring 2020   INR 2.00 1.90 1.60   INR Date 2020   INR Goal 2.0-3.0 2.0-3.0 2.0-3.0   Trend Same Same Up   Last Week Total 10 mg 10 mg 10 mg   Next Week Total 10 mg 10 mg 12 mg   Sun 1 mg - 1 mg   Mon 2 mg - 2 mg   Tue 1 mg 1 mg 1 mg   Wed 2 mg - 3 mg ()   Thu 1 mg - 1 mg   Fri 2 mg - 2 mg   Sat 1 mg - 2 mg   Visit Report - - -   Some recent data might be hidden       Plan:  1. INR is Subtherapeutic today- see above in Anticoagulation Summary.   Provided instructions via faxed orders to [facility] to Change their warfarin regimen- see above in Anticoagulation Summary.  2. Follow up in 1 week      Daren Osman RPH

## 2021-02-04 ENCOUNTER — ANTICOAGULATION VISIT (OUTPATIENT)
Dept: PHARMACY | Facility: HOSPITAL | Age: 86
End: 2021-02-04

## 2021-02-10 ENCOUNTER — ANTICOAGULATION VISIT (OUTPATIENT)
Dept: PHARMACY | Facility: HOSPITAL | Age: 86
End: 2021-02-10

## 2021-02-10 LAB — INR PPP: 2.1

## 2021-02-10 NOTE — PROGRESS NOTES
Anticoagulation Clinic Progress Note    Anticoagulation Summary  As of 2/10/2021    INR goal:  2.0-3.0   TTR:  67.9 % (2.2 y)   INR used for dosin.10 (2/10/2021)   Warfarin maintenance plan:  1 mg every Sun, Tue, Thu; 2 mg all other days   Weekly warfarin total:  11 mg   Plan last modified:  Daren Osman [facility] (2021)   Next INR check:  2021   Priority:  Maintenance   Target end date:  Indefinite    Indications    Atrial fibrillation (CMS/HCC) [I48.91]  Atrial fibrillation with RVR (CMS/HCC) (Resolved) [I48.91]             Anticoagulation Episode Summary     INR check location:      Preferred lab:      Send INR reminders to:   ABBIE ORTIZ  POOL    Comments:  [facility] [place] [place]they will fax to us (phone: [phone])      Anticoagulation Care Providers     Provider Role Specialty Phone number    Vinh Apple MD Referring Cardiology [phone]          Clinic Interview:  No pertinent clinical findings have been reported.    INR History:  Anticoagulation Monitoring 2021 2021 2/10/2021   INR 1.60 1.97 2.10   INR Date 2021 2/3/2021 2/10/2021   INR Goal 2.0-3.0 2.0-3.0 2.0-3.0   Trend Up Same Same   Last Week Total 10 mg 11 mg 11 mg   Next Week Total 12 mg 11 mg 11 mg   Sun 1 mg 1 mg 1 mg   Mon 2 mg 2 mg 2 mg   Tue 1 mg 1 mg 1 mg   Wed 3 mg () - 2 mg   Thu 1 mg 1 mg 1 mg   Fri 2 mg 2 mg 2 mg   Sat 2 mg 2 mg 2 mg   Visit Report - - -   Some recent data might be hidden       Plan:  1. INR is therapeutic today- see above in Anticoagulation Summary.    Citlali A Clore to continue their warfarin regimen- see above in Anticoagulation Summary.  2. Follow up in 1 weeks  3. Pt has agreed to only be called if INR out of range. They have been instructed to call if any changes in medications, doses, concerns, etc.  Emma Hong [facility]

## 2021-02-11 RX ORDER — WARFARIN SODIUM 1 MG/1
TABLET ORAL
Qty: 145 TABLET | Refills: 1 | Status: SHIPPED | OUTPATIENT
Start: 2021-02-11 | End: 2021-06-30 | Stop reason: SDUPTHER

## 2021-02-17 LAB — INR PPP: 2.3

## 2021-02-18 ENCOUNTER — ANTICOAGULATION VISIT (OUTPATIENT)
Dept: PHARMACY | Facility: HOSPITAL | Age: 86
End: 2021-02-18

## 2021-02-18 RX ORDER — FUROSEMIDE 20 MG/1
TABLET ORAL
Qty: 30 TABLET | Refills: 1 | Status: SHIPPED | OUTPATIENT
Start: 2021-02-18 | End: 2021-04-21

## 2021-02-18 NOTE — PROGRESS NOTES
Anticoagulation Clinic Progress Note    Anticoagulation Summary  As of 2021    INR goal:  2.0-3.0   TTR:  68.2 % (2.2 y)   INR used for dosin.30 (2021)   Warfarin maintenance plan:  1 mg every Sun, Tue, Thu; 2 mg all other days   Weekly warfarin total:  11 mg   No change documented:  Daren Osman RPH   Plan last modified:  Daren Osman RPH (2021)   Next INR check:  3/3/2021   Priority:  Maintenance   Target end date:  Indefinite    Indications    Atrial fibrillation (CMS/HCC) [I48.91]  Atrial fibrillation with RVR (CMS/HCC) (Resolved) [I48.91]             Anticoagulation Episode Summary     INR check location:      Preferred lab:      Send INR reminders to:   ABBIE ORTIZ  POOL    Comments:  lab per [facility] - they will fax to us (phone: [phone])      Anticoagulation Care Providers     Provider Role Specialty Phone number    Vinh Apple MD Referring Cardiology [phone]          INR History:  Anticoagulation Monitoring 2021 2/10/2021 2021   INR 1.97 2.10 2.30   INR Date 2/3/2021 2/10/2021 2021   INR Goal 2.0-3.0 2.0-3.0 2.0-3.0   Trend Same Same Same   Last Week Total 11 mg 11 mg 11 mg   Next Week Total 11 mg 11 mg 11 mg   Sun 1 mg 1 mg 1 mg   Mon 2 mg 2 mg 2 mg   Tue 1 mg 1 mg 1 mg   Wed - 2 mg 2 mg   Thu 1 mg 1 mg 1 mg   Fri 2 mg 2 mg 2 mg   Sat 2 mg 2 mg 2 mg   Visit Report - - -   Some recent data might be hidden       Plan:  1. INR is Therapeutic today- see above in Anticoagulation Summary.   Provided instructions to [facility] via faxed orders to Continue their warfarin regimen- see above in Anticoagulation Summary.   2. Follow up in 2 weeks      Daren Osman RPH

## 2021-02-24 LAB — INR PPP: 2

## 2021-02-25 ENCOUNTER — ANTICOAGULATION VISIT (OUTPATIENT)
Dept: PHARMACY | Facility: HOSPITAL | Age: 86
End: 2021-02-25

## 2021-02-25 NOTE — PROGRESS NOTES
Anticoagulation Clinic Progress Note    Anticoagulation Summary  As of 2021    INR goal:  2.0-3.0   TTR:  68.4 % (2.2 y)   INR used for dosin.00 (2021)   Warfarin maintenance plan:  1 mg every Sun, Tue, Thu; 2 mg all other days   Weekly warfarin total:  11 mg   No change documented:  Daren Osman RPH   Plan last modified:  Daren Osman RPH (2021)   Next INR check:  3/10/2021   Priority:  Maintenance   Target end date:  Indefinite    Indications    Atrial fibrillation (CMS/HCC) [I48.91]  Atrial fibrillation with RVR (CMS/HCC) (Resolved) [I48.91]             Anticoagulation Episode Summary     INR check location:      Preferred lab:      Send INR reminders to:   ABBIE ORTIZ  POOL    Comments:  lab per [facility] - they will fax to us (phone: [phone])      Anticoagulation Care Providers     Provider Role Specialty Phone number    Vinh Apple MD Referring Cardiology [phone]          INR History:  Anticoagulation Monitoring 2/10/2021 2021 2021   INR 2.10 2.30 2.00   INR Date 2/10/2021 2021 2021   INR Goal 2.0-3.0 2.0-3.0 2.0-3.0   Trend Same Same Same   Last Week Total 11 mg 11 mg 11 mg   Next Week Total 11 mg 11 mg 11 mg   Sun 1 mg 1 mg 1 mg   Mon 2 mg 2 mg 2 mg   Tue 1 mg 1 mg 1 mg   Wed 2 mg 2 mg 2 mg   Thu 1 mg 1 mg 1 mg   Fri 2 mg 2 mg 2 mg   Sat 2 mg 2 mg 2 mg   Visit Report - - -   Some recent data might be hidden       Plan:  1. INR is Therapeutic today- see above in Anticoagulation Summary.   Provided instructions to [facility] to Continue their warfarin regimen- see above in Anticoagulation Summary. Faxed orders and left message by phone.   2. Follow up in 2 weeks      Daren Osman RPH

## 2021-03-10 ENCOUNTER — ANTICOAGULATION VISIT (OUTPATIENT)
Dept: PHARMACY | Facility: HOSPITAL | Age: 86
End: 2021-03-10

## 2021-03-10 LAB — INR PPP: 1.9

## 2021-03-10 NOTE — PROGRESS NOTES
Anticoagulation Clinic Progress Note    Anticoagulation Summary  As of 3/10/2021    INR goal:  2.0-3.0   TTR:  67.3 % (2.3 y)   INR used for dosin.90 (3/10/2021)   Warfarin maintenance plan:  1 mg every Sun, Tue, Thu; 2 mg all other days   Weekly warfarin total:  11 mg   Plan last modified:  Daren Osman RP (2021)   Next INR check:  3/24/2021   Priority:  Maintenance   Target end date:  Indefinite    Indications    Atrial fibrillation (CMS/HCC) [I48.91]  Atrial fibrillation with RVR (CMS/HCC) (Resolved) [I48.91]             Anticoagulation Episode Summary     INR check location:      Preferred lab:      Send INR reminders to:   ABBIE ORTIZ  POOL    Comments:  lab per [facility] - they will fax to us (phone: [phone])      Anticoagulation Care Providers     Provider Role Specialty Phone number    Vinh Apple MD Referring Cardiology [phone]            INR History:  Anticoagulation Monitoring 2021 2021 3/10/2021   INR 2.30 2.00 1.90   INR Date 2021 2021 3/10/2021   INR Goal 2.0-3.0 2.0-3.0 2.0-3.0   Trend Same Same Same   Last Week Total 11 mg 11 mg 11 mg   Next Week Total 11 mg 11 mg 12 mg   Sun 1 mg 1 mg 1 mg   Mon 2 mg 2 mg 2 mg   Tue 1 mg 1 mg 1 mg   Wed 2 mg 2 mg 3 mg (3/10); Otherwise 2 mg   Thu 1 mg 1 mg 1 mg   Fri 2 mg 2 mg 2 mg   Sat 2 mg 2 mg 2 mg   Visit Report - - -   Some recent data might be hidden       Plan:  1. INR is Subtherapeutic. Provided instructions to [facility] via faxed orders to boost dose today only then continue their warfarin regimen - see above in Anticoagulation Summary.  2. Follow up in 2 weeks    Chencho Gutierres III, PharmD  PGY1 Pharmacy Resident

## 2021-03-24 ENCOUNTER — ANTICOAGULATION VISIT (OUTPATIENT)
Dept: PHARMACY | Facility: HOSPITAL | Age: 86
End: 2021-03-24

## 2021-03-24 LAB — INR PPP: 1.6

## 2021-03-24 NOTE — PROGRESS NOTES
Anticoagulation Clinic Progress Note    Anticoagulation Summary  As of 3/24/2021    INR goal:  2.0-3.0   TTR:  66.2 % (2.3 y)   INR used for dosin.60 (3/24/2021)   Warfarin maintenance plan:  1 mg every Sun, Thu; 2 mg all other days   Weekly warfarin total:  12 mg   Plan last modified:  Daren Osman RPH (3/24/2021)   Next INR check:  3/31/2021   Priority:  Maintenance   Target end date:  Indefinite    Indications    Atrial fibrillation (CMS/HCC) [I48.91]  Atrial fibrillation with RVR (CMS/HCC) (Resolved) [I48.91]             Anticoagulation Episode Summary     INR check location:      Preferred lab:      Send INR reminders to:   ABBIE ORTIZ  POOL    Comments:  lab per [facility] - they will fax to us (phone: [phone])      Anticoagulation Care Providers     Provider Role Specialty Phone number    Vinh Apple MD Referring Cardiology [phone]          INR History:  Anticoagulation Monitoring 2021 3/10/2021 3/24/2021   INR 2.00 1.90 1.60   INR Date 2021 3/10/2021 3/24/2021   INR Goal 2.0-3.0 2.0-3.0 2.0-3.0   Trend Same Same Up   Last Week Total 11 mg 11 mg 11 mg   Next Week Total 11 mg 12 mg 13 mg   Sun 1 mg 1 mg 1 mg   Mon 2 mg 2 mg 2 mg   Tue 1 mg 1 mg 2 mg   Wed 2 mg 3 mg (3/10); Otherwise 2 mg 3 mg (3/24)   Thu 1 mg 1 mg 1 mg   Fri 2 mg 2 mg 2 mg   Sat 2 mg 2 mg 2 mg   Visit Report - - -   Some recent data might be hidden       Plan:  1. INR is Subtherapeutic today- see above in Anticoagulation Summary.   Provided instructions to Ofelia with [facility] to Change their warfarin regimen- see above in Anticoagulation Summary. Also faxed orders.  2. Follow up in 1 week      Daren Osman RPH

## 2021-03-31 ENCOUNTER — ANTICOAGULATION VISIT (OUTPATIENT)
Dept: PHARMACY | Facility: HOSPITAL | Age: 86
End: 2021-03-31

## 2021-03-31 LAB — INR PPP: 1.7

## 2021-03-31 NOTE — PROGRESS NOTES
Anticoagulation Clinic Progress Note    Anticoagulation Summary  As of 3/31/2021    INR goal:  2.0-3.0   TTR:  65.6 % (2.3 y)   INR used for dosin.70 (3/31/2021)   Warfarin maintenance plan:  1 mg every Sun; 2 mg all other days   Weekly warfarin total:  13 mg   Plan last modified:  Aisha Salazar RPH (3/31/2021)   Next INR check:  2021   Priority:  Maintenance   Target end date:  Indefinite    Indications    Atrial fibrillation (CMS/HCC) [I48.91]  Atrial fibrillation with RVR (CMS/HCC) (Resolved) [I48.91]             Anticoagulation Episode Summary     INR check location:      Preferred lab:      Send INR reminders to:  GLENNY ORTIZ  POOL    Comments:  lab per [facility] - they will fax to us (phone: [phone])      Anticoagulation Care Providers     Provider Role Specialty Phone number    Vinh Apple MD Referring Cardiology [phone]          INR History:  Anticoagulation Monitoring 3/10/2021 3/24/2021 3/31/2021   INR 1.90 1.60 1.70   INR Date 3/10/2021 3/24/2021 3/31/2021   INR Goal 2.0-3.0 2.0-3.0 2.0-3.0   Trend Same Up Up   Last Week Total 11 mg 11 mg 13 mg   Next Week Total 12 mg 13 mg 13 mg   Sun 1 mg 1 mg 1 mg   Mon 2 mg 2 mg 2 mg   Tue 1 mg 2 mg 2 mg   Wed 3 mg (3/10); Otherwise 2 mg 3 mg (3/24) 2 mg   Thu 1 mg 1 mg 2 mg   Fri 2 mg 2 mg 2 mg   Sat 2 mg 2 mg 2 mg   Visit Report - - -   Some recent data might be hidden       Plan:  1. INR is Subtherapeutic today- see above in Anticoagulation Summary.   Will instruct Citlali Solorio to Increase their warfarin regimen- see above in Anticoagulation Summary.  2. Follow up in 1 week  3. Spoke with Ofelia at [facility] and faxed orders also. They have been instructed to call if any changes in medications, doses, concerns, etc.   Aisha Salazar RPH

## 2021-04-07 ENCOUNTER — ANTICOAGULATION VISIT (OUTPATIENT)
Dept: PHARMACY | Facility: HOSPITAL | Age: 86
End: 2021-04-07

## 2021-04-07 LAB — INR PPP: 1.9

## 2021-04-07 NOTE — PROGRESS NOTES
Anticoagulation Clinic Progress Note    Anticoagulation Summary  As of 2021    INR goal:  2.0-3.0   TTR:  65.1 % (2.4 y)   INR used for dosin.90 (2021)   Warfarin maintenance plan:  1 mg every Sun; 2 mg all other days   Weekly warfarin total:  13 mg   Plan last modified:  Aisha Salazar RPH (3/31/2021)   Next INR check:  2021   Priority:  Maintenance   Target end date:  Indefinite    Indications    Atrial fibrillation (CMS/HCC) [I48.91]  Atrial fibrillation with RVR (CMS/HCC) (Resolved) [I48.91]             Anticoagulation Episode Summary     INR check location:      Preferred lab:      Send INR reminders to:  GLENNY ORTIZ  POOL    Comments:  lab per [facility] - they will fax to us (phone: [phone])      Anticoagulation Care Providers     Provider Role Specialty Phone number    Vinh Apple MD Referring Cardiology [phone]        Clinic Interview:  Patient Findings     Negatives:  Signs/symptoms of thrombosis, Signs/symptoms of bleeding,   Laboratory test error suspected, Change in health, Change in alcohol use,   Change in activity, Upcoming invasive procedure, Emergency department   visit, Upcoming dental procedure, Missed doses, Extra doses, Change in   medications, Change in diet/appetite, Hospital admission, Bruising, Other   complaints    Comments:  Per NILTON Bates      Clinical Outcomes     Negatives:  Major bleeding event, Thromboembolic event,   Anticoagulation-related hospital admission, Anticoagulation-related ED   visit, Anticoagulation-related fatality    Comments:  Per NILTON Bates      INR History:  Anticoagulation Monitoring 3/24/2021 3/31/2021 2021   INR 1.60 1.70 1.90   INR Date 3/24/2021 3/31/2021 2021   INR Goal 2.0-3.0 2.0-3.0 2.0-3.0   Trend Up Up Same   Last Week Total 11 mg 13 mg 13 mg   Next Week Total 13 mg 13 mg 14 mg   Sun 1 mg 1 mg 1 mg   Mon 2 mg 2 mg 2 mg   Tue 2 mg 2 mg 2 mg   Wed 3 mg (3/24) 2 mg 3 mg ()   Thu 1 mg 2 mg 2 mg     Fri 2 mg 2 mg 2 mg   Sat 2 mg 2 mg 2 mg   Visit Report - - -   Some recent data might be hidden     Plan:  1. INR is Subtherapeutic today- see above in Anticoagulation Summary.   Will instruct Citlali Solorio to Change their warfarin regimen- see above in Anticoagulation Summary.  Plan for 3mg total dose today and then continue current regimen.    2. Follow up in 1 week  3. Spoke with NILTON Bates at [facility] and faxed orders also.  They have been instructed to call if any changes in medications, doses, concerns, etc.   Matti Bonilla, TamelaD

## 2021-04-13 ENCOUNTER — OFFICE VISIT (OUTPATIENT)
Dept: INTERNAL MEDICINE | Facility: CLINIC | Age: 86
End: 2021-04-13

## 2021-04-13 VITALS
OXYGEN SATURATION: 98 % | HEIGHT: 65 IN | HEART RATE: 72 BPM | WEIGHT: 130 LBS | BODY MASS INDEX: 21.66 KG/M2 | DIASTOLIC BLOOD PRESSURE: 80 MMHG | SYSTOLIC BLOOD PRESSURE: 138 MMHG

## 2021-04-13 DIAGNOSIS — Z00.00 MEDICARE ANNUAL WELLNESS VISIT, SUBSEQUENT: Primary | ICD-10-CM

## 2021-04-13 DIAGNOSIS — M80.00XG AGE-RELATED OSTEOPOROSIS WITH CURRENT PATHOLOGICAL FRACTURE WITH DELAYED HEALING: ICD-10-CM

## 2021-04-13 DIAGNOSIS — E78.5 HYPERLIPIDEMIA, UNSPECIFIED HYPERLIPIDEMIA TYPE: ICD-10-CM

## 2021-04-13 DIAGNOSIS — G23.1 PROGRESSIVE SUPRANUCLEAR PALSY (HCC): ICD-10-CM

## 2021-04-13 DIAGNOSIS — I48.91 ATRIAL FIBRILLATION, UNSPECIFIED TYPE (HCC): ICD-10-CM

## 2021-04-13 DIAGNOSIS — I50.32 CHRONIC HEART FAILURE WITH PRESERVED EJECTION FRACTION (HCC): ICD-10-CM

## 2021-04-13 PROBLEM — Z85.820 HISTORY OF MELANOMA: Status: ACTIVE | Noted: 2021-04-13

## 2021-04-13 PROCEDURE — G0439 PPPS, SUBSEQ VISIT: HCPCS | Performed by: INTERNAL MEDICINE

## 2021-04-13 PROCEDURE — 99213 OFFICE O/P EST LOW 20 MIN: CPT | Performed by: INTERNAL MEDICINE

## 2021-04-13 NOTE — PATIENT INSTRUCTIONS
Zoledronic Acid Injection (Paget's Disease, Osteoporosis)  What is this medicine?  ZOLEDRONIC ACID (PAMELA le doug ik AS id) slows calcium loss from bones. It treats Paget's disease and osteoporosis. It may be used in other people at risk for bone loss.  This medicine may be used for other purposes; ask your health care provider or pharmacist if you have questions.  COMMON BRAND NAME(S): Reclast, Zometa  What should I tell my health care provider before I take this medicine?  They need to know if you have any of these conditions:  · bleeding disorder  · cancer  · dental disease  · kidney disease  · low levels of calcium in the blood  · low red blood cell counts  · lung or breathing disease (asthma)  · receiving steroids like dexamethasone or prednisone  · an unusual or allergic reaction to zoledronic acid, other medicines, foods, dyes, or preservatives  · pregnant or trying to get pregnant  · breast-feeding  How should I use this medicine?  This drug is injected into a vein. It is given by a health care provider in a hospital or clinic setting.  A special MedGuide will be given to you before each treatment. Be sure to read this information carefully each time.  Talk to your health care provider about the use of this drug in children. Special care may be needed.  Overdosage: If you think you have taken too much of this medicine contact a poison control center or emergency room at once.  NOTE: This medicine is only for you. Do not share this medicine with others.  What if I miss a dose?  Keep appointments for follow-up doses. It is important not to miss your dose. Call your health care provider if you are unable to keep an appointment.  What may interact with this medicine?  · certain antibiotics given by injection  · NSAIDs, medicines for pain and inflammation, like ibuprofen or naproxen  · some diuretics like bumetanide, furosemide  · teriparatide  This list may not describe all possible interactions. Give your health  care provider a list of all the medicines, herbs, non-prescription drugs, or dietary supplements you use. Also tell them if you smoke, drink alcohol, or use illegal drugs. Some items may interact with your medicine.  What should I watch for while using this medicine?  Visit your health care provider for regular checks on your progress. It may be some time before you see the benefit from this drug.  Some people who take this drug have severe bone, joint, or muscle pain. This drug may also increase your risk for jaw problems or a broken thigh bone. Tell your health care provider right away if you have severe pain in your jaw, bones, joints, or muscles. Tell you health care provider if you have any pain that does not go away or that gets worse.  You should make sure you get enough calcium and vitamin D while you are taking this drug. Discuss the foods you eat and the vitamins you take with your health care provider.  You may need blood work done while you are taking this drug.  Tell your dentist and dental surgeon that you are taking this drug. You should not have major dental surgery while on this drug. See your dentist to have a dental exam and fix any dental problems before starting this drug. Take good care of your teeth while on this drug. Make sure you see your dentist for regular follow-up appointments.  What side effects may I notice from receiving this medicine?  Side effects that you should report to your doctor or health care provider as soon as possible:  · allergic reactions (skin rash, itching or hives; swelling of the face, lips, or tongue)  · bone pain  · infection (fever, chills, cough, sore throat, pain or trouble passing urine)  · jaw pain, especially after dental work  · joint pain  · kidney injury (trouble passing urine or change in the amount of urine)  · low calcium levels (fast heartbeat; muscle cramps or pain; pain, tingling, or numbness in the hands or feet; seizures)  · low red blood cell  counts (trouble breathing; feeling faint; lightheaded, falls; unusually weak or tired)  · muscle pain  · palpitations  · redness, blistering, peeling, or loosening of the skin, including inside the mouth  Side effects that usually do not require medical attention (report to your doctor or health care provider if they continue or are bothersome):  · diarrhea  · eye irritation, itching, or pain  · fever  · general ill feeling or flu-like symptoms  · headache  · increase in blood pressure  · nausea  · pain, redness, or irritation at site where injected  · stomach pain  · upset stomach  This list may not describe all possible side effects. Call your doctor for medical advice about side effects. You may report side effects to FDA at 6-898-CWT-6097.  Where should I keep my medicine?  This drug is given in a hospital or clinic. It will not be stored at home.  NOTE: This sheet is a summary. It may not cover all possible information. If you have questions about this medicine, talk to your doctor, pharmacist, or health care provider.  © 2021 Elsevier/Gold Standard (2020-10-05 11:46:18)      Fall Prevention in the Home, Adult  Falls can cause injuries and can affect people from all age groups. There are many simple things that you can do to make your home safe and to help prevent falls. Ask for help when making these changes, if needed.  What actions can I take to prevent falls?  General instructions  · Use good lighting in all rooms. Replace any light bulbs that burn out.  · Turn on lights if it is dark. Use night-lights.  · Place frequently used items in easy-to-reach places. Lower the shelves around your home if necessary.  · Set up furniture so that there are clear paths around it. Avoid moving your furniture around.  · Remove throw rugs and other tripping hazards from the floor.  · Avoid walking on wet floors.  · Fix any uneven floor surfaces.  · Add color or contrast paint or tape to grab bars and handrails in your home.  Place contrasting color strips on the first and last steps of stairways.  · When you use a stepladder, make sure that it is completely opened and that the sides are firmly locked. Have someone hold the ladder while you are using it. Do not climb a closed stepladder.  · Be aware of any and all pets.  What can I do in the bathroom?         · Keep the floor dry. Immediately clean up any water that spills onto the floor.  · Remove soap buildup in the tub or shower on a regular basis.  · Use non-skid mats or decals on the floor of the tub or shower.  · Attach bath mats securely with double-sided, non-slip rug tape.  · If you need to sit down while you are in the shower, use a plastic, non-slip stool.  · Install grab bars by the toilet and in the tub and shower. Do not use towel bars as grab bars.  What can I do in the bedroom?  · Make sure that a bedside light is easy to reach.  · Do not use oversized bedding that drapes onto the floor.  · Have a firm chair that has side arms to use for getting dressed.  What can I do in the kitchen?  · Clean up any spills right away.  · If you need to reach for something above you, use a sturdy step stool that has a grab bar.  · Keep electrical cables out of the way.  · Do not use floor polish or wax that makes floors slippery. If you must use wax, make sure that it is non-skid floor wax.  What can I do in the stairways?  · Do not leave any items on the stairs.  · Make sure that you have a light switch at the top of the stairs and the bottom of the stairs. Have them installed if you do not have them.  · Make sure that there are handrails on both sides of the stairs. Fix handrails that are broken or loose. Make sure that handrails are as long as the stairways.  · Install non-slip stair treads on all stairs in your home.  · Avoid having throw rugs at the top or bottom of stairways, or secure the rugs with carpet tape to prevent them from moving.  · Choose a carpet design that does not  hide the edge of steps on the stairway.  · Check any carpeting to make sure that it is firmly attached to the stairs. Fix any carpet that is loose or worn.  What can I do on the outside of my home?  · Use bright outdoor lighting.  · Regularly repair the edges of walkways and driveways and fix any cracks.  · Remove high doorway thresholds.  · Trim any shrubbery on the main path into your home.  · Regularly check that handrails are securely fastened and in good repair. Both sides of any steps should have handrails.  · Install guardrails along the edges of any raised decks or porches.  · Clear walkways of debris and clutter, including tools and rocks.  · Have leaves, snow, and ice cleared regularly.  · Use sand or salt on walkways during winter months.  · In the garage, clean up any spills right away, including grease or oil spills.  What other actions can I take?  · Wear closed-toe shoes that fit well and support your feet. Wear shoes that have rubber soles or low heels.  · Use mobility aids as needed, such as canes, walkers, scooters, and crutches.  · Review your medicines with your health care provider. Some medicines can cause dizziness or changes in blood pressure, which increase your risk of falling.  Talk with your health care provider about other ways that you can decrease your risk of falls. This may include working with a physical therapist or  to improve your strength, balance, and endurance.  Where to find more information  · Centers for Disease Control and Prevention, [place]: https://www.cdc.gov  · National [place] on Aging: https://rt4zxtu.john.nih.gov  Contact a health care provider if:  · You are afraid of falling at home.  · You feel weak, drowsy, or dizzy at home.  · You fall at home.  Summary  · There are many simple things that you can do to make your home safe and to help prevent falls.  · Ways to make your home safe include removing tripping hazards and installing grab bars in the  bathroom.  · Ask for help when making these changes in your home.  This information is not intended to replace advice given to you by your health care provider. Make sure you discuss any questions you have with your health care provider.  Document Revised: 2018 Document Reviewed: 2018  [employer] Patient Education ©  [employer]vier [employer]    Medicare Wellness  Personal Prevention Plan of Service     Date of Office Visit:  2021  Encounter Provider:  Cristal Lema MD  Place of Service:  [facility]  Patient Name: Citlali Solorio  :  1935    As part of the Medicare Wellness portion of your visit today, we are providing you with this personalized preventive plan of services (PPPS). This plan is based upon recommendations of the [place] Preventive Services Task Force (USPSTF) and the Advisory Committee on Immunization Practices (ACIP).    This lists the preventive care services that should be considered, and provides dates of when you are due. Items listed as completed are up-to-date and do not require any further intervention.    Health Maintenance   Topic Date Due   • ZOSTER VACCINE (2 of 3) 2009   • ANNUAL WELLNESS VISIT  2020   • LIPID PANEL  2021   • INFLUENZA VACCINE  2021   • DXA SCAN  10/02/2021   • COLONOSCOPY  2022   • MAMMOGRAM  2022   • TDAP/TD VACCINES (2 - Td) 2023   • COVID-19 Vaccine  Completed   • Pneumococcal Vaccine 65+  Completed       Orders Placed This Encounter   Procedures   • Comprehensive Metabolic Panel     Order Specific Question:   Release to patient     Answer:   Immediate   • Lipid Panel   • TSH Rfx On Abnormal To Free T4     Order Specific Question:   Release to patient     Answer:   Immediate   • Digoxin Level     Order Specific Question:   Release to patient     Answer:   Immediate   • CBC & Differential     Order Specific Question:   Manual Differential     Answer:   No       Return in  about 1 year (around 4/13/2022) for Medicare Wellness.

## 2021-04-13 NOTE — PROGRESS NOTES
The ABCs of the Annual Wellness Visit  Subsequent Medicare Wellness Visit    Chief Complaint   Patient presents with   • Medicare Wellness-subsequent       Subjective   History of Present Illness:  Citlali Solorio is a 85 y.o. female who presents for a Subsequent Medicare Wellness Visit.    HF/afib.  Breathing well.   Anticoagulated with coumadin.    HLD.  Due check of labs.  PSP.  Symptoms have progressed.  Followed by neurology at .    OP.  By virtue of fracture.  I think she is a good Reclast candidate.  She has dental work to get done first.  I reviewed treatments for bone density with the patient.  We discussed the  bisphosphonate class of drugs.  I reviewed the side effects which include esophagitis, rare atypical fracture and rare jaw osteonecrosis.  The benefit is a decrease in the risk for fracture.      HEALTH RISK ASSESSMENT    Recent Hospitalizations:  No hospitalization(s) within the last year.    Current Medical Providers:  Patient Care Team:  Cristal Lema MD as PCP - General (Internal Medicine)    Smoking Status:  Social History     Tobacco Use   Smoking Status Former Smoker   • Packs/day: 0.50   • Years: 15.00   • Pack years: 7.50   • Types: Cigarettes   • Quit date:    • Years since quittin.3   Smokeless Tobacco Never Used       Alcohol Consumption:  Social History     Substance and Sexual Activity   Alcohol Use Yes    Comment: Rare       Depression Screen:   PHQ-2/PHQ-9 Depression Screening 2021   Little interest or pleasure in doing things 0   Feeling down, depressed, or hopeless 0   Trouble falling or staying asleep, or sleeping too much -   Feeling tired or having little energy -   Poor appetite or overeating -   Feeling bad about yourself - or that you are a failure or have let yourself or your family down -   Trouble concentrating on things, such as reading the newspaper or watching television -   Moving or speaking so slowly that other people could have noticed. Or the  opposite - being so fidgety or restless that you have been moving around a lot more than usual -   Thoughts that you would be better off dead, or of hurting yourself in some way -   Total Score 0   If you checked off any problems, how difficult have these problems made it for you to do your work, take care of things at home, or get along with other people? -       Fall Risk Screen:  GONZALEZ Fall Risk Assessment was completed, and patient is at MODERATE risk for falls. Assessment completed on:4/13/2021    Health Habits and Functional and Cognitive Screening:  Functional & Cognitive Status 4/13/2021   Do you have difficulty preparing food and eating? Yes   Do you have difficulty bathing yourself, getting dressed or grooming yourself? Yes   Do you have difficulty using the toilet? Yes   Do you have difficulty moving around from place to place? Yes   Do you have trouble with steps or getting out of a bed or a chair? Yes   Current Diet Well Balanced Diet   Dental Exam Up to date   Eye Exam Up to date   Exercise (times per week) 0 times per week   Current Exercise Activities Include None   Do you need help using the phone?  No   Are you deaf or do you have serious difficulty hearing?  Yes   Do you need help with transportation? Yes   Do you need help shopping? Yes   Do you need help preparing meals?  Yes   Do you need help with housework?  Yes   Do you need help with laundry? Yes   Do you need help taking your medications? Yes   Do you need help managing money? Yes   Do you ever drive or ride in a car without wearing a seat belt? No   Have you felt unusual stress, anger or loneliness in the last month? No   Who do you live with? Community   If you need help, do you have trouble finding someone available to you? No   Have you been bothered in the last four weeks by sexual problems? No   Do you have difficulty concentrating, remembering or making decisions? No         Does the patient have evidence of cognitive impairment?  No    Asprin use counseling:Does not need ASA (and currently is not on it)    Age-appropriate Screening Schedule:  Refer to the list below for future screening recommendations based on patient's age, sex and/or medical conditions. Orders for these recommended tests are listed in the plan section. The patient has been provided with a written plan.    Health Maintenance   Topic Date Due   • ZOSTER VACCINE (2 of 3) 11/30/2009   • INFLUENZA VACCINE  08/01/2021   • DXA SCAN  10/02/2021   • LIPID PANEL  04/13/2022   • COLONOSCOPY  04/26/2022   • MAMMOGRAM  06/23/2022   • TDAP/TD VACCINES (2 - Td) 01/01/2023          The following portions of the patient's history were reviewed and updated as appropriate: allergies, current medications, past family history, past medical history, past social history, past surgical history and problem list.    Outpatient Medications Prior to Visit   Medication Sig Dispense Refill   • acetaminophen (TYLENOL) 650 MG 8 hr tablet Take 650 mg by mouth Every 8 (Eight) Hours As Needed for Mild Pain .     • Cholecalciferol (VITAMIN D PO) Take 1,000 mg by mouth Every Evening.     • digoxin (LANOXIN) 125 MCG tablet TAKE ONE TABLET BY MOUTH DAILY 90 tablet 3   • furosemide (LASIX) 20 MG tablet TAKE ONE TABLET BY MOUTH DAILY 30 tablet 1   • hydrALAZINE (APRESOLINE) 25 MG tablet TAKE ONE TABLET BY MOUTH TWICE A  tablet 3   • metoprolol succinate XL (TOPROL-XL) 50 MG 24 hr tablet TAKE 1 AND 1/2 TABLET BY MOUTH TWO TIMES A DAY (Patient taking differently: Take 75 mg by mouth 2 (two) times a day.) 270 tablet 2   • potassium chloride (K-DUR,KLOR-CON) 10 MEQ CR tablet TAKE ONE TABLET BY MOUTH DAILY 90 tablet 3   • vitamin B-12 (VITAMIN B-12) 1000 MCG tablet Take 1 tablet by mouth Daily. (Patient taking differently: Take 1,000 mcg by mouth Every Evening. HOLD PRIOR TO SURG)     • warfarin (COUMADIN) 1 MG tablet Take one tablet (1 mg) by mouth Sun, Tues, and Thurs, and take two tablets (2 mg) by mouth  "all other days or as directed. 145 tablet 1   • HYDROcodone-acetaminophen (NORCO) 5-325 MG per tablet Take 1 tablet by mouth Every 6 (Six) Hours As Needed.       No facility-administered medications prior to visit.       Patient Active Problem List   Diagnosis   • Progressive supranuclear palsy (CMS/HCC)   • Carotid artery plaque, bilateral   • Heart failure (CMS/HCC)   • Gastroesophageal reflux disease   • Hyperlipidemia   • Spinal stenosis of lumbar region   • Cobalamin deficiency   • Atrial fibrillation (CMS/HCC)   • Benign essential hypertension   • Mitral valve insufficiency   • Tricuspid valve insufficiency   • Tear of medial meniscus of right knee, current   • Heart failure with preserved ejection fraction (CMS/HCC)   • Compression fracture of L1 vertebra with delayed healing   • Age-related osteoporosis with current pathological fracture with delayed healing   • History of melanoma       Advanced Care Planning:  ACP discussion was held with the patient during this visit. Patient does not have an advance directive, information provided.    Review of Systems   Constitutional: Negative for fever.   Respiratory: Negative.    Cardiovascular: Negative.        Compared to one year ago, the patient feels her physical health is the same.  Compared to one year ago, the patient feels her mental health is the same.    Reviewed chart for potential of high risk medication in the elderly: yes  Reviewed chart for potential of harmful drug interactions in the elderly:yes    Objective         Vitals:    04/13/21 1332   BP: 138/80   Pulse: 72   SpO2: 98%   Weight: 59 kg (130 lb)   Height: 165.1 cm (65\")   PainSc: 0-No pain       Body mass index is 21.63 kg/m².  Discussed the patient's BMI with her. The BMI is in the acceptable range.    Physical Exam  Constitutional:       Appearance: She is well-developed.   HENT:      Head: Normocephalic and atraumatic.      Right Ear: Hearing, tympanic membrane and external ear normal.      " Left Ear: Hearing, tympanic membrane and external ear normal.      Nose: Nose normal.   Neck:      Thyroid: No thyromegaly.   Cardiovascular:      Rate and Rhythm: Normal rate and regular rhythm.      Heart sounds: Normal heart sounds. No murmur heard.     Pulmonary:      Effort: Pulmonary effort is normal.      Breath sounds: Normal breath sounds.   Abdominal:      General: There is no distension.      Palpations: Abdomen is soft.      Tenderness: There is no abdominal tenderness.   Musculoskeletal:      Cervical back: Neck supple.   Lymphadenopathy:      Cervical: No cervical adenopathy.   Skin:     General: Skin is warm and dry.   Neurological:      Mental Status: She is alert and oriented to person, place, and time.   Psychiatric:         Speech: Speech normal.         Behavior: Behavior normal.         Thought Content: Thought content normal.         Judgment: Judgment normal.         Lab Results   Component Value Date    GLU 94 04/13/2021    CHLPL 139 04/13/2021    TRIG 145 04/13/2021    HDL 45 04/13/2021    LDL 69 04/13/2021    VLDL 25 04/13/2021        Assessment/Plan   Medicare Risks and Personalized Health Plan  CMS Preventative Services Quick Reference  Dementia/Memory   Fall Risk  Osteoprorosis Risk    The above risks/problems have been discussed with the patient.  Pertinent information has been shared with the patient in the After Visit Summary.  Follow up plans and orders are seen below in the Assessment/Plan Section.    Diagnoses and all orders for this visit:    1. Medicare annual wellness visit, subsequent (Primary)    2. Chronic heart failure with preserved ejection fraction (CMS/HCC)  -     CBC & Differential  -     Comprehensive Metabolic Panel  -     Lipid Panel  -     TSH Rfx On Abnormal To Free T4  -     Digoxin Level    3. Hyperlipidemia, unspecified hyperlipidemia type  -     CBC & Differential  -     Comprehensive Metabolic Panel  -     Lipid Panel  -     TSH Rfx On Abnormal To Free T4  -      Digoxin Level    4. Age-related osteoporosis with current pathological fracture with delayed healing  -     Cancel: Ambulatory Referral to [facility] For Infusion Treatment    5. Progressive supranuclear palsy (CMS/HCC)    6. Atrial fibrillation, unspecified type (CMS/HCC)    HF/afib.  The patient is advised to continue current dosage of metoprolol, coumadin, digoxin.  HLD.  Continue healthy diet.  OP.  When done with dental work, call to set up Reclast.    PSP.  Continue with neurology.      Follow Up:  Return in about 1 year (around 4/13/2022) for Medicare Wellness.     An After Visit Summary and PPPS were given to the patient.

## 2021-04-14 LAB
ALBUMIN SERPL-MCNC: 4.1 G/DL (ref 3.5–5.2)
ALBUMIN/GLOB SERPL: 1.6 G/DL
ALP SERPL-CCNC: 57 U/L (ref 39–117)
ALT SERPL-CCNC: 11 U/L (ref 1–33)
AST SERPL-CCNC: 19 U/L (ref 1–32)
BASOPHILS # BLD AUTO: 0.06 10*3/MM3 (ref 0–0.2)
BASOPHILS NFR BLD AUTO: 1 % (ref 0–1.5)
BILIRUB SERPL-MCNC: 0.7 MG/DL (ref 0–1.2)
BUN SERPL-MCNC: 18 MG/DL (ref 8–23)
BUN/CREAT SERPL: 19.8 (ref 7–25)
CALCIUM SERPL-MCNC: 9.6 MG/DL (ref 8.6–10.5)
CHLORIDE SERPL-SCNC: 101 MMOL/L (ref 98–107)
CHOLEST SERPL-MCNC: 139 MG/DL (ref 0–200)
CO2 SERPL-SCNC: 28.6 MMOL/L (ref 22–29)
CREAT SERPL-MCNC: 0.91 MG/DL (ref 0.57–1)
DIGOXIN SERPL-MCNC: 0.6 NG/ML (ref 0.6–1.2)
EOSINOPHIL # BLD AUTO: 0.14 10*3/MM3 (ref 0–0.4)
EOSINOPHIL NFR BLD AUTO: 2.2 % (ref 0.3–6.2)
ERYTHROCYTE [DISTWIDTH] IN BLOOD BY AUTOMATED COUNT: 12.2 % (ref 12.3–15.4)
GLOBULIN SER CALC-MCNC: 2.5 GM/DL
GLUCOSE SERPL-MCNC: 94 MG/DL (ref 65–99)
HCT VFR BLD AUTO: 41.6 % (ref 34–46.6)
HDLC SERPL-MCNC: 45 MG/DL (ref 40–60)
HGB BLD-MCNC: 13.9 G/DL (ref 12–15.9)
IMM GRANULOCYTES # BLD AUTO: 0.01 10*3/MM3 (ref 0–0.05)
IMM GRANULOCYTES NFR BLD AUTO: 0.2 % (ref 0–0.5)
INR PPP: 2
LDLC SERPL CALC-MCNC: 69 MG/DL (ref 0–100)
LYMPHOCYTES # BLD AUTO: 1.41 10*3/MM3 (ref 0.7–3.1)
LYMPHOCYTES NFR BLD AUTO: 22.4 % (ref 19.6–45.3)
MCH RBC QN AUTO: 31.7 PG (ref 26.6–33)
MCHC RBC AUTO-ENTMCNC: 33.4 G/DL (ref 31.5–35.7)
MCV RBC AUTO: 94.8 FL (ref 79–97)
MONOCYTES # BLD AUTO: 0.63 10*3/MM3 (ref 0.1–0.9)
MONOCYTES NFR BLD AUTO: 10 % (ref 5–12)
NEUTROPHILS # BLD AUTO: 4.04 10*3/MM3 (ref 1.7–7)
NEUTROPHILS NFR BLD AUTO: 64.2 % (ref 42.7–76)
NRBC BLD AUTO-RTO: 0 /100 WBC (ref 0–0.2)
PLATELET # BLD AUTO: 237 10*3/MM3 (ref 140–450)
POTASSIUM SERPL-SCNC: 4.2 MMOL/L (ref 3.5–5.2)
PROT SERPL-MCNC: 6.6 G/DL (ref 6–8.5)
RBC # BLD AUTO: 4.39 10*6/MM3 (ref 3.77–5.28)
SODIUM SERPL-SCNC: 138 MMOL/L (ref 136–145)
TRIGL SERPL-MCNC: 145 MG/DL (ref 0–150)
TSH SERPL DL<=0.005 MIU/L-ACNC: 2.29 UIU/ML (ref 0.27–4.2)
VLDLC SERPL CALC-MCNC: 25 MG/DL (ref 5–40)
WBC # BLD AUTO: 6.29 10*3/MM3 (ref 3.4–10.8)

## 2021-04-15 ENCOUNTER — ANTICOAGULATION VISIT (OUTPATIENT)
Dept: PHARMACY | Facility: HOSPITAL | Age: 86
End: 2021-04-15

## 2021-04-15 NOTE — PROGRESS NOTES
Anticoagulation Clinic Progress Note    Anticoagulation Summary  As of 4/15/2021    INR goal:  2.0-3.0   TTR:  64.6 % (2.4 y)   INR used for dosin.00 (2021)   Warfarin maintenance plan:  2 mg every day   Weekly warfarin total:  14 mg   Plan last modified:  Daren Osman RPH (4/15/2021)   Next INR check:  2021   Priority:  Maintenance   Target end date:  Indefinite    Indications    Atrial fibrillation (CMS/HCC) [I48.91]  Atrial fibrillation with RVR (CMS/HCC) (Resolved) [I48.91]             Anticoagulation Episode Summary     INR check location:      Preferred lab:      Send INR reminders to:   ABBIE ORTIZ  POOL    Comments:  lab per [facility] - they will fax to us (phone: [phone])      Anticoagulation Care Providers     Provider Role Specialty Phone number    Vinh Apple MD Referring Cardiology [phone]          INR History:  Anticoagulation Monitoring 3/31/2021 2021 4/15/2021   INR 1.70 1.90 2.00   INR Date 3/31/2021 2021 2021   INR Goal 2.0-3.0 2.0-3.0 2.0-3.0   Trend Up Same Up   Last Week Total 13 mg 13 mg 13 mg   Next Week Total 13 mg 14 mg 14 mg   Sun 1 mg 1 mg 2 mg   Mon 2 mg 2 mg 2 mg   Tue 2 mg 2 mg 2 mg   Wed 2 mg 3 mg () -   Thu 2 mg 2 mg 2 mg   Fri 2 mg 2 mg 2 mg   Sat 2 mg 2 mg 2 mg   Visit Report - - -   Some recent data might be hidden       Plan:  1. INR is Therapeutic today- see above in Anticoagulation Summary.   Provided instructions to Rico with [facility] to Increase their warfarin regimen- see above in Anticoagulation Summary. Also faxed orders.   2. Follow up in 1 week      Daren Osman RPH

## 2021-04-21 RX ORDER — FUROSEMIDE 20 MG/1
TABLET ORAL
Qty: 30 TABLET | Refills: 0 | Status: SHIPPED | OUTPATIENT
Start: 2021-04-21 | End: 2021-05-20

## 2021-05-20 RX ORDER — FUROSEMIDE 20 MG/1
20 TABLET ORAL DAILY
Qty: 30 TABLET | Refills: 0 | Status: SHIPPED | OUTPATIENT
Start: 2021-05-20 | End: 2021-06-17

## 2021-05-24 RX ORDER — METOPROLOL SUCCINATE 50 MG/1
TABLET, EXTENDED RELEASE ORAL
Qty: 270 TABLET | Refills: 3 | Status: SHIPPED | OUTPATIENT
Start: 2021-05-24 | End: 2021-06-30 | Stop reason: SDUPTHER

## 2021-05-26 ENCOUNTER — ANTICOAGULATION VISIT (OUTPATIENT)
Dept: PHARMACY | Facility: HOSPITAL | Age: 86
End: 2021-05-26

## 2021-05-26 LAB — INR PPP: 1.7

## 2021-05-26 NOTE — PROGRESS NOTES
Anticoagulation Clinic Progress Note    Anticoagulation Summary  As of 2021    INR goal:  2.0-3.0   TTR:  61.6 % (2.5 y)   INR used for dosin.70 (2021)   Warfarin maintenance plan:  2 mg every day   Weekly warfarin total:  14 mg   Plan last modified:  Daren Osman RPH (4/15/2021)   Next INR check:     Priority:  Maintenance   Target end date:  Indefinite    Indications    Atrial fibrillation (CMS/HCC) [I48.91]  Atrial fibrillation with RVR (CMS/HCC) (Resolved) [I48.91]             Anticoagulation Episode Summary     INR check location:      Preferred lab:      Send INR reminders to:   ABBIE ORTIZ  POOL    Comments:  lab per [facility] - they will fax to us (phone: [phone])      Anticoagulation Care Providers     Provider Role Specialty Phone number    Vinh Apple MD Referring Cardiology [phone]          Clinic Interview:  Patient Findings     Negatives:  Signs/symptoms of thrombosis, Signs/symptoms of bleeding,   Laboratory test error suspected, Change in health, Change in alcohol use,   Change in activity, Upcoming invasive procedure, Emergency department   visit, Upcoming dental procedure, Missed doses, Extra doses, Change in   medications, Change in diet/appetite, Hospital admission, Bruising, Other   complaints      Clinical Outcomes     Negatives:  Major bleeding event, Thromboembolic event,   Anticoagulation-related hospital admission, Anticoagulation-related ED   visit, Anticoagulation-related fatality        INR History:  Anticoagulation Monitoring 2021 4/15/2021 2021   INR 1.90 2.00 1.70   INR Date 2021   INR Goal 2.0-3.0 2.0-3.0 2.0-3.0   Trend Same Up Same   Last Week Total 13 mg 13 mg 13 mg   Next Week Total 14 mg 14 mg 15 mg   Sun 1 mg 2 mg 2 mg   Mon 2 mg 2 mg 2 mg   Tue 2 mg 2 mg 2 mg   Wed 3 mg () - 3 mg ()   Thu 2 mg 2 mg 2 mg   Fri 2 mg 2 mg 2 mg   Sat 2 mg 2 mg 2 mg   Visit Report - - -   Some recent  data might be hidden       Plan:  1. INR is Subtherapeutic today- see above in Anticoagulation Summary.   Will instruct Citlali A Clore to Change their warfarin regimen- see above in Anticoagulation Summary.  2. Follow up in 2 weeks.  3. They have been instructed to call if any changes in medications, doses, concerns, etc. Patient expresses understanding and has no further questions at this time.    Katelyn Figueroa [facility]

## 2021-06-07 ENCOUNTER — OFFICE VISIT (OUTPATIENT)
Dept: INTERNAL MEDICINE | Facility: CLINIC | Age: 86
End: 2021-06-07

## 2021-06-07 ENCOUNTER — HOSPITAL ENCOUNTER (OUTPATIENT)
Dept: CT IMAGING | Facility: HOSPITAL | Age: 86
Discharge: HOME | End: 2021-06-07
Admitting: INTERNAL MEDICINE

## 2021-06-07 VITALS
SYSTOLIC BLOOD PRESSURE: 128 MMHG | HEIGHT: 65 IN | WEIGHT: 130 LBS | BODY MASS INDEX: 21.66 KG/M2 | OXYGEN SATURATION: 97 % | DIASTOLIC BLOOD PRESSURE: 80 MMHG | HEART RATE: 77 BPM

## 2021-06-07 DIAGNOSIS — S62.397A CLOSED NONDISPLACED FRACTURE OF OTHER PART OF FIFTH METACARPAL BONE OF LEFT HAND, INITIAL ENCOUNTER: ICD-10-CM
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
## 2021-06-07 DIAGNOSIS — M54.50 ACUTE BILATERAL LOW BACK PAIN WITHOUT SCIATICA: ICD-10-CM

## 2021-06-07 DIAGNOSIS — M79.642 LEFT HAND PAIN: Primary | ICD-10-CM

## 2021-06-07 DIAGNOSIS — M25.562 ACUTE PAIN OF LEFT KNEE: ICD-10-CM

## 2021-06-07 DIAGNOSIS — G44.319 ACUTE POST-TRAUMATIC HEADACHE, NOT INTRACTABLE: ICD-10-CM

## 2021-06-07 DIAGNOSIS — Z79.01 ANTICOAGULATED: ICD-10-CM

## 2021-06-07 PROCEDURE — 99213 OFFICE O/P EST LOW 20 MIN: CPT | Performed by: INTERNAL MEDICINE

## 2021-06-07 PROCEDURE — 73560 X-RAY EXAM OF KNEE 1 OR 2: CPT | Performed by: INTERNAL MEDICINE

## 2021-06-07 PROCEDURE — 72110 X-RAY EXAM L-2 SPINE 4/>VWS: CPT | Performed by: INTERNAL MEDICINE

## 2021-06-07 PROCEDURE — 70450 CT HEAD/BRAIN W/O DYE: CPT

## 2021-06-07 PROCEDURE — 73130 X-RAY EXAM OF HAND: CPT | Performed by: INTERNAL MEDICINE

## 2021-06-07 NOTE — PROGRESS NOTES
Subjective     Citlali ARIANA Solorio is a 85 y.o. female who presents with   Chief Complaint   Patient presents with   • Hand Pain   • Knee Pain   • Back Pain   • Headache       History of Present Illness     She fell in the bathroom yesterday morning.  She hit her head, left hand, left knee.  Mildly hit head.  She had headache this morning.  She is notably anticoagulated.       Review of Systems   Constitutional: Negative for fever.   Respiratory: Negative.    Cardiovascular: Negative.    Musculoskeletal: Positive for arthralgias, back pain and gait problem. Negative for neck pain.   Neurological: Positive for headaches.       The following portions of the patient's history were reviewed and updated as appropriate: allergies, current medications and problem list.    Patient Active Problem List    Diagnosis Date Noted   • History of melanoma 04/13/2021   • Age-related osteoporosis with current pathological fracture with delayed healing 11/19/2020   • Compression fracture of L1 vertebra with delayed healing 10/29/2020   • Heart failure with preserved ejection fraction (CMS/HCC) 08/13/2019   • Tear of medial meniscus of right knee, current 06/15/2017   • Mitral valve insufficiency 07/06/2016   • Tricuspid valve insufficiency 07/06/2016   • Progressive supranuclear palsy (CMS/HCC) 04/21/2016   • Carotid artery plaque, bilateral 04/21/2016     Note Last Updated: 5/31/2017     Description: plaque on screening last done 3/13, 11/13, 10/16     • Heart failure (CMS/HCC) 04/21/2016     Note Last Updated: 4/21/2016     Description: admission 7/2013     • Gastroesophageal reflux disease 04/21/2016   • Hyperlipidemia 04/21/2016     Note Last Updated: 4/21/2016     Description: Patient tried Lipitor, Crestor, Zocor, Bacol and Livalo in the past and was intolerant of all of them.     • Spinal stenosis of lumbar region 04/21/2016   • Cobalamin deficiency 04/21/2016     Note Last Updated: 5/31/2017     Description: told at [facility]  "that B12 was low.  Monthly shots recommended 5/5/15.  Now on oral replacement.      • Atrial fibrillation (CMS/HCC) 06/24/2013   • Benign essential hypertension 06/24/2013       Current Outpatient Medications on File Prior to Visit   Medication Sig Dispense Refill   • acetaminophen (TYLENOL) 650 MG 8 hr tablet Take 650 mg by mouth Every 8 (Eight) Hours As Needed for Mild Pain .     • Cholecalciferol (VITAMIN D PO) Take 1,000 mg by mouth Every Evening.     • digoxin (LANOXIN) 125 MCG tablet TAKE ONE TABLET BY MOUTH DAILY 90 tablet 3   • furosemide (LASIX) 20 MG tablet Take 1 tablet by mouth Daily. * NEEDS TO CONTACT OFFICE FOR  AN APPOINTMENT TO RECEIVE FURTHER REFILLS 30 tablet 0   • hydrALAZINE (APRESOLINE) 25 MG tablet TAKE ONE TABLET BY MOUTH TWICE A  tablet 3   • metoprolol succinate XL (TOPROL-XL) 50 MG 24 hr tablet TAKE 1 AND 1/2 TABLETS BY MOUTH TWO TIMES A  tablet 3   • potassium chloride (K-DUR,KLOR-CON) 10 MEQ CR tablet TAKE ONE TABLET BY MOUTH DAILY 90 tablet 3   • vitamin B-12 (VITAMIN B-12) 1000 MCG tablet Take 1 tablet by mouth Daily. (Patient taking differently: Take 1,000 mcg by mouth Every Evening. HOLD PRIOR TO SURG)     • warfarin (COUMADIN) 1 MG tablet Take one tablet (1 mg) by mouth Sun, Tues, and Thurs, and take two tablets (2 mg) by mouth all other days or as directed. 145 tablet 1     No current facility-administered medications on file prior to visit.       Objective     /80   Pulse 77   Ht 165.1 cm (65\")   Wt 59 kg (130 lb)   LMP  (LMP Unknown)   SpO2 97%   BMI 21.63 kg/m²     Physical Exam  Constitutional:       Appearance: She is well-developed.   HENT:      Head: Normocephalic and atraumatic.   Pulmonary:      Effort: Pulmonary effort is normal.   Musculoskeletal:      Left hand: Swelling and tenderness present. No deformity, lacerations or bony tenderness. Normal range of motion.      Cervical back: No tenderness.      Thoracic back: No tenderness.      Lumbar " back: No deformity or tenderness.      Left knee: Normal.   Neurological:      Mental Status: She is alert and oriented to person, place, and time.   Psychiatric:         Behavior: Behavior normal.     X-rays of the l/s spine, hand, knee  performed today for following indication:   pain.  X-ray reveal nad l/s spine, knee.  Acute left 5th metacarpal head fracture.  There is no available x-ray for comparison.  X-ray sent to radiology for official interpretation and findings.        Assessment/Plan   Diagnoses and all orders for this visit:    1. Left hand pain (Primary)  -     XR Hand 3+ View Left    2. Acute bilateral low back pain without sciatica  -     XR Spine Lumbar Complete 4+VW    3. Acute pain of left knee  -     XR Knee 1 or 2 View Left (In Office)    4. Fall, initial encounter    5. Anticoagulated    6. Acute post-traumatic headache, not intractable  -     CT Head Without Contrast; Future    7. Closed nondisplaced fracture of other part of fifth metacarpal bone of left hand, initial encounter  -     Ambulatory Referral to Hand Surgery        Discussion    Patient presents with fall and fracture of the 5th left metacarpal head.  Urgent referral is placed to hand surgery.    STAT CT is ordered since she struck her head and has a mild HA.    She is advised to always call for help when she needs to go to the bathroom at [place].  Her daughter and I both stressed that today.           Future Appointments   Date Time Provider Department Center   6/30/2021 11:00 AM Leola Kennedy APRN [facility]   4/12/2022  9:50 AM [facility]   4/19/2022 11:15 AM Cristal Lema MD [facility]

## 2021-06-07 NOTE — NURSING NOTE
"1404 Patient arrived in xray triage for stat hold and call. Daughter at bedside.    1426 Dr. Cristal Lema called. She asked me to tell patient that \"CT is normal and they may leave\".     1427 Directed them to Entrance A to exit.      Protective goggles and mask in place with all patient interactions today.  "

## 2021-06-09 ENCOUNTER — ANTICOAGULATION VISIT (OUTPATIENT)
Dept: PHARMACY | Facility: HOSPITAL | Age: 86
End: 2021-06-09

## 2021-06-09 LAB — INR PPP: 3.9

## 2021-06-09 NOTE — PROGRESS NOTES
Anticoagulation Clinic Progress Note  Anticoagulation Summary  As of 6/9/2021    INR goal:  2.0-3.0   TTR:  61.4 % (2.5 y)   INR used for dosing:  3.90 (6/9/2021)   Warfarin maintenance plan:  2 mg every day   Weekly warfarin total:  14 mg   Plan last modified:  Daren Osman RPH (4/15/2021)   Next INR check:  6/11/2021   Priority:  Maintenance   Target end date:  Indefinite    Indications    Atrial fibrillation (CMS/HCC) [I48.91]  Atrial fibrillation with RVR (CMS/HCC) (Resolved) [I48.91]             Anticoagulation Episode Summary     INR check location:      Preferred lab:      Send INR reminders to:   ABBIE ORTIZ  POOL    Comments:  lab per [facility] - they will fax to us (phone: [phone])      Anticoagulation Care Providers     Provider Role Specialty Phone number    Vinh Apple MD Referring Cardiology [phone]        Clinic Interview:  Patient Findings     Positives:  Other complaints    Negatives:  Signs/symptoms of thrombosis, Signs/symptoms of bleeding,   Laboratory test error suspected, Change in health, Change in alcohol use,   Change in activity, Upcoming invasive procedure, Emergency department   visit, Upcoming dental procedure, Missed doses, Extra doses, Change in   medications, Change in diet/appetite, Hospital admission, Bruising    Comments:  Fell in bathroom on 6/6/21 and hit head among other injuries   (fall and fracture of the 5th left metacarpal head per Dr. Lema).  CT   scan of the head performed 6/7 with doctor impression below.  - NILTON Gilbert at [facility] denies any plans for invasive   procedures at this time or any bleeding.      6/7: CT SCAN OF THE BRAIN WITHOUT CONTRAST     HISTORY: Fell with trauma to head. Headache. On anticoagulation.     The CT scan was performed as an emergency procedure through the brain  without contrast. There is mild diffuse atrophy and chronic small vessel  ischemic change similar to the previous CT scan  dated 11/04/2013. There  is no evidence of acute intracranial hemorrhage or mass effect. The  visualized sinuses and mastoid air cells are clear.      Radiation dose reduction techniques were utilized, including automated  exposure control and exposure modulation based on body size.     This report was finalized on 6/7/2021 2:48 PM by Dr. Taj Gomez M.D.      Clinical Outcomes     Negatives:  Major bleeding event, Thromboembolic event,   Anticoagulation-related hospital admission, Anticoagulation-related ED   visit, Anticoagulation-related fatality       INR History:  Anticoagulation Monitoring 4/15/2021 5/26/2021 6/9/2021   INR 2.00 1.70 3.90   INR Date 4/14/2021 5/26/2021 6/9/2021   INR Goal 2.0-3.0 2.0-3.0 2.0-3.0   Trend Up Same Same   Last Week Total 13 mg 13 mg 14 mg   Next Week Total 14 mg 15 mg 12 mg   Sun 2 mg 2 mg -   Mon 2 mg 2 mg -   Tue 2 mg 2 mg -   Wed - 3 mg (5/26); Otherwise 2 mg Hold (6/9)   Thu 2 mg 2 mg 2 mg   Fri 2 mg 2 mg -   Sat 2 mg 2 mg -   Visit Report - - -   Some recent data might be hidden     Plan:  1. INR is Supratherapeutic today- see above in Anticoagulation Summary.   Will instruct Citlali A Clore to Change their warfarin regimen- see above in Anticoagulation Summary.  Hold today's dose and recheck INR on Friday.  Information communicated to NILTON Gilbert and faxed securely.    -Unclear if Dr. Apple's office was made aware of fall so will notify office today.   2. Follow up in 2 days  3. They have been instructed to call if any changes in medications, doses, concerns, etc. Patient expresses understanding and has no further questions at this time.    Matti Bonilla, TamelaD

## 2021-06-11 ENCOUNTER — ANTICOAGULATION VISIT (OUTPATIENT)
Dept: PHARMACY | Facility: HOSPITAL | Age: 86
End: 2021-06-11

## 2021-06-11 LAB — INR PPP: 2.1

## 2021-06-11 NOTE — PROGRESS NOTES
Anticoagulation Clinic Progress Note  Anticoagulation Summary  As of 2021    INR goal:  2.0-3.0   TTR:  61.3 % (2.5 y)   INR used for dosin.10 (2021)   Warfarin maintenance plan:  2 mg every day   Weekly warfarin total:  14 mg   No change documented:  Matti Bonilla, PharmD   Plan last modified:  Daren Osman [facility] (4/15/2021)   Next INR check:  2021   Priority:  Maintenance   Target end date:  Indefinite    Indications    Atrial fibrillation (CMS/HCC) [I48.91]  Atrial fibrillation with RVR (CMS/HCC) (Resolved) [I48.91]             Anticoagulation Episode Summary     INR check location:      Preferred lab:      Send INR reminders to:  GLENNY ORTIZ  POOL    Comments:  lab per [facility] - they will fax to us (phone: [phone])      Anticoagulation Care Providers     Provider Role Specialty Phone number    Vinh Apple MD Referring Cardiology [phone]        Clinic Interview:  Patient Findings     Negatives:  Signs/symptoms of thrombosis, Signs/symptoms of bleeding,   Laboratory test error suspected, Change in health, Change in alcohol use,   Change in activity, Upcoming invasive procedure, Emergency department   visit, Upcoming dental procedure, Missed doses, Extra doses, Change in   medications, Change in diet/appetite, Hospital admission, Bruising, Other   complaints    Comments:  NILTON May at [facility] denies any known bleeding,   eating normally, and wearing brace on hand or wrist.        Clinical Outcomes     Negatives:  Major bleeding event, Thromboembolic event,   Anticoagulation-related hospital admission, Anticoagulation-related ED   visit, Anticoagulation-related fatality      INR History:  Anticoagulation Monitoring 2021   INR 1.70 3.90 2.10   INR Date 2021   INR Goal 2.0-3.0 2.0-3.0 2.0-3.0   Trend Same Same Same   Last Week Total 13 mg 14 mg 12 mg   Next Week Total 15 mg 12 mg 14 mg   Sun  2 mg - 2 mg   Mon 2 mg - 2 mg   Tue 2 mg - 2 mg   Wed 3 mg (5/26); Otherwise 2 mg Hold (6/9) -   Thu 2 mg 2 mg -   Fri 2 mg - 2 mg   Sat 2 mg - 2 mg   Visit Report - - -   Some recent data might be hidden     Plan:  1. INR is Therapeutic today- see above in Anticoagulation Summary.   Will instruct Citlali Solorio via NILTON May to Continue their warfarin regimen- see above in Anticoagulation Summary.  2. Follow up in 1 week  3. They have been instructed to call if any changes in medications, doses, concerns, etc. Patient expresses understanding and has no further questions at this time.    Matti Bonilla, TamelaD

## 2021-06-16 ENCOUNTER — ANTICOAGULATION VISIT (OUTPATIENT)
Dept: PHARMACY | Facility: HOSPITAL | Age: 86
End: 2021-06-16

## 2021-06-16 LAB — INR PPP: 2.4

## 2021-06-16 NOTE — PROGRESS NOTES
Anticoagulation Clinic Progress Note  Anticoagulation Summary  As of 2021    INR goal:  2.0-3.0   TTR:  61.5 % (2.6 y)   INR used for dosin.40 (2021)   Warfarin maintenance plan:  2 mg every day   Weekly warfarin total:  14 mg   No change documented:  Matti Bonilla, PharmD   Plan last modified:  Draen Osman [facility] (4/15/2021)   Next INR check:  2021   Priority:  Maintenance   Target end date:  Indefinite    Indications    Atrial fibrillation (CMS/HCC) [I48.91]  Atrial fibrillation with RVR (CMS/HCC) (Resolved) [I48.91]             Anticoagulation Episode Summary     INR check location:      Preferred lab:      Send INR reminders to:  GLENNY ORTIZ  POOL    Comments:  lab per [facility] - they will fax to us (phone: [phone])      Anticoagulation Care Providers     Provider Role Specialty Phone number    Vinh Apple MD Referring Cardiology [phone]        Clinic Interview:  Patient Findings     Negatives:  Signs/symptoms of thrombosis, Signs/symptoms of bleeding,   Laboratory test error suspected, Change in health, Change in alcohol use,   Change in activity, Upcoming invasive procedure, Emergency department   visit, Upcoming dental procedure, Missed doses, Extra doses, Change in   medications, Change in diet/appetite, Hospital admission, Bruising, Other   complaints    Comments:  Info per NILTON May at [facility]     Clinical Outcomes     Negatives:  Major bleeding event, Thromboembolic event,   Anticoagulation-related hospital admission, Anticoagulation-related ED   visit, Anticoagulation-related fatality      INR History:  Anticoagulation Monitoring 2021   INR 3.90 2.10 2.40   INR Date 2021   INR Goal 2.0-3.0 2.0-3.0 2.0-3.0   Trend Same Same Same   Last Week Total 14 mg 12 mg 12 mg   Next Week Total 12 mg 14 mg 14 mg   Sun - 2 mg 2 mg   Mon - 2 mg 2 mg   Tue - 2 mg 2 mg   Wed Hold () - 2 mg    Thu 2 mg - 2 mg   Fri - 2 mg 2 mg   Sat - 2 mg 2 mg   Visit Report - - -   Some recent data might be hidden     Plan:  1. INR is Therapeutic today- see above in Anticoagulation Summary.   Will instruct Citlali Solorio via NILTON May to Continue their warfarin regimen- see above in Anticoagulation Summary.  2. Follow up in 1 week  3. They have been instructed to call if any changes in medications, doses, concerns, etc. Patient expresses understanding and has no further questions at this time.    Matti Bonilla, PharmD

## 2021-06-17 RX ORDER — FUROSEMIDE 20 MG/1
TABLET ORAL
Qty: 30 TABLET | Refills: 5 | Status: SHIPPED | OUTPATIENT
Start: 2021-06-17 | End: 2021-06-30 | Stop reason: SDUPTHER

## 2021-06-23 LAB — INR PPP: 3

## 2021-06-24 ENCOUNTER — TELEPHONE (OUTPATIENT)
Dept: INTERNAL MEDICINE | Facility: CLINIC | Age: 86
End: 2021-06-24

## 2021-06-24 ENCOUNTER — ANTICOAGULATION VISIT (OUTPATIENT)
Dept: PHARMACY | Facility: HOSPITAL | Age: 86
End: 2021-06-24

## 2021-06-24 NOTE — TELEPHONE ENCOUNTER
Caller: [facility]     [facility] [[email or web address]] call back number:. [phone]    CALLED TO CONFIRM FAX WAS RECEIVED FOR INR RESULTS. TOOK RESULTS OVER PHONE:     6/23  PT: 29.8  INR: 3.0

## 2021-06-24 NOTE — TELEPHONE ENCOUNTER
Reviewed result and faxed orders to [facility]. May refer to today's Anticoagulation Visit. Thank you!

## 2021-06-24 NOTE — PROGRESS NOTES
Anticoagulation Clinic Progress Note    Anticoagulation Summary  As of 6/24/2021    INR goal:  2.0-3.0   TTR:  61.8 % (2.6 y)   INR used for dosing:  3.00 (6/23/2021)   Warfarin maintenance plan:  2 mg every day   Weekly warfarin total:  14 mg   No change documented:  Daren Osman RPH   Plan last modified:  Daren Osman RPH (4/15/2021)   Next INR check:  6/30/2021   Priority:  Maintenance   Target end date:  Indefinite    Indications    Atrial fibrillation (CMS/HCC) [I48.91]  Atrial fibrillation with RVR (CMS/HCC) (Resolved) [I48.91]             Anticoagulation Episode Summary     INR check location:      Preferred lab:      Send INR reminders to:   ABBIE ORTIZ  POOL    Comments:  lab per [facility] - they will fax to us (phone: [phone])      Anticoagulation Care Providers     Provider Role Specialty Phone number    Vinh Apple MD Referring Cardiology [phone]          INR History:  Anticoagulation Monitoring 6/11/2021 6/16/2021 6/24/2021   INR 2.10 2.40 3.00   INR Date 6/11/2021 6/16/2021 6/23/2021   INR Goal 2.0-3.0 2.0-3.0 2.0-3.0   Trend Same Same Same   Last Week Total 12 mg 12 mg 14 mg   Next Week Total 14 mg 14 mg 14 mg   Sun 2 mg 2 mg 2 mg   Mon 2 mg 2 mg 2 mg   Tue 2 mg 2 mg 2 mg   Wed - 2 mg -   Thu - 2 mg 2 mg   Fri 2 mg 2 mg 2 mg   Sat 2 mg 2 mg 2 mg   Visit Report - - -   Some recent data might be hidden       Plan:  1. INR is Therapeutic today- see above in Anticoagulation Summary.   Provided instructions via faxed orders to [facility] to Continue their warfarin regimen- see above in Anticoagulation Summary.  2. Follow up in 1 week      Daren Osman RPH

## 2021-06-28 ENCOUNTER — TELEPHONE (OUTPATIENT)
Dept: INTERNAL MEDICINE | Facility: CLINIC | Age: 86
End: 2021-06-28

## 2021-06-28 NOTE — TELEPHONE ENCOUNTER
Call [facility].  They have been sending me Protimes on patient.  Confirm that Dr. Russ is one who is managing her protimes.  CRISTHIANW

## 2021-06-30 ENCOUNTER — ANTICOAGULATION VISIT (OUTPATIENT)
Dept: PHARMACY | Facility: HOSPITAL | Age: 86
End: 2021-06-30

## 2021-06-30 ENCOUNTER — OFFICE VISIT (OUTPATIENT)
Dept: CARDIOLOGY | Facility: CLINIC | Age: 86
End: 2021-06-30

## 2021-06-30 VITALS
WEIGHT: 122 LBS | HEART RATE: 66 BPM | HEIGHT: 64 IN | BODY MASS INDEX: 20.83 KG/M2 | SYSTOLIC BLOOD PRESSURE: 132 MMHG | OXYGEN SATURATION: 97 % | DIASTOLIC BLOOD PRESSURE: 76 MMHG

## 2021-06-30 DIAGNOSIS — I48.91 ATRIAL FIBRILLATION, UNSPECIFIED TYPE (HCC): Primary | ICD-10-CM

## 2021-06-30 DIAGNOSIS — I10 BENIGN ESSENTIAL HYPERTENSION: ICD-10-CM

## 2021-06-30 LAB — INR PPP: 2.7

## 2021-06-30 PROCEDURE — 99214 OFFICE O/P EST MOD 30 MIN: CPT | Performed by: NURSE PRACTITIONER

## 2021-06-30 RX ORDER — DIGOXIN 125 MCG
125 TABLET ORAL DAILY
Qty: 90 TABLET | Refills: 3 | Status: SHIPPED | OUTPATIENT
Start: 2021-06-30 | End: 2022-01-01

## 2021-06-30 RX ORDER — WARFARIN SODIUM 1 MG/1
TABLET ORAL
Qty: 145 TABLET | Refills: 1 | Status: SHIPPED | OUTPATIENT
Start: 2021-06-30 | End: 2021-07-20

## 2021-06-30 RX ORDER — POTASSIUM CHLORIDE 750 MG/1
TABLET, EXTENDED RELEASE ORAL
Qty: 90 TABLET | Refills: 3 | Status: SHIPPED | OUTPATIENT
Start: 2021-06-30 | End: 2022-01-01

## 2021-06-30 RX ORDER — FUROSEMIDE 20 MG/1
20 TABLET ORAL DAILY
Qty: 90 TABLET | Refills: 3 | Status: SHIPPED | OUTPATIENT
Start: 2021-06-30 | End: 2022-01-01

## 2021-06-30 RX ORDER — METOPROLOL SUCCINATE 50 MG/1
75 TABLET, EXTENDED RELEASE ORAL 2 TIMES DAILY
Qty: 270 TABLET | Refills: 3 | Status: SHIPPED | OUTPATIENT
Start: 2021-06-30 | End: 2022-01-01

## 2021-06-30 RX ORDER — DONEPEZIL HYDROCHLORIDE 5 MG/1
TABLET, FILM COATED ORAL
COMMUNITY
Start: 2021-05-07 | End: 2022-01-01

## 2021-06-30 RX ORDER — HYDRALAZINE HYDROCHLORIDE 25 MG/1
25 TABLET, FILM COATED ORAL 2 TIMES DAILY
Qty: 180 TABLET | Refills: 3 | Status: SHIPPED | OUTPATIENT
Start: 2021-06-30 | End: 2022-01-01

## 2021-06-30 NOTE — PROGRESS NOTES
Anticoagulation Clinic Progress Note    Anticoagulation Summary  As of 2021    INR goal:  2.0-3.0   TTR:  62.1 % (2.6 y)   INR used for dosin.70 (2021)   Warfarin maintenance plan:  2 mg every day   Weekly warfarin total:  14 mg   No change documented:  Daren Osman RPH   Plan last modified:  Daren Osman RPH (4/15/2021)   Next INR check:  2021   Priority:  Maintenance   Target end date:  Indefinite    Indications    Atrial fibrillation (CMS/HCC) [I48.91]  Atrial fibrillation with RVR (CMS/HCC) (Resolved) [I48.91]             Anticoagulation Episode Summary     INR check location:      Preferred lab:      Send INR reminders to:   ABBIE ORTIZ  POOL    Comments:  lab per [facility] - they will fax to us (phone: [phone])      Anticoagulation Care Providers     Provider Role Specialty Phone number    Vinh Apple MD Referring Cardiology [phone]          INR History:  Anticoagulation Monitoring 2021   INR 2.40 3.00 2.70   INR Date 2021   INR Goal 2.0-3.0 2.0-3.0 2.0-3.0   Trend Same Same Same   Last Week Total 12 mg 14 mg 14 mg   Next Week Total 14 mg 14 mg 14 mg   Sun 2 mg 2 mg 2 mg   Mon 2 mg 2 mg 2 mg   Tue 2 mg 2 mg 2 mg   Wed 2 mg - 2 mg   Thu 2 mg 2 mg 2 mg   Fri 2 mg 2 mg 2 mg   Sat 2 mg 2 mg 2 mg   Visit Report - - -   Some recent data might be hidden       Plan:  1. INR is Therapeutic today- see above in Anticoagulation Summary.   Provided instructions to by faxed orders to [facility] to Continue their warfarin regimen- see above in Anticoagulation Summary.  2. Follow up in 2 weeks      Daren Osman RPH

## 2021-06-30 NOTE — PROGRESS NOTES
"    CARDIOLOGY        Patient Name: Citlali Solorio  :1935  Age: 86 y.o.  Primary Cardiologist: Vinh Apple MD  Encounter Provider:  FAM Case    Date of Service: 21          CHIEF COMPLAINT / REASON FOR OFFICE VISIT     Congestive Heart Failure (1 yr f/u )      HISTORY OF PRESENT ILLNESS       HPI  Citlali Solorio is a 86 y.o. female who presents today for annual evaluation.     Pt has a  history significant for heart failure, A. fib, hypertension, mitral valve and tricuspid valve insufficiency.    Patient presents today with her daughter.  The patient has become more wheelchair bound.  The patient does walk infrequently and uses a walker.  She did recently suffer a fall and has an injured left wrist.  The patient is having falls about 2-3 times per month.  Most have occurred in the bathroom.  She does not use her wheelchair in the apartment at the senior living facility.  Thus far there have not been any major injuries with the falls.  Largest complaint is drooling and difficulty swallowing.  She is following neurology, OT, ST. Diet is limited.     The following portions of the patient's history were reviewed and updated as appropriate: allergies, current medications, past family history, past medical history, past social history, past surgical history and problem list.      VITAL SIGNS     Visit Vitals  /76 (BP Location: Left arm, Patient Position: Sitting, Cuff Size: Adult)   Pulse 66   Ht 162.6 cm (64\")   Wt 55.3 kg (122 lb)   LMP  (LMP Unknown)   SpO2 97%   BMI 20.94 kg/m²         Wt Readings from Last 3 Encounters:   21 55.3 kg (122 lb)   21 59 kg (130 lb)   21 59 kg (130 lb)     Body mass index is 20.94 kg/m².      REVIEW OF SYSTEMS   Review of Systems   Constitutional: Negative for chills, fever, weight gain and weight loss.   Cardiovascular: Negative for leg swelling.   Respiratory: Negative for cough, snoring and wheezing.    Hematologic/Lymphatic: " Negative for bleeding problem. Does not bruise/bleed easily.   Skin: Negative for color change.   Musculoskeletal: Positive for falls. Negative for joint pain and myalgias.   Gastrointestinal: Negative for melena.   Genitourinary: Negative for hematuria.   Neurological: Negative for excessive daytime sleepiness.   Psychiatric/Behavioral: Negative for depression. The patient is not nervous/anxious.            PHYSICAL EXAMINATION     Vitals and nursing note reviewed.   Constitutional:       Appearance: Normal appearance. Well-developed.   Eyes:      Conjunctiva/sclera: Conjunctivae normal.   Neck:      Vascular: No carotid bruit.   Pulmonary:      Breath sounds: Normal breath sounds.   Cardiovascular:      Normal rate. Regular rhythm. Normal S1 with normal intensity. Normal S2 with normal intensity.      Murmurs: There is no murmur.      No gallop. No click. No rub.   Musculoskeletal: Normal range of motion. Skin:     General: Skin is warm and dry.   Neurological:      Mental Status: Alert and oriented to person, place, and time.      GCS: GCS eye subscore is 4. GCS verbal subscore is 5. GCS motor subscore is 6.   Psychiatric:         Speech: Speech normal.         Behavior: Behavior normal.         Thought Content: Thought content normal.         Judgment: Judgment normal.           REVIEWED DATA     Procedures    Cardiac Procedures:  1. Echocardiogram 8/23/17: EF 53.8%.  Right ventricular cavity is mild to moderately dilated.  Left atrial cavity is severely dilated.  Mild to moderate MVR.  Moderate tricuspid valve regurgitation.  2. Echocardiogram 1/21/2019.  LVEF 56%.  LAE.  Moderate mitral valve regurgitation.  Moderate tricuspid valve regurgitation.  Mild LVH.  Calculated RVSP 44.2 mmHg.          Lipid Panel    Lipid Panel 4/13/21   Total Cholesterol 139   Triglycerides 145   HDL Cholesterol 45   VLDL Cholesterol 25   LDL Cholesterol  69      Comments are available for some flowsheets but are not being  displayed.               ASSESSMENT & PLAN      Diagnosis Plan   1. Atrial fibrillation, unspecified type (CMS/HCC)     2. Benign essential hypertension           SUMMARY/DISCUSSION  1. Atrial fibrillation.  Patient currently asymptomatic and denies any pain, shortness of breath, tachycardias.  It is noted during examination with history reported by daughter the patient is having at least 2-3 falls per month.  This is obviously very concerning as patient is currently anticoagulated with Coumadin.  Patient, daughter and I had long discussion about the risk benefit scale of continuing versus discontinuing Coumadin.  Patient is aware that with increased falls she does have the risk of potentially hitting her head and having a possible head bleed or other internal bleeding that could be life-threatening.  Patient is very anxious of having a stroke as her father ultimately  from a stroke.  At this point patient would like to continue Coumadin and she does accept the risk that if she does suffer a head injury from a fall that it could ultimately lead to her death.  I advised the daughter that if at any point her falls became more traumatic, more frequent or that they determined they would like to come off of the Coumadin to please stop the medication and notify our office.  Continue metoprolol and digoxin for rhythm and rate control.  2. Hypertension.  BP is controlled in office today at 132/76.  Continue furosemide 20 mg/day, hydralazine 25 mg twice per day, metoprolol succinate 75 mg twice per day.  3. Follow-up with Dr. Apple and 6 months - 1 year.  Sooner for problems or complications.        MEDICATIONS         Discharge Medications          Accurate as of 2021 11:30 AM. If you have any questions, ask your nurse or doctor.            Changes to Medications      Instructions Start Date   cyanocobalamin 1000 MCG tablet  Commonly known as: VITAMIN B-12  What changed:   · when to take this  · additional  instructions   1,000 mcg, Oral, Daily         Continue These Medications      Instructions Start Date   acetaminophen 650 MG 8 hr tablet  Commonly known as: TYLENOL   650 mg, Oral, Every 8 Hours PRN      digoxin 125 MCG tablet  Commonly known as: LANOXIN   TAKE ONE TABLET BY MOUTH DAILY      donepezil 5 MG tablet  Commonly known as: ARICEPT   No dose, route, or frequency recorded.      furosemide 20 MG tablet  Commonly known as: LASIX   TAKE ONE TABLET BY MOUTH DAILY *NEEDS TO CONTACT OFFICE FOR AN APPOINTMENT TO RECEIVE FURTHER REFILLS*      hydrALAZINE 25 MG tablet  Commonly known as: APRESOLINE   TAKE ONE TABLET BY MOUTH TWICE A DAY      metoprolol succinate XL 50 MG 24 hr tablet  Commonly known as: TOPROL-XL   TAKE 1 AND 1/2 TABLETS BY MOUTH TWO TIMES A DAY      potassium chloride 10 MEQ CR tablet  Commonly known as: K-DUR,KLOR-CON   TAKE ONE TABLET BY MOUTH DAILY      VITAMIN D PO   1,000 mg, Oral, Every Evening      warfarin 1 MG tablet  Commonly known as: COUMADIN   Take one tablet (1 mg) by mouth Sun, Tues, and Thurs, and take two tablets (2 mg) by mouth all other days or as directed.                 **Dragon Disclaimer:   Much of this encounter note is an electronic transcription/translation of spoken language to printed text. The electronic translation of spoken language may permit erroneous, or at times, nonsensical words or phrases to be inadvertently transcribed. Although I have reviewed the note for such errors, some may still exist.

## 2021-07-07 ENCOUNTER — TELEPHONE (OUTPATIENT)
Dept: INTERNAL MEDICINE | Facility: CLINIC | Age: 86
End: 2021-07-07

## 2021-07-07 NOTE — TELEPHONE ENCOUNTER
Caller: PORSCHE    Relationship: [facility]UR[facility] call back number:     Medication needed:   Requested Prescriptions      No prescriptions requested or ordered in this encounter   NORCO 5 325MG    When do you need the refill by:     What additional details did the patient provide when requesting the medication:     Does the patient have less than a 3 day supply:  [x] Yes  [] No    What is the patient's preferred pharmacy:  [facility]  PHONE  [phone] FAX

## 2021-07-07 NOTE — TELEPHONE ENCOUNTER
Call and ask patient.  Is she still taking hydrocodone.  How often?  It increases risks of falls.  LEOPOLDO

## 2021-07-14 LAB — INR PPP: 3.2

## 2021-07-15 ENCOUNTER — ANTICOAGULATION VISIT (OUTPATIENT)
Dept: PHARMACY | Facility: HOSPITAL | Age: 86
End: 2021-07-15

## 2021-07-15 NOTE — PROGRESS NOTES
Anticoagulation Clinic Progress Note    Anticoagulation Summary  As of 7/15/2021    INR goal:  2.0-3.0   TTR:  62.1 % (2.6 y)   INR used for dosing:  3.20 (7/14/2021)   Warfarin maintenance plan:  2 mg every day   Weekly warfarin total:  14 mg   Plan last modified:  Daren Osman RPH (4/15/2021)   Next INR check:  7/21/2021   Priority:  Maintenance   Target end date:  Indefinite    Indications    Atrial fibrillation (CMS/HCC) [I48.91]  Atrial fibrillation with RVR (CMS/HCC) (Resolved) [I48.91]             Anticoagulation Episode Summary     INR check location:      Preferred lab:      [facility]  [facility]    Comments:  lab per [facility] - they will fax to us (phone: [phone])      Anticoagulation Care Providers     Provider Role Specialty Phone number    Vinh Apple MD Referring Cardiology [phone]          INR History:  Anticoagulation Monitoring 6/24/2021 6/30/2021 7/15/2021   INR 3.00 2.70 3.20   INR Date 6/23/2021 6/30/2021 7/14/2021   INR Goal 2.0-3.0 2.0-3.0 2.0-3.0   Trend Same Same Same   Last Week Total 14 mg 14 mg 14 mg   Next Week Total 14 mg 14 mg 13 mg   Sun 2 mg 2 mg 2 mg   Mon 2 mg 2 mg 2 mg   Tue 2 mg 2 mg 2 mg   Wed - 2 mg -   Thu 2 mg 2 mg 1 mg (7/15)   Fri 2 mg 2 mg 2 mg   Sat 2 mg 2 mg 2 mg   Visit Report - - -   Some recent data might be hidden       Plan:  1. INR is Supratherapeutic today- see above in Anticoagulation Summary.   Provided instructions to [facility] by faxed orders to Change their warfarin regimen- see above in Anticoagulation Summary.  2. Follow up in 1 week      Daren Osman RPH

## 2021-07-20 RX ORDER — WARFARIN SODIUM 1 MG/1
TABLET ORAL
Qty: 145 TABLET | Refills: 0 | Status: SHIPPED | OUTPATIENT
Start: 2021-07-20 | End: 2022-01-11

## 2021-07-22 ENCOUNTER — ANTICOAGULATION VISIT (OUTPATIENT)
Dept: PHARMACY | Facility: HOSPITAL | Age: 86
End: 2021-07-22

## 2021-07-22 ENCOUNTER — APPOINTMENT (OUTPATIENT)
Dept: WOMENS IMAGING | Facility: HOSPITAL | Age: 86
End: 2021-07-22

## 2021-07-22 LAB — INR PPP: 3.4

## 2021-07-22 PROCEDURE — 77067 SCR MAMMO BI INCL CAD: CPT | Performed by: RADIOLOGY

## 2021-07-22 PROCEDURE — 77063 BREAST TOMOSYNTHESIS BI: CPT | Performed by: RADIOLOGY

## 2021-07-22 NOTE — PROGRESS NOTES
Anticoagulation Clinic Progress Note    Anticoagulation Summary  As of 7/22/2021    INR goal:  2.0-3.0   TTR:  61.6 % (2.7 y)   INR used for dosing:  3.40 (7/22/2021)   Warfarin maintenance plan:  1 mg every Thu; 2 mg all other days   Weekly warfarin total:  13 mg   Plan last modified:  Daren Osman PharmD (7/22/2021)   Next INR check:  7/28/2021   Priority:  Maintenance   Target end date:  Indefinite    Indications    Atrial fibrillation (CMS/HCC) [I48.91]  Atrial fibrillation with RVR (CMS/HCC) (Resolved) [I48.91]             Anticoagulation Episode Summary     INR check location:      Preferred lab:      Send INR reminders to:   ABBIE [facility]  POOL    Comments:  lab per [facility] - they will fax to us (phone: [phone])      Anticoagulation Care Providers     Provider Role Specialty Phone number    Vinh Apple MD Referring Cardiology [phone]          INR History:  Anticoagulation Monitoring 6/30/2021 7/15/2021 7/22/2021   INR 2.70 3.20 3.40   INR Date 6/30/2021 7/14/2021 7/22/2021   INR Goal 2.0-3.0 2.0-3.0 2.0-3.0   Trend Same Same Down   Last Week Total 14 mg 14 mg 13 mg   Next Week Total 14 mg 13 mg 13 mg   Sun 2 mg 2 mg 2 mg   Mon 2 mg 2 mg 2 mg   Tue 2 mg 2 mg 2 mg   Wed 2 mg - -   Thu 2 mg 1 mg (7/15) 1 mg   Fri 2 mg 2 mg 2 mg   Sat 2 mg 2 mg 2 mg   Visit Report - - -   Some recent data might be hidden       Plan:  1. INR is Supratherapeutic today- see above in Anticoagulation Summary.   Provided instructions to Yadira with [facility] to Change their warfarin regimen- see above in Anticoagulation Summary. Also faxed orders.   2. Follow up in 6 days      Daren Osman PharmD

## 2021-07-28 ENCOUNTER — TELEPHONE (OUTPATIENT)
Dept: CARDIOLOGY | Facility: CLINIC | Age: 86
End: 2021-07-28

## 2021-07-28 ENCOUNTER — ANTICOAGULATION VISIT (OUTPATIENT)
Dept: PHARMACY | Facility: HOSPITAL | Age: 86
End: 2021-07-28

## 2021-07-28 LAB — INR PPP: 3.6

## 2021-07-28 NOTE — PROGRESS NOTES
Anticoagulation Clinic Progress Note  Anticoagulation Summary  As of 7/28/2021    INR goal:  2.0-3.0   TTR:  61.2 % (2.7 y)   INR used for dosing:  3.60 (7/28/2021)   Warfarin maintenance plan:  1 mg every Sun, Thu; 2 mg all other days   Weekly warfarin total:  12 mg   Plan last modified:  Matti Bonilla, PharmD (7/28/2021)   Next INR check:  8/4/2021   Priority:  Maintenance   Target end date:  Indefinite    Indications    Atrial fibrillation (CMS/HCC) [I48.91]  Atrial fibrillation with RVR (CMS/HCC) (Resolved) [I48.91]             Anticoagulation Episode Summary     INR check location:      Preferred lab:      Send INR reminders to:  GL[facility]TI[facility]  POOL    Comments:  lab per [facility] - they will fax to us (phone: [phone])      Anticoagulation Care Providers     Provider Role Specialty Phone number    Vinh Apple MD Referring Cardiology [phone]        Clinic Interview:  Patient Findings     Positives:  Other complaints    Negatives:  Signs/symptoms of thrombosis, Signs/symptoms of bleeding,   Laboratory test error suspected, Change in health, Change in alcohol use,   Change in activity, Upcoming invasive procedure, Emergency department   visit, Upcoming dental procedure, Missed doses, Extra doses, Change in   medications, Change in diet/appetite, Hospital admission, Bruising    Comments:  Information per NILTON May at [facility].    Self-reported fall 7/25, denies any bleeding, facility MD already notified   and NILTON May will notify Cardiologist office today.        Clinical Outcomes     Negatives:  Major bleeding event, Thromboembolic event,   Anticoagulation-related hospital admission, Anticoagulation-related ED   visit, Anticoagulation-related fatality      INR History:  Anticoagulation Monitoring 7/15/2021 7/22/2021 7/28/2021   INR 3.20 3.40 3.60   INR Date 7/14/2021 7/22/2021 7/28/2021   INR Goal 2.0-3.0 2.0-3.0 2.0-3.0   Trend Same Down Down   Last Week  Total 14 mg 13 mg 13 mg   Next Week Total 13 mg 13 mg 10 mg   Sun 2 mg 2 mg 1 mg (8/1)   Mon 2 mg 2 mg 2 mg   Tue 2 mg 2 mg 2 mg   Wed - - Hold (7/28)   Thu 1 mg (7/15) 1 mg 1 mg   Fri 2 mg 2 mg 2 mg   Sat 2 mg 2 mg 2 mg   Visit Report - - -   Some recent data might be hidden     Plan:  1. INR is Supratherapeutic today- see above in Anticoagulation Summary.   Will instruct Citlali Solorio via NILTON May to Change their warfarin regimen- see above in Anticoagulation Summary.  -Plan to hold Warfarin 7/28 and then reduce future doses to 1mg on Thursdays and Sundays and 2mg all other days of the week (~7.7% decrease in weekly dosage).  Also faxed orders per facility request.    -NILTON May agrees to contact MD immediately for any bleeding, falls, or bumps to the head.    -Cardiologist office notified of the self-reported fall from 7/25.    2. Follow up in 1 week  3. They have been instructed to call if any changes in medications, doses, concerns, etc. Patient expresses understanding and has no further questions at this time.    Matti Bonilla, TamelaD

## 2021-08-04 ENCOUNTER — ANTICOAGULATION VISIT (OUTPATIENT)
Dept: PHARMACY | Facility: HOSPITAL | Age: 86
End: 2021-08-04

## 2021-08-04 LAB — INR PPP: 2.5

## 2021-08-04 NOTE — PROGRESS NOTES
Anticoagulation Clinic Progress Note    Anticoagulation Summary  As of 2021    INR goal:  2.0-3.0   TTR:  61.1 % (2.7 y)   INR used for dosin.50 (2021)   Warfarin maintenance plan:  1 mg every Sun, Thu; 2 mg all other days   Weekly warfarin total:  12 mg   No change documented:  Daren Osman PharmD   Plan last modified:  Matti Bonilla PharmD (2021)   Next INR check:  2021   Priority:  Maintenance   Target end date:  Indefinite    Indications    Atrial fibrillation (CMS/HCC) [I48.91]  Atrial fibrillation with RVR (CMS/HCC) (Resolved) [I48.91]             Anticoagulation Episode Summary     INR check location:      Preferred lab:      Send INR reminders to:   [facility]  POOL    Comments:  lab per [facility] - they will fax to us (phone: [phone])      Anticoagulation Care Providers     Provider Role Specialty Phone number    Vinh Apple MD Referring Cardiology [phone]          INR History:  Anticoagulation Monitoring 2021   INR 3.40 3.60 2.50   INR Date 2021   INR Goal 2.0-3.0 2.0-3.0 2.0-3.0   Trend Down Down Same   Last Week Total 13 mg 13 mg 10 mg   Next Week Total 13 mg 10 mg 12 mg   Sun 2 mg 1 mg () 1 mg   Mon 2 mg 2 mg 2 mg   Tue 2 mg 2 mg 2 mg   Wed - Hold () 2 mg   Thu 1 mg 1 mg 1 mg   Fri 2 mg 2 mg 2 mg   Sat 2 mg 2 mg 2 mg   Visit Report - - -   Some recent data might be hidden       Plan:  1. INR is Therapeutic today- see above in Anticoagulation Summary.   Faxed orders to [facility] to Continue their warfarin regimen- see above in Anticoagulation Summary.  2. Follow up in 1 week      Daren Osman PharmD

## 2021-08-07 ENCOUNTER — HOSPITAL ENCOUNTER (EMERGENCY)
Facility: HOSPITAL | Age: 86
Discharge: HOME | End: 2021-08-07
Attending: EMERGENCY MEDICINE | Admitting: EMERGENCY MEDICINE

## 2021-08-07 ENCOUNTER — APPOINTMENT (OUTPATIENT)
Dept: GENERAL RADIOLOGY | Facility: HOSPITAL | Age: 86
End: 2021-08-07

## 2021-08-07 ENCOUNTER — APPOINTMENT (OUTPATIENT)
Dept: CT IMAGING | Facility: HOSPITAL | Age: 86
End: 2021-08-07

## 2021-08-07 VITALS
DIASTOLIC BLOOD PRESSURE: 84 MMHG | SYSTOLIC BLOOD PRESSURE: 143 MMHG | HEIGHT: 65 IN | WEIGHT: 123 LBS | HEART RATE: 100 BPM | BODY MASS INDEX: 20.49 KG/M2 | TEMPERATURE: 97.2 F | OXYGEN SATURATION: 98 % | RESPIRATION RATE: 14 BRPM

## 2021-08-07 DIAGNOSIS — S09.90XA INJURY OF HEAD, INITIAL ENCOUNTER: ICD-10-CM
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
## 2021-08-07 DIAGNOSIS — S50.01XA CONTUSION OF RIGHT ELBOW, INITIAL ENCOUNTER: ICD-10-CM

## 2021-08-07 LAB
INR PPP: 2.48 (ref 0.9–1.1)
PROTHROMBIN TIME: 26.6 SECONDS (ref 11.7–14.2)

## 2021-08-07 PROCEDURE — 99283 EMERGENCY DEPT VISIT LOW MDM: CPT

## 2021-08-07 PROCEDURE — 73070 X-RAY EXAM OF ELBOW: CPT

## 2021-08-07 PROCEDURE — 70450 CT HEAD/BRAIN W/O DYE: CPT

## 2021-08-07 PROCEDURE — 85610 PROTHROMBIN TIME: CPT | Performed by: PHYSICIAN ASSISTANT

## 2021-08-07 NOTE — ED PROVIDER NOTES
Pt presents to the ED c/o an unwitnessed fall at her assisted living facility when her walker got out of control from her.  Denies any head injury, reporting some right elbow pain.     On exam,   General: Awake, alert, no acute distress  HEENT: EOMI, no scalp hematoma or laceration  Neck: Full range of motion, nontender  Pulm: Symmetric chest rise, nonlabored breathing  CV: Regular rate and rhythm  GI: Non-distended  MSK: No deformity, full range of motion of the arms and legs bilaterally without any wounds or ecchymoses noted  Skin: Warm, dry  Neuro: Alert and oriented x 3, moving all extremities, no focal deficits  Psych: Calm, cooperative    Vital signs and nursing notes reviewed.       Surgical mask, protective eye goggles, and gloves used during this encounter. Patient in surgical mask.      Plan:   ED Course as of Aug 07 1334   Sat Aug 07, 2021   1122 Patient presents with injury sustained in a mechanical fall just prior to arrival.  Patient injured her right elbow and possibly hit her head.  Patient is on Coumadin for atrial fibrillation.  Plan for x-rays of right elbow, as well as CT scan of head to rule out intracranial hemorrhage/fracture.    [EE]   1157 Stanley with CCP has evaluated and discussed with the patient multiple times, patient unwilling to be admitted or considered assisted living, continues to want to go home. He understands the concerns for his ability to care     [DC]   1210 Right elbow films interpreted by myself show no acute fracture.  There are moderate degenerative changes.  There is a small joint effusion.  Cannot rule out occult fracture.    [EE]      ED Course User Index  [DC] Bay Vance MD  [EE] Celestino Ledbetter PA     While the x-rays cannot rule out occult fracture in the elbow, she maintains intact flexion extension and supination and pronation in the elbow without significant discomfort.  I think less likely at this time.  No other acute emergent findings today.  Supportive  care at home, ED return for worsening symptoms as needed.     Attestation:  The ZULEYMA and I have discussed this patient's history, physical exam, and treatment plan.  I have reviewed the documentation and personally had a face to face interaction with the patient. I affirm the documentation and agree with the treatment and plan.  The attached note describes my personal findings.          Bay Vance MD  08/07/21 [id number]

## 2021-08-07 NOTE — ED TRIAGE NOTES
Pt arrived via ems from [facility]. Pt had an unwitnessed fall. Pt reports pain to her right upper arm. Pt is alert and oriented. Pt is at her baseline. PT is on warfarin.  Unknown head injury.      Pt was wearing a mask during assessment.  This RN wore appropriate PPE

## 2021-08-07 NOTE — ED PROVIDER NOTES
EMERGENCY DEPARTMENT ENCOUNTER    Room Number:  B03/03  Date of encounter:  8/7/2021  PCP: Cristal Lema MD  Historian: Patient, daughter      I used full protective equipment while examining this patient.  This includes face mask, gloves and protective eyewear.  I washed my hands before entering the room and immediately upon leaving the room      HPI:  Chief Complaint: Fall  A complete HPI/ROS/PMH/PSH/SH/FH are unobtainable due to: Nothing    Context: Citlali Solorio is a 86 y.o. female who presents to the ED c/o injury sustained in a mechanical fall just prior to arrival.  Patient states she lost her balance in the bathroom.  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  Patient fell and injured her right elbow.  She denies any obvious head trauma.  Patient is on Coumadin for atrial fibrillation.  Patient was in her normal state of health prior to this fall.  She denies any headache, nausea, vomiting, chest pain, shortness of breath.  Patient complains of mild right elbow pain.    Review of Medical Records  I reviewed patient's last cardiology office visit from 6/30/2021.  Patient being treated for atrial fibrillation, hypertension.    PAST MEDICAL HISTORY  Active Ambulatory Problems     Diagnosis Date Noted   • Progressive supranuclear palsy (CMS/HCC) 04/21/2016   • Carotid artery plaque, bilateral 04/21/2016   • Heart failure (CMS/HCC) 04/21/2016   • Gastroesophageal reflux disease 04/21/2016   • Hyperlipidemia 04/21/2016   • Spinal stenosis of lumbar region 04/21/2016   • Cobalamin deficiency 04/21/2016   • Atrial fibrillation (CMS/HCC) 06/24/2013   • Benign essential hypertension 06/24/2013   • Mitral valve insufficiency 07/06/2016   • Tricuspid valve insufficiency 07/06/2016   • Tear of medial meniscus of right knee, current 06/15/2017   • Heart failure with preserved ejection fraction (CMS/HCC) 08/13/2019   • Compression fracture of L1 vertebra with delayed healing  10/29/2020   • Age-related osteoporosis with current pathological fracture with delayed healing 11/19/2020   • History of melanoma 04/13/2021     Resolved Ambulatory Problems     Diagnosis Date Noted   • Anxiety 04/21/2016   • Hypertension 04/21/2016   • Impaired fasting glucose 04/21/2016   • Shoulder pain 04/21/2016   • Stress incontinence 05/23/2016   • Atrial fibrillation with RVR (CMS/[facility]) 08/24/2017   • Cough 09/01/2017   • Diarrhea 01/04/2018   • Metabolic encephalopathy 01/04/2018   • Abnormal EKG 01/04/2018   • Influenza A 01/05/2018   • Acute on chronic diastolic heart failure (CMS/[facility]) 01/10/2018     Past Medical History:   Diagnosis Date   • Abnormal gait    • Anticoagulated    • Arthritis    • Ataxic gait    • Carotid artery stenosis    • Diastolic congestive heart failure (CMS/[facility])    • Esophageal reflux    • History of recent fall    • History of vitamin B deficiency    • [place] (hard of hearing)    • Hypercholesterolemia    • IFG (impaired fasting glucose)    • Lumbar canal stenosis    • Lumbar disc disorder    • PAF (paroxysmal atrial fibrillation) (CMS/HCC)    • Urinary incontinence    • Vitamin B12 deficiency          PAST SURGICAL HISTORY  Past Surgical History:   Procedure Laterality Date   • BLADDER SURGERY     • CATARACT EXTRACTION Bilateral    • HYSTERECTOMY     • KNEE ARTHROPLASTY Left    • KNEE ARTHROSCOPY Right 6/15/2017    Procedure: RT KNEE ARTHROSCOPIC PARTIAL MEDIAL AND LATERAL MENISECTOMY AND SYNOVECTOMY;  Surgeon: Sam Soni MD;  Location: [facility];  Service:    • KNEE ARTHROSCOPY Right 8/29/2018    Procedure: RT KNEE ARTHROSCOPY WITH PARTIAL LATERAL MENISECTOMY;  Surgeon: Randy Tucker MD;  Location: [facility] MAIN OR;  Service: Orthopedics   • KYPHOPLASTY Bilateral 11/9/2020    Procedure: KYPHOPLASTY at L 1;  Surgeon: Pardeep Voss MD;  Location: [facility] OR 18/19;  Service: Neurosurgery;  Laterality: Bilateral;         FAMILY HISTORY  Family History   Problem  Relation Age of Onset   • Hypertension Mother    • Heart disease Mother    • Heart disease Father    • Stroke Father    • Hypertension Father    • Hypertension Sister    • Malig Hyperthermia Neg Hx          SOCIAL HISTORY  Social History     Socioeconomic History   • Marital status:      Spouse name: Not on file   • Number of children: Not on file   • Years of education: Not on file   • Highest education level: Not on file   Tobacco Use   • Smoking status: Former Smoker     Packs/day: 0.50     Years: 15.00     Pack years: 7.50     Types: Cigarettes     Quit date:      Years since quittin.6   • Smokeless tobacco: Never Used   Vaping Use   • Vaping Use: Never used   Substance and Sexual Activity   • Alcohol use: Not Currently     Comment: Rare   • Drug use: No         ALLERGIES  Sulfamethoxazole, Atorvastatin, Bactrim [sulfamethoxazole-trimethoprim], Penicillins, Pitavastatin, Rosuvastatin, and Simvastatin        REVIEW OF SYSTEMS  All systems reviewed and negative except for those discussed in HPI.       PHYSICAL EXAM    I have reviewed the triage vital signs and nursing notes.    ED Triage Vitals [21 1020]   Temp Heart Rate Resp BP SpO2   97.2 °F (36.2 °C) 87 16 174/88 98 %      Temp src Heart Rate Source Patient Position BP Location FiO2 (%)   -- Monitor -- -- --       Physical Exam  GENERAL: Alert, oriented, elderly, not distressed  HENT: head atraumatic, no cervical tenderness or vertebral step-off  EYES: no scleral icterus, EOMI  CV: Irregular rhythm, regular rate, no murmur  RESPIRATORY: normal effort, CTA  ABDOMEN: soft, nontender  MUSCULOSKELETAL: Mild tenderness to right olecranon.  No deformity.  Full range of motion.  Remainder of extremities are normal.  NEURO: alert, moves all extremities, follows commands  SKIN: warm, dry        LAB RESULTS  Recent Results (from the past 24 hour(s))   Protime-INR    Collection Time: 21 12:00 PM    Specimen: Blood   Result Value Ref Range     Protime 26.6 (H) 11.7 - 14.2 Seconds    INR 2.48 (H) 0.90 - 1.10       Ordered the above labs and independently reviewed the results.        RADIOLOGY  XR Elbow 2 View Right    Result Date: 8/7/2021  TWO-VIEW RIGHT ELBOW  HISTORY: Fell. Pain.  FINDINGS:  There are moderate degenerative changes at the elbow. Although no fracture is visualized, there appears to be a small joint effusion that raises the concern of an occult radial head fracture and follow-up imaging in several days may be helpful if symptoms persist.  This report was finalized on 8/7/2021 12:33 PM by Dr. Taj Gomez M.D.      CT Head Without Contrast    Result Date: 8/7/2021  CT HEAD WITHOUT CONTRAST  CLINICAL HISTORY: Fall with confusion. Generalized weakness.  TECHNIQUE: CT scan of the head was obtained with 2 mm axial bone algorithm and 3 mm axial soft tissue algorithm images. No intravenous contrast was administered.  COMPARISON: Comparison is made to previous CT scan of the head dated 06/07/2021.  FINDINGS:  There is no evidence for a calvarial fracture. There is no evidence for an acute extra-axial hemorrhage. The ventricles, sulci, and cisterns are age appropriate. Mild changes of chronic small vessel ischemic phenomena are incidentally noted. The basal ganglia and thalami are unremarkable. The posterior fossa structures are unremarkable. Mild atherosclerotic calcifications are seen.  The extracranial structures are incidentally notable for some arthritic phenomena involving the articulation of the anterior arch of C1 and the odontoid process. There is some incidental cranial settling likely due to these arthritic changes.       No evidence for acute traumatic intracranial pathology.  Radiation dose reduction techniques were utilized, including automated exposure control and exposure modulation based on body size.  This report was finalized on 8/7/2021 1:08 PM by Dr. Darius Rosales M.D.        I ordered the above noted radiological studies.  Reviewed by me and discussed with radiologist.  See dictation for official radiology interpretation.      MEDICATIONS GIVEN IN ER    Medications - No data to display      PROGRESS, DATA ANALYSIS, CONSULTS, AND MEDICAL DECISION MAKING    All labs have been independently reviewed by me.  All radiology studies have been reviewed by me and discussed with radiologist dictating the report.   EKG's independently viewed and interpreted by me.  Discussion below represents my analysis of pertinent findings related to patient's condition, differential diagnosis, treatment plan and final disposition.    I have discussed case with Dr. Vance, emergency room physician.  He has performed his own bedside examination and agrees with treatment plan.    ED Course as of Aug 07 1937   Sat Aug 07, 2021   1122 Patient presents with injury sustained in a mechanical fall just prior to arrival.  Patient injured her right elbow and possibly hit her head.  Patient is on Coumadin for atrial fibrillation.  Plan for x-rays of right elbow, as well as CT scan of head to rule out intracranial hemorrhage/fracture.    [EE]   1157 Stanley with [facility] has evaluated and discussed with the patient multiple times, patient unwilling to be admitted or considered assisted living, continues to want to go home. He understands the concerns for his ability to care     [DC]   1210 Right elbow films interpreted by myself show no acute fracture.  There are moderate degenerative changes.  There is a small joint effusion.  Cannot rule out occult fracture.    [EE]   1245 Updated patient and family on work-up.  I explained that right elbow films cannot completely rule out an occult fracture.  Patient does use a walker and is already having difficulty ambulating.  I explained that given her decent range of motion, I would prefer to not put her in a sling.  They will follow-up with orthopedics if her pain worsens over the next several days.  They are in agreement with this  plan.    [EE]      ED Course User Index  [DC] Bay Vance MD  [EE] Celestino Ledbetter PA       AS OF 19:37 EDT VITALS:    BP - 143/84  HR - 100  TEMP - 97.2 °F (36.2 °C)  O2 SATS - 98%        DIAGNOSIS  Final diagnoses:   Fall, initial encounter   Contusion of right elbow, initial encounter   Injury of head, initial encounter         DISPOSITION  Discharged           Celestino Ledbetter, PA  08/07/21 0455

## 2021-08-07 NOTE — DISCHARGE INSTRUCTIONS
Return to ER for any worsening symptoms    Follow-up with orthopedics if right elbow does not improve in 3 to 5 days

## 2021-08-11 ENCOUNTER — ANTICOAGULATION VISIT (OUTPATIENT)
Dept: PHARMACY | Facility: HOSPITAL | Age: 86
End: 2021-08-11

## 2021-08-11 LAB — INR PPP: 2

## 2021-08-11 NOTE — PROGRESS NOTES
Anticoagulation Clinic Progress Note  Anticoagulation Summary  As of 2021    INR goal:  2.0-3.0   TTR:  61.4 % (2.7 y)   INR used for dosin.00 (2021)   Warfarin maintenance plan:  1 mg every Sun, Thu; 2 mg all other days   Weekly warfarin total:  12 mg   No change documented:  Matti Bonilla, Kory   Plan last modified:  Matti Bonilla PharmD (2021)   Next INR check:  2021   Priority:  Maintenance   Target end date:  Indefinite    Indications    Atrial fibrillation (CMS/HCC) [I48.91]  Atrial fibrillation with RVR (CMS/HCC) (Resolved) [I48.91]             Anticoagulation Episode Summary     INR check location:      Preferred lab:      Send INR reminders to:   ABBIE ORTIZ  POOL    Comments:  lab per [place] [facility] - they will fax to us (phone: [phone])      Anticoagulation Care Providers     Provider Role Specialty Phone number    Vinh Apple MD Referring Cardiology [phone]        Clinic Interview:  Patient Findings     Positives:  Emergency department visit    Negatives:  Signs/symptoms of thrombosis, Signs/symptoms of bleeding,   Laboratory test error suspected, Change in health, Change in alcohol use,   Change in activity, Upcoming invasive procedure, Upcoming dental   procedure, Missed doses, Extra doses, Change in medications, Change in   diet/appetite, Hospital admission, Bruising, Other complaints    Comments:  ED visit  for fall.  Please see ED note for details.    - CT Head:  FINDINGS:     There is no evidence for a calvarial fracture. There is no evidence for  an acute extra-axial hemorrhage. The ventricles, sulci, and cisterns are  age appropriate. Mild changes of chronic small vessel ischemic phenomena  are incidentally noted. The basal ganglia and thalami are unremarkable.  The posterior fossa structures are unremarkable. Mild atherosclerotic  calcifications are seen.     The extracranial structures are incidentally notable for  some arthritic  phenomena involving the articulation of the anterior arch of C1 and the  odontoid process. There is some incidental cranial settling likely due  to these arthritic changes.      IMPRESSION:     No evidence for acute traumatic intracranial pathology.     Radiation dose reduction techniques were utilized, including automated  exposure control and exposure modulation based on body size.     This report was finalized on 8/7/2021 1:08 PM by Dr. Darius Rosales M.D.    S/W NILTON May who denies any bleeding at this time and agreed to notify   Cardiologist office of fall.        Clinical Outcomes     Negatives:  Major bleeding event, Thromboembolic event,   Anticoagulation-related hospital admission, Anticoagulation-related ED   visit, Anticoagulation-related fatality      INR History:  Anticoagulation Monitoring 7/28/2021 8/4/2021 8/11/2021   INR 3.60 2.50 2.00   INR Date 7/28/2021 8/4/2021 8/11/2021   INR Goal 2.0-3.0 2.0-3.0 2.0-3.0   Trend Down Same Same   Last Week Total 13 mg 10 mg 12 mg   Next Week Total 10 mg 12 mg 12 mg   Sun 1 mg (8/1) 1 mg 1 mg   Mon 2 mg 2 mg 2 mg   Tue 2 mg 2 mg 2 mg   Wed Hold (7/28) 2 mg 2 mg   Thu 1 mg 1 mg 1 mg   Fri 2 mg 2 mg 2 mg   Sat 2 mg 2 mg 2 mg   Visit Report - - -   Some recent data might be hidden     Plan:  1. INR is Therapeutic today- see above in Anticoagulation Summary.   Will instruct Citlali Solorio via NILTON May at [facility] to Continue their warfarin regimen- see above in Anticoagulation Summary.  Faxed order per their request.    -Notified Cardiologist office of ED visit on 8/7.    2. Follow up in 1 week  3. They have been instructed to call if any changes in medications, doses, concerns, etc. Patient expresses understanding and has no further questions at this time.    Matti Bonilla, TamelaD

## 2021-08-11 NOTE — TELEPHONE ENCOUNTER
Yadira @ [facility] left another msg saying the pt fell again on 08-07-21.  The pt went for x-rays and was fine.  She said it is not their normal protocol to call the doctor every single time a pt falls but the medication management clinic told them to let you know every time she does.    Thanks,  Shu

## 2021-08-18 ENCOUNTER — ANTICOAGULATION VISIT (OUTPATIENT)
Dept: PHARMACY | Facility: HOSPITAL | Age: 86
End: 2021-08-18

## 2021-08-18 LAB — INR PPP: 2.4

## 2021-08-18 NOTE — PROGRESS NOTES
Anticoagulation Clinic Progress Note    Anticoagulation Summary  As of 2021    INR goal:  2.0-3.0   TTR:  61.6 % (2.7 y)   INR used for dosin.40 (2021)   Warfarin maintenance plan:  1 mg every Sun, Thu; 2 mg all other days   Weekly warfarin total:  12 mg   No change documented:  Daren Osman PharmD   Plan last modified:  Matti Bonilla PharmD (2021)   Next INR check:  2021   Priority:  Maintenance   Target end date:  Indefinite    Indications    Atrial fibrillation (CMS/HCC) [I48.91]  Atrial fibrillation with RVR (CMS/HCC) (Resolved) [I48.91]             Anticoagulation Episode Summary     INR check location:      Preferred lab:      Send INR reminders to:   ABBIE [facility]  POOL    Comments:  lab per [facility] - they will fax to us (phone: [phone])      Anticoagulation Care Providers     Provider Role Specialty Phone number    Vinh Apple MD Referring Cardiology [phone]          INR History:  Anticoagulation Monitoring 2021   INR 2.50 2.00 2.40   INR Date 2021   INR Goal 2.0-3.0 2.0-3.0 2.0-3.0   Trend Same Same Same   Last Week Total 10 mg 12 mg 12 mg   Next Week Total 12 mg 12 mg 12 mg   Sun 1 mg 1 mg 1 mg   Mon 2 mg 2 mg 2 mg   Tue 2 mg 2 mg 2 mg   Wed 2 mg 2 mg 2 mg   Thu 1 mg 1 mg 1 mg   Fri 2 mg 2 mg 2 mg   Sat 2 mg 2 mg 2 mg   Visit Report - - -   Some recent data might be hidden       Plan:  1. INR is Therapeutic today- see above in Anticoagulation Summary.   Faxed orders to [facility] to Continue their warfarin regimen- see above in Anticoagulation Summary.  2. Follow up in 1 week      Daren Osman PharmD

## 2021-08-25 LAB — INR PPP: 2.2

## 2021-08-26 ENCOUNTER — ANTICOAGULATION VISIT (OUTPATIENT)
Dept: PHARMACY | Facility: HOSPITAL | Age: 86
End: 2021-08-26

## 2021-08-26 NOTE — PROGRESS NOTES
Anticoagulation Clinic Progress Note    Anticoagulation Summary  As of 2021    INR goal:  2.0-3.0   TTR:  61.9 % (2.7 y)   INR used for dosin.20 (2021)   Warfarin maintenance plan:  1 mg every Sun, Thu; 2 mg all other days   Weekly warfarin total:  12 mg   Plan last modified:  Matti Bonilla, PharmD (2021)   Next INR check:  2021   Priority:  Maintenance   Target end date:  Indefinite    Indications    Atrial fibrillation (CMS/HCC) [I48.91]  Atrial fibrillation with RVR (CMS/HCC) (Resolved) [I48.91]             Anticoagulation Episode Summary     INR check location:      Preferred lab:      Send INR reminders to:   ABBIE ORTIZ  POOL    Comments:  lab per [facility] - they will fax to us (phone: [phone])      Anticoagulation Care Providers     Provider Role Specialty Phone number    Vinh Apple MD Referring Cardiology [phone]          Clinic Interview:  Patient Findings     Negatives:  Signs/symptoms of thrombosis, Signs/symptoms of bleeding,   Laboratory test error suspected, Change in health, Change in alcohol use,   Change in activity, Upcoming invasive procedure, Emergency department   visit, Upcoming dental procedure, Missed doses, Extra doses, Change in   medications, Change in diet/appetite, Hospital admission, Bruising, Other   complaints      Clinical Outcomes     Negatives:  Major bleeding event, Thromboembolic event,   Anticoagulation-related hospital admission, Anticoagulation-related ED   visit, Anticoagulation-related fatality        INR History:  Anticoagulation Monitoring 2021   INR 2.00 2.40 2.20   INR Date 2021   INR Goal 2.0-3.0 2.0-3.0 2.0-3.0   Trend Same Same Same   Last Week Total 12 mg 12 mg 12 mg   Next Week Total 12 mg 12 mg 12 mg   Sun 1 mg 1 mg 1 mg   Mon 2 mg 2 mg 2 mg   Tue 2 mg 2 mg 2 mg   Wed 2 mg 2 mg -   Thu 1 mg 1 mg 1 mg   Fri 2 mg 2 mg 2 mg   Sat 2 mg 2 mg 2 mg    Visit Report - - -   Some recent data might be hidden       Plan:  1. INR is Therapeutic today- see above in Anticoagulation Summary.   Will instruct Citlali Solorio to Continue their warfarin regimen- see above in Anticoagulation Summary.  2. Follow up in 1 week  3. They have been instructed to call if any changes in medications, doses, concerns, etc. Patient expresses understanding and has no further questions at this time.    Nicole Perez, PharmD

## 2021-08-27 ENCOUNTER — TELEPHONE (OUTPATIENT)
Dept: INTERNAL MEDICINE | Facility: CLINIC | Age: 86
End: 2021-08-27

## 2021-08-27 NOTE — TELEPHONE ENCOUNTER
PT HAD A FALL AND HIT HER HEAD. BUT WAS SEEN BY WORKERS AND THEY DON'T BELIEVE SHE HIT HER HEAD VERY HARD. THERE WERE NO PAK AND SHE WASN'T DIZZY.     PHONE [phone]

## 2021-08-27 NOTE — TELEPHONE ENCOUNTER
If she feel and hit her head she should be taken to the ER.  Alternatively I could see her today and do a STAT CT.  LEOPOLDO   25-Jul-2019 23:51

## 2021-08-27 NOTE — TELEPHONE ENCOUNTER
Daughter is calling to confirm the situation. She was told by father that she landed on her bottom.

## 2021-08-29 ENCOUNTER — TELEPHONE (OUTPATIENT)
Dept: INTERNAL MEDICINE | Facility: CLINIC | Age: 86
End: 2021-08-29

## 2021-09-01 ENCOUNTER — ANTICOAGULATION VISIT (OUTPATIENT)
Dept: PHARMACY | Facility: HOSPITAL | Age: 86
End: 2021-09-01

## 2021-09-01 ENCOUNTER — TELEPHONE (OUTPATIENT)
Dept: CARDIOLOGY | Facility: CLINIC | Age: 86
End: 2021-09-01

## 2021-09-01 LAB — INR PPP: 2.6

## 2021-09-01 NOTE — TELEPHONE ENCOUNTER
Yadira at [facility] left voice mail stating that the [facility] wanted her to notify that the patient has had a couple of falls recently.  She is currently on Warfarin which is why they wanted you to be notified.

## 2021-09-01 NOTE — PROGRESS NOTES
Anticoagulation Clinic Progress Note    Anticoagulation Summary  As of 2021    INR goal:  2.0-3.0   TTR:  62.2 % (2.8 y)   INR used for dosin.60 (2021)   Warfarin maintenance plan:  1 mg every Sun, Thu; 2 mg all other days   Weekly warfarin total:  12 mg   No change documented:  Daren Osman PharmD   Plan last modified:  Matti Bonilla PharmD (2021)   Next INR check:  9/15/2021   Priority:  Maintenance   Target end date:  Indefinite    Indications    Atrial fibrillation (CMS/HCC) [I48.91]  Atrial fibrillation with RVR (CMS/HCC) (Resolved) [I48.91]             Anticoagulation Episode Summary     INR check location:      Preferred lab:      Send INR reminders to:   ABBIE [facility]  POOL    Comments:  lab per [facility] - they will fax to us (phone: [phone])      Anticoagulation Care Providers     Provider Role Specialty Phone number    Vinh Apple MD Referring Cardiology [phone]          INR History:  Anticoagulation Monitoring 2021   INR 2.40 2.20 2.60   INR Date 2021   INR Goal 2.0-3.0 2.0-3.0 2.0-3.0   Trend Same Same Same   Last Week Total 12 mg 12 mg 12 mg   Next Week Total 12 mg 12 mg 12 mg   Sun 1 mg 1 mg 1 mg   Mon 2 mg 2 mg 2 mg   Tue 2 mg 2 mg 2 mg   Wed 2 mg - 2 mg   Thu 1 mg 1 mg 1 mg   Fri 2 mg 2 mg 2 mg   Sat 2 mg 2 mg 2 mg   Visit Report - - -   Some recent data might be hidden       Plan:  1. INR is Therapeutic today- see above in Anticoagulation Summary.   Faxed orders to [facility] to Continue their warfarin regimen- see above in Anticoagulation Summary.  2. Follow up in 2 weeks      Daren Osman PharmD

## 2021-09-15 ENCOUNTER — ANTICOAGULATION VISIT (OUTPATIENT)
Dept: PHARMACY | Facility: HOSPITAL | Age: 86
End: 2021-09-15

## 2021-09-15 LAB — INR PPP: 2.7

## 2021-09-15 NOTE — PROGRESS NOTES
Anticoagulation Clinic Progress Note    Anticoagulation Summary  As of 9/15/2021    INR goal:  2.0-3.0   TTR:  62.7 % (2.8 y)   INR used for dosin.70 (9/15/2021)   Warfarin maintenance plan:  1 mg every Sun, Thu; 2 mg all other days   Weekly warfarin total:  12 mg   No change documented:  Nicole Perez PharmD   Plan last modified:  Matti Bonilla PharmD (2021)   Next INR check:  2021   Priority:  Maintenance   Target end date:  Indefinite    Indications    Atrial fibrillation (CMS/HCC) [I48.91]  Atrial fibrillation with RVR (CMS/HCC) (Resolved) [I48.91]             Anticoagulation Episode Summary     INR check location:      Preferred lab:      Send INR reminders to:   ABBIE ORTIZ  POOL    Comments:  lab per [place] - they will fax to us (phone: [phone])      Anticoagulation Care Providers     Provider Role Specialty Phone number    Vinh Apple MD Referring Cardiology [phone]          Clinic Interview:  Patient Findings     Negatives:  Signs/symptoms of thrombosis, Signs/symptoms of bleeding,   Laboratory test error suspected, Change in health, Change in alcohol use,   Change in activity, Upcoming invasive procedure, Emergency department   visit, Upcoming dental procedure, Missed doses, Extra doses, Change in   medications, Change in diet/appetite, Hospital admission, Bruising, Other   complaints      Clinical Outcomes     Negatives:  Major bleeding event, Thromboembolic event,   Anticoagulation-related hospital admission, Anticoagulation-related ED   visit, Anticoagulation-related fatality        INR History:  Anticoagulation Monitoring 2021 2021 9/15/2021   INR 2.20 2.60 2.70   INR Date 2021 2021 9/15/2021   INR Goal 2.0-3.0 2.0-3.0 2.0-3.0   Trend Same Same Same   Last Week Total 12 mg 12 mg 12 mg   Next Week Total 12 mg 12 mg 12 mg   Sun 1 mg 1 mg 1 mg   Mon 2 mg 2 mg 2 mg   Tue 2 mg 2 mg 2 mg   Wed - 2 mg 2 mg   Thu 1 mg 1 mg 1  mg   Fri 2 mg 2 mg 2 mg   Sat 2 mg 2 mg 2 mg   Visit Report - - -   Some recent data might be hidden       Plan:  1. INR is Therapeutic today- see above in Anticoagulation Summary.   Will instruct Citlali Solorio to Continue their warfarin regimen- see above in Anticoagulation Summary.  2. Follow up in 2 weeks  3.They have been instructed to call if any changes in medications, doses, concerns, etc. Patient expresses understanding and has no further questions at this time.    Nicole Perez, PharmD

## 2021-09-16 ENCOUNTER — OFFICE VISIT (OUTPATIENT)
Dept: INTERNAL MEDICINE | Facility: CLINIC | Age: 86
End: 2021-09-16

## 2021-09-16 VITALS
WEIGHT: 116 LBS | HEART RATE: 70 BPM | DIASTOLIC BLOOD PRESSURE: 70 MMHG | BODY MASS INDEX: 19.33 KG/M2 | OXYGEN SATURATION: 97 % | HEIGHT: 65 IN | TEMPERATURE: 96.8 F | SYSTOLIC BLOOD PRESSURE: 134 MMHG

## 2021-09-16 DIAGNOSIS — M25.511 ACUTE PAIN OF RIGHT SHOULDER: ICD-10-CM
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
## 2021-09-16 PROCEDURE — 99215 OFFICE O/P EST HI 40 MIN: CPT | Performed by: STUDENT IN AN ORGANIZED HEALTH CARE EDUCATION/TRAINING PROGRAM

## 2021-09-16 PROCEDURE — 73030 X-RAY EXAM OF SHOULDER: CPT | Performed by: STUDENT IN AN ORGANIZED HEALTH CARE EDUCATION/TRAINING PROGRAM

## 2021-09-16 NOTE — PROGRESS NOTES
Chris Lazo D.O.  Internal Medicine  [facility]  [address]  [place] [address]  [phone]      Chief Complaint  Right shoulder pain (fell last night around 9:30 pm)    SUBJECTIVE    History of Present Illness    Citlali Solorio is a 86 y.o. female who presents to the office today as Urgent visit.  Patient is normally seen by Dr. Lema.    Patient is brought in today by her daughter Agusto.  Most of the medical history is obtained from the daughter.  Patient's daughter states that the patient falls 3-4 times per month.  She lives in an assisted living facility and frequently gets up out of her bedside chair and also her bed at night and has falls.  She has hit her head before.  Her most recent fall was last night where she fell in her bathroom and hit both of her knees as well as her right shoulder.  Patient states that she remembers everything and did not lose consciousness.  Patient reports right shoulder pain today.  States that it is worse when she raises her right shoulder above her head.  She is following commands and moving all extremities.      Allergies   Allergen Reactions   • Sulfamethoxazole Hallucinations and Unknown - High Severity     Other reaction(s): sulfamethoxazole   • Atorvastatin Other (See Comments)     LEG CRAMPS   • Bactrim [Sulfamethoxazole-Trimethoprim] Hallucinations   • Penicillins Itching   • Pitavastatin Other (See Comments)     LEG CRAMPS   • Rosuvastatin Other (See Comments)     LEG CRAMPS   • Simvastatin Other (See Comments)     LEG CRAMPS        Outpatient Medications Marked as Taking for the 9/16/21 encounter (Office Visit) with Chris Lazo, DO   Medication Sig Dispense Refill   • acetaminophen (TYLENOL) 650 MG 8 hr tablet Take 650 mg by mouth Every 8 (Eight) Hours As Needed for Mild Pain .     • Cholecalciferol (VITAMIN D PO) Take 1,000 mg by mouth Every Evening.     • digoxin (LANOXIN) 125 MCG tablet Take 1 tablet by mouth Daily. 90  "tablet 3   • donepezil (ARICEPT) 5 MG tablet      • furosemide (LASIX) 20 MG tablet Take 1 tablet by mouth Daily. 90 tablet 3   • hydrALAZINE (APRESOLINE) 25 MG tablet Take 1 tablet by mouth 2 (Two) Times a Day. 180 tablet 3   • metoprolol succinate XL (TOPROL-XL) 50 MG 24 hr tablet Take 1.5 tablets by mouth 2 (Two) Times a Day. 270 tablet 3   • potassium chloride (K-DUR,KLOR-CON) 10 MEQ CR tablet TAKE ONE TABLET BY MOUTH DAILY 90 tablet 3   • vitamin B-12 (VITAMIN B-12) 1000 MCG tablet Take 1 tablet by mouth Daily. (Patient taking differently: Take 1,000 mcg by mouth Every Evening. HOLD PRIOR TO SURG)     • warfarin (COUMADIN) 1 MG tablet Take 2mg (2 Tablets) everyday OR as directed by [facility]. 145 tablet 0        Past Medical History:   Diagnosis Date   • Abnormal EKG    • Abnormal gait     \"frozen gait\"  Seen at [facility] with full work up.   • Acute on chronic diastolic heart failure (CMS/HCC)    • Anticoagulated     WARFARIN FOR A FIB. SEES DR CHASE, HELD 11/4/2020   • Anxiety    • Arthritis    • Ataxic gait    • Atrial fibrillation with RVR (CMS/HCC)    • Benign essential hypertension    • Carotid artery stenosis     plaque on screening last done 3/13, 11/13   • Diarrhea     OCCASIONALLY   • Diastolic congestive heart failure (CMS/HCC)     addmission 07/2013   • Esophageal reflux    • History of recent fall    • History of vitamin B deficiency    • [place] (hard of hearing)    • Hypercholesterolemia     Patient tried Lipitor, Crestor, Zocor, Bacol and Livalo in the past and was intolerant of all of them.   • Hypertension    • IFG (impaired fasting glucose)    • Lumbar canal stenosis    • Lumbar disc disorder     LUMBAR ONE FROM FALL   • Metabolic encephalopathy    • PAF (paroxysmal atrial fibrillation) (CMS/HCC)    • Progressive supranuclear palsy (CMS/HCC)     STUTTER FIRST SIGN   • Urinary incontinence    • Vitamin B12 deficiency     told at [facility] that B12 was low.  Monthly shots " "recommended 5/5/15.       OBJECTIVE    Vital Signs:   /70   Pulse 70   Temp 96.8 °F (36 °C) (Temporal)   Ht 165.1 cm (65\")   Wt 52.6 kg (116 lb)   SpO2 97%   BMI 19.30 kg/m²     Physical Exam  Vitals reviewed. Chaperone present: Accompanied by daughter.   Constitutional:       General: She is not in acute distress.     Appearance: Normal appearance. She is normal weight. She is ill-appearing (chronically).   HENT:      Head: Normocephalic and atraumatic.   Eyes:      General: No scleral icterus.     Extraocular Movements: Extraocular movements intact.      Pupils: Pupils are equal, round, and reactive to light.   Cardiovascular:      Rate and Rhythm: Rhythm irregularly irregular.      Heart sounds: Normal heart sounds.   Pulmonary:      Effort: Pulmonary effort is normal. No respiratory distress.      Breath sounds: Normal breath sounds. No wheezing or rales.   Musculoskeletal:      Right lower leg: No edema.      Left lower leg: No edema.      Comments: Right shoulder is nontender to palpation but has a positive empty can test.  There is no tenderness or deformity to either of the shoulders.   Skin:     General: Skin is warm.      Comments: Bilateral knees with bruising and small skin abrasions.   Neurological:      Mental Status: She is alert.      Comments: 5/5 bilateral upper extremity strength                             ASSESSMENT & PLAN     Diagnoses and all orders for this visit:    1. Fall, subsequent encounter (Primary)  2. Acute pain of right shoulder  -Patient presents with a fall last night standing without loss of consciousness and with a hit to her right shoulder and bilateral knees.  Patient currently lives in assisted living facility and this is her third or fourth fall this month.  Discussed with patient's daughter in depth that the patient may need more close monitoring and that may include moving to a different facility or having a bed alarm.  Encourage patient to ask the facility " staff to get her out of bed every single time she needs up.  Discussed with her that tenured falls could be devastating to her health.  -I reviewed in office 2 view right her x-ray and did not identify any acute fractures.  I will send off to radiology for reading.  There are no neurological deficits on exam.  I believe that the patient most likely has a rotator cuff tendinopathy versus muscle contusion at this point.  I would have her come back and to follow-up with Dr. Lema next week at which time she can be reevaluated and further discussions can be had in regards to her living situation as well as need for physical therapy.  -     XR Shoulder 2+ View Right (In Office)        I spent 45 minutes caring for Citlali on this date of service. This time includes time spent by me in the following activities:reviewing tests, obtaining and/or reviewing a separately obtained history, performing a medically appropriate examination and/or evaluation , counseling and educating the patient/family/caregiver, ordering medications, tests, or procedures and documenting information in the medical record    The following social determinates of health impact the patient's medical decision making: No social determinates of health were factored in to today's visit.     Follow Up  Return in about 4 days (around 9/20/2021).    Patient/family had no further questions at this time and verbalized understanding of the plan discussed today.

## 2021-09-29 ENCOUNTER — ANTICOAGULATION VISIT (OUTPATIENT)
Dept: PHARMACY | Facility: HOSPITAL | Age: 86
End: 2021-09-29

## 2021-09-29 LAB — INR PPP: 2.4

## 2021-09-29 NOTE — PROGRESS NOTES
Anticoagulation Clinic Progress Note    Anticoagulation Summary  As of 2021    INR goal:  2.0-3.0   TTR:  63.2 % (2.8 y)   INR used for dosin.40 (2021)   Warfarin maintenance plan:  1 mg every Sun, Thu; 2 mg all other days   Weekly warfarin total:  12 mg   No change documented:  Nicole Perez PharmD   Plan last modified:  Matti Bonilla PharmD (2021)   Next INR check:  10/13/2021   Priority:  Maintenance   Target end date:  Indefinite    Indications    Atrial fibrillation (CMS/HCC) [I48.91]  Atrial fibrillation with RVR (CMS/HCC) (Resolved) [I48.91]             Anticoagulation Episode Summary     INR check location:      Preferred lab:      Send INR reminders to:   ABBIE ORTIZ  POOL    Comments:  lab per [facility] - they will fax to us (phone: [phone])      Anticoagulation Care Providers     Provider Role Specialty Phone number    Vinh Apple MD Referring Cardiology [phone]          Clinic Interview:  Patient Findings     Negatives:  Signs/symptoms of thrombosis, Signs/symptoms of bleeding,   Laboratory test error suspected, Change in health, Change in alcohol use,   Change in activity, Upcoming invasive procedure, Emergency department   visit, Upcoming dental procedure, Missed doses, Extra doses, Change in   medications, Change in diet/appetite, Hospital admission, Bruising, Other   complaints      Clinical Outcomes     Negatives:  Major bleeding event, Thromboembolic event,   Anticoagulation-related hospital admission, Anticoagulation-related ED   visit, Anticoagulation-related fatality        INR History:  Anticoagulation Monitoring 2021 9/15/2021 2021   INR 2.60 2.70 2.40   INR Date 2021 9/15/2021 2021   INR Goal 2.0-3.0 2.0-3.0 2.0-3.0   Trend Same Same Same   Last Week Total 12 mg 12 mg 12 mg   Next Week Total 12 mg 12 mg 12 mg   Sun 1 mg 1 mg 1 mg   Mon 2 mg 2 mg 2 mg   Tue 2 mg 2 mg 2 mg   Wed 2 mg 2 mg 2 mg   Thu 1 mg 1  mg 1 mg   Fri 2 mg 2 mg 2 mg   Sat 2 mg 2 mg 2 mg   Visit Report - - -   Some recent data might be hidden     Comment: [facility]. Left voice mail with nurse explaining to continue the same and to call us if there have been any changes.    Plan:  1. INR is Therapeutic today- see above in Anticoagulation Summary.   Will instruct Citlali Solorio to Continue their warfarin regimen- see above in Anticoagulation Summary.  2. Follow up in 2 weeks  3. hey have been instructed to call if any changes in medications, doses, concerns, etc. Patient expresses understanding and has no further questions at this time.    Nicole Perez, PharmD

## 2021-10-13 ENCOUNTER — ANTICOAGULATION VISIT (OUTPATIENT)
Dept: PHARMACY | Facility: HOSPITAL | Age: 86
End: 2021-10-13

## 2021-10-13 LAB — INR PPP: 2.4

## 2021-10-13 NOTE — PROGRESS NOTES
Anticoagulation Clinic Progress Note    Anticoagulation Summary  As of 10/13/2021    INR goal:  2.0-3.0   TTR:  63.7 % (2.9 y)   INR used for dosin.40 (10/13/2021)   Warfarin maintenance plan:  1 mg every Sun, Thu; 2 mg all other days   Weekly warfarin total:  12 mg   No change documented:  Elinor Cartagena RPH   Plan last modified:  Matti Bonilla, PharmD (2021)   Next INR check:  10/27/2021   Priority:  Maintenance   Target end date:  Indefinite    Indications    Atrial fibrillation (HCC) [I48.91]  Atrial fibrillation with RVR (HCC) (Resolved) [I48.91]             Anticoagulation Episode Summary     INR check location:      Preferred lab:      Send INR reminders to:  [facility]  POOL    Comments:  lab per [facility] - they will fax to us (phone: [phone])      Anticoagulation Care Providers     Provider Role Specialty Phone number    Vinh Apple MD Referring Cardiology [phone]          Clinic Interview:  No pertinent clinical findings have been reported.    INR History:  Anticoagulation Monitoring 9/15/2021 2021 10/13/2021   INR 2.70 2.40 2.40   INR Date 9/15/2021 2021 10/13/2021   INR Goal 2.0-3.0 2.0-3.0 2.0-3.0   Trend Same Same Same   Last Week Total 12 mg 12 mg 12 mg   Next Week Total 12 mg 12 mg 12 mg   Sun 1 mg 1 mg 1 mg   Mon 2 mg 2 mg 2 mg   Tue 2 mg 2 mg 2 mg   Wed 2 mg 2 mg 2 mg   Thu 1 mg 1 mg 1 mg   Fri 2 mg 2 mg 2 mg   Sat 2 mg 2 mg 2 mg   Visit Report - - -   Some recent data might be hidden       Plan:  1. INR is therapeutic today- see above in Anticoagulation Summary.    Citlali A Clore to continue their warfarin regimen- see above in Anticoagulation Summary.  2. Follow up in 2 weeks  3. They have been instructed to call if any changes in medications, doses, concerns, etc. Patient expresses understanding and has no further questions at this time.    Elinor Cartagena RPH

## 2021-10-27 ENCOUNTER — ANTICOAGULATION VISIT (OUTPATIENT)
Dept: PHARMACY | Facility: HOSPITAL | Age: 86
End: 2021-10-27

## 2021-10-27 LAB — INR PPP: 2

## 2021-10-27 NOTE — PROGRESS NOTES
Anticoagulation Clinic Progress Note    Anticoagulation Summary  As of 10/27/2021    INR goal:  2.0-3.0   TTR:  64.1 % (2.9 y)   INR used for dosin.00 (10/27/2021)   Warfarin maintenance plan:  1 mg every Sun, Thu; 2 mg all other days   Weekly warfarin total:  12 mg   No change documented:  Daren Osman PharmD   Plan last modified:  Matti Bonilla PharmD (2021)   Next INR check:  11/10/2021   Priority:  Maintenance   Target end date:  Indefinite    Indications    Atrial fibrillation (HCC) [I48.91]  Atrial fibrillation with RVR (HCC) (Resolved) [I48.91]             Anticoagulation Episode Summary     INR check location:      Preferred lab:      Send INR reminders to:  [facility]  POOL    Comments:  lab per [facility] - they will fax to us (phone: [phone])      Anticoagulation Care Providers     Provider Role Specialty Phone number    Vinh Apple MD Referring Cardiology [phone]          INR History:  Anticoagulation Monitoring 2021 10/13/2021 10/27/2021   INR 2.40 2.40 2.00   INR Date 2021 10/13/2021 10/27/2021   INR Goal 2.0-3.0 2.0-3.0 2.0-3.0   Trend Same Same Same   Last Week Total 12 mg 12 mg 12 mg   Next Week Total 12 mg 12 mg 12 mg   Sun 1 mg 1 mg 1 mg   Mon 2 mg 2 mg 2 mg   Tue 2 mg 2 mg 2 mg   Wed 2 mg 2 mg 2 mg   Thu 1 mg 1 mg 1 mg   Fri 2 mg 2 mg 2 mg   Sat 2 mg 2 mg 2 mg   Visit Report - - -   Some recent data might be hidden       Plan:  1. INR is Therapeutic today- see above in Anticoagulation Summary.   Faxed orders to [facility] to Continue their warfarin regimen- see above in Anticoagulation Summary.  2. Follow up in 2 weeks      Daren Osman PharmD

## 2021-11-10 LAB — INR PPP: 1.3

## 2021-11-17 LAB — INR PPP: 1.6

## 2021-11-18 ENCOUNTER — ANTICOAGULATION VISIT (OUTPATIENT)
Dept: PHARMACY | Facility: HOSPITAL | Age: 86
End: 2021-11-18

## 2021-11-18 NOTE — PROGRESS NOTES
Anticoagulation Clinic Progress Note    Anticoagulation Summary  As of 2021    INR goal:  2.0-3.0   TTR:  62.9 % (3 y)   INR used for dosin.6 (2021)   Warfarin maintenance plan:  1 mg every Sun, Thu; 2 mg all other days   Weekly warfarin total:  12 mg   Plan last modified:  Matti Bonilla, PharmD (2021)   Next INR check:  2021   Priority:  Maintenance   Target end date:  Indefinite    Indications    Atrial fibrillation (HCC) [I48.91]  Atrial fibrillation with RVR (HCC) (Resolved) [I48.91]             Anticoagulation Episode Summary     INR check location:      Preferred lab:      Send INR reminders to:   ABBIE [facility]  POOL    Comments:  lab per [facility] - they will fax to us (phone: [phone])      Anticoagulation Care Providers     Provider Role Specialty Phone number    Vinh Apple MD Referring Cardiology [phone]          INR History:  Anticoagulation Monitoring 10/13/2021 10/27/2021 2021   INR 2.40 2.00 1.6   INR Date 10/13/2021 10/27/2021 2021   INR Goal 2.0-3.0 2.0-3.0 2.0-3.0   Trend Same Same Same   Last Week Total 12 mg 12 mg 12 mg   Next Week Total 12 mg 12 mg 13 mg   Sun 1 mg 1 mg 1 mg   Mon 2 mg 2 mg 2 mg   Tue 2 mg 2 mg 2 mg   Wed 2 mg 2 mg -   Thu 1 mg 1 mg 2 mg ()   Fri 2 mg 2 mg 2 mg   Sat 2 mg 2 mg 2 mg   Visit Report - - -   Some recent data might be hidden       Plan:  1. INR is Subtherapeutic today- see above in Anticoagulation Summary.   Faxed orders to [facility] to Change their warfarin regimen- see above in Anticoagulation Summary. Unable to reach nurse by phone to determine cause for subtherapeutic INRs; left voicemails requesting return call.   2. Follow up in 1 week      Daren Osman, PharmD

## 2021-11-24 ENCOUNTER — ANTICOAGULATION VISIT (OUTPATIENT)
Dept: PHARMACY | Facility: HOSPITAL | Age: 86
End: 2021-11-24

## 2021-11-24 LAB — INR PPP: 2.2

## 2021-11-24 NOTE — PROGRESS NOTES
Anticoagulation Clinic Progress Note    Anticoagulation Summary  As of 2021    INR goal:  2.0-3.0   TTR:  62.7 % (3 y)   INR used for dosin.20 (2021)   Warfarin maintenance plan:  1 mg every Sun; 2 mg all other days   Weekly warfarin total:  13 mg   Plan last modified:  Elinor Cartagena RPH (2021)   Next INR check:  2021   Priority:  Maintenance   Target end date:  Indefinite    Indications    Atrial fibrillation (HCC) [I48.91]  Atrial fibrillation with RVR (HCC) (Resolved) [I48.91]             Anticoagulation Episode Summary     INR check location:      Preferred lab:      Send INR reminders to:   ABBIE ORTIZ  POOL    Comments:  lab per [facility] - they will fax to us (phone: [phone])      Anticoagulation Care Providers     Provider Role Specialty Phone number    Vinh Apple MD Referring Cardiology [phone]          Clinic Interview:  Patient Findings     Negatives:  Signs/symptoms of thrombosis, Signs/symptoms of bleeding,   Laboratory test error suspected, Change in health, Change in alcohol use,   Change in activity, Upcoming invasive procedure, Emergency department   visit, Upcoming dental procedure, Missed doses, Extra doses, Change in   medications, Change in diet/appetite, Hospital admission, Bruising, Other   complaints      Clinical Outcomes     Negatives:  Major bleeding event, Thromboembolic event,   Anticoagulation-related hospital admission, Anticoagulation-related ED   visit, Anticoagulation-related fatality        INR History:  Anticoagulation Monitoring 10/27/2021 2021 2021   INR 2.00 1.6 2.20   INR Date 10/27/2021 2021 2021   INR Goal 2.0-3.0 2.0-3.0 2.0-3.0   Trend Same Same Up   Last Week Total 12 mg 12 mg 13 mg   Next Week Total 12 mg 13 mg 13 mg   Sun 1 mg 1 mg 1 mg   Mon 2 mg 2 mg 2 mg   Tue 2 mg 2 mg 2 mg   Wed 2 mg - 2 mg   Thu 1 mg 2 mg () 2 mg   Fri 2 mg 2 mg 2 mg   Sat 2 mg 2 mg 2 mg   Visit  Report - - -   Some recent data might be hidden       Plan:  1. INR is Therapeutic today- see above in Anticoagulation Summary.   Will instruct Citlali LANE [place]avi to Continue their warfarin regimen- see above in Anticoagulation Summary.  2. Follow up in 1 weeks (Faxed orders to [facility] but was unable to reach nurse)   3. They have been instructed to call if any changes in medications, doses, concerns, etc. Patient expresses understanding and has no further questions at this time.    Elinor Cartagena, [facility]

## 2021-12-01 ENCOUNTER — ANTICOAGULATION VISIT (OUTPATIENT)
Dept: PHARMACY | Facility: HOSPITAL | Age: 86
End: 2021-12-01

## 2021-12-01 LAB — INR PPP: 2.7

## 2021-12-01 NOTE — PROGRESS NOTES
Anticoagulation Clinic Progress Note    Anticoagulation Summary  As of 2021    INR goal:  2.0-3.0   TTR:  63.0 % (3 y)   INR used for dosin.70 (2021)   Warfarin maintenance plan:  1 mg every Sun; 2 mg all other days   Weekly warfarin total:  13 mg   No change documented:  Elinor Cartagena RPH   Plan last modified:  Elinor Cartagena RPH (2021)   Next INR check:  2021   Priority:  Maintenance   Target end date:  Indefinite    Indications    Atrial fibrillation (HCC) [I48.91]  Atrial fibrillation with RVR (HCC) (Resolved) [I48.91]             Anticoagulation Episode Summary     INR check location:      Preferred lab:      Send INR reminders to:   ABBIE ORTIZ  POOL    Comments:  lab per [facility] - they will fax to us (phone: [phone])      Anticoagulation Care Providers     Provider Role Specialty Phone number    Vinh Apple MD Referring Cardiology [phone]          Clinic Interview:  Patient Findings     Negatives:  Signs/symptoms of thrombosis, Signs/symptoms of bleeding,   Laboratory test error suspected, Change in health, Change in alcohol use,   Change in activity, Upcoming invasive procedure, Emergency department   visit, Upcoming dental procedure, Missed doses, Extra doses, Change in   medications, Change in diet/appetite, Hospital admission, Bruising, Other   complaints      Clinical Outcomes     Negatives:  Major bleeding event, Thromboembolic event,   Anticoagulation-related hospital admission, Anticoagulation-related ED   visit, Anticoagulation-related fatality        INR History:  Anticoagulation Monitoring 2021   INR 1.6 2.20 2.70   INR Date 2021   INR Goal 2.0-3.0 2.0-3.0 2.0-3.0   Trend Same Up Same   Last Week Total 12 mg 13 mg 13 mg   Next Week Total 13 mg 13 mg 13 mg   Sun 1 mg 1 mg 1 mg   Mon 2 mg 2 mg 2 mg   Tue 2 mg 2 mg 2 mg   Wed - 2 mg 2 mg   Thu 2 mg () 2 mg 2 mg   Fri 2  mg 2 mg 2 mg   Sat 2 mg 2 mg 2 mg   Visit Report - - -   Some recent data might be hidden       Plan:  1. INR is Therapeutic today- see above in Anticoagulation Summary.   Will instruct Citlali Solorio to Continue their warfarin regimen- see above in Anticoagulation Summary.  2. Follow up in 1 week  3. They have been instructed to call if any changes in medications, doses, concerns, etc. Patient expresses understanding and has no further questions at this time.    Elinor Cartagena, [facility]

## 2021-12-03 ENCOUNTER — TELEPHONE (OUTPATIENT)
Dept: INTERNAL MEDICINE | Facility: CLINIC | Age: 86
End: 2021-12-03

## 2021-12-03 NOTE — TELEPHONE ENCOUNTER
[facility]    Relationship to patient:     Best call back number: [phone]    Patient is needing: PATY FROM  [facility] CALLING TO LET DR MONTERROSO KNOW THAT PATIENT HAS FALLEN TWICE TODAY 12/3/21 SECOND TIME PATIENT SLIPPED OUT OF CHAIR PATY STATES THERE ARE NO INJURIES

## 2021-12-08 ENCOUNTER — ANTICOAGULATION VISIT (OUTPATIENT)
Dept: PHARMACY | Facility: HOSPITAL | Age: 86
End: 2021-12-08

## 2021-12-08 LAB — INR PPP: 2.7

## 2021-12-08 NOTE — PROGRESS NOTES
Anticoagulation Clinic Progress Note    Anticoagulation Summary  As of 2021    INR goal:  2.0-3.0   TTR:  63.2 % (3 y)   INR used for dosin.70 (2021)   Warfarin maintenance plan:  1 mg every Sun; 2 mg all other days   Weekly warfarin total:  13 mg   No change documented:  Daren Osman PharmD   Plan last modified:  Elinor Cartagena RPH (2021)   Next INR check:  2021   Priority:  Maintenance   Target end date:  Indefinite    Indications    Atrial fibrillation (HCC) [I48.91]  Atrial fibrillation with RVR (HCC) (Resolved) [I48.91]             Anticoagulation Episode Summary     INR check location:      Preferred lab:      Send INR reminders to:   ABBIE ORTIZ  POOL    Comments:  lab per [facility] - they will fax to us (phone: [phone])      Anticoagulation Care Providers     Provider Role Specialty Phone number    Vinh Apple MD Referring Cardiology [phone]          INR History:  Anticoagulation Monitoring 2021   INR 2.20 2.70 2.70   INR Date 2021   INR Goal 2.0-3.0 2.0-3.0 2.0-3.0   Trend Up Same Same   Last Week Total 13 mg 13 mg 13 mg   Next Week Total 13 mg 13 mg 13 mg   Sun 1 mg 1 mg 1 mg   Mon 2 mg 2 mg 2 mg   Tue 2 mg 2 mg 2 mg   Wed 2 mg 2 mg 2 mg   Thu 2 mg 2 mg 2 mg   Fri 2 mg 2 mg 2 mg   Sat 2 mg 2 mg 2 mg   Visit Report - - -   Some recent data might be hidden       Plan:  1. INR is Therapeutic today- see above in Anticoagulation Summary.   Provided instructions to [facility] to Continue their warfarin regimen- see above in Anticoagulation Summary.  2. Follow up in 2 weeks      Daren Osman PharmD

## 2021-12-22 ENCOUNTER — ANTICOAGULATION VISIT (OUTPATIENT)
Dept: PHARMACY | Facility: HOSPITAL | Age: 86
End: 2021-12-22

## 2021-12-22 LAB — INR PPP: 3.1

## 2021-12-22 NOTE — PROGRESS NOTES
Anticoagulation Clinic Progress Note    Anticoagulation Summary  As of 12/22/2021    INR goal:  2.0-3.0   TTR:  63.3 % (3.1 y)   INR used for dosing:  3.10 (12/22/2021)   Warfarin maintenance plan:  1 mg every Sun; 2 mg all other days   Weekly warfarin total:  13 mg   Plan last modified:  Elinor Cartagena RPH (11/24/2021)   Next INR check:  12/29/2021   Priority:  Maintenance   Target end date:  Indefinite    Indications    Atrial fibrillation (HCC) [I48.91]  Atrial fibrillation with RVR (HCC) (Resolved) [I48.91]             Anticoagulation Episode Summary     INR check location:      Preferred lab:      Send INR reminders to:  [facility]  POOL    Comments:  lab per [facility] - they will fax to us (phone: [phone])      Anticoagulation Care Providers     Provider Role Specialty Phone number    Vinh Apple MD Referring Cardiology [phone]          INR History:  Anticoagulation Monitoring 12/1/2021 12/8/2021 12/22/2021   INR 2.70 2.70 3.10   INR Date 12/1/2021 12/8/2021 12/22/2021   INR Goal 2.0-3.0 2.0-3.0 2.0-3.0   Trend Same Same Same   Last Week Total 13 mg 13 mg 13 mg   Next Week Total 13 mg 13 mg 12 mg   Sun 1 mg 1 mg 1 mg   Mon 2 mg 2 mg 2 mg   Tue 2 mg 2 mg 2 mg   Wed 2 mg 2 mg 1 mg (12/22)   Thu 2 mg 2 mg 2 mg   Fri 2 mg 2 mg 2 mg   Sat 2 mg 2 mg 2 mg   Visit Report - - -   Some recent data might be hidden       Plan:  1. INR is Supratherapeutic today- see above in Anticoagulation Summary.   Faxed orders to [facility] to Change their warfarin regimen- see above in Anticoagulation Summary.  2. Follow up in 1 week      Daren Osman, TamelaD

## 2021-12-29 ENCOUNTER — ANTICOAGULATION VISIT (OUTPATIENT)
Dept: PHARMACY | Facility: HOSPITAL | Age: 86
End: 2021-12-29

## 2021-12-29 LAB — INR PPP: 3.2

## 2021-12-29 NOTE — PROGRESS NOTES
Anticoagulation Clinic Progress Note    Anticoagulation Summary  As of 12/29/2021    INR goal:  2.0-3.0   TTR:  62.9 % (3.1 y)   INR used for dosing:  3.20 (12/29/2021)   Warfarin maintenance plan:  1 mg every Sun; 2 mg all other days   Weekly warfarin total:  13 mg   Plan last modified:  Elinor Cartagena RPH (11/24/2021)   Next INR check:  1/5/2022   Priority:  Maintenance   Target end date:  Indefinite    Indications    Atrial fibrillation (HCC) [I48.91]  Atrial fibrillation with RVR (HCC) (Resolved) [I48.91]             Anticoagulation Episode Summary     INR check location:      Preferred lab:      Send INR reminders to:  [facility]  POOL    Comments:  lab per [facility] - they will fax to us (phone: [phone])      Anticoagulation Care Providers     Provider Role Specialty Phone number    Vinh Apple MD Referring Cardiology [phone]          INR History:  Anticoagulation Monitoring 12/8/2021 12/22/2021 12/29/2021   INR 2.70 3.10 3.20   INR Date 12/8/2021 12/22/2021 12/29/2021   INR Goal 2.0-3.0 2.0-3.0 2.0-3.0   Trend Same Same Same   Last Week Total 13 mg 13 mg 12 mg   Next Week Total 13 mg 12 mg 12 mg   Sun 1 mg 1 mg 1 mg   Mon 2 mg 2 mg 2 mg   Tue 2 mg 2 mg 2 mg   Wed 2 mg 1 mg (12/22) 1 mg (12/29)   Thu 2 mg 2 mg 2 mg   Fri 2 mg 2 mg 2 mg   Sat 2 mg 2 mg 2 mg   Visit Report - - -   Some recent data might be hidden       Plan:  1. INR is Supratherapeutic today- see above in Anticoagulation Summary.   Faxed orders to [facility] to Change their warfarin regimen- see above in Anticoagulation Summary.  2. Follow up in 1 week      Daren Osman, TamelaD

## 2022-01-01 ENCOUNTER — APPOINTMENT (OUTPATIENT)
Dept: GENERAL RADIOLOGY | Facility: HOSPITAL | Age: 87
End: 2022-01-01

## 2022-01-01 ENCOUNTER — TELEPHONE (OUTPATIENT)
Dept: INTERNAL MEDICINE | Facility: CLINIC | Age: 87
End: 2022-01-01

## 2022-01-01 ENCOUNTER — OFFICE VISIT (OUTPATIENT)
Dept: INTERNAL MEDICINE | Facility: CLINIC | Age: 87
End: 2022-01-01

## 2022-01-01 ENCOUNTER — ANTICOAGULATION VISIT (OUTPATIENT)
Dept: PHARMACY | Facility: HOSPITAL | Age: 87
End: 2022-01-01

## 2022-01-01 ENCOUNTER — OFFICE VISIT (OUTPATIENT)
Dept: CARDIOLOGY | Facility: CLINIC | Age: 87
End: 2022-01-01

## 2022-01-01 ENCOUNTER — HOSPITAL ENCOUNTER (EMERGENCY)
Facility: HOSPITAL | Age: 87
Discharge: HOME | End: 2022-08-08
Attending: EMERGENCY MEDICINE | Admitting: EMERGENCY MEDICINE

## 2022-01-01 ENCOUNTER — HOSPITAL ENCOUNTER (EMERGENCY)
Facility: HOSPITAL | Age: 87
Discharge: NURSING, REHAB OR LONG-TERM CARE | End: 2022-04-21
Attending: EMERGENCY MEDICINE | Admitting: EMERGENCY MEDICINE

## 2022-01-01 ENCOUNTER — TELEPHONE (OUTPATIENT)
Dept: PHARMACY | Facility: HOSPITAL | Age: 87
End: 2022-01-01

## 2022-01-01 ENCOUNTER — APPOINTMENT (OUTPATIENT)
Dept: CT IMAGING | Facility: HOSPITAL | Age: 87
End: 2022-01-01

## 2022-01-01 VITALS
WEIGHT: 108 LBS | OXYGEN SATURATION: 98 % | WEIGHT: 110 LBS | DIASTOLIC BLOOD PRESSURE: 68 MMHG | SYSTOLIC BLOOD PRESSURE: 118 MMHG | SYSTOLIC BLOOD PRESSURE: 110 MMHG | OXYGEN SATURATION: 92 % | DIASTOLIC BLOOD PRESSURE: 60 MMHG | BODY MASS INDEX: 20.39 KG/M2 | HEART RATE: 63 BPM | HEIGHT: 61 IN | HEIGHT: 61 IN | BODY MASS INDEX: 20.77 KG/M2 | HEART RATE: 67 BPM

## 2022-01-01 VITALS
BODY MASS INDEX: 18.78 KG/M2 | SYSTOLIC BLOOD PRESSURE: 115 MMHG | HEIGHT: 64 IN | DIASTOLIC BLOOD PRESSURE: 96 MMHG | TEMPERATURE: 97.4 F | WEIGHT: 110 LBS | HEART RATE: 79 BPM | RESPIRATION RATE: 18 BRPM | OXYGEN SATURATION: 96 %

## 2022-01-01 VITALS
SYSTOLIC BLOOD PRESSURE: 106 MMHG | BODY MASS INDEX: 18.44 KG/M2 | WEIGHT: 108 LBS | OXYGEN SATURATION: 96 % | DIASTOLIC BLOOD PRESSURE: 70 MMHG | HEART RATE: 74 BPM | HEIGHT: 64 IN

## 2022-01-01 VITALS
SYSTOLIC BLOOD PRESSURE: 139 MMHG | TEMPERATURE: 97.6 F | HEART RATE: 75 BPM | DIASTOLIC BLOOD PRESSURE: 71 MMHG | OXYGEN SATURATION: 93 % | RESPIRATION RATE: 16 BRPM

## 2022-01-01 VITALS
BODY MASS INDEX: 19.12 KG/M2 | DIASTOLIC BLOOD PRESSURE: 74 MMHG | HEIGHT: 64 IN | OXYGEN SATURATION: 98 % | HEART RATE: 73 BPM | WEIGHT: 112 LBS | SYSTOLIC BLOOD PRESSURE: 130 MMHG

## 2022-01-01 VITALS
DIASTOLIC BLOOD PRESSURE: 68 MMHG | WEIGHT: 111.3 LBS | SYSTOLIC BLOOD PRESSURE: 138 MMHG | HEIGHT: 64 IN | OXYGEN SATURATION: 97 % | HEART RATE: 68 BPM | TEMPERATURE: 97.1 F | BODY MASS INDEX: 19 KG/M2

## 2022-01-01 VITALS
WEIGHT: 112 LBS | HEIGHT: 64 IN | SYSTOLIC BLOOD PRESSURE: 120 MMHG | HEART RATE: 82 BPM | BODY MASS INDEX: 19.12 KG/M2 | DIASTOLIC BLOOD PRESSURE: 68 MMHG

## 2022-01-01 DIAGNOSIS — R63.4 WEIGHT LOSS: Primary | ICD-10-CM

## 2022-01-01 DIAGNOSIS — Z00.00 MEDICARE ANNUAL WELLNESS VISIT, SUBSEQUENT: Primary | ICD-10-CM

## 2022-01-01 DIAGNOSIS — E78.5 HYPERLIPIDEMIA, UNSPECIFIED HYPERLIPIDEMIA TYPE: ICD-10-CM

## 2022-01-01 DIAGNOSIS — G23.1 PROGRESSIVE SUPRANUCLEAR PALSY: ICD-10-CM

## 2022-01-01 DIAGNOSIS — I48.91 ATRIAL FIBRILLATION, UNSPECIFIED TYPE: Primary | ICD-10-CM

## 2022-01-01 DIAGNOSIS — I10 BENIGN ESSENTIAL HYPERTENSION: ICD-10-CM

## 2022-01-01 DIAGNOSIS — R53.1 WEAKNESS: ICD-10-CM

## 2022-01-01 DIAGNOSIS — I50.32 CHRONIC DIASTOLIC HEART FAILURE: ICD-10-CM

## 2022-01-01 DIAGNOSIS — E78.5 HYPERLIPIDEMIA, UNSPECIFIED HYPERLIPIDEMIA TYPE: Primary | ICD-10-CM

## 2022-01-01 DIAGNOSIS — G23.1: ICD-10-CM

## 2022-01-01 DIAGNOSIS — R29.6 FREQUENT FALLS: ICD-10-CM

## 2022-01-01 DIAGNOSIS — R63.4 WEIGHT LOSS: ICD-10-CM

## 2022-01-01 DIAGNOSIS — I10 BENIGN ESSENTIAL HYPERTENSION: Primary | ICD-10-CM

## 2022-01-01 DIAGNOSIS — I48.91 ATRIAL FIBRILLATION, UNSPECIFIED TYPE: ICD-10-CM

## 2022-01-01 DIAGNOSIS — I65.23 CAROTID ARTERY PLAQUE, BILATERAL: ICD-10-CM

## 2022-01-01 DIAGNOSIS — M25.551 RIGHT HIP PAIN: ICD-10-CM

## 2022-01-01 DIAGNOSIS — J18.9 COMMUNITY ACQUIRED PNEUMONIA OF LEFT LUNG, UNSPECIFIED PART OF LUNG: Primary | ICD-10-CM

## 2022-01-01 DIAGNOSIS — R41.89 UNRESPONSIVE EPISODE: Primary | ICD-10-CM

## 2022-01-01 DIAGNOSIS — I95.9 TRANSIENT HYPOTENSION: Primary | ICD-10-CM

## 2022-01-01 DIAGNOSIS — H10.32 ACUTE CONJUNCTIVITIS OF LEFT EYE, UNSPECIFIED ACUTE CONJUNCTIVITIS TYPE: ICD-10-CM

## 2022-01-01 DIAGNOSIS — R41.0 CONFUSION: ICD-10-CM

## 2022-01-01 LAB
ALBUMIN SERPL-MCNC: 3.5 G/DL (ref 3.6–4.6)
ALBUMIN SERPL-MCNC: 3.8 G/DL (ref 3.5–5.2)
ALBUMIN SERPL-MCNC: 3.8 G/DL (ref 3.6–4.6)
ALBUMIN SERPL-MCNC: 3.9 G/DL (ref 3.6–4.6)
ALBUMIN/GLOB SERPL: 1.1 {RATIO} (ref 1.2–2.2)
ALBUMIN/GLOB SERPL: 1.1 {RATIO} (ref 1.2–2.2)
ALBUMIN/GLOB SERPL: 1.2 G/DL
ALBUMIN/GLOB SERPL: 1.2 G/DL
ALBUMIN/GLOB SERPL: 1.3 G/DL
ALBUMIN/GLOB SERPL: 1.3 {RATIO} (ref 1.2–2.2)
ALP SERPL-CCNC: 53 U/L (ref 39–117)
ALP SERPL-CCNC: 57 U/L (ref 39–117)
ALP SERPL-CCNC: 59 U/L (ref 39–117)
ALP SERPL-CCNC: 60 IU/L (ref 44–121)
ALP SERPL-CCNC: 62 IU/L (ref 44–121)
ALP SERPL-CCNC: 66 IU/L (ref 44–121)
ALT SERPL W P-5'-P-CCNC: 11 U/L (ref 1–33)
ALT SERPL W P-5'-P-CCNC: 15 U/L (ref 1–33)
ALT SERPL-CCNC: 14 IU/L (ref 0–32)
ALT SERPL-CCNC: 15 IU/L (ref 0–32)
ALT SERPL-CCNC: 17 IU/L (ref 0–32)
ALT SERPL-CCNC: 17 U/L (ref 1–33)
AMPHET+METHAMPHET UR QL: NEGATIVE
ANION GAP SERPL CALCULATED.3IONS-SCNC: 11.1 MMOL/L (ref 5–15)
ANION GAP SERPL CALCULATED.3IONS-SCNC: 9 MMOL/L (ref 5–15)
AST SERPL-CCNC: 15 IU/L (ref 0–40)
AST SERPL-CCNC: 15 IU/L (ref 0–40)
AST SERPL-CCNC: 16 U/L (ref 1–32)
AST SERPL-CCNC: 17 U/L (ref 1–32)
AST SERPL-CCNC: 18 U/L (ref 1–32)
AST SERPL-CCNC: 22 IU/L (ref 0–40)
BARBITURATES UR QL SCN: NEGATIVE
BASOPHILS # BLD AUTO: 0 X10E3/UL (ref 0–0.2)
BASOPHILS # BLD AUTO: 0 X10E3/UL (ref 0–0.2)
BASOPHILS # BLD AUTO: 0.04 10*3/MM3 (ref 0–0.2)
BASOPHILS # BLD AUTO: 0.1 X10E3/UL (ref 0–0.2)
BASOPHILS NFR BLD AUTO: 0 %
BASOPHILS NFR BLD AUTO: 0 %
BASOPHILS NFR BLD AUTO: 0.4 % (ref 0–1.5)
BASOPHILS NFR BLD AUTO: 0.5 % (ref 0–1.5)
BASOPHILS NFR BLD AUTO: 0.5 % (ref 0–1.5)
BASOPHILS NFR BLD AUTO: 1 %
BENZODIAZ UR QL SCN: NEGATIVE
BILIRUB SERPL-MCNC: 0.5 MG/DL (ref 0–1.2)
BILIRUB SERPL-MCNC: 0.7 MG/DL (ref 0–1.2)
BILIRUB SERPL-MCNC: 0.9 MG/DL (ref 0–1.2)
BILIRUB SERPL-MCNC: 1 MG/DL (ref 0–1.2)
BILIRUB UR QL STRIP: NEGATIVE
BILIRUB UR QL STRIP: NEGATIVE
BUN SERPL-MCNC: 15 MG/DL (ref 8–23)
BUN SERPL-MCNC: 19 MG/DL (ref 8–23)
BUN SERPL-MCNC: 19 MG/DL (ref 8–23)
BUN SERPL-MCNC: 20 MG/DL (ref 8–27)
BUN SERPL-MCNC: 22 MG/DL (ref 8–27)
BUN SERPL-MCNC: 24 MG/DL (ref 8–27)
BUN/CREAT SERPL: 14.9 (ref 7–25)
BUN/CREAT SERPL: 20 (ref 12–28)
BUN/CREAT SERPL: 20.4 (ref 7–25)
BUN/CREAT SERPL: 21.1 (ref 7–25)
BUN/CREAT SERPL: 23 (ref 12–28)
BUN/CREAT SERPL: 24 (ref 12–28)
CALCIUM SERPL-MCNC: 9.1 MG/DL (ref 8.7–10.3)
CALCIUM SERPL-MCNC: 9.4 MG/DL (ref 8.7–10.3)
CALCIUM SERPL-MCNC: 9.5 MG/DL (ref 8.7–10.3)
CALCIUM SERPL-MCNC: 9.7 MG/DL (ref 8.6–10.5)
CALCIUM SPEC-SCNC: 9 MG/DL (ref 8.6–10.5)
CALCIUM SPEC-SCNC: 9.2 MG/DL (ref 8.6–10.5)
CANNABINOIDS SERPL QL: NEGATIVE
CHLORIDE SERPL-SCNC: 94 MMOL/L (ref 96–106)
CHLORIDE SERPL-SCNC: 97 MMOL/L (ref 96–106)
CHLORIDE SERPL-SCNC: 97 MMOL/L (ref 96–106)
CHLORIDE SERPL-SCNC: 97 MMOL/L (ref 98–107)
CHOLEST SERPL-MCNC: 113 MG/DL (ref 100–199)
CHOLEST SERPL-MCNC: 120 MG/DL (ref 100–199)
CHOLEST SERPL-MCNC: 142 MG/DL (ref 0–200)
CLARITY UR: CLEAR
CLARITY UR: CLEAR
CO2 SERPL-SCNC: 27 MMOL/L (ref 20–29)
CO2 SERPL-SCNC: 27.9 MMOL/L (ref 22–29)
CO2 SERPL-SCNC: 29 MMOL/L (ref 22–29)
CO2 SERPL-SCNC: 33.5 MMOL/L (ref 22–29)
COCAINE UR QL: NEGATIVE
COLOR UR: YELLOW
COLOR UR: YELLOW
CREAT SERPL-MCNC: 0.9 MG/DL (ref 0.57–1)
CREAT SERPL-MCNC: 0.92 MG/DL (ref 0.57–1)
CREAT SERPL-MCNC: 0.93 MG/DL (ref 0.57–1)
CREAT SERPL-MCNC: 1 MG/DL (ref 0.57–1)
CREAT SERPL-MCNC: 1.01 MG/DL (ref 0.57–1)
CREAT SERPL-MCNC: 1.06 MG/DL (ref 0.57–1)
D-LACTATE SERPL-SCNC: 1.2 MMOL/L (ref 0.5–2)
DEPRECATED RDW RBC AUTO: 38.4 FL (ref 37–54)
DEPRECATED RDW RBC AUTO: 43 FL (ref 37–54)
DIGOXIN SERPL-MCNC: 0.9 NG/ML (ref 0.6–1.2)
DIGOXIN SERPL-MCNC: 1.2 NG/ML (ref 0.6–1.2)
EGFRCR SERPLBLD CKD-EPI 2021: 52 ML/MIN/1.73
EGFRCR SERPLBLD CKD-EPI 2021: 54.7 ML/MIN/1.73
EGFRCR SERPLBLD CKD-EPI 2021: 60.7 ML/MIN/1.73
EGFRCR SERPLBLD CKD-EPI 2021: 61 ML/MIN/1.73
EGFRCR SERPLBLD CKD-EPI 2021: 62.8 ML/MIN/1.73
EOSINOPHIL # BLD AUTO: 0 X10E3/UL (ref 0–0.4)
EOSINOPHIL # BLD AUTO: 0.06 10*3/MM3 (ref 0–0.4)
EOSINOPHIL # BLD AUTO: 0.09 10*3/MM3 (ref 0–0.4)
EOSINOPHIL # BLD AUTO: 0.11 10*3/MM3 (ref 0–0.4)
EOSINOPHIL # BLD AUTO: 0.2 X10E3/UL (ref 0–0.4)
EOSINOPHIL # BLD AUTO: 0.2 X10E3/UL (ref 0–0.4)
EOSINOPHIL NFR BLD AUTO: 0 %
EOSINOPHIL NFR BLD AUTO: 0.6 % (ref 0.3–6.2)
EOSINOPHIL NFR BLD AUTO: 1.1 % (ref 0.3–6.2)
EOSINOPHIL NFR BLD AUTO: 1.5 % (ref 0.3–6.2)
EOSINOPHIL NFR BLD AUTO: 2 %
EOSINOPHIL NFR BLD AUTO: 3 %
ERYTHROCYTE [DISTWIDTH] IN BLOOD BY AUTOMATED COUNT: 11.8 % (ref 11.7–15.4)
ERYTHROCYTE [DISTWIDTH] IN BLOOD BY AUTOMATED COUNT: 11.9 % (ref 12.3–15.4)
ERYTHROCYTE [DISTWIDTH] IN BLOOD BY AUTOMATED COUNT: 12 % (ref 11.7–15.4)
ERYTHROCYTE [DISTWIDTH] IN BLOOD BY AUTOMATED COUNT: 12.4 % (ref 12.3–15.4)
ERYTHROCYTE [DISTWIDTH] IN BLOOD BY AUTOMATED COUNT: 12.7 % (ref 11.7–15.4)
ERYTHROCYTE [DISTWIDTH] IN BLOOD BY AUTOMATED COUNT: 12.8 % (ref 12.3–15.4)
ETHANOL BLD-MCNC: <10 MG/DL (ref 0–10)
ETHANOL UR QL: <0.01 %
GLOBULIN SER CALC-MCNC: 3 GM/DL
GLOBULIN SER CALC-MCNC: 3.1 G/DL (ref 1.5–4.5)
GLOBULIN SER CALC-MCNC: 3.2 G/DL (ref 1.5–4.5)
GLOBULIN SER CALC-MCNC: 3.4 G/DL (ref 1.5–4.5)
GLOBULIN UR ELPH-MCNC: 3.3 GM/DL
GLOBULIN UR ELPH-MCNC: 3.3 GM/DL
GLUCOSE SERPL-MCNC: 108 MG/DL (ref 65–99)
GLUCOSE SERPL-MCNC: 82 MG/DL (ref 65–99)
GLUCOSE SERPL-MCNC: 82 MG/DL (ref 65–99)
GLUCOSE SERPL-MCNC: 83 MG/DL (ref 65–99)
GLUCOSE SERPL-MCNC: 84 MG/DL (ref 65–99)
GLUCOSE SERPL-MCNC: 95 MG/DL (ref 65–99)
GLUCOSE UR STRIP-MCNC: NEGATIVE MG/DL
GLUCOSE UR STRIP-MCNC: NEGATIVE MG/DL
HCT VFR BLD AUTO: 34.4 % (ref 34–46.6)
HCT VFR BLD AUTO: 34.8 % (ref 34–46.6)
HCT VFR BLD AUTO: 35.4 % (ref 34–46.6)
HCT VFR BLD AUTO: 36.4 % (ref 34–46.6)
HCT VFR BLD AUTO: 37 % (ref 34–46.6)
HCT VFR BLD AUTO: 37.4 % (ref 34–46.6)
HDLC SERPL-MCNC: 45 MG/DL
HDLC SERPL-MCNC: 49 MG/DL
HDLC SERPL-MCNC: 58 MG/DL (ref 40–60)
HGB BLD-MCNC: 11.4 G/DL (ref 11.1–15.9)
HGB BLD-MCNC: 11.5 G/DL (ref 11.1–15.9)
HGB BLD-MCNC: 11.8 G/DL (ref 11.1–15.9)
HGB BLD-MCNC: 11.8 G/DL (ref 12–15.9)
HGB BLD-MCNC: 12 G/DL (ref 12–15.9)
HGB BLD-MCNC: 12.2 G/DL (ref 12–15.9)
HGB UR QL STRIP.AUTO: NEGATIVE
HGB UR QL STRIP.AUTO: NEGATIVE
HOLD SPECIMEN: NORMAL
HOLD SPECIMEN: NORMAL
IMM GRANULOCYTES # BLD AUTO: 0 X10E3/UL (ref 0–0.1)
IMM GRANULOCYTES # BLD AUTO: 0.03 10*3/MM3 (ref 0–0.05)
IMM GRANULOCYTES NFR BLD AUTO: 0 %
IMM GRANULOCYTES NFR BLD AUTO: 0.3 % (ref 0–0.5)
IMM GRANULOCYTES NFR BLD AUTO: 0.4 % (ref 0–0.5)
IMM GRANULOCYTES NFR BLD AUTO: 0.4 % (ref 0–0.5)
INR PPP: 1.5
INR PPP: 1.6
INR PPP: 1.7
INR PPP: 1.8
INR PPP: 1.9
INR PPP: 1.9
INR PPP: 2
INR PPP: 2.05 (ref 0.9–1.1)
INR PPP: 2.1
INR PPP: 2.2
INR PPP: 2.3
INR PPP: 2.4
INR PPP: 2.4
INR PPP: 2.5
INR PPP: 2.5
INR PPP: 2.6
INR PPP: 2.8
INR PPP: 2.9
INR PPP: 3.1
INR PPP: 3.4
INR PPP: 3.5
INR PPP: 3.6
INR PPP: 4
KETONES UR QL STRIP: ABNORMAL
KETONES UR QL STRIP: NEGATIVE
LDLC SERPL CALC-MCNC: 49 MG/DL (ref 0–99)
LDLC SERPL CALC-MCNC: 51 MG/DL (ref 0–99)
LDLC SERPL CALC-MCNC: 68 MG/DL (ref 0–100)
LEUKOCYTE ESTERASE UR QL STRIP.AUTO: NEGATIVE
LEUKOCYTE ESTERASE UR QL STRIP.AUTO: NEGATIVE
LYMPHOCYTES # BLD AUTO: 0.78 10*3/MM3 (ref 0.7–3.1)
LYMPHOCYTES # BLD AUTO: 0.8 X10E3/UL (ref 0.7–3.1)
LYMPHOCYTES # BLD AUTO: 0.9 X10E3/UL (ref 0.7–3.1)
LYMPHOCYTES # BLD AUTO: 1.1 10*3/MM3 (ref 0.7–3.1)
LYMPHOCYTES # BLD AUTO: 1.1 X10E3/UL (ref 0.7–3.1)
LYMPHOCYTES # BLD AUTO: 1.75 10*3/MM3 (ref 0.7–3.1)
LYMPHOCYTES NFR BLD AUTO: 11.5 % (ref 19.6–45.3)
LYMPHOCYTES NFR BLD AUTO: 13 %
LYMPHOCYTES NFR BLD AUTO: 23.8 % (ref 19.6–45.3)
LYMPHOCYTES NFR BLD AUTO: 5 %
LYMPHOCYTES NFR BLD AUTO: 9 %
LYMPHOCYTES NFR BLD AUTO: 9.7 % (ref 19.6–45.3)
MAGNESIUM SERPL-MCNC: 1.9 MG/DL (ref 1.6–2.4)
MCH RBC QN AUTO: 29.9 PG (ref 26.6–33)
MCH RBC QN AUTO: 30.4 PG (ref 26.6–33)
MCH RBC QN AUTO: 30.5 PG (ref 26.6–33)
MCH RBC QN AUTO: 30.8 PG (ref 26.6–33)
MCH RBC QN AUTO: 30.9 PG (ref 26.6–33)
MCH RBC QN AUTO: 32.1 PG (ref 26.6–33)
MCHC RBC AUTO-ENTMCNC: 31.6 G/DL (ref 31.5–35.7)
MCHC RBC AUTO-ENTMCNC: 32.1 G/DL (ref 31.5–35.7)
MCHC RBC AUTO-ENTMCNC: 32.8 G/DL (ref 31.5–35.7)
MCHC RBC AUTO-ENTMCNC: 33 G/DL (ref 31.5–35.7)
MCHC RBC AUTO-ENTMCNC: 33.3 G/DL (ref 31.5–35.7)
MCHC RBC AUTO-ENTMCNC: 34.3 G/DL (ref 31.5–35.7)
MCV RBC AUTO: 90.1 FL (ref 79–97)
MCV RBC AUTO: 92.5 FL (ref 79–97)
MCV RBC AUTO: 94 FL (ref 79–97)
MCV RBC AUTO: 94.7 FL (ref 79–97)
MCV RBC AUTO: 95 FL (ref 79–97)
MCV RBC AUTO: 96 FL (ref 79–97)
METHADONE UR QL SCN: NEGATIVE
MONOCYTES # BLD AUTO: 0.6 X10E3/UL (ref 0.1–0.9)
MONOCYTES # BLD AUTO: 0.66 10*3/MM3 (ref 0.1–0.9)
MONOCYTES # BLD AUTO: 0.7 X10E3/UL (ref 0.1–0.9)
MONOCYTES # BLD AUTO: 0.72 10*3/MM3 (ref 0.1–0.9)
MONOCYTES # BLD AUTO: 0.77 10*3/MM3 (ref 0.1–0.9)
MONOCYTES # BLD AUTO: 0.8 X10E3/UL (ref 0.1–0.9)
MONOCYTES NFR BLD AUTO: 5 %
MONOCYTES NFR BLD AUTO: 7 %
MONOCYTES NFR BLD AUTO: 7 %
MONOCYTES NFR BLD AUTO: 8.1 % (ref 5–12)
MONOCYTES NFR BLD AUTO: 8.2 % (ref 5–12)
MONOCYTES NFR BLD AUTO: 9.8 % (ref 5–12)
NEUTROPHILS # BLD AUTO: 12.3 X10E3/UL (ref 1.4–7)
NEUTROPHILS # BLD AUTO: 6.4 X10E3/UL (ref 1.4–7)
NEUTROPHILS # BLD AUTO: 6.41 10*3/MM3 (ref 1.7–7)
NEUTROPHILS # BLD AUTO: 8.4 X10E3/UL (ref 1.4–7)
NEUTROPHILS NFR BLD AUTO: 4.7 10*3/MM3 (ref 1.7–7)
NEUTROPHILS NFR BLD AUTO: 64 % (ref 42.7–76)
NEUTROPHILS NFR BLD AUTO: 7.53 10*3/MM3 (ref 1.7–7)
NEUTROPHILS NFR BLD AUTO: 77 %
NEUTROPHILS NFR BLD AUTO: 79.1 % (ref 42.7–76)
NEUTROPHILS NFR BLD AUTO: 80.1 % (ref 42.7–76)
NEUTROPHILS NFR BLD AUTO: 81 %
NEUTROPHILS NFR BLD AUTO: 90 %
NITRITE UR QL STRIP: NEGATIVE
NITRITE UR QL STRIP: NEGATIVE
NRBC BLD AUTO-RTO: 0 /100 WBC (ref 0–0.2)
OPIATES UR QL: NEGATIVE
OXYCODONE UR QL SCN: NEGATIVE
PH UR STRIP.AUTO: 6 [PH] (ref 5–8)
PH UR STRIP.AUTO: <=5 [PH] (ref 5–8)
PLATELET # BLD AUTO: 254 10*3/MM3 (ref 140–450)
PLATELET # BLD AUTO: 262 10*3/MM3 (ref 140–450)
PLATELET # BLD AUTO: 272 10*3/MM3 (ref 140–450)
PLATELET # BLD AUTO: 272 X10E3/UL (ref 150–450)
PLATELET # BLD AUTO: 275 X10E3/UL (ref 150–450)
PLATELET # BLD AUTO: 279 X10E3/UL (ref 150–450)
PMV BLD AUTO: 10.3 FL (ref 6–12)
PMV BLD AUTO: 10.6 FL (ref 6–12)
POTASSIUM SERPL-SCNC: 4 MMOL/L (ref 3.5–5.2)
POTASSIUM SERPL-SCNC: 4.2 MMOL/L (ref 3.5–5.2)
POTASSIUM SERPL-SCNC: 4.3 MMOL/L (ref 3.5–5.2)
POTASSIUM SERPL-SCNC: 4.7 MMOL/L (ref 3.5–5.2)
PROCALCITONIN SERPL-MCNC: 0.05 NG/ML (ref 0–0.25)
PROT SERPL-MCNC: 6.7 G/DL (ref 6–8.5)
PROT SERPL-MCNC: 6.8 G/DL (ref 6–8.5)
PROT SERPL-MCNC: 7 G/DL (ref 6–8.5)
PROT SERPL-MCNC: 7.1 G/DL (ref 6–8.5)
PROT SERPL-MCNC: 7.1 G/DL (ref 6–8.5)
PROT SERPL-MCNC: 7.2 G/DL (ref 6–8.5)
PROT UR QL STRIP: NEGATIVE
PROT UR QL STRIP: NEGATIVE
PROTHROMBIN TIME: 23.1 SECONDS (ref 11.7–14.2)
QT INTERVAL: 379 MS
QT INTERVAL: 394 MS
RBC # BLD AUTO: 3.68 X10E6/UL (ref 3.77–5.28)
RBC # BLD AUTO: 3.7 X10E6/UL (ref 3.77–5.28)
RBC # BLD AUTO: 3.82 10*6/MM3 (ref 3.77–5.28)
RBC # BLD AUTO: 3.85 X10E6/UL (ref 3.77–5.28)
RBC # BLD AUTO: 3.95 10*6/MM3 (ref 3.77–5.28)
RBC # BLD AUTO: 4 10*6/MM3 (ref 3.77–5.28)
SODIUM SERPL-SCNC: 135 MMOL/L (ref 136–145)
SODIUM SERPL-SCNC: 136 MMOL/L (ref 134–144)
SODIUM SERPL-SCNC: 136 MMOL/L (ref 136–145)
SODIUM SERPL-SCNC: 136 MMOL/L (ref 136–145)
SODIUM SERPL-SCNC: 137 MMOL/L (ref 134–144)
SODIUM SERPL-SCNC: 138 MMOL/L (ref 134–144)
SP GR UR STRIP: 1.02 (ref 1–1.03)
SP GR UR STRIP: 1.02 (ref 1–1.03)
TRIGL SERPL-MCNC: 135 MG/DL (ref 0–149)
TRIGL SERPL-MCNC: 74 MG/DL (ref 0–149)
TRIGL SERPL-MCNC: 83 MG/DL (ref 0–150)
TROPONIN T SERPL-MCNC: <0.01 NG/ML (ref 0–0.03)
TSH SERPL DL<=0.005 MIU/L-ACNC: 2.05 UIU/ML (ref 0.27–4.2)
TSH SERPL DL<=0.005 MIU/L-ACNC: 2.49 UIU/ML (ref 0.45–4.5)
UNABLE TO VOID: NORMAL
UROBILINOGEN UR QL STRIP: ABNORMAL
UROBILINOGEN UR QL STRIP: NORMAL
VLDLC SERPL CALC-MCNC: 15 MG/DL (ref 5–40)
VLDLC SERPL CALC-MCNC: 16 MG/DL (ref 5–40)
VLDLC SERPL CALC-MCNC: 24 MG/DL (ref 5–40)
WBC # BLD AUTO: 10.4 X10E3/UL (ref 3.4–10.8)
WBC # BLD AUTO: 13.8 X10E3/UL (ref 3.4–10.8)
WBC # BLD AUTO: 8.01 10*3/MM3 (ref 3.4–10.8)
WBC # BLD AUTO: 8.3 X10E3/UL (ref 3.4–10.8)
WBC NRBC COR # BLD: 7.35 10*3/MM3 (ref 3.4–10.8)
WBC NRBC COR # BLD: 9.53 10*3/MM3 (ref 3.4–10.8)
WHOLE BLOOD HOLD COAG: NORMAL
WHOLE BLOOD HOLD SPECIMEN: NORMAL

## 2022-01-01 PROCEDURE — 83735 ASSAY OF MAGNESIUM: CPT

## 2022-01-01 PROCEDURE — 81003 URINALYSIS AUTO W/O SCOPE: CPT | Performed by: EMERGENCY MEDICINE

## 2022-01-01 PROCEDURE — 36415 COLL VENOUS BLD VENIPUNCTURE: CPT

## 2022-01-01 PROCEDURE — 93010 ELECTROCARDIOGRAM REPORT: CPT | Performed by: INTERNAL MEDICINE

## 2022-01-01 PROCEDURE — 80053 COMPREHEN METABOLIC PANEL: CPT

## 2022-01-01 PROCEDURE — 84145 PROCALCITONIN (PCT): CPT | Performed by: EMERGENCY MEDICINE

## 2022-01-01 PROCEDURE — 80307 DRUG TEST PRSMV CHEM ANLYZR: CPT | Performed by: EMERGENCY MEDICINE

## 2022-01-01 PROCEDURE — P9612 CATHETERIZE FOR URINE SPEC: HCPCS

## 2022-01-01 PROCEDURE — 1170F FXNL STATUS ASSESSED: CPT | Performed by: INTERNAL MEDICINE

## 2022-01-01 PROCEDURE — 71045 X-RAY EXAM CHEST 1 VIEW: CPT

## 2022-01-01 PROCEDURE — 85025 COMPLETE CBC W/AUTO DIFF WBC: CPT | Performed by: EMERGENCY MEDICINE

## 2022-01-01 PROCEDURE — 96360 HYDRATION IV INFUSION INIT: CPT

## 2022-01-01 PROCEDURE — 93005 ELECTROCARDIOGRAM TRACING: CPT | Performed by: EMERGENCY MEDICINE

## 2022-01-01 PROCEDURE — 99214 OFFICE O/P EST MOD 30 MIN: CPT | Performed by: INTERNAL MEDICINE

## 2022-01-01 PROCEDURE — 85610 PROTHROMBIN TIME: CPT | Performed by: EMERGENCY MEDICINE

## 2022-01-01 PROCEDURE — 85025 COMPLETE CBC W/AUTO DIFF WBC: CPT

## 2022-01-01 PROCEDURE — 70450 CT HEAD/BRAIN W/O DYE: CPT

## 2022-01-01 PROCEDURE — 99284 EMERGENCY DEPT VISIT MOD MDM: CPT

## 2022-01-01 PROCEDURE — 99213 OFFICE O/P EST LOW 20 MIN: CPT | Performed by: INTERNAL MEDICINE

## 2022-01-01 PROCEDURE — 84484 ASSAY OF TROPONIN QUANT: CPT

## 2022-01-01 PROCEDURE — 82077 ASSAY SPEC XCP UR&BREATH IA: CPT | Performed by: EMERGENCY MEDICINE

## 2022-01-01 PROCEDURE — G0439 PPPS, SUBSEQ VISIT: HCPCS | Performed by: INTERNAL MEDICINE

## 2022-01-01 PROCEDURE — 83605 ASSAY OF LACTIC ACID: CPT | Performed by: EMERGENCY MEDICINE

## 2022-01-01 PROCEDURE — 1126F AMNT PAIN NOTED NONE PRSNT: CPT | Performed by: INTERNAL MEDICINE

## 2022-01-01 PROCEDURE — 1159F MED LIST DOCD IN RCRD: CPT | Performed by: INTERNAL MEDICINE

## 2022-01-01 PROCEDURE — 80162 ASSAY OF DIGOXIN TOTAL: CPT

## 2022-01-01 PROCEDURE — 96361 HYDRATE IV INFUSION ADD-ON: CPT

## 2022-01-01 PROCEDURE — 80053 COMPREHEN METABOLIC PANEL: CPT | Performed by: EMERGENCY MEDICINE

## 2022-01-01 PROCEDURE — 99214 OFFICE O/P EST MOD 30 MIN: CPT | Performed by: NURSE PRACTITIONER

## 2022-01-01 PROCEDURE — 80162 ASSAY OF DIGOXIN TOTAL: CPT | Performed by: EMERGENCY MEDICINE

## 2022-01-01 RX ORDER — DOXYCYCLINE 100 MG/1
100 CAPSULE ORAL EVERY 12 HOURS SCHEDULED
Qty: 14 CAPSULE | Refills: 0 | Status: SHIPPED | OUTPATIENT
Start: 2022-01-01 | End: 2022-01-01

## 2022-01-01 RX ORDER — ACETAMINOPHEN 500 MG
500 TABLET ORAL
COMMUNITY
Start: 2022-01-01 | End: 2022-01-01

## 2022-01-01 RX ORDER — MOXIFLOXACIN 5 MG/ML
1 SOLUTION/ DROPS OPHTHALMIC 3 TIMES DAILY
Qty: 3 ML | Refills: 0 | Status: SHIPPED | OUTPATIENT
Start: 2022-01-01 | End: 2022-01-01

## 2022-01-01 RX ORDER — METOPROLOL SUCCINATE 50 MG/1
TABLET, EXTENDED RELEASE ORAL
Qty: 270 TABLET | Refills: 3 | Status: SHIPPED | OUTPATIENT
Start: 2022-01-01 | End: 2022-01-01 | Stop reason: SDUPTHER

## 2022-01-01 RX ORDER — DONEPEZIL HYDROCHLORIDE 10 MG/1
TABLET, FILM COATED ORAL
COMMUNITY
Start: 2022-01-01

## 2022-01-01 RX ORDER — SODIUM CHLORIDE 9 MG/ML
125 INJECTION, SOLUTION INTRAVENOUS CONTINUOUS
Status: DISCONTINUED | OUTPATIENT
Start: 2022-01-01 | End: 2022-01-01 | Stop reason: HOSPADM

## 2022-01-01 RX ORDER — SODIUM CHLORIDE 0.9 % (FLUSH) 0.9 %
10 SYRINGE (ML) INJECTION AS NEEDED
Status: DISCONTINUED | OUTPATIENT
Start: 2022-01-01 | End: 2022-01-01 | Stop reason: HOSPADM

## 2022-01-01 RX ORDER — DIGOXIN 125 MCG
TABLET ORAL
Qty: 90 TABLET | Refills: 3 | Status: SHIPPED | OUTPATIENT
Start: 2022-01-01 | End: 2022-01-01

## 2022-01-01 RX ORDER — WARFARIN SODIUM 1 MG/1
TABLET ORAL
Qty: 205 TABLET | Refills: 0 | Status: SHIPPED | OUTPATIENT
Start: 2022-01-01 | End: 2022-01-01 | Stop reason: SDUPTHER

## 2022-01-01 RX ORDER — DIGOXIN 125 MCG
TABLET ORAL
Qty: 90 TABLET | Refills: 3 | Status: SHIPPED | OUTPATIENT
Start: 2022-01-01 | End: 2023-01-01 | Stop reason: HOSPADM

## 2022-01-01 RX ORDER — METOPROLOL SUCCINATE 50 MG/1
75 TABLET, EXTENDED RELEASE ORAL 2 TIMES DAILY
Qty: 270 TABLET | Refills: 3 | Status: SHIPPED | OUTPATIENT
Start: 2022-01-01 | End: 2022-01-01

## 2022-01-01 RX ORDER — HYDRALAZINE HYDROCHLORIDE 25 MG/1
25 TABLET, FILM COATED ORAL 2 TIMES DAILY PRN
Qty: 180 TABLET | Refills: 3 | Status: CANCELLED | OUTPATIENT
Start: 2022-01-01

## 2022-01-01 RX ORDER — POTASSIUM CHLORIDE 750 MG/1
TABLET, EXTENDED RELEASE ORAL
Qty: 90 TABLET | Refills: 1 | Status: SHIPPED | OUTPATIENT
Start: 2022-01-01

## 2022-01-01 RX ORDER — HYDRALAZINE HYDROCHLORIDE 25 MG/1
TABLET, FILM COATED ORAL
Qty: 180 TABLET | Refills: 3 | Status: SHIPPED | OUTPATIENT
Start: 2022-01-01

## 2022-01-01 RX ORDER — WARFARIN SODIUM 1 MG/1
TABLET ORAL
Qty: 205 TABLET | Refills: 0 | Status: SHIPPED | OUTPATIENT
Start: 2022-01-01

## 2022-01-01 RX ORDER — FUROSEMIDE 20 MG/1
TABLET ORAL
Qty: 90 TABLET | Refills: 3 | Status: SHIPPED | OUTPATIENT
Start: 2022-01-01

## 2022-01-01 RX ORDER — SODIUM CHLORIDE FOR INHALATION 0.9 %
1 VIAL, NEBULIZER (ML) INHALATION
COMMUNITY
Start: 2022-01-01

## 2022-01-01 RX ORDER — DONEPEZIL HYDROCHLORIDE 10 MG/1
TABLET, FILM COATED ORAL
COMMUNITY
Start: 2022-01-01 | End: 2022-01-01

## 2022-01-01 RX ORDER — POTASSIUM CHLORIDE 750 MG/1
TABLET, EXTENDED RELEASE ORAL
Qty: 30 TABLET | Refills: 0 | Status: SHIPPED | OUTPATIENT
Start: 2022-01-01 | End: 2022-01-01

## 2022-01-01 RX ADMIN — SODIUM CHLORIDE 125 ML/HR: 9 INJECTION, SOLUTION INTRAVENOUS at 19:09

## 2022-01-05 ENCOUNTER — ANTICOAGULATION VISIT (OUTPATIENT)
Dept: PHARMACY | Facility: HOSPITAL | Age: 87
End: 2022-01-05

## 2022-01-05 LAB — INR PPP: 2.8

## 2022-01-05 NOTE — PROGRESS NOTES
Anticoagulation Clinic Progress Note    Anticoagulation Summary  As of 2022    INR goal:  2.0-3.0   TTR:  62.9 % (3.1 y)   INR used for dosin.80 (2022)   Warfarin maintenance plan:  1 mg every Sun; 2 mg all other days   Weekly warfarin total:  13 mg   Plan last modified:  Elinor Cartagena RPH (2022)   Next INR check:  2022   Priority:  Maintenance   Target end date:  Indefinite    Indications    Atrial fibrillation (HCC) [I48.91]  Atrial fibrillation with RVR (HCC) (Resolved) [I48.91]             Anticoagulation Episode Summary     INR check location:      Preferred lab:      Send INR reminders to:   ABBIE [facility]  POOL    Comments:  lab per [facility] - they will fax to us (phone: [phone])      Anticoagulation Care Providers     Provider Role Specialty Phone number    Vinh Apple MD Referring Cardiology [phone]          Clinic Interview:  Patient Findings     Negatives:  Signs/symptoms of thrombosis, Signs/symptoms of bleeding,   Laboratory test error suspected, Change in health, Change in alcohol use,   Change in activity, Upcoming invasive procedure, Emergency department   visit, Upcoming dental procedure, Missed doses, Extra doses, Change in   medications, Change in diet/appetite, Hospital admission, Bruising, Other   complaints      Clinical Outcomes     Negatives:  Major bleeding event, Thromboembolic event,   Anticoagulation-related hospital admission, Anticoagulation-related ED   visit, Anticoagulation-related fatality        INR History:  Anticoagulation Monitoring 2021   INR 3.10 3.20 2.80   INR Date 2021   INR Goal 2.0-3.0 2.0-3.0 2.0-3.0   Trend Same Same Same   Last Week Total 13 mg 12 mg 12 mg   Next Week Total 12 mg 12 mg 13 mg   Sun 1 mg 1 mg 1 mg   Mon 2 mg 2 mg 2 mg   Tue 2 mg 2 mg 2 mg   Wed 1 mg () 1 mg () 2 mg   Thu 2 mg 2 mg 2 mg   Fri 2 mg 2 mg 2 mg   Sat 2 mg 2 mg 2 mg    Visit Report - - -   Some recent data might be hidden       Plan:  1. INR is Therapeutic today- see above in Anticoagulation Summary.   Will instruct Citlali LANE Cloxavi to Continue their warfarin regimen- see above in Anticoagulation Summary.  2. Follow up in 1 weeks  3.  They have been instructed to call if any changes in medications, doses, concerns, etc. Patient expresses understanding and has no further questions at this time.    Elinor Cartagena, [facility]

## 2022-01-11 RX ORDER — WARFARIN SODIUM 1 MG/1
TABLET ORAL
Qty: 145 TABLET | Refills: 0 | Status: SHIPPED | OUTPATIENT
Start: 2022-01-11 | End: 2022-01-01

## 2022-01-12 ENCOUNTER — ANTICOAGULATION VISIT (OUTPATIENT)
Dept: PHARMACY | Facility: HOSPITAL | Age: 87
End: 2022-01-12

## 2022-01-12 LAB — INR PPP: 3.3

## 2022-01-12 NOTE — PROGRESS NOTES
Anticoagulation Clinic Progress Note    Anticoagulation Summary  As of 1/12/2022    INR goal:  2.0-3.0   TTR:  62.7 % (3.1 y)   INR used for dosing:  3.30 (1/12/2022)   Warfarin maintenance plan:  1 mg every Sun, Wed; 2 mg all other days   Weekly warfarin total:  12 mg   Plan last modified:  Daren Osman PharmD (1/12/2022)   Next INR check:  1/19/2022   Priority:  Maintenance   Target end date:  Indefinite    Indications    Atrial fibrillation (HCC) [I48.91]  Atrial fibrillation with RVR (HCC) (Resolved) [I48.91]             Anticoagulation Episode Summary     INR check location:      Preferred lab:      Send INR reminders to:   ABBI[facility]  POOL    Comments:  lab per [facility] - they will fax to us (phone: [phone])      Anticoagulation Care Providers     Provider Role Specialty Phone number    Vinh Apple MD Referring Cardiology [phone]          INR History:  Anticoagulation Monitoring 12/29/2021 1/5/2022 1/12/2022   INR 3.20 2.80 3.30   INR Date 12/29/2021 1/5/2022 1/12/2022   INR Goal 2.0-3.0 2.0-3.0 2.0-3.0   Trend Same Same Down   Last Week Total 12 mg 12 mg 13 mg   Next Week Total 12 mg 13 mg 12 mg   Sun 1 mg 1 mg 1 mg   Mon 2 mg 2 mg 2 mg   Tue 2 mg 2 mg 2 mg   Wed 1 mg (12/29) 2 mg 1 mg   Thu 2 mg 2 mg 2 mg   Fri 2 mg 2 mg 2 mg   Sat 2 mg 2 mg 2 mg   Visit Report - - -   Some recent data might be hidden       Plan:  1. INR is Supratherapeutic today- see above in Anticoagulation Summary.   Faxed orders to [facility] to Change their warfarin regimen- see above in Anticoagulation Summary.  2. Follow up in 1 week      Daren Osman PharmD

## 2022-01-19 ENCOUNTER — ANTICOAGULATION VISIT (OUTPATIENT)
Dept: PHARMACY | Facility: HOSPITAL | Age: 87
End: 2022-01-19

## 2022-01-19 LAB — INR PPP: 2.6

## 2022-01-19 NOTE — PROGRESS NOTES
Anticoagulation Clinic Progress Note    Anticoagulation Summary  As of 2022    INR goal:  2.0-3.0   TTR:  62.7 % (3.2 y)   INR used for dosin.60 (2022)   Warfarin maintenance plan:  1 mg every Sun, Wed; 2 mg all other days   Weekly warfarin total:  12 mg   No change documented:  Daren Osman PharmD   Plan last modified:  Daren Osman PharmD (2022)   Next INR check:  2022   Priority:  Maintenance   Target end date:  Indefinite    Indications    Atrial fibrillation (HCC) [I48.91]  Atrial fibrillation with RVR (HCC) (Resolved) [I48.91]             Anticoagulation Episode Summary     INR check location:      Preferred lab:      Send INR reminders to:   ABBIE ORTIZ  POOL    Comments:  lab per [facility] - they will fax to us (phone: [phone])      Anticoagulation Care Providers     Provider Role Specialty Phone number    Vinh Apple MD Referring Cardiology [phone]          INR History:  Anticoagulation Monitoring 2022   INR 2.80 3.30 2.60   INR Date 2022   INR Goal 2.0-3.0 2.0-3.0 2.0-3.0   Trend Same Down Same   Last Week Total 12 mg 13 mg 12 mg   Next Week Total 13 mg 12 mg 12 mg   Sun 1 mg 1 mg 1 mg   Mon 2 mg 2 mg 2 mg   Tue 2 mg 2 mg 2 mg   Wed 2 mg 1 mg 1 mg   Thu 2 mg 2 mg 2 mg   Fri 2 mg 2 mg 2 mg   Sat 2 mg 2 mg 2 mg   Visit Report - - -   Some recent data might be hidden       Plan:  1. INR is Therapeutic today- see above in Anticoagulation Summary.   Faxed orders to [facility] to Continue their warfarin regimen- see above in Anticoagulation Summary.  2. Follow up in 1 week      Daren Osman PharmD

## 2022-01-26 ENCOUNTER — ANTICOAGULATION VISIT (OUTPATIENT)
Dept: PHARMACY | Facility: HOSPITAL | Age: 87
End: 2022-01-26

## 2022-01-26 LAB — INR PPP: 3.3

## 2022-01-26 NOTE — PROGRESS NOTES
Anticoagulation Clinic Progress Note    Anticoagulation Summary  As of 1/26/2022    INR goal:  2.0-3.0   TTR:  62.7 % (3.2 y)   INR used for dosing:  3.30 (1/26/2022)   Warfarin maintenance plan:  1 mg every Sun, Wed; 2 mg all other days   Weekly warfarin total:  12 mg   Plan last modified:  Queenie Liu PharmD (1/26/2022)   Next INR check:     Priority:  Maintenance   Target end date:  Indefinite    Indications    Atrial fibrillation (HCC) [I48.91]  Atrial fibrillation with RVR (HCC) (Resolved) [I48.91]             Anticoagulation Episode Summary     INR check location:      Preferred lab:      [facility]    Comments:  lab per [facility] - they will fax to us (phone: [phone])      Anticoagulation Care Providers     Provider Role Specialty Phone number    Vinh Apple MD Referring Cardiology [phone]          INR History:  Anticoagulation Monitoring 1/12/2022 1/19/2022 1/26/2022   INR 3.30 2.60 3.30   INR Date 1/12/2022 1/19/2022 1/26/2022   INR Goal 2.0-3.0 2.0-3.0 2.0-3.0   Trend Down Same Same   Last Week Total 13 mg 12 mg 12 mg   Next Week Total 12 mg 12 mg 11 mg   Sun 1 mg 1 mg 1 mg   Mon 2 mg 2 mg 2 mg   Tue 2 mg 2 mg 2 mg   Wed 1 mg 1 mg Hold (1/26)   Thu 2 mg 2 mg 2 mg   Fri 2 mg 2 mg 2 mg   Sat 2 mg 2 mg 2 mg   Visit Report - - -   Some recent data might be hidden       Plan:  1. INR is Supratherapeutic today- see above in Anticoagulation Summary.   Faxed orders to [facility] to Hold warfarin dose today then continue her normal regimen - see above in Anticoagulation Summary.  2. Follow up in 1 weeks    Queenie Liu PharmD

## 2022-02-02 ENCOUNTER — ANTICOAGULATION VISIT (OUTPATIENT)
Dept: PHARMACY | Facility: HOSPITAL | Age: 87
End: 2022-02-02

## 2022-02-02 LAB — INR PPP: 2.9

## 2022-02-02 NOTE — PROGRESS NOTES
Anticoagulation Clinic Progress Note    Anticoagulation Summary  As of 2022    INR goal:  2.0-3.0   TTR:  62.4 % (3.2 y)   INR used for dosin.90 (2022)   Warfarin maintenance plan:  1 mg every Mon, Wed, Fri; 2 mg all other days   Weekly warfarin total:  11 mg   Plan last modified:  Elinor Cartagena RPH (2022)   Next INR check:  2022   Priority:  Maintenance   Target end date:  Indefinite    Indications    Atrial fibrillation (HCC) [I48.91]  Atrial fibrillation with RVR (HCC) (Resolved) [I48.91]             Anticoagulation Episode Summary     INR check location:      Preferred lab:      Send INR reminders to:  GLENNY ORTIZ  POOL    Comments:  lab per [facility] - they will fax to us (phone: [phone])      Anticoagulation Care Providers     Provider Role Specialty Phone number    Vinh Apple MD Referring Cardiology [phone]          Clinic Interview:  Patient Findings     Negatives:  Signs/symptoms of thrombosis, Signs/symptoms of bleeding,   Laboratory test error suspected, Change in health, Change in alcohol use,   Change in activity, Upcoming invasive procedure, Emergency department   visit, Upcoming dental procedure, Missed doses, Extra doses, Change in   medications, Change in diet/appetite, Hospital admission, Bruising, Other   complaints      Clinical Outcomes     Negatives:  Major bleeding event, Thromboembolic event,   Anticoagulation-related hospital admission, Anticoagulation-related ED   visit, Anticoagulation-related fatality        INR History:  Anticoagulation Monitoring 2022   INR 2.60 3.30 2.90   INR Date 2022   INR Goal 2.0-3.0 2.0-3.0 2.0-3.0   Trend Same Same Down   Last Week Total 12 mg 12 mg 11 mg   Next Week Total 12 mg 11 mg 11 mg   Sun 1 mg 1 mg 2 mg   Mon 2 mg 2 mg 1 mg   Tue 2 mg 2 mg 2 mg   Wed 1 mg Hold () 1 mg   Thu 2 mg 2 mg 2 mg   Fri 2 mg 2 mg 1 mg   Sat 2 mg 2 mg 2 mg    Visit Report - - -   Some recent data might be hidden       Plan:  1. INR is Therapeutic today- see above in Anticoagulation Summary.   Will instruct Citlali A Clore to Change their warfarin regimen- see above in Anticoagulation Summary.  2. Follow up in 1 weeks  3. They have been instructed to call if any changes in medications, doses, concerns, etc. Patient expresses understanding and has no further questions at this time.    Elinor Cartagena, [facility]

## 2022-02-10 ENCOUNTER — ANTICOAGULATION VISIT (OUTPATIENT)
Dept: PHARMACY | Facility: HOSPITAL | Age: 87
End: 2022-02-10

## 2022-02-10 LAB — INR PPP: 2.4

## 2022-02-10 NOTE — PROGRESS NOTES
Anticoagulation Clinic Progress Note    Anticoagulation Summary  As of 2/10/2022    INR goal:  2.0-3.0   TTR:  62.7 % (3.2 y)   INR used for dosin.40 (2/10/2022)   Warfarin maintenance plan:  1 mg every Mon, Wed, Fri; 2 mg all other days   Weekly warfarin total:  11 mg   No change documented:  Ana George [facility]   Plan last modified:  Elinor Cartagena RPH (2022)   Next INR check:  2022   Priority:  Maintenance   Target end date:  Indefinite    Indications    Atrial fibrillation (HCC) [I48.91]  Atrial fibrillation with RVR (HCC) (Resolved) [I48.91]             Anticoagulation Episode Summary     INR check location:      Preferred lab:      Send INR reminders to:   ABBIE [facility]  POOL    Comments:  lab per [facility] - they will fax to us (phone: [phone])      Anticoagulation Care Providers     Provider Role Specialty Phone number    Vinh Apple MD Referring Cardiology [phone]          Drug interactions: has remained unchanged.  Diet: has remained unchanged.    Clinic Interview:  No pertinent clinical findings have been reported.    INR History:  Anticoagulation Monitoring 2022 2022 2/10/2022   INR 3.30 2.90 2.40   INR Date 2022 2022 2/10/2022   INR Goal 2.0-3.0 2.0-3.0 2.0-3.0   Trend Same Down Same   Last Week Total 12 mg 11 mg 11 mg   Next Week Total 11 mg 11 mg 11 mg   Sun 1 mg 2 mg 2 mg   Mon 2 mg 1 mg 1 mg   Tue 2 mg 2 mg 2 mg   Wed Hold () 1 mg 1 mg   Thu 2 mg 2 mg 2 mg   Fri 2 mg 1 mg 1 mg   Sat 2 mg 2 mg 2 mg   Visit Report - - -   Some recent data might be hidden       Plan:  1. INR is Therapeutic today- see above in Anticoagulation Summary.   Will instruct Citlali Solorio to Continue their warfarin regimen- see above in Anticoagulation Summary.  2. Follow up in 1 weeks  3. Pt has agreed to only be called if INR out of range. They have been instructed to call if any changes in medications, doses, concerns, etc. Patient expresses  understanding and has no further questions at this time.    Ana George, [facility]

## 2022-02-15 NOTE — TELEPHONE ENCOUNTER
Anesthesia Post Evaluation    Patient: Neil Goode    Procedure(s) Performed: Procedure(s) (LRB):  EXCISION, PILONIDAL CYST (N/A)    Final Anesthesia Type: general    Patient location during evaluation: PACU  Patient participation: Yes- Able to Participate  Level of consciousness: awake and alert  Post-procedure vital signs: reviewed and stable  Pain management: adequate  Airway patency: patent    PONV status at discharge: No PONV  Anesthetic complications: no      Cardiovascular status: blood pressure returned to baseline  Respiratory status: unassisted  Hydration status: euvolemic  Follow-up not needed.          Vitals Value Taken Time   BP 91/61 08/18/20 1137   Temp 36.5 °C (97.7 °F) 08/18/20 1130   Pulse 46 08/18/20 1138   Resp 15 08/18/20 1130   SpO2 97 % 08/18/20 1138   Vitals shown include unvalidated device data.      Event Time   Out of Recovery 11:29:07         Pain/Jerel Score: Pain Rating Prior to Med Admin: 3 (8/18/2020 10:50 AM)  Pain Rating Post Med Admin: 3 (8/18/2020 10:50 AM)  Jerel Score: 10 (8/18/2020 11:37 AM)         Provider: DR MONTERROSO  Caller: PATY  Relationship to Patient: MAURICE  Phone Number: [phone]  Reason for Call: PATY CALLED TO LET US KNOW THE PATIENT HAD A FALL IN HER ROOM TODAY.  SHE STATES THERE ARE NO INJURIES BUT WANTED TO MAKE DR MONTERROSO AWARE.

## 2022-02-16 NOTE — PROGRESS NOTES
Anticoagulation Clinic Progress Note    Anticoagulation Summary  As of 2022    INR goal:  2.0-3.0   TTR:  62.9 % (3.2 y)   INR used for dosin.80 (2022)   Warfarin maintenance plan:  1 mg every Mon, Wed, Fri; 2 mg all other days   Weekly warfarin total:  11 mg   No change documented:  [facility]   Plan last modified:  Elinor Cartagena RPH (2022)   Next INR check:  2022   Priority:  Maintenance   Target end date:  Indefinite    Indications    Atrial fibrillation (HCC) [I48.91]  Atrial fibrillation with RVR (HCC) (Resolved) [I48.91]             Anticoagulation Episode Summary     INR check location:      Preferred lab:      Send INR reminders to:   ABBIE [facility]  POOL    Comments:  lab per [facility] - they will fax to us (phone: [phone])      Anticoagulation Care Providers     Provider Role Specialty Phone number    Vinh Apple MD Referring Cardiology [phone]          INR History:  Anticoagulation Monitoring 2022 2/10/2022 2022   INR 2.90 2.40 2.80   INR Date 2022 2/10/2022 2022   INR Goal 2.0-3.0 2.0-3.0 2.0-3.0   Trend Down Same Same   Last Week Total 11 mg 11 mg 11 mg   Next Week Total 11 mg 11 mg 11 mg   Sun 2 mg 2 mg 2 mg   Mon 1 mg 1 mg 1 mg   Tue 2 mg 2 mg 2 mg   Wed 1 mg - 1 mg   Thu 2 mg 2 mg 2 mg   Fri 1 mg 1 mg 1 mg   Sat 2 mg 2 mg 2 mg   Visit Report - - -   Some recent data might be hidden       Plan:  1. INR is Therapeutic today- see above in Anticoagulation Summary.   Provided instructions to Yadira  with [facility] to Continue their current warfarin regimen- see above in Anticoagulation Summary. Also faxed orders.   2. Follow up in 1 week      [facility]

## 2022-02-23 PROBLEM — H10.32 ACUTE CONJUNCTIVITIS OF LEFT EYE: Status: ACTIVE | Noted: 2022-01-01

## 2022-02-23 NOTE — PROGRESS NOTES
Anticoagulation Clinic Progress Note    Anticoagulation Summary  As of 2022    INR goal:  2.0-3.0   TTR:  63.1 % (3.2 y)   INR used for dosin.30 (2022)   Warfarin maintenance plan:  1 mg every Mon, Wed, Fri; 2 mg all other days   Weekly warfarin total:  11 mg   No change documented:  Elinor Cartagena RPH   Plan last modified:  Elinor Cartagena RPH (2022)   Next INR check:  3/2/2022   Priority:  Maintenance   Target end date:  Indefinite    Indications    Atrial fibrillation (HCC) [I48.91]  Atrial fibrillation with RVR (HCC) (Resolved) [I48.91]             Anticoagulation Episode Summary     INR check location:      Preferred lab:      Send INR reminders to:   ABBIE ORTIZ  POOL    Comments:  lab per [facility] - they will fax to us (phone: [phone])      Anticoagulation Care Providers     Provider Role Specialty Phone number    Vinh Apple MD Referring Cardiology [phone]          Clinic Interview:  No pertinent clinical findings have been reported.    INR History:  Anticoagulation Monitoring 2/10/2022 2022 2022   INR 2.40 2.80 2.30   INR Date 2/10/2022 2022 2022   INR Goal 2.0-3.0 2.0-3.0 2.0-3.0   Trend Same Same Same   Last Week Total 11 mg 11 mg 11 mg   Next Week Total 11 mg 11 mg 11 mg   Sun 2 mg 2 mg 2 mg   Mon 1 mg 1 mg 1 mg   Tue 2 mg 2 mg 2 mg   Wed - 1 mg 1 mg   Thu 2 mg 2 mg 2 mg   Fri 1 mg 1 mg 1 mg   Sat 2 mg 2 mg 2 mg   Visit Report - - -   Some recent data might be hidden       Plan:  1. INR is therapeutic today- see above in Anticoagulation Summary.    Citlali A Clore to continue their warfarin regimen- see above in Anticoagulation Summary.  2. Follow up in 1 week  3. Pt has agreed to only be called if INR out of range. They have been instructed to call if any changes in medications, doses, concerns, etc. Patient expresses understanding and has no further questions at this time.    Elinor Cartagena RPH

## 2022-02-23 NOTE — PROGRESS NOTES
Subjective     Citlali ARIANA Solorio is a 86 y.o. female who presents with   Chief Complaint   Patient presents with   • Weight Loss       History of Present Illness     PSP is causing issues with her eating.  It takes her a long time to eat.  She drools a lot.  She fortunately is not aspirating.  She has seen speech therapy.  She has eight pounds since September.       Left eye redness and drainage going on for several days.       Review of Systems   Respiratory: Negative.    Cardiovascular: Negative.        The following portions of the patient's history were reviewed and updated as appropriate: allergies, current medications and problem list.    Patient Active Problem List    Diagnosis Date Noted   • Acute conjunctivitis of left eye 02/23/2022   • History of melanoma 04/13/2021   • Age-related osteoporosis with current pathological fracture with delayed healing 11/19/2020   • Compression fracture of L1 vertebra with delayed healing 10/29/2020   • Heart failure with preserved ejection fraction (HCC) 08/13/2019   • Tear of medial meniscus of right knee, current 06/15/2017   • Mitral valve insufficiency 07/06/2016   • Tricuspid valve insufficiency 07/06/2016   • Progressive supranuclear palsy (HCC) 04/21/2016   • Carotid artery plaque, bilateral 04/21/2016     Note Last Updated: 5/31/2017     Description: plaque on screening last done 3/13, 11/13, 10/16     • Heart failure (HCC) 04/21/2016     Note Last Updated: 4/21/2016     Description: admission 7/2013     • Gastroesophageal reflux disease 04/21/2016   • Hyperlipidemia 04/21/2016     Note Last Updated: 4/21/2016     Description: Patient tried Lipitor, Crestor, Zocor, Bacol and Livalo in the past and was intolerant of all of them.     • Spinal stenosis of lumbar region 04/21/2016   • Cobalamin deficiency 04/21/2016     Note Last Updated: 5/31/2017     Description: told at [facility] that B12 was low.  Monthly shots recommended 5/5/15.  Now on oral replacement.      •  "Atrial fibrillation (HCC) 06/24/2013   • Benign essential hypertension 06/24/2013       Current Outpatient Medications on File Prior to Visit   Medication Sig Dispense Refill   • acetaminophen (TYLENOL) 650 MG 8 hr tablet Take 650 mg by mouth Every 8 (Eight) Hours As Needed for Mild Pain .     • Cholecalciferol (VITAMIN D PO) Take 1,000 mg by mouth Every Evening.     • digoxin (LANOXIN) 125 MCG tablet Take 1 tablet by mouth Daily. 90 tablet 3   • donepezil (ARICEPT) 5 MG tablet      • furosemide (LASIX) 20 MG tablet Take 1 tablet by mouth Daily. 90 tablet 3   • hydrALAZINE (APRESOLINE) 25 MG tablet Take 1 tablet by mouth 2 (Two) Times a Day. 180 tablet 3   • metoprolol succinate XL (TOPROL-XL) 50 MG 24 hr tablet Take 1.5 tablets by mouth 2 (Two) Times a Day. 270 tablet 3   • potassium chloride (K-DUR,KLOR-CON) 10 MEQ CR tablet TAKE ONE TABLET BY MOUTH DAILY 90 tablet 3   • vitamin B-12 (VITAMIN B-12) 1000 MCG tablet Take 1 tablet by mouth Daily. (Patient taking differently: Take 1,000 mcg by mouth Every Evening. HOLD PRIOR TO SURG)     • warfarin (COUMADIN) 1 MG tablet TAKE 1 TABLET BY MOUTH ON SUNDAY,TUESDAY,AND THURSDAY, AND TAKE 2 TABLETS BY MOUTH ALL OTHER DAYS OR AS DIRECTED. 145 tablet 0     No current facility-administered medications on file prior to visit.       Objective     /60   Pulse 67   Ht 154.1 cm (60.67\")   Wt 49 kg (108 lb)   LMP  (LMP Unknown)   SpO2 98%   BMI 20.63 kg/m²     Physical Exam  Constitutional:       Appearance: She is well-developed.   HENT:      Head: Normocephalic and atraumatic.   Eyes:      Conjunctiva/sclera:      Left eye: Left conjunctiva is injected.   Pulmonary:      Effort: Pulmonary effort is normal.   Neurological:      Mental Status: She is alert and oriented to person, place, and time.   Psychiatric:         Behavior: Behavior normal.         Assessment/Plan   Diagnoses and all orders for this visit:    1. Weight loss (Primary)  -     CBC & Differential  -    "  Comprehensive Metabolic Panel    2. Progressive supranuclear palsy (HCC)    3. Acute conjunctivitis of left eye, unspecified acute conjunctivitis type    Other orders  -     moxifloxacin (Vigamox) 0.5 % ophthalmic solution; Administer 0.05 mL to both eyes 3 (Three) Times a Day.  Dispense: 3 mL; Refill: 0        Discussion    Patient presents with weight loss from because PSP is making it difficult for her to eat.  We discussed ways to increase calories.  Check labs today.  We can consider a feeding tube.    Conjunctivitis.  Rx for antibiotics.  The patient is instructed to let our office know if not feeling better over the next several days or if there is any change in symptoms.           Future Appointments   Date Time Provider Department Center   4/12/2022  9:50 AM [facility]   4/19/2022 11:15 AM Cristal Lema MD [facility]   7/5/2022 11:00 AM Vnih Apple MD [facility]

## 2022-03-03 NOTE — PROGRESS NOTES
Anticoagulation Clinic Progress Note    Anticoagulation Summary  As of 3/3/2022    INR goal:  2.0-3.0   TTR:  63.3 % (3.3 y)   INR used for dosin.00 (3/2/2022)   Warfarin maintenance plan:  1 mg every Mon, Wed, Fri; 2 mg all other days   Weekly warfarin total:  11 mg   No change documented:  Elinor Cartagena RPH   Plan last modified:  Elinor Cartagena RPH (2022)   Next INR check:  3/9/2022   Priority:  Maintenance   Target end date:  Indefinite    Indications    Atrial fibrillation (HCC) [I48.91]  Atrial fibrillation with RVR (HCC) (Resolved) [I48.91]             Anticoagulation Episode Summary     INR check location:      Preferred lab:      [facility]    Comments:  lab per [facility] - they will fax to us (phone: [phone])      Anticoagulation Care Providers     Provider Role Specialty Phone number    Vinh Apple MD Referring Cardiology [phone]          Clinic Interview:  Patient Findings     Negatives:  Signs/symptoms of thrombosis, Signs/symptoms of bleeding,   Laboratory test error suspected, Change in health, Change in alcohol use,   Change in activity, Upcoming invasive procedure, Emergency department   visit, Upcoming dental procedure, Missed doses, Extra doses, Change in   medications, Change in diet/appetite, Hospital admission, Bruising, Other   complaints      Clinical Outcomes     Negatives:  Major bleeding event, Thromboembolic event,   Anticoagulation-related hospital admission, Anticoagulation-related ED   visit, Anticoagulation-related fatality        INR History:  Anticoagulation Monitoring 2022 2022 3/3/2022   INR 2.80 2.30 2.00   INR Date 2022 2022 3/2/2022   INR Goal 2.0-3.0 2.0-3.0 2.0-3.0   Trend Same Same Same   Last Week Total 11 mg 11 mg 11 mg   Next Week Total 11 mg 11 mg 11 mg   Sun 2 mg 2 mg 2 mg   Mon 1 mg 1 mg 1 mg   Tue 2 mg 2 mg 2 mg   Wed 1 mg 1 mg -   Thu 2 mg 2 mg 2 mg   Fri 1 mg  1 mg 1 mg   Sat 2 mg 2 mg 2 mg   Visit Report - - -   Some recent data might be hidden       Plan:  1. INR is Therapeutic today- see above in Anticoagulation Summary.   Will instruct Citlali Solorio to Continue their warfarin regimen- see above in Anticoagulation Summary. Spoke with [facility] and faxed over orders   2. Follow up in 1 week  3.  They have been instructed to call if any changes in medications, doses, concerns, etc. Patient expresses understanding and has no further questions at this time.    Elinor Cartagena, [facility]

## 2022-03-09 NOTE — PROGRESS NOTES
Anticoagulation Clinic Progress Note    Anticoagulation Summary  As of 3/9/2022    INR goal:  2.0-3.0   TTR:  63.7 % (3.3 y)   INR used for dosin.60 (3/9/2022)   Warfarin maintenance plan:  1 mg every Mon, Wed, Fri; 2 mg all other days   Weekly warfarin total:  11 mg   No change documented:  Elinor Cartagena RPH   Plan last modified:  Elinor Cartagena RPH (2022)   Next INR check:  3/16/2022   Priority:  Maintenance   Target end date:  Indefinite    Indications    Atrial fibrillation (HCC) [I48.91]  Atrial fibrillation with RVR (HCC) (Resolved) [I48.91]             Anticoagulation Episode Summary     INR check location:      Preferred lab:      Send INR reminders to:   [facility]  POOL    Comments:  lab per [facility] - they will fax to us (phone: [phone])      Anticoagulation Care Providers     Provider Role Specialty Phone number    Vinh Apple MD Referring Cardiology [phone]          Clinic Interview:  No pertinent clinical findings have been reported.    INR History:  Anticoagulation Monitoring 2022 3/3/2022 3/9/2022   INR 2.30 2.00 2.60   INR Date 2022 3/2/2022 3/9/2022   INR Goal 2.0-3.0 2.0-3.0 2.0-3.0   Trend Same Same Same   Last Week Total 11 mg 11 mg 11 mg   Next Week Total 11 mg 11 mg 11 mg   Sun 2 mg 2 mg 2 mg   Mon 1 mg 1 mg 1 mg   Tue 2 mg 2 mg 2 mg   Wed 1 mg - 1 mg   Thu 2 mg 2 mg 2 mg   Fri 1 mg 1 mg 1 mg   Sat 2 mg 2 mg 2 mg   Visit Report - - -   Some recent data might be hidden       Plan:  1. INR is therapeutic today- see above in Anticoagulation Summary.    Citlali A Clore to continue their warfarin regimen- see above in Anticoagulation Summary.  2. Follow up in 1 week  3. They have been instructed to call if any changes in medications, doses, concerns, etc. Patient expresses understanding and has no further questions at this time.    Elinor Cartagena RPH

## 2022-03-16 NOTE — PROGRESS NOTES
Anticoagulation Clinic Progress Note    Anticoagulation Summary  As of 3/16/2022    INR goal:  2.0-3.0   TTR:  63.9 % (3.3 y)   INR used for dosin.50 (3/16/2022)   Warfarin maintenance plan:  1 mg every Mon, Wed, Fri; 2 mg all other days   Weekly warfarin total:  11 mg   No change documented:  Elinor Cartagena RPH   Plan last modified:  Elinor Cartagena RPH (2022)   Next INR check:  3/23/2022   Priority:  Maintenance   Target end date:  Indefinite    Indications    Atrial fibrillation (HCC) [I48.91]  Atrial fibrillation with RVR (HCC) (Resolved) [I48.91]             Anticoagulation Episode Summary     INR check location:      Preferred lab:      Send INR reminders to:   AB[facility]  POOL    Comments:  lab per [facility] - they will fax to us (phone: [phone])      Anticoagulation Care Providers     Provider Role Specialty Phone number    Vinh Apple MD Referring Cardiology [phone]          Clinic Interview:  No pertinent clinical findings have been reported.    INR History:  Anticoagulation Monitoring 3/3/2022 3/9/2022 3/16/2022   INR 2.00 2.60 2.50   INR Date 3/2/2022 3/9/2022 3/16/2022   INR Goal 2.0-3.0 2.0-3.0 2.0-3.0   Trend Same Same Same   Last Week Total 11 mg 11 mg 11 mg   Next Week Total 11 mg 11 mg 11 mg   Sun 2 mg 2 mg 2 mg   Mon 1 mg 1 mg 1 mg   Tue 2 mg 2 mg 2 mg   Wed - 1 mg 1 mg   Thu 2 mg 2 mg 2 mg   Fri 1 mg 1 mg 1 mg   Sat 2 mg 2 mg 2 mg   Visit Report - - -   Some recent data might be hidden       Plan:  1. INR is therapeutic today- see above in Anticoagulation Summary.    Citlali A Clore to continue their warfarin regimen- see above in Anticoagulation Summary.  2. Follow up in 1 week. Faxed orders to [facility]   3. Pt has agreed to only be called if INR out of range. They have been instructed to call if any changes in medications, doses, concerns, etc. Patient expresses understanding and has no further questions at this  time.    Elinor Cartagena, [facility]

## 2022-03-21 NOTE — TELEPHONE ENCOUNTER
THEY ARE WANTING TO KNOW IF SHE CAN TAKE IT IN LIQUID FORM     IMMODIUM LIQUID 1MG/.75 ML    PLEASE ADVISE   PATY (Other) [phone]

## 2022-03-21 NOTE — TELEPHONE ENCOUNTER
Caller: PATY    Relationship to patient: Other    Best call back number: [phone]    Patient is needing:  [facility] WOULD LIKE TO MAKE SURE THAT IT IS OK FOR THE PATIENT TO TAKE IMODIUM FOR DIARRHEA AND IF SO, CAN WE SENT AN ORDER OVER SO THEY CAN KEEP THE MEDICATION, CAN FAX TO:     [phone]

## 2022-03-23 NOTE — PROGRESS NOTES
Anticoagulation Clinic Progress Note    Anticoagulation Summary  As of 3/23/2022    INR goal:  2.0-3.0   TTR:  64.1 % (3.3 y)   INR used for dosin.00 (3/23/2022)   Warfarin maintenance plan:  1 mg every Mon, Wed, Fri; 2 mg all other days   Weekly warfarin total:  11 mg   No change documented:  Elinor Cartagena RPH   Plan last modified:  Elinor Cartagena RPH (2022)   Next INR check:  3/30/2022   Priority:  Maintenance   Target end date:  Indefinite    Indications    Atrial fibrillation (HCC) [I48.91]  Atrial fibrillation with RVR (HCC) (Resolved) [I48.91]             Anticoagulation Episode Summary     INR check location:      Preferred lab:      [facility]    Comments:  lab per [facility] - they will fax to us (phone: [phone])      Anticoagulation Care Providers     Provider Role Specialty Phone number    Vinh Apple MD Referring Cardiology [phone]          Clinic Interview:  No pertinent clinical findings have been reported.    INR History:  Anticoagulation Monitoring 3/9/2022 3/16/2022 3/23/2022   INR 2.60 2.50 2.00   INR Date 3/9/2022 3/16/2022 3/23/2022   INR Goal 2.0-3.0 2.0-3.0 2.0-3.0   Trend Same Same Same   Last Week Total 11 mg 11 mg 11 mg   Next Week Total 11 mg 11 mg 11 mg   Sun 2 mg 2 mg 2 mg   Mon 1 mg 1 mg 1 mg   Tue 2 mg 2 mg 2 mg   Wed 1 mg 1 mg 1 mg   Thu 2 mg 2 mg 2 mg   Fri 1 mg 1 mg 1 mg   Sat 2 mg 2 mg 2 mg   Visit Report - - -   Some recent data might be hidden       Plan:  1. INR is therapeutic today- see above in Anticoagulation Summary.    Citlali A Clore to continue their warfarin regimen- see above in Anticoagulation Summary.  2. Follow up in 1 week  3.  They have been instructed to call if any changes in medications, doses, concerns, etc. Patient expresses understanding and has no further questions at this time.    Elinor Cartagena RPH

## 2022-03-30 NOTE — PROGRESS NOTES
Anticoagulation Clinic Progress Note    Anticoagulation Summary  As of 3/30/2022    INR goal:  2.0-3.0   TTR:  63.7 % (3.3 y)   INR used for dosin.80 (3/30/2022)   Warfarin maintenance plan:  1 mg every Mon, Wed, Fri; 2 mg all other days   Weekly warfarin total:  11 mg   Plan last modified:  Elinor Cartagena RPH (2022)   Next INR check:  2022   Priority:  Maintenance   Target end date:  Indefinite    Indications    Atrial fibrillation (HCC) [I48.91]  Atrial fibrillation with RVR (HCC) (Resolved) [I48.91]             Anticoagulation Episode Summary     INR check location:      Preferred lab:      Send INR reminders to:  GLENNY [facility]TIZ  POOL    Comments:  lab per [facility] - they will fax to us (phone: [phone])      Anticoagulation Care Providers     Provider Role Specialty Phone number    Vinh Apple MD Referring Cardiology [phone]          Clinic Interview:  No pertinent clinical findings have been reported.    INR History:  Anticoagulation Monitoring 3/16/2022 3/23/2022 3/30/2022   INR 2.50 2.00 1.80   INR Date 3/16/2022 3/23/2022 3/30/2022   INR Goal 2.0-3.0 2.0-3.0 2.0-3.0   Trend Same Same Same   Last Week Total 11 mg 11 mg 11 mg   Next Week Total 11 mg 11 mg 12 mg   Sun 2 mg 2 mg 2 mg   Mon 1 mg 1 mg 1 mg   Tue 2 mg 2 mg 2 mg   Wed 1 mg 1 mg 2 mg (3/30)   Thu 2 mg 2 mg 2 mg   Fri 1 mg 1 mg 1 mg   Sat 2 mg 2 mg 2 mg   Visit Report - - -   Some recent data might be hidden       Plan:  1. INR is subtherapeutic today- see above in Anticoagulation Summary.    Citlali A Clore to increase their warfarin regimen- see above in Anticoagulation Summary.  2. Follow up in 1 week  3. They have been instructed to call if any changes in medications, doses, concerns, etc. Patient expresses understanding and has no further questions at this time.    Elinor Cartagena RPH

## 2022-04-06 NOTE — PROGRESS NOTES
Anticoagulation Clinic Progress Note    Anticoagulation Summary  As of 2022    INR goal:  2.0-3.0   TTR:  63.3 % (3.4 y)   INR used for dosin.80 (2022)   Warfarin maintenance plan:  1 mg every Mon, Fri; 2 mg all other days   Weekly warfarin total:  12 mg   Plan last modified:  Elinor Cartagena RPH (2022)   Next INR check:  2022   Priority:  Maintenance   Target end date:  Indefinite    Indications    Atrial fibrillation (HCC) [I48.91]  Atrial fibrillation with RVR (HCC) (Resolved) [I48.91]             Anticoagulation Episode Summary     INR check location:      Preferred lab:      Send INR reminders to:   ABBIE ORTI[facility]  POOL    Comments:  lab per [facility] - they will fax to us (phone: [phone])      Anticoagulation Care Providers     Provider Role Specialty Phone number    Vinh Apple MD Referring Cardiology [phone]          Clinic Interview:  Patient Findings     Negatives:  Signs/symptoms of thrombosis, Signs/symptoms of bleeding,   Laboratory test error suspected, Change in health, Change in alcohol use,   Change in activity, Upcoming invasive procedure, Emergency department   visit, Upcoming dental procedure, Missed doses, Extra doses, Change in   medications, Change in diet/appetite, Hospital admission, Bruising, Other   complaints      Clinical Outcomes     Negatives:  Major bleeding event, Thromboembolic event,   Anticoagulation-related hospital admission, Anticoagulation-related ED   visit, Anticoagulation-related fatality        INR History:  Anticoagulation Monitoring 3/23/2022 3/30/2022 2022   INR 2.00 1.80 1.80   INR Date 3/23/2022 3/30/2022 2022   INR Goal 2.0-3.0 2.0-3.0 2.0-3.0   Trend Same Same Up   Last Week Total 11 mg 11 mg 12 mg   Next Week Total 11 mg 12 mg 12 mg   Sun 2 mg 2 mg 2 mg   Mon 1 mg 1 mg 1 mg   Tue 2 mg 2 mg 2 mg   Wed 1 mg 2 mg (3/30) 2 mg   Thu 2 mg 2 mg 2 mg   Fri 1 mg 1 mg 1 mg   Sat 2 mg 2 mg 2 mg   Visit  Report - - -   Some recent data might be hidden       Plan:  1. INR is Subtherapeutic today- see above in Anticoagulation Summary.   Will instruct Citlali A Clore to Increase their warfarin regimen- see above in Anticoagulation Summary.  2. Follow up in 1 weeks. Faxed orders to [facility]   3.  They have been instructed to call if any changes in medications, doses, concerns, etc. Patient expresses understanding and has no further questions at this time.    Elinor Cartagena, [facility]

## 2022-04-13 NOTE — PROGRESS NOTES
Anticoagulation Clinic Progress Note    Anticoagulation Summary  As of 2022    INR goal:  2.0-3.0   TTR:  63.0 % (3.4 y)   INR used for dosin.80 (2022)   Warfarin maintenance plan:  1 mg every Mon; 2 mg all other days   Weekly warfarin total:  13 mg   Plan last modified:  Daren Osman PharmD (2022)   Next INR check:  2022   Priority:  Maintenance   Target end date:  Indefinite    Indications    Atrial fibrillation (HCC) [I48.91]  Atrial fibrillation with RVR (HCC) (Resolved) [I48.91]             Anticoagulation Episode Summary     INR check location:      Preferred lab:      Send INR reminders to:  [facility]  POOL    Comments:  lab per [facility] - they will fax to us (phone: [phone])      Anticoagulation Care Providers     Provider Role Specialty Phone number    Vinh Apple MD Referring Cardiology [phone]          INR History:  Anticoagulation Monitoring 3/30/2022 2022 2022   INR 1.80 1.80 1.80   INR Date 3/30/2022 2022 2022   INR Goal 2.0-3.0 2.0-3.0 2.0-3.0   Trend Same Up Up   Last Week Total 11 mg 12 mg 12 mg   Next Week Total 12 mg 12 mg 13 mg   Sun 2 mg 2 mg 2 mg   Mon 1 mg 1 mg 1 mg   Tue 2 mg 2 mg 2 mg   Wed 2 mg (3/30) 2 mg 2 mg   Thu 2 mg 2 mg 2 mg   Fri 1 mg 1 mg 2 mg   Sat 2 mg 2 mg 2 mg   Visit Report - - -   Some recent data might be hidden       Plan:  1. INR is Subtherapeutic today- see above in Anticoagulation Summary.   Faxed orders to [facility] to Increase their warfarin regimen- see above in Anticoagulation Summary.  2. Follow up in 1 week      Daren Osman PharmD

## 2022-04-19 PROBLEM — S32.010G COMPRESSION FRACTURE OF L1 VERTEBRA WITH DELAYED HEALING: Status: RESOLVED | Noted: 2020-10-29 | Resolved: 2022-01-01

## 2022-04-19 PROBLEM — M80.00XG AGE-RELATED OSTEOPOROSIS WITH CURRENT PATHOLOGICAL FRACTURE WITH DELAYED HEALING: Status: RESOLVED | Noted: 2020-11-19 | Resolved: 2022-01-01

## 2022-04-19 PROBLEM — H10.32 ACUTE CONJUNCTIVITIS OF LEFT EYE: Status: RESOLVED | Noted: 2022-01-01 | Resolved: 2022-01-01

## 2022-04-19 NOTE — PATIENT INSTRUCTIONS
Medicare Wellness  Personal Prevention Plan of Service     Date of Office Visit:    Encounter Provider:  Cristal Lema MD  Place of Service:  [facility]  Patient Name: Citlali Solorio  :  1935    As part of the Medicare Wellness portion of your visit today, we are providing you with this personalized preventive plan of services (PPPS). This plan is based upon recommendations of the [place] Preventive Services Task Force (USPSTF) and the Advisory Committee on Immunization Practices (ACIP).    This lists the preventive care services that should be considered, and provides dates of when you are due. Items listed as completed are up-to-date and do not require any further intervention.    Health Maintenance   Topic Date Due    ZOSTER VACCINE (2 of 3) 2009    DXA SCAN  10/02/2021    ANNUAL WELLNESS VISIT  2022    COLORECTAL CANCER SCREENING  2022    INFLUENZA VACCINE  2022    TDAP/TD VACCINES (2 - Td or Tdap) 2023    LIPID PANEL  2023    MAMMOGRAM  2023    COVID-19 Vaccine  Completed    Pneumococcal Vaccine 65+  Completed       No orders of the defined types were placed in this encounter.      No follow-ups on file.

## 2022-04-19 NOTE — PROGRESS NOTES
The ABCs of the Annual Wellness Visit  Subsequent Medicare Wellness Visit    Chief Complaint   Patient presents with   • Medicare Wellness-subsequent   • Hypertension   • psp      Subjective    History of Present Illness:  Citlali Solorio is a 86 y.o. female who presents for a Subsequent Medicare Wellness Visit.    The following data was reviewed by: Cristal Lema MD on 04/19/2022:  Common labs    Common Labsle 2/23/22 2/23/22 4/12/22 4/12/22 4/12/22    1148 1148 1000 1000 1000   Glucose  95  83    BUN  20  24    Creatinine  1.00  1.06 (A)    eGFR Non  Am  52 (A)      eGFR African Am  60      Sodium  136  138    Potassium  4.7  4.3    Chloride  94 (A)  97    Calcium  9.5  9.4    Total Protein  7.0  6.7    Albumin  3.9  3.5 (A)    Total Bilirubin  1.0  0.7    Alkaline Phosphatase  62  60    AST (SGOT)  22  15    ALT (SGPT)  17  14    WBC 13.8 (A)  10.4     Hemoglobin 11.8  11.5     Hematocrit 35.4  36.4     Platelets 272  275     Total Cholesterol     113   Triglycerides     74   HDL Cholesterol     49   LDL Cholesterol      49   (A) Abnormal value       Comments are available for some flowsheets but are not being displayed.           HTN.  Control is good.  PSP.  Followed by neurology.  She is getting progressively more weak.  She is having more issues with swallowing.  She is not interested in a feeding tube at this time.      The following portions of the patient's history were reviewed and   updated as appropriate: allergies, current medications, past family history, past medical history, past social history, past surgical history and problem list.    Compared to one year ago, the patient feels her physical   health is the same.    Compared to one year ago, the patient feels her mental   health is the same.    Recent Hospitalizations:  She was not admitted to the hospital during the last year.       Current Medical Providers:  Patient Care Team:  Cristal Lema MD as PCP - General (Internal  Medicine)    Outpatient Medications Prior to Visit   Medication Sig Dispense Refill   • acetaminophen (TYLENOL) 650 MG 8 hr tablet Take 650 mg by mouth Every 8 (Eight) Hours As Needed for Mild Pain .     • Cholecalciferol (VITAMIN D PO) Take 1,000 mg by mouth Every Evening.     • digoxin (LANOXIN) 125 MCG tablet Take 1 tablet by mouth Daily. 90 tablet 3   • furosemide (LASIX) 20 MG tablet Take 1 tablet by mouth Daily. 90 tablet 3   • hydrALAZINE (APRESOLINE) 25 MG tablet Take 1 tablet by mouth 2 (Two) Times a Day. 180 tablet 3   • metoprolol succinate XL (TOPROL-XL) 50 MG 24 hr tablet Take 1.5 tablets by mouth 2 (Two) Times a Day. 270 tablet 3   • potassium chloride (K-DUR,KLOR-CON) 10 MEQ CR tablet TAKE ONE TABLET BY MOUTH DAILY 90 tablet 3   • vitamin B-12 (VITAMIN B-12) 1000 MCG tablet Take 1 tablet by mouth Daily. (Patient taking differently: Take 1,000 mcg by mouth Every Evening. HOLD PRIOR TO SURG)     • warfarin (COUMADIN) 1 MG tablet TAKE 1 TABLET BY MOUTH ON SUNDAY,TUESDAY,AND THURSDAY, AND TAKE 2 TABLETS BY MOUTH ALL OTHER DAYS OR AS DIRECTED. 145 tablet 0   • moxifloxacin (Vigamox) 0.5 % ophthalmic solution Administer 0.05 mL to both eyes 3 (Three) Times a Day. 3 mL 0   • donepezil (ARICEPT) 10 MG tablet      • donepezil (ARICEPT) 5 MG tablet        No facility-administered medications prior to visit.       No opioid medication identified on active medication list. I have reviewed chart for other potential  high risk medication/s and harmful drug interactions in the elderly.          Aspirin is not on active medication list.  Aspirin use is not indicated based on review of current medical condition/s. Risk of harm outweighs potential benefits.  .    Patient Active Problem List   Diagnosis   • Progressive supranuclear palsy (HCC)   • Carotid artery plaque, bilateral   • Heart failure (HCC)   • Gastroesophageal reflux disease   • Hyperlipidemia   • Spinal stenosis of lumbar region   • Cobalamin deficiency  "  • Atrial fibrillation (HCC)   • Benign essential hypertension   • Mitral valve insufficiency   • Tricuspid valve insufficiency   • Tear of medial meniscus of right knee, current   • Heart failure with preserved ejection fraction (HCC)   • History of melanoma       Review of Systems   HENT: Positive for trouble swallowing.    Respiratory: Negative.    Cardiovascular: Negative.    Musculoskeletal: Positive for gait problem.   Neurological: Positive for weakness.        Objective    Vitals:    22 1123   BP: 110/68   Pulse: 63   SpO2: 92%   Weight: 49.9 kg (110 lb)   Height: 154.1 cm (60.67\")   PainSc: 0-No pain     BMI Readings from Last 1 Encounters:   22 21.01 kg/m²   BMI is within normal parameters. No follow-up required.    Does the patient have evidence of cognitive impairment? No    Physical Exam  Constitutional:       Appearance: She is well-developed.   HENT:      Head: Normocephalic and atraumatic.      Right Ear: Hearing and tympanic membrane normal.      Left Ear: Hearing and tympanic membrane normal.      Mouth/Throat:      Pharynx: No oropharyngeal exudate or posterior oropharyngeal erythema.   Cardiovascular:      Rate and Rhythm: Normal rate and regular rhythm.      Heart sounds: Normal heart sounds.   Pulmonary:      Effort: Pulmonary effort is normal.      Breath sounds: Normal breath sounds.   Skin:     General: Skin is warm and dry.   Neurological:      Mental Status: She is alert and oriented to person, place, and time.   Psychiatric:         Behavior: Behavior normal.       Lab Results   Component Value Date    CHLPL 113 2022    TRIG 74 2022    HDL 49 2022    LDL 49 2022    VLDL 15 2022            HEALTH RISK ASSESSMENT    Smoking Status:  Social History     Tobacco Use   Smoking Status Former Smoker   • Packs/day: 0.50   • Years: 15.00   • Pack years: 7.50   • Types: Cigarettes   • Quit date:    • Years since quittin.3   Smokeless Tobacco Never " Used     Alcohol Consumption:  Social History     Substance and Sexual Activity   Alcohol Use Not Currently    Comment: Rare     Fall Risk Screen:    WILLIEADI Fall Risk Assessment was completed, and patient is at MODERATE risk for falls. Assessment completed on:4/19/2022    Depression Screening:  PHQ-2/PHQ-9 Depression Screening 4/19/2022   Retired PHQ-9 Total Score -   Retired Total Score -   Little Interest or Pleasure in Doing Things 0-->not at all   Feeling Down, Depressed or Hopeless 0-->not at all   PHQ-9: Brief Depression Severity Measure Score 0       Health Habits and Functional and Cognitive Screening:  Functional & Cognitive Status 4/19/2022   Do you have difficulty preparing food and eating? Yes   Do you have difficulty bathing yourself, getting dressed or grooming yourself? Yes   Do you have difficulty using the toilet? Yes   Do you have difficulty moving around from place to place? Yes   Do you have trouble with steps or getting out of a bed or a chair? Yes   Current Diet Well Balanced Diet   Dental Exam Up to date   Eye Exam Up to date   Exercise (times per week) 0 times per week   Current Exercises Include No Regular Exercise   Current Exercise Activities Include -   Do you need help using the phone?  Yes   Are you deaf or do you have serious difficulty hearing?  Yes   Do you need help with transportation? Yes   Do you need help shopping? Yes   Do you need help preparing meals?  Yes   Do you need help with housework?  Yes   Do you need help with laundry? Yes   Do you need help taking your medications? Yes   Do you need help managing money? Yes   Do you ever drive or ride in a car without wearing a seat belt? Yes   Have you felt unusual stress, anger or loneliness in the last month? Yes   Who do you live with? Community   If you need help, do you have trouble finding someone available to you? Yes   Have you been bothered in the last four weeks by sexual problems? Yes   Do you have difficulty  concentrating, remembering or making decisions? Yes       Age-appropriate Screening Schedule:  Refer to the list below for future screening recommendations based on patient's age, sex and/or medical conditions. Orders for these recommended tests are listed in the plan section. The patient has been provided with a written plan.    Health Maintenance   Topic Date Due   • ZOSTER VACCINE (2 of 3) 11/30/2009   • DXA SCAN  10/02/2021   • INFLUENZA VACCINE  08/01/2022   • TDAP/TD VACCINES (2 - Td or Tdap) 01/01/2023   • LIPID PANEL  04/12/2023   • MAMMOGRAM  07/22/2023              Assessment/Plan   CMS Preventative Services Quick Reference  Risk Factors Identified During Encounter  Fall Risk-High or Moderate  The above risks/problems have been discussed with the patient.  Follow up actions/plans if indicated are seen below in the Assessment/Plan Section.  Pertinent information has been shared with the patient in the After Visit Summary.    Diagnoses and all orders for this visit:    1. Medicare annual wellness visit, subsequent (Primary)    2. Benign essential hypertension    3. Progressive supranuclear palsy (HCC)  -     Ambulatory Referral to Physical Therapy Evaluate and treat    4. Weakness  -     Ambulatory Referral to Physical Therapy Evaluate and treat    HTN.  Control is good.  The patient is advised to continue current dosage of metoprolol.  PSP.  Continue supportive measures.  Refer for physical therapy to help prevent recurrent falls.  Importance of waiting for assistance when she needs to use bathroom.  Discussed importance of supplements to augment nutrition.        Follow Up:   Return in about 3 months (around 7/19/2022) for Recheck.     An After Visit Summary and PPPS were made available to the patient.

## 2022-04-21 NOTE — PROGRESS NOTES
Anticoagulation Clinic Progress Note    Anticoagulation Summary  As of 2022    INR goal:  2.0-3.0   TTR:  62.6 % (3.4 y)   INR used for dosin.80 (2022)   Warfarin maintenance plan:  2 mg every day   Weekly warfarin total:  14 mg   Plan last modified:  Daren Osman, Kory (2022)   Next INR check:  2022   Priority:  Maintenance   Target end date:  Indefinite    Indications    Atrial fibrillation (HCC) [I48.91]  Atrial fibrillation with RVR (HCC) (Resolved) [I48.91]             Anticoagulation Episode Summary     INR check location:      Preferred lab:      Send INR reminders to:  [facility]  POOL    Comments:  lab per [facility] - they will fax to us (phone: [phone])      Anticoagulation Care Providers     Provider Role Specialty Phone number    Vinh Apple MD Referring Cardiology [phone]        Findings:  Nursing staff (Danielle) at [facility] confirmed Ms. Solorio received warfarin 1 mg Mon, 2 mg all other days over the past week.     INR History:  Anticoagulation Monitoring 2022   INR 1.80 1.80 1.80   INR Date 2022   INR Goal 2.0-3.0 2.0-3.0 2.0-3.0   Trend Up Up Up   Last Week Total 12 mg 12 mg 13 mg   Next Week Total 12 mg 13 mg 14 mg   Sun 2 mg 2 mg 2 mg   Mon 1 mg 1 mg 2 mg   Tue 2 mg 2 mg 2 mg   Wed 2 mg 2 mg 2 mg   Thu 2 mg 2 mg 2 mg   Fri 1 mg 2 mg 2 mg   Sat 2 mg 2 mg 2 mg   Visit Report - - -   Some recent data might be hidden       Plan:  1. INR is Subtherapeutic 22- see above in Anticoagulation Summary.   Provided instructions by phone and fax to Danielle at [facility] to Increase their warfarin regimen to 2 mg daily - see above in Anticoagulation Summary.  2. Follow up in 1 week      Daren Osman, Kory

## 2022-04-21 NOTE — ED TRIAGE NOTES
Pt from facility for AMS, daughter left facility at 1610 and pt was at her baseline.  came into room about 45 minutes later and states pt was slumped over in chair and was hard to arouse. Pts baseline is a/o x4. Per facility pt has had multiple falls over the past few weeks with the last fall being last week. Pt is on blood thinners. Per EMS assessment pt had pinpoint pupils and were non-reactive, pt was given 2MG of narcan with no change.     Pt and RN wearing mask upon triage.

## 2022-04-21 NOTE — ED PROVIDER NOTES
" EMERGENCY DEPARTMENT ENCOUNTER    Room Number:  39/39  Date of encounter:  4/21/2022  PCP: Cristal Lema MD  Historian: Paramedics      HPI:  Chief Complaint: Difficulty waking her up  A complete HPI/ROS/PMH/PSH/SH/FH are unobtainable due to: Mental status    Context: Citlali Solorio is a 86 y.o. female who presents to the ED from her assisted living facility.  Paramedics provide all the history-they tell me that her daughter was visiting with her and left around 4:10 this afternoon and she seemed okay but perhaps just a little \"tired\".  Her  lives in the room next to her and came to check on her later and found that she was very difficult to awaken.  He summon staff at the nursing home and her systolic blood pressure was reported to be in the 80s.  EMS reports that she was normotensive on arrival and was lethargic but arousable.  Paramedics also noted pinpoint pupils and gave her 2 mg of intravenous Narcan without change.  Currently, she is sitting up in bed and answers my questions.  She does interact-she knows she is at the hospital, but she does not know why.  She denies any, chest pain, shortness of breath.  Paramedics also report frequent recent falls.  She is on Coumadin      The patient was placed in a mask in triage, hand hygiene was performed before and after my interaction with the patient.  I wore a mask, safety glasses and gloves during my entire interaction with the patient.    PAST MEDICAL HISTORY  Active Ambulatory Problems     Diagnosis Date Noted   • Progressive supranuclear palsy (HCC) 04/21/2016   • Carotid artery plaque, bilateral 04/21/2016   • Heart failure (HCC) 04/21/2016   • Gastroesophageal reflux disease 04/21/2016   • Hyperlipidemia 04/21/2016   • Spinal stenosis of lumbar region 04/21/2016   • Cobalamin deficiency 04/21/2016   • Atrial fibrillation (HCC) 06/24/2013   • Benign essential hypertension 06/24/2013   • Mitral valve insufficiency 07/06/2016   • Tricuspid valve " insufficiency 07/06/2016   • Tear of medial meniscus of right knee, current 06/15/2017   • Heart failure with preserved ejection fraction (HCC) 08/13/2019   • History of melanoma 04/13/2021     Resolved Ambulatory Problems     Diagnosis Date Noted   • Anxiety 04/21/2016   • Hypertension 04/21/2016   • Impaired fasting glucose 04/21/2016   • Shoulder pain 04/21/2016   • Stress incontinence 05/23/2016   • Atrial fibrillation with RVR ([facility]) 08/24/2017   • Cough 09/01/2017   • Diarrhea 01/04/2018   • Metabolic encephalopathy 01/04/2018   • Abnormal EKG 01/04/2018   • Influenza A 01/05/2018   • Acute on chronic diastolic heart failure ([facility]) 01/10/2018   • Compression fracture of L1 vertebra with delayed healing 10/29/2020   • Age-related osteoporosis with current pathological fracture with delayed healing 11/19/2020   • Acute conjunctivitis of left eye 02/23/2022     Past Medical History:   Diagnosis Date   • Abnormal gait    • Anticoagulated    • Arthritis    • Ataxic gait    • Carotid artery stenosis    • Diastolic congestive heart failure ([facility])    • Esophageal reflux    • History of recent fall    • History of vitamin B deficiency    • [place] (hard of hearing)    • Hypercholesterolemia    • IFG (impaired fasting glucose)    • Lumbar canal stenosis    • Lumbar disc disorder    • PAF (paroxysmal atrial fibrillation) ([facility])    • Urinary incontinence    • Vitamin B12 deficiency          PAST SURGICAL HISTORY  Past Surgical History:   Procedure Laterality Date   • BLADDER SURGERY     • CATARACT EXTRACTION Bilateral    • HYSTERECTOMY     • KNEE ARTHROPLASTY Left    • KNEE ARTHROSCOPY Right 6/15/2017    Procedure: RT KNEE ARTHROSCOPIC PARTIAL MEDIAL AND LATERAL MENISECTOMY AND SYNOVECTOMY;  Surgeon: Sam Soni MD;  Location: [facility], [place], operated by [facility];  Service:    • KNEE ARTHROSCOPY Right 8/29/2018    Procedure: RT KNEE ARTHROSCOPY WITH PARTIAL LATERAL MENISECTOMY;  Surgeon: Randy Tucker MD;  Location: [facility] OR;  Service:  Orthopedics   • KYPHOPLASTY Bilateral 2020    Procedure: KYPHOPLASTY at L 1;  Surgeon: Pardeep Voss MD;  Location: [facility] OR ;  Service: Neurosurgery;  Laterality: Bilateral;         FAMILY HISTORY  Family History   Problem Relation Age of Onset   • Hypertension Mother    • Heart disease Mother    • Heart disease Father    • Stroke Father    • Hypertension Father    • Hypertension Sister    • Malig Hyperthermia Neg Hx          SOCIAL HISTORY  Social History     Socioeconomic History   • Marital status:    Tobacco Use   • Smoking status: Former Smoker     Packs/day: 0.50     Years: 15.00     Pack years: 7.50     Types: Cigarettes     Quit date:      Years since quittin.3   • Smokeless tobacco: Never Used   Vaping Use   • Vaping Use: Never used   Substance and Sexual Activity   • Alcohol use: Not Currently     Comment: Rare   • Drug use: No         ALLERGIES  Sulfamethoxazole, Atorvastatin, Bactrim [sulfamethoxazole-trimethoprim], Penicillins, Pitavastatin, Rosuvastatin, and Simvastatin        REVIEW OF SYSTEMS  Review of Systems   Unable to perform ROS: Age          PHYSICAL EXAM    I have reviewed the triage vital signs and nursing notes.    ED Triage Vitals   Temp Pulse Resp BP SpO2   -- -- -- -- --      Temp src Heart Rate Source Patient Position BP Location FiO2 (%)   -- -- -- -- --       Physical Exam   Constitutional: Pt. is awake and alert.  She is slow to respond, but answers questions fairly well and follows commands.  She knows she is at the hospital but does not know which.  HENT: Normocephalic and atraumatic. Oropharynx moist/nonerythematous.  Neck: Normal range of motion. Neck supple. No JVD present.   Cardiovascular: Normal rate, regular rhythm and normal heart sounds. Exam reveals no gallop and no friction rub.   No murmur heard.  Pulmonary/Chest: Effort normal and breath sounds normal. No stridor. No respiratory distress. No wheezes, no rales.   Abdominal: Soft.  Bowel sounds are normal. No distension. There is no tenderness. There is no rebound and no guarding.   Musculoskeletal: Trace pedal edema, no tenderness or deformity.   Neurological: Patient is awake and alert.  She knows she is in the hospital.  Speech is slow but clear.  Face is symmetric.  Pupils are pinpoint, extraocular motions appear to be intact.  Tongue is midline.   are equal bilaterally, strength in lower extremities is symmetric.  Sensation is intact.    Skin: Skin is warm and dry. No rash noted. Pt. is not diaphoretic. No erythema.   Psychiatric: She seems pleasantly confused.  Nursing note and vitals reviewed.        LAB RESULTS  Recent Results (from the past 24 hour(s))   Comprehensive Metabolic Panel    Collection Time: 04/21/22  6:29 PM    Specimen: Blood   Result Value Ref Range    Glucose 84 65 - 99 mg/dL    BUN 19 8 - 23 mg/dL    Creatinine 0.93 0.57 - 1.00 mg/dL    Sodium 135 (L) 136 - 145 mmol/L    Potassium 4.2 3.5 - 5.2 mmol/L    Chloride 97 (L) 98 - 107 mmol/L    CO2 29.0 22.0 - 29.0 mmol/L    Calcium 9.2 8.6 - 10.5 mg/dL    Total Protein 7.1 6.0 - 8.5 g/dL    Albumin 3.80 3.50 - 5.20 g/dL    ALT (SGPT) 15 1 - 33 U/L    AST (SGOT) 17 1 - 32 U/L    Alkaline Phosphatase 59 39 - 117 U/L    Total Bilirubin 0.7 0.0 - 1.2 mg/dL    Globulin 3.3 gm/dL    A/G Ratio 1.2 g/dL    BUN/Creatinine Ratio 20.4 7.0 - 25.0    Anion Gap 9.0 5.0 - 15.0 mmol/L    eGFR 60.7 >60.0 mL/min/1.73   Lactic Acid, Plasma    Collection Time: 04/21/22  6:29 PM    Specimen: Blood   Result Value Ref Range    Lactate 1.2 0.5 - 2.0 mmol/L   Procalcitonin    Collection Time: 04/21/22  6:29 PM    Specimen: Blood   Result Value Ref Range    Procalcitonin 0.05 0.00 - 0.25 ng/mL   Ethanol    Collection Time: 04/21/22  6:29 PM    Specimen: Blood   Result Value Ref Range    Ethanol <10 0 - 10 mg/dL    Ethanol % <0.010 %   Protime-INR    Collection Time: 04/21/22  6:29 PM    Specimen: Blood   Result Value Ref Range    Protime 23.1  (H) 11.7 - 14.2 Seconds    INR 2.05 (H) 0.90 - 1.10   Digoxin Level    Collection Time: 04/21/22  6:29 PM    Specimen: Blood   Result Value Ref Range    Digoxin 0.90 0.60 - 1.20 ng/mL   CBC Auto Differential    Collection Time: 04/21/22  6:29 PM    Specimen: Blood   Result Value Ref Range    WBC 7.35 3.40 - 10.80 10*3/mm3    RBC 3.82 3.77 - 5.28 10*6/mm3    Hemoglobin 11.8 (L) 12.0 - 15.9 g/dL    Hematocrit 34.4 34.0 - 46.6 %    MCV 90.1 79.0 - 97.0 fL    MCH 30.9 26.6 - 33.0 pg    MCHC 34.3 31.5 - 35.7 g/dL    RDW 11.9 (L) 12.3 - 15.4 %    RDW-SD 38.4 37.0 - 54.0 fl    MPV 10.3 6.0 - 12.0 fL    Platelets 272 140 - 450 10*3/mm3    Neutrophil % 64.0 42.7 - 76.0 %    Lymphocyte % 23.8 19.6 - 45.3 %    Monocyte % 9.8 5.0 - 12.0 %    Eosinophil % 1.5 0.3 - 6.2 %    Basophil % 0.5 0.0 - 1.5 %    Immature Grans % 0.4 0.0 - 0.5 %    Neutrophils, Absolute 4.70 1.70 - 7.00 10*3/mm3    Lymphocytes, Absolute 1.75 0.70 - 3.10 10*3/mm3    Monocytes, Absolute 0.72 0.10 - 0.90 10*3/mm3    Eosinophils, Absolute 0.11 0.00 - 0.40 10*3/mm3    Basophils, Absolute 0.04 0.00 - 0.20 10*3/mm3    Immature Grans, Absolute 0.03 0.00 - 0.05 10*3/mm3    nRBC 0.0 0.0 - 0.2 /100 WBC   ECG 12 Lead    Collection Time: 04/21/22  7:00 PM   Result Value Ref Range    QT Interval 394 ms   Urine Drug Screen - Urine, Clean Catch    Collection Time: 04/21/22  7:08 PM    Specimen: Urine, Clean Catch   Result Value Ref Range    Amphet/Methamphet, Screen Negative Negative    Barbiturates Screen, Urine Negative Negative    Benzodiazepine Screen, Urine Negative Negative    Cocaine Screen, Urine Negative Negative    Opiate Screen Negative Negative    THC, Screen, Urine Negative Negative    Methadone Screen, Urine Negative Negative    Oxycodone Screen, Urine Negative Negative   Urinalysis With Culture If Indicated - Urine, Catheter    Collection Time: 04/21/22  7:09 PM    Specimen: Urine, Catheter   Result Value Ref Range    Color, UA Yellow Yellow, Straw     Appearance, UA Clear Clear    pH, UA <=5.0 5.0 - 8.0    Specific Gravity, UA 1.022 1.005 - 1.030    Glucose, UA Negative Negative    Ketones, UA Negative Negative    Bilirubin, UA Negative Negative    Blood, UA Negative Negative    Protein, UA Negative Negative    Leuk Esterase, UA Negative Negative    Nitrite, UA Negative Negative    Urobilinogen, UA 0.2 E.U./dL 0.2 - 1.0 E.U./dL       Ordered the above labs and independently reviewed the results.        RADIOLOGY  CT Head Without Contrast    Result Date: 4/21/2022  CT HEAD WO CONTRAST-  INDICATIONS: Delirium  TECHNIQUE: Radiation dose reduction techniques were utilized, including automated exposure control and exposure modulation based on body size. Noncontrast head CT  COMPARISON: 08/07/2021  FINDINGS:    No acute intracranial hemorrhage, midline shift or mass effect. No acute territorial infarct is identified.  Mild to moderate periventricular hypodensities suggest chronic small vessel ischemic change in a patient this age.  Arterial calcifications are seen at the base of the brain.  Ventricles, cisterns, cerebral sulci are unremarkable for patient age.  The visualized paranasal sinuses, orbits, mastoid air cells are unremarkable.         No acute intracranial hemorrhage or hydrocephalus. Chronic changes of the brain. If there is further clinical concern, MRI could be considered for further evaluation.  This report was finalized on 4/21/2022 6:54 PM by Dr. Joo Kimball M.D.      XR Chest 1 View    Result Date: 4/21/2022  XR CHEST 1 VW-  HISTORY: Female who is 86 years-old, altered mental status  TECHNIQUE: Frontal view of the chest  COMPARISON: 01/04/2019  FINDINGS: The heart size is normal. Pulmonary vasculature is unremarkable. Small patchy density in the left lower lung may be atelectasis or developing infiltrate. Minimal left pleural effusion is apparent. No pneumothorax. No acute osseous process.      Small patchy density in the left lower lung may be  atelectasis or developing infiltrate. Minimal left pleural effusion.  This report was finalized on 4/21/2022 6:40 PM by Dr. Joo Kimball M.D.        I ordered the above noted radiological studies. Reviewed by me and discussed with radiologist.  See dictation for official radiology interpretation.      PROCEDURES    Procedures      MEDICATIONS GIVEN IN ER    Medications   sodium chloride 0.9 % flush 10 mL (has no administration in time range)   sodium chloride 0.9 % infusion (125 mL/hr Intravenous New Bag 4/21/22 1909)         PROGRESS, DATA ANALYSIS, CONSULTS, AND MEDICAL DECISION MAKING    Any/all labs have been independently reviewed by me.  Any/all radiology studies have been reviewed by me and discussed with radiologist dictating the report.   EKG's independently viewed and interpreted by me.  Discussion below represents my analysis of pertinent findings related to patient's condition, differential diagnosis, treatment plan and final disposition.    Number of Diagnoses or Management Options     Amount and/or Complexity of Data Reviewed  Clinical lab tests:  Yes  Tests in the radiology section of CPT®:  Yes  Tests in the medicine section of CPT®:  Yes  Review and summarize past medical records: yes (see below)  Independent visualization of images, tracings, or specimens: yes (see below)      ED Course as of 04/21/22 2109   Thu Apr 21, 2022   1823 Prior record review-patient has no ER/inpatient visits to [facility].  She does see neurology at [place] and the Parkinson's disease and movement disorder clinic.  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 [WC]   1840 CBC shows a mild anemia-otherwise unremarkable. [WC]   1840 Differential diagnosis at this point time is quite broad-stroke, intracerebral hemorrhage, electrolyte abnormality, infectious process, intoxication, progression of her underlying illnesses,  etc... [WC]   1843 Chest x-ray was independently visualized by me and interpreted by/discussed with Dr. Kimball (radiology)-there is a small patchy density left lower lobe which could be atelectasis or developing infiltrate.  There is minimal left pleural effusion.  See dictated report for official interpretation. [WC]   1847 INR(!): 2.05 [WC]   1858 CT of the brain was independently viewed by me and discussed with/interpreted by Dr. Kimball (radiology)-there are no acute processes identified.  For official interpretation, see dictated report. [WC]   1900 Lactate: 1.2  Normal [WC]   1902 CMP is unremarkable. [WC]   1902 Digoxin: 0.90  Normal [WC]   1902 Ethanol %: <0.010  Normal. [WC]   1908 Procalcitonin: 0.05  Normal [WC]   1919 EKG performed at 1900 and interpreted by me shows atrial fibrillation with a heart of approximately 70 bpm.  QRS complexes and ST-T segments are unremarkable.  There is no significant change when compared to 11/6/2020. [WC]   1945 Toxicology screen is negative [WC]   2016 Lab notified of need to perform UA. [WC]   2108 Patient's daughters are in the room at this time.  All results were discussed with him.  They think their mother is back to baseline and are comfortable letting her go home. [WC]      ED Course User Index  [WC] Rafael Ulloa MD       AS OF 21:09 EDT VITALS:    BP - 141/97  HR - 87  TEMP - 97.6 °F (36.4 °C)  02 SATS - 93%        DIAGNOSIS  Final diagnoses:   Unresponsive episode   Progressive supranuclear palsies (HCC)         DISPOSITION  Discharged           Rafael Ulloa MD  04/21/22 2376

## 2022-04-22 NOTE — TELEPHONE ENCOUNTER
Danielle called from a assisted living facility were patient is staying called and stated that they can do her 6 month lab in July, so she does not have to make 2 trips. Can you please put in lab orders and fax to  [phone]

## 2022-04-22 NOTE — PROGRESS NOTES
Anticoagulation Clinic Progress Note    Anticoagulation Summary  As of 2022    INR goal:  2.0-3.0   TTR:  62.6 % (3.4 y)   INR used for dosin.05 (2022)   Warfarin maintenance plan:  2 mg every day   Weekly warfarin total:  14 mg   No change documented:  Elinor Cartagena RPH   Plan last modified:  Daren Osman, PharmD (2022)   Next INR check:  2022   Priority:  Maintenance   Target end date:  Indefinite    Indications    Atrial fibrillation (HCC) [I48.91]  Atrial fibrillation with RVR (HCC) (Resolved) [I48.91]             Anticoagulation Episode Summary     INR check location:      Preferred lab:      Send INR reminders to:  GLENNY ORTIZ  POOL    Comments:  lab per [facility] - they will fax to us (phone: [phone])      Anticoagulation Care Providers     Provider Role Specialty Phone number    Vinh Apple MD Referring Cardiology [phone]            INR History:  Anticoagulation Monitoring 2022   INR 1.80 1.80 2.05   INR Date 2022   INR Goal 2.0-3.0 2.0-3.0 2.0-3.0   Trend Up Up Same   Last Week Total 12 mg 13 mg 13 mg   Next Week Total 13 mg 14 mg 14 mg   Sun 2 mg 2 mg 2 mg   Mon 1 mg 2 mg 2 mg   Tue 2 mg 2 mg 2 mg   Wed 2 mg 2 mg -   Thu 2 mg 2 mg -   Fri 2 mg 2 mg 2 mg   Sat 2 mg 2 mg 2 mg   Visit Report - - -   Some recent data might be hidden       Plan:  1. INR is Therapeutic today- see above in Anticoagulation Summary.   Will instruct Citlali Solorio to Continue their warfarin regimen- see above in Anticoagulation Summary.  2. Follow up in 1 week. Spoke with Danielle from [facility]   3.  They have been instructed to call if any changes in medications, doses, concerns, etc. Patient expresses understanding and has no further questions at this time.    Elinor Cartagena RPH

## 2022-04-27 NOTE — TELEPHONE ENCOUNTER
PATY, WITH [facility], CALLED TO UPDATE DR. MONTERROSO THAT THE PATIENT HAD A NON-INJURY FALL ON 04/27/22.    THE PATIENT RAISED HER ELECTRIC RECLINER TOO HIGH, CAUSING HER TO SLIDE OFF OF IT.    CALL BACK IF NEEDED:  [phone]

## 2022-05-02 NOTE — TELEPHONE ENCOUNTER
CARMINA FROM [facility] IS CALLING IN SHE STATES THAT PATIENT IS HAVING A COUGH WITH SOME CRACKLES AND SHE IS NOT TAKING REAL GOOD DEEP BREATHS AND THEY ARE ASKING FOR ORDERS FOR A CHEST XRAY THAT THEY CAN DO THERE.    PLEASE ADVISE    CALLBACK NUMBER IS  [phone]

## 2022-05-04 NOTE — PROGRESS NOTES
Anticoagulation Clinic Progress Note    Anticoagulation Summary  As of 2022    INR goal:  2.0-3.0   TTR:  63.0 % (3.4 y)   INR used for dosin.20 (2022)   Warfarin maintenance plan:  2 mg every day   Weekly warfarin total:  14 mg   No change documented:  Elinor Cartagena RPH   Plan last modified:  Daren Osman, PharmD (2022)   Next INR check:  2022   Priority:  Maintenance   Target end date:  Indefinite    Indications    Atrial fibrillation (HCC) [I48.91]  Atrial fibrillation with RVR (HCC) (Resolved) [I48.91]             Anticoagulation Episode Summary     INR check location:      Preferred lab:      Send INR reminders to:  GLENNY ORTIZ  POOL    Comments:  lab per [facility] - they will fax to us (phone: [phone])      Anticoagulation Care Providers     Provider Role Specialty Phone number    Vinh Apple MD Referring Cardiology [phone]          Clinic Interview:  No pertinent clinical findings have been reported.    INR History:  Anticoagulation Monitoring 2022   INR 2.05 2.90 2.20   INR Date 2022   INR Goal 2.0-3.0 2.0-3.0 2.0-3.0   Trend Same Same Same   Last Week Total 13 mg 14 mg 14 mg   Next Week Total 14 mg 14 mg 14 mg   Sun 2 mg 2 mg 2 mg   Mon 2 mg 2 mg 2 mg   Tue 2 mg 2 mg 2 mg   Wed - 2 mg 2 mg   Thu - 2 mg 2 mg   Fri 2 mg 2 mg 2 mg   Sat 2 mg 2 mg 2 mg   Visit Report - - -   Some recent data might be hidden       Plan:  1. INR is therapeutic today- see above in Anticoagulation Summary.    Citlali A Clore to continue their warfarin regimen- see above in Anticoagulation Summary.  2. Follow up in 1 week  3.  They have been instructed to call if any changes in medications, doses, concerns, etc. Patient expresses understanding and has no further questions at this time.    Elinor Cartagena RPH

## 2022-05-04 NOTE — PROGRESS NOTES
Subjective     Citlali ARIANA Solorio is a 86 y.o. female who presents with   Chief Complaint   Patient presents with   • Cough     Concerned with pneumonia       History of Present Illness     Cough going on a couple days.  No SOA.  No fever.  CXR at her facility showed patchy air space disease.      Review of Systems   Respiratory: Positive for cough. Negative for chest tightness, shortness of breath and wheezing.    Cardiovascular: Negative for chest pain.       The following portions of the patient's history were reviewed and updated as appropriate: allergies, current medications and problem list.    Patient Active Problem List    Diagnosis Date Noted   • History of melanoma 04/13/2021   • Heart failure with preserved ejection fraction (HCC) 08/13/2019   • Tear of medial meniscus of right knee, current 06/15/2017   • Mitral valve insufficiency 07/06/2016   • Tricuspid valve insufficiency 07/06/2016   • Progressive supranuclear palsy (HCC) 04/21/2016   • Carotid artery plaque, bilateral 04/21/2016     Note Last Updated: 5/31/2017     Description: plaque on screening last done 3/13, 11/13, 10/16     • Heart failure (HCC) 04/21/2016     Note Last Updated: 4/21/2016     Description: admission 7/2013     • Gastroesophageal reflux disease 04/21/2016   • Hyperlipidemia 04/21/2016     Note Last Updated: 4/21/2016     Description: Patient tried Lipitor, Crestor, Zocor, Bacol and Livalo in the past and was intolerant of all of them.     • Spinal stenosis of lumbar region 04/21/2016   • Cobalamin deficiency 04/21/2016     Note Last Updated: 5/31/2017     Description: told at [facility] that B12 was low.  Monthly shots recommended 5/5/15.  Now on oral replacement.      • Atrial fibrillation (HCC) 06/24/2013   • Benign essential hypertension 06/24/2013       Current Outpatient Medications on File Prior to Visit   Medication Sig Dispense Refill   • acetaminophen (TYLENOL) 650 MG 8 hr tablet Take 650 mg by mouth Every 8 (Eight)  "Hours As Needed for Mild Pain .     • Cholecalciferol (VITAMIN D PO) Take 1,000 mg by mouth Every Evening.     • digoxin (LANOXIN) 125 MCG tablet Take 1 tablet by mouth Daily. 90 tablet 3   • furosemide (LASIX) 20 MG tablet Take 1 tablet by mouth Daily. 90 tablet 3   • hydrALAZINE (APRESOLINE) 25 MG tablet Take 1 tablet by mouth 2 (Two) Times a Day. 180 tablet 3   • metoprolol succinate XL (TOPROL-XL) 50 MG 24 hr tablet Take 1.5 tablets by mouth 2 (Two) Times a Day. 270 tablet 3   • potassium chloride (K-DUR,KLOR-CON) 10 MEQ CR tablet TAKE ONE TABLET BY MOUTH DAILY 90 tablet 3   • vitamin B-12 (VITAMIN B-12) 1000 MCG tablet Take 1 tablet by mouth Daily. (Patient taking differently: Take 1,000 mcg by mouth Every Evening. HOLD PRIOR TO SURG)     • warfarin (COUMADIN) 1 MG tablet TAKE 1 TABLET BY MOUTH ON SUNDAY,TUESDAY,AND THURSDAY, AND TAKE 2 TABLETS BY MOUTH ALL OTHER DAYS OR AS DIRECTED. 145 tablet 0     No current facility-administered medications on file prior to visit.       Objective     /68   Pulse 68   Temp 97.1 °F (36.2 °C)   Ht 162.6 cm (64\")   Wt 50.5 kg (111 lb 4.8 oz)   LMP  (LMP Unknown)   SpO2 97%   BMI 19.10 kg/m²     Physical Exam  Constitutional:       Appearance: She is well-developed.   HENT:      Head: Normocephalic and atraumatic.   Cardiovascular:      Rate and Rhythm: Normal rate and regular rhythm.      Heart sounds: Normal heart sounds.   Pulmonary:      Effort: Pulmonary effort is normal.      Breath sounds: Examination of the left-middle field reveals rales. Rales present.   Skin:     General: Skin is warm and dry.   Neurological:      Mental Status: She is alert and oriented to person, place, and time.   Psychiatric:         Behavior: Behavior normal.         Assessment/Plan   Diagnoses and all orders for this visit:    1. Community acquired pneumonia of left lung, unspecified part of lung (Primary)    Other orders  -     doxycycline (MONODOX) 100 MG capsule; Take 1 capsule " by mouth Every 12 (Twelve) Hours.  Dispense: 14 capsule; Refill: 0        Discussion    Patient presents with infiltrate on CXR consistent with community acquired pneumonia.  She does not appear ill currently.  Rx for doxycycline.  The patient is instructed to let our office know if not feeling better over the next couple days or if there is any change in symptoms.  Order for respiratory therapy given to work on breathing exercises to prevent atelectasis.           Future Appointments   Date Time Provider Department Center   7/5/2022 11:00 AM Vinh Apple MD [facility]   7/13/2022 11:30 AM [facility]   7/20/2022  1:00 PM Cristal Lema MD [facility]

## 2022-05-05 NOTE — TELEPHONE ENCOUNTER
Danielle from [facility] called and stated that Dr. Lema prescribed  Mucinex DM in liquid form because she can not take pill form. Danielle ask daughter about medication and said she needs help measuring it and taking it. Danielle from the facility said they need an order for the Mucinex Dm and how to administer it to patient. You can fax order/Rx to  Fax # [phone]    FYI - Also, Danielle stated the facility does not do respiratory therapy. They can show her and provide equipment and she can do on her own. Danielle stated that she has done this with patient    Any questions Danielle can be reached at [phone]

## 2022-05-11 NOTE — PROGRESS NOTES
Anticoagulation Clinic Progress Note    Anticoagulation Summary  As of 2022    INR goal:  2.0-3.0   TTR:  63.2 % (3.5 y)   INR used for dosin.10 (2022)   Warfarin maintenance plan:  2 mg every day   Weekly warfarin total:  14 mg   No change documented:  Emma Hong [facility]   Plan last modified:  Daren Osman, PharmD (2022)   Next INR check:  2022   Priority:  Maintenance   Target end date:  Indefinite    Indications    Atrial fibrillation (HCC) [I48.91]  Atrial fibrillation with RVR (HCC) (Resolved) [I48.91]             Anticoagulation Episode Summary     INR check location:      Preferred lab:      Send INR reminders to:   [facility] ORTIZ  POOL    Comments:  lab per [facility] - they will fax to us (phone: [phone])      Anticoagulation Care Providers     Provider Role Specialty Phone number    Vinh Apple MD Referring Cardiology [phone]          Clinic Interview:  No pertinent clinical findings have been reported.    INR History:  Anticoagulation Monitoring 2022   INR 2.90 2.20 2.10   INR Date 2022   INR Goal 2.0-3.0 2.0-3.0 2.0-3.0   Trend Same Same Same   Last Week Total 14 mg 14 mg 14 mg   Next Week Total 14 mg 14 mg 14 mg   Sun 2 mg 2 mg 2 mg   Mon 2 mg 2 mg 2 mg   Tue 2 mg 2 mg 2 mg   Wed 2 mg 2 mg 2 mg   Thu 2 mg 2 mg 2 mg   Fri 2 mg 2 mg 2 mg   Sat 2 mg 2 mg 2 mg   Visit Report - - -   Some recent data might be hidden       Plan:  1. INR is therapeutic today- see above in Anticoagulation Summary.    Citlali A Clore to continue her curremt warfarin regimen- see above in Anticoagulation Summary.  2. Follow up in 1 week  3. Called orders and Fax orders to [facility]      Emma Hong [facility]

## 2022-05-24 NOTE — PROGRESS NOTES
Anticoagulation Clinic Progress Note    Anticoagulation Summary  As of 2022    INR goal:  2.0-3.0   TTR:  63.4 % (3.5 y)   INR used for dosin.90 (2022)   Warfarin maintenance plan:  2 mg every day   Weekly warfarin total:  14 mg   No change documented:  Tanvi Alfredo PharmD   Plan last modified:  Daren Osman PharmD (2022)   Next INR check:  2022   Priority:  Maintenance   Target end date:  Indefinite    Indications    Atrial fibrillation (HCC) [I48.91]  Atrial fibrillation with RVR (HCC) (Resolved) [I48.91]             Anticoagulation Episode Summary     INR check location:      Preferred lab:      Send INR reminders to:   ABBIE ORTIZ  POOL    Comments:  lab per [facility] - they will fax to us (phone: [phone])      Anticoagulation Care Providers     Provider Role Specialty Phone number    Vinh Apple MD Referring Cardiology [phone]          INR History:  Anticoagulation Monitoring 2022   INR 2.20 2.10 2.90   INR Date 2022   INR Goal 2.0-3.0 2.0-3.0 2.0-3.0   Trend Same Same Same   Last Week Total 14 mg 14 mg 14 mg   Next Week Total 14 mg 14 mg 14 mg   Sun 2 mg 2 mg 2 mg   Mon 2 mg 2 mg 2 mg   Tue 2 mg 2 mg 2 mg   Wed 2 mg 2 mg 2 mg   Thu 2 mg 2 mg 2 mg   Fri 2 mg 2 mg 2 mg   Sat 2 mg 2 mg 2 mg   Visit Report - - -   Some recent data might be hidden       Plan:  1. INR is Therapeutic today- see above in Anticoagulation Summary.   Provided instructions to Danielle  With [facility] to Continue their warfarin regimen- see above in Anticoagulation Summary.  2. Follow up in 1 week      Tanvi Alfredo PharmD

## 2022-05-25 NOTE — PROGRESS NOTES
Anticoagulation Clinic Progress Note    Anticoagulation Summary  As of 2022    INR goal:  2.0-3.0   TTR:  63.4 % (3.5 y)   INR used for dosin.60 (2022)   Warfarin maintenance plan:  2 mg every day   Weekly warfarin total:  14 mg   Plan last modified:  Daren Osman PharmD (2022)   Next INR check:  2022   Priority:  Maintenance   Target end date:  Indefinite    Indications    Atrial fibrillation (HCC) [I48.91]  Atrial fibrillation with RVR (HCC) (Resolved) [I48.91]             Anticoagulation Episode Summary     INR check location:      Preferred lab:      Send INR reminders to:  [facility]  POOL    Comments:  lab per [facility] - they will fax to us (phone: [phone])      Anticoagulation Care Providers     Provider Role Specialty Phone number    Vinh Apple MD Referring Cardiology [phone]          INR History:  Anticoagulation Monitoring 2022   INR 2.10 2.90 1.60   INR Date 2022   INR Goal 2.0-3.0 2.0-3.0 2.0-3.0   Trend Same Same Same   Last Week Total 14 mg 14 mg 14 mg   Next Week Total 14 mg 14 mg 16 mg   Sun 2 mg 2 mg 2 mg   Mon 2 mg 2 mg 2 mg   Tue 2 mg 2 mg 2 mg   Wed 2 mg 2 mg 4 mg ()   Thu 2 mg 2 mg 2 mg   Fri 2 mg 2 mg 2 mg   Sat 2 mg 2 mg 2 mg   Visit Report - - -   Some recent data might be hidden       Plan:  1. INR is Subtherapeutic today- see above in Anticoagulation Summary.   Provided instructions to Danielle with [facility] to Increase their warfarin regimen- see above in Anticoagulation Summary. Faxed orders to [place] [phone]  2. Follow up in 1 week      Tanvi Alfredo PharmD

## 2022-06-01 NOTE — PROGRESS NOTES
Anticoagulation Clinic Progress Note    Anticoagulation Summary  As of 2022    INR goal:  2.0-3.0   TTR:  63.0 % (3.5 y)   INR used for dosin.90 (2022)   Warfarin maintenance plan:  2 mg every day   Weekly warfarin total:  14 mg   Plan last modified:  Daren Osman, PharmD (2022)   Next INR check:  2022   Priority:  Maintenance   Target end date:  Indefinite    Indications    Atrial fibrillation (HCC) [I48.91]  Atrial fibrillation with RVR (HCC) (Resolved) [I48.91]             Anticoagulation Episode Summary     INR check location:      Preferred lab:      Send INR reminders to:  [facility]  POOL    Comments:  lab per [facility] - they will fax to us (phone: [phone])      Anticoagulation Care Providers     Provider Role Specialty Phone number    Vinh Apple MD Referring Cardiology [phone]          INR History:  Anticoagulation Monitoring 2022   INR 2.90 1.60 1.90   INR Date 2022   INR Goal 2.0-3.0 2.0-3.0 2.0-3.0   Trend Same Same Same   Last Week Total 14 mg 14 mg 16 mg   Next Week Total 14 mg 16 mg 16 mg   Sun 2 mg 2 mg 2 mg   Mon 2 mg 2 mg -   Tue 2 mg 2 mg -   Wed 2 mg 4 mg () 4 mg ()   Thu 2 mg 2 mg 2 mg   Fri 2 mg 2 mg 2 mg   Sat 2 mg 2 mg 2 mg   Visit Report - - -   Some recent data might be hidden       Plan:  1. INR is Subtherapeutic today- see above in Anticoagulation Summary.   Provided instructions to Danielle donohue [facility] to Change their warfarin regimen- see above in Anticoagulation Summary.  Take 4mg today instead of usual 2mg, then resume previous dose.  2. Follow up in 5  Days.  3. Orders sent to Danielle/[facility] via fax.     Tanvi Alfredo, TamelaD

## 2022-06-07 NOTE — PROGRESS NOTES
Anticoagulation Clinic Progress Note    Anticoagulation Summary  As of 2022    INR goal:  2.0-3.0   TTR:  63.1 % (3.5 y)   INR used for dosin.80 (2022)   Warfarin maintenance plan:  4 mg every Wed; 2 mg all other days   Weekly warfarin total:  16 mg   Plan last modified:  Elinor Cartagena RPH (2022)   Next INR check:  2022   Priority:  Maintenance   Target end date:  Indefinite    Indications    Atrial fibrillation (HCC) [I48.91]  Atrial fibrillation with RVR (HCC) (Resolved) [I48.91]             Anticoagulation Episode Summary     INR check location:      Preferred lab:      Send INR reminders to:   ABBIE ORTIZ  POOL    Comments:  lab per [facility] - they will fax to us (phone: [phone])      Anticoagulation Care Providers     Provider Role Specialty Phone number    Vinh Apple MD Referring Cardiology [phone]          Clinic Interview:  No pertinent clinical findings have been reported.    INR History:  Anticoagulation Monitoring 2022   INR 1.60 1.90 2.80   INR Date 2022   INR Goal 2.0-3.0 2.0-3.0 2.0-3.0   Trend Same Same Up   Last Week Total 14 mg 16 mg 16 mg   Next Week Total 16 mg 16 mg 16 mg   Sun 2 mg 2 mg 2 mg   Mon 2 mg - -   Tue 2 mg - 2 mg   Wed 4 mg () 4 mg () 4 mg   Thu 2 mg 2 mg 2 mg   Fri 2 mg 2 mg 2 mg   Sat 2 mg 2 mg 2 mg   Visit Report - - -   Some recent data might be hidden       Plan:  1. INR is therapeutic today- see above in Anticoagulation Summary.    Citlali A Clore to continue their warfarin regimen- see above in Anticoagulation Summary.  2. Follow up in 1 week  3. . They have been instructed to call if any changes in medications, doses, concerns, etc. Patient expresses understanding and has no further questions at this time.    Elinor Cartagena RPH

## 2022-06-13 NOTE — PROGRESS NOTES
Anticoagulation Clinic Progress Note    Anticoagulation Summary  As of 2022    INR goal:  2.0-3.0   TTR:  63.1 % (3.5 y)   INR used for dosin.70 (2022)   Warfarin maintenance plan:  4 mg every Wed; 2 mg all other days   Weekly warfarin total:  16 mg   Plan last modified:  Elinor Cartagena RPH (2022)   Next INR check:  2022   Priority:  Maintenance   Target end date:  Indefinite    Indications    Atrial fibrillation (HCC) [I48.91]  Atrial fibrillation with RVR (HCC) (Resolved) [I48.91]             Anticoagulation Episode Summary     INR check location:      Preferred lab:      [facility]    Comments:  lab per [facility] - they will fax to us (phone: [phone])      Anticoagulation Care Providers     Provider Role Specialty Phone number    Vinh Apple MD Referring Cardiology [phone]          INR History:  Anticoagulation Monitoring 2022   INR 1.90 2.80 1.70   INR Date 2022   INR Goal 2.0-3.0 2.0-3.0 2.0-3.0   Trend Same Up Same   Last Week Total 16 mg 16 mg 16 mg   Next Week Total 16 mg 16 mg 18 mg   Sun 2 mg 2 mg 2 mg   Mon - - 4 mg ()   Tue - 2 mg 2 mg   Wed 4 mg () 4 mg 4 mg   Thu 2 mg 2 mg 2 mg   Fri 2 mg 2 mg 2 mg   Sat 2 mg 2 mg 2 mg   Visit Report - - -   Some recent data might be hidden       Plan:  1. INR is Subtherapeutic today- see above in Anticoagulation Summary.   Provided instructions to [facility] to Change their warfarin regimen- see above in Anticoagulation Summary.  2. Follow up in 1 week      Elinor Cartagena RPH

## 2022-06-21 NOTE — PROGRESS NOTES
Anticoagulation Clinic Progress Note    Anticoagulation Summary  As of 2022    INR goal:  2.0-3.0   TTR:  63.1 % (3.6 y)   INR used for dosin.40 (2022)   Warfarin maintenance plan:  4 mg every Mon, Wed; 2 mg all other days   Weekly warfarin total:  18 mg   Plan last modified:  Elinor Cartagena RPH (2022)   Next INR check:  2022   Priority:  Maintenance   Target end date:  Indefinite    Indications    Atrial fibrillation (HCC) [I48.91]  Atrial fibrillation with RVR (HCC) (Resolved) [I48.91]             Anticoagulation Episode Summary     INR check location:      Preferred lab:      Send INR reminders to:   ABBIE ORTIZ  POOL    Comments:  lab per [facility] - they will fax to us (phone: [phone])      Anticoagulation Care Providers     Provider Role Specialty Phone number    Vinh Apple MD Referring Cardiology [phone]          Clinic Interview:  No pertinent clinical findings have been reported.    INR History:  Anticoagulation Monitoring 2022   INR 2.80 1.70 2.40   INR Date 2022   INR Goal 2.0-3.0 2.0-3.0 2.0-3.0   Trend Up Same Up   Last Week Total 16 mg 16 mg 18 mg   Next Week Total 16 mg 18 mg 18 mg   Sun 2 mg 2 mg 2 mg   Mon - 4 mg () -   Tue 2 mg 2 mg 2 mg   Wed 4 mg 4 mg 4 mg   Thu 2 mg 2 mg 2 mg   Fri 2 mg 2 mg 2 mg   Sat 2 mg 2 mg 2 mg   Visit Report - - -   Some recent data might be hidden       Plan:  1. INR is therapeutic today- see above in Anticoagulation Summary.    Citlali A Clore to continue their warfarin regimen- see above in Anticoagulation Summary.  2. Follow up in 1 week  3.  They have been instructed to call if any changes in medications, doses, concerns, etc. Patient expresses understanding and has no further questions at this time.    Elinor Cartagena RPH

## 2022-06-27 NOTE — PROGRESS NOTES
Anticoagulation Clinic Progress Note    Anticoagulation Summary  As of 2022    INR goal:  2.0-3.0   TTR:  63.3 % (3.6 y)   INR used for dosin.80 (2022)   Warfarin maintenance plan:  4 mg every Mon, Wed; 2 mg all other days   Weekly warfarin total:  18 mg   Plan last modified:  Elinor Cartagena [facility] (2022)   Next INR check:  2022   Priority:  Maintenance   Target end date:  Indefinite    Indications    Atrial fibrillation (HCC) [I48.91]  Atrial fibrillation with RVR (HCC) (Resolved) [I48.91]             Anticoagulation Episode Summary     INR check location:      Preferred lab:      Send INR reminders to:   ABBIE ORTIZ  POOL    Comments:  lab per [facility] - they will fax to us (phone: [phone])      Anticoagulation Care Providers     Provider Role Specialty Phone number    Vinh Apple MD Referring Cardiology [phone]          INR History:  Anticoagulation Monitoring 2022   INR 1.70 2.40 2.80   INR Date 2022   INR Goal 2.0-3.0 2.0-3.0 2.0-3.0   Trend Same Up Same   Last Week Total 16 mg 18 mg 18 mg   Next Week Total 18 mg 18 mg 18 mg   Sun 2 mg 2 mg 2 mg   Mon 4 mg () - 4 mg   Tue 2 mg 2 mg 2 mg   Wed 4 mg 4 mg 4 mg   Thu 2 mg 2 mg 2 mg   Fri 2 mg 2 mg 2 mg   Sat 2 mg 2 mg 2 mg   Visit Report - - -   Some recent data might be hidden       Plan:  1. INR is Therapeutic today- see above in Anticoagulation Summary.   Provided instructions to [facility] via Faxed orders. Instructed to continue Citlali Solorio's current warfarin regimen- see above in Anticoagulation Summary.  2. Follow up in 1 week      Emma Hong [facility]

## 2022-07-01 NOTE — TELEPHONE ENCOUNTER
PATY FROM [facility] IS CALLING TO INFORM DR MONTERROSO THAT THE PATIENT HAD A FALL ON 6/30/22.     PATIENT STATES SHE SLIPPED, BUT WASN'T HURT.     [phone]

## 2022-07-05 NOTE — PROGRESS NOTES
Anticoagulation Clinic Progress Note    Anticoagulation Summary  As of 7/5/2022    INR goal:  2.0-3.0   TTR:  63.1 % (3.6 y)   INR used for dosing:  3.5 (7/4/2022)   Warfarin maintenance plan:  4 mg every Mon, Wed; 2 mg all other days   Weekly warfarin total:  18 mg   Plan last modified:  Elinor Cartagena RPH (6/21/2022)   Next INR check:  7/11/2022   Priority:  Maintenance   Target end date:  Indefinite    Indications    Atrial fibrillation (HCC) [I48.91]  Atrial fibrillation with RVR (HCC) (Resolved) [I48.91]             Anticoagulation Episode Summary     INR check location:      Preferred lab:      Send INR reminders to:   ABBIE ORTIZ  POOL    Comments:  lab per [facility] - they will fax to us (phone: [phone])      Anticoagulation Care Providers     Provider Role Specialty Phone number    Vinh Apple MD Referring Cardiology [phone]          Clinic Interview:  Patient Findings     Negatives:  Signs/symptoms of thrombosis, Signs/symptoms of bleeding,   Laboratory test error suspected, Change in health, Change in alcohol use,   Change in activity, Upcoming invasive procedure, Emergency department   visit, Upcoming dental procedure, Missed doses, Extra doses, Change in   medications, Change in diet/appetite, Hospital admission, Bruising, Other   complaints      Clinical Outcomes     Negatives:  Major bleeding event, Thromboembolic event,   Anticoagulation-related hospital admission, Anticoagulation-related ED   visit, Anticoagulation-related fatality        INR History:  Anticoagulation Monitoring 6/21/2022 6/27/2022 7/5/2022   INR 2.40 2.80 3.5   INR Date 6/20/2022 6/27/2022 7/4/2022   INR Goal 2.0-3.0 2.0-3.0 2.0-3.0   Trend Up Same Same   Last Week Total 18 mg 18 mg 18 mg   Next Week Total 18 mg 18 mg 16 mg   Sun 2 mg 2 mg 2 mg   Mon - 4 mg -   Tue 2 mg 2 mg Hold (7/5)   Wed 4 mg 4 mg 4 mg   Thu 2 mg 2 mg 2 mg   Fri 2 mg 2 mg 2 mg   Sat 2 mg 2 mg 2 mg   Visit Report - -  -   Some recent data might be hidden       Plan:  1. INR is Supratherapeutic today- see above in Anticoagulation Summary.   Will instruct Citlali A Clore to Change their warfarin regimen- see above in Anticoagulation Summary.  2. Follow up in 1 week  3. They have been instructed to call if any changes in medications, doses, concerns, etc. Patient expresses understanding and has no further questions at this time.    Elinor Cartagena, [facility]

## 2022-07-11 NOTE — PROGRESS NOTES
Anticoagulation Clinic Progress Note    Anticoagulation Summary  As of 2022    INR goal:  2.0-3.0   TTR:  62.9 % (3.6 y)   INR used for dosin.90 (2022)   Warfarin maintenance plan:  4 mg every Mon, Wed; 2 mg all other days   Weekly warfarin total:  18 mg   No change documented:  Tanvi Alfredo PharmD   Plan last modified:  Elinor Cartagena RPH (2022)   Next INR check:  2022   Priority:  Maintenance   Target end date:  Indefinite    Indications    Atrial fibrillation (HCC) [I48.91]  Atrial fibrillation with RVR (HCC) (Resolved) [I48.91]             Anticoagulation Episode Summary     INR check location:      Preferred lab:      Send INR reminders to:   ABBIE ORTIZ  POOL    Comments:  lab per [facility] - they will fax to us (phone: [phone])      Anticoagulation Care Providers     Provider Role Specialty Phone number    Vinh Apple MD Referring Cardiology [phone]          INR History:  Anticoagulation Monitoring 2022   INR 2.80 3.5 2.90   INR Date 2022   INR Goal 2.0-3.0 2.0-3.0 2.0-3.0   Trend Same Same Same   Last Week Total 18 mg 18 mg 16 mg   Next Week Total 18 mg 16 mg 18 mg   Sun 2 mg 2 mg 2 mg   Mon 4 mg - 4 mg   Tue 2 mg Hold () 2 mg   Wed 4 mg 4 mg 4 mg   Thu 2 mg 2 mg 2 mg   Fri 2 mg 2 mg 2 mg   Sat 2 mg 2 mg 2 mg   Visit Report - - -   Some recent data might be hidden       Plan:  1. INR is Therapeutic today- see above in Anticoagulation Summary.   Provided instructions to Danielle with [facility]  to Continue their warfarin regimen- (faxed orders as well) see above in Anticoagulation Summary.  2. Follow up in 1 week      Tanvi Alfredo PharmD

## 2022-07-11 NOTE — TELEPHONE ENCOUNTER
PATY WITH [facility] CALLED FOR NEW PRESCRIPTION FOR     metoprolol succinate XL (TOPROL-XL) 50 MG 24 hr tablet    SHE IS NEEDING THEM CRUSHED NOW. ONE AND HALF TABLETS A DAY.    [place] [address] [place], [place] - [address] AT [facility] [address] - [phone]  - [phone] FX  [phone]    CALL BACK NUMBER [phone]

## 2022-07-14 NOTE — TELEPHONE ENCOUNTER
Caller: KENIA     Relationship: DAUGHTER    Best call back number: [phone]    What is the best time to reach you: ANY    What was the call regarding: KENIA STATES PATIENT FELL ON 7.13.22. WAS TAKEN TO [facility]. VERTICAL GASH ON FOREHEAD, GLUE & STRIPS. NO SURGERY. ALSO PATIENTS INR WAS ELEVATED. KENIA DOES NOT REQUIRE A CALL BACK.

## 2022-07-15 NOTE — TELEPHONE ENCOUNTER
Caller: PATY     Relationship:     Best call back number: [phone]  What is the best time to reach you: ANY     Who are you requesting to speak with (clinical staff, provider,  specific staff member): CLINICAL STAFF     What was the call regarding: PATY WITH [facility] CALLING IN REGARDING THE PATIENT'S MEDICATION metoprolol succinate XL (TOPROL-XL) 50 MG 24 hr tablet.  SHE IS HAVING TO HAVE HER MEDICATION CRUSHED SO THEY ARE ABLE TO GIVE.  THIS MEDICATION CAN'T BE.  WOULD LIKE TO HAVE SOMETHING THAT IS AN EQUIVALENT THAT CAN BE CRUSHED    Do you require a callback: YES

## 2022-07-19 NOTE — PROGRESS NOTES
Anticoagulation Clinic Progress Note    Anticoagulation Summary  As of 2022    INR goal:  2.0-3.0   TTR:  62.6 % (3.6 y)   INR used for dosin.00 (2022)   Warfarin maintenance plan:  4 mg every Mon; 2 mg all other days   Weekly warfarin total:  16 mg   Plan last modified:  Elinor Cartagena RPH (2022)   Next INR check:  2022   Priority:  Maintenance   Target end date:  Indefinite    Indications    Atrial fibrillation (HCC) [I48.91]  Atrial fibrillation with RVR (HCC) (Resolved) [I48.91]             Anticoagulation Episode Summary     INR check location:      Preferred lab:      Send INR reminders to:   ABBIE ORTIZ  POOL    Comments:  lab per [place] [facility] - they will fax to us (phone: [phone])      Anticoagulation Care Providers     Provider Role Specialty Phone number    Vinh Apple MD Referring Cardiology [phone]          Clinic Interview:  Patient Findings     Negatives:  Signs/symptoms of thrombosis, Signs/symptoms of bleeding,   Laboratory test error suspected, Change in health, Change in alcohol use,   Change in activity, Upcoming invasive procedure, Emergency department   visit, Upcoming dental procedure, Missed doses, Extra doses, Change in   medications, Change in diet/appetite, Hospital admission, Bruising, Other   complaints      Clinical Outcomes     Negatives:  Major bleeding event, Thromboembolic event,   Anticoagulation-related hospital admission, Anticoagulation-related ED   visit, Anticoagulation-related fatality        INR History:  Anticoagulation Monitoring 2022   INR 3.5 2.90 4.00   INR Date 2022   INR Goal 2.0-3.0 2.0-3.0 2.0-3.0   Trend Same Same Down   Last Week Total 18 mg 16 mg 14 mg   Next Week Total 16 mg 18 mg 16 mg   Sun 2 mg 2 mg 2 mg   Mon - 4 mg -   Tue Hold () 2 mg 2 mg   Wed 4 mg 4 mg 2 mg   Thu 2 mg 2 mg 2 mg   Fri 2 mg 2 mg 2 mg   Sat 2 mg 2 mg 2 mg   Visit Report - -  -   Some recent data might be hidden       Plan:  1. INR is Supratherapeutic today- see above in Anticoagulation Summary.   Will instruct Citlali A Clore to Change their warfarin regimen- see above in Anticoagulation Summary.  2. Follow up in 1 week  3. Faxed orders to [facility]. They have been instructed to call if any changes in medications, doses, concerns, etc. Patient expresses understanding and has no further questions at this time.    Elinor Cartagena, [facility]

## 2022-07-20 NOTE — PROGRESS NOTES
Subjective     Citlali ARIANA Soolrio is a 87 y.o. female who presents with   Chief Complaint   Patient presents with   • Hypertension   • Weight Check       History of Present Illness     The following data was reviewed by: Cristal Lema MD on 07/20/2022:  Common labs    Common Labsle 4/12/22 4/12/22 4/12/22 4/21/22 4/21/22 7/13/22 7/13/22 7/13/22    1000 1000 1000 1829 1829 1541 1541 1541   Glucose  83   84  108 (A)    BUN  24   19  22    Creatinine  1.06 (A)   0.93  0.92    Sodium  138   135 (A)  137    Potassium  4.3   4.2  4.2    Chloride  97   97 (A)  97    Calcium  9.4   9.2  9.1    Total Protein  6.7     7.2    Albumin  3.5 (A)   3.80  3.8    Total Bilirubin  0.7   0.7  0.5    Alkaline Phosphatase  60   59  66    AST (SGOT)  15   17  15    ALT (SGPT)  14   15  15    WBC 10.4   7.35  8.3     Hemoglobin 11.5   11.8 (A)  11.4     Hematocrit 36.4   34.4  34.8     Platelets 275   272  279     Total Cholesterol   113     120   Triglycerides   74     135   HDL Cholesterol   49     45   LDL Cholesterol    49     51   (A) Abnormal value            HTN.  Control is good.    Weight check.  Weight is stable.    She is falling almost on a weekly basis.  They are moving facilities.      Review of Systems   Respiratory: Negative.    Cardiovascular: Negative.        The following portions of the patient's history were reviewed and updated as appropriate: allergies, current medications and problem list.    Patient Active Problem List    Diagnosis Date Noted   • History of melanoma 04/13/2021   • Heart failure with preserved ejection fraction (HCC) 08/13/2019   • Tear of medial meniscus of right knee, current 06/15/2017   • Mitral valve insufficiency 07/06/2016   • Tricuspid valve insufficiency 07/06/2016   • Progressive supranuclear palsy (HCC) 04/21/2016   • Carotid artery plaque, bilateral 04/21/2016     Note Last Updated: 5/31/2017     Description: plaque on screening last done 3/13, 11/13, 10/16     • Heart failure (HCC)  04/21/2016     Note Last Updated: 4/21/2016     Description: admission 7/2013     • Gastroesophageal reflux disease 04/21/2016   • Hyperlipidemia 04/21/2016     Note Last Updated: 4/21/2016     Description: Patient tried Lipitor, Crestor, Zocor, Bacol and Livalo in the past and was intolerant of all of them.     • Spinal stenosis of lumbar region 04/21/2016   • Cobalamin deficiency 04/21/2016     Note Last Updated: 5/31/2017     Description: told at [facility] that B12 was low.  Monthly shots recommended 5/5/15.  Now on oral replacement.      • Atrial fibrillation (HCC) 06/24/2013   • Benign essential hypertension 06/24/2013       Current Outpatient Medications on File Prior to Visit   Medication Sig Dispense Refill   • acetaminophen (TYLENOL) 650 MG 8 hr tablet Take 650 mg by mouth Every 8 (Eight) Hours As Needed for Mild Pain .     • Cholecalciferol (VITAMIN D PO) Take 1,000 mg by mouth Every Evening.     • digoxin (LANOXIN) 125 MCG tablet TAKE ONE TABLET BY MOUTH DAILY 90 tablet 3   • furosemide (LASIX) 20 MG tablet TAKE ONE TABLET BY MOUTH DAILY 90 tablet 3   • hydrALAZINE (APRESOLINE) 25 MG tablet TAKE ONE TABLET BY MOUTH TWICE A  tablet 3   • metoprolol tartrate (LOPRESSOR) 25 MG tablet Take 1 tablet by mouth 2 (Two) Times a Day. 180 tablet 3   • potassium chloride (K-DUR,KLOR-CON) 10 MEQ CR tablet TAKE ONE TABLET BY MOUTH DAILY 90 tablet 3   • sodium chloride 0.9 % nebulizer solution Inhale 1 spray.     • vitamin B-12 (VITAMIN B-12) 1000 MCG tablet Take 1 tablet by mouth Daily. (Patient taking differently: Take 1,000 mcg by mouth Every Evening. HOLD PRIOR TO SURG)     • warfarin (COUMADIN) 1 MG tablet Take four tablets by mouth on wed and take two tablets by mouth all other days or as directed 205 tablet 0   • [DISCONTINUED] acetaminophen (TYLENOL) 500 MG tablet Take 500 mg by mouth.     • donepezil (ARICEPT) 10 MG tablet      • [DISCONTINUED] doxycycline (MONODOX) 100 MG capsule Take 1 capsule by  "mouth Every 12 (Twelve) Hours. 14 capsule 0     No current facility-administered medications on file prior to visit.       Objective     /74   Pulse 73   Ht 162.6 cm (64.02\")   Wt 50.8 kg (112 lb)   LMP  (LMP Unknown)   SpO2 98%   BMI 19.22 kg/m²     Physical Exam  Constitutional:       Appearance: She is well-developed.   HENT:      Head: Normocephalic and atraumatic.   Pulmonary:      Effort: Pulmonary effort is normal.   Neurological:      Mental Status: She is alert and oriented to person, place, and time.   Psychiatric:         Behavior: Behavior normal.         Assessment & Plan   Diagnoses and all orders for this visit:    1. Benign essential hypertension (Primary)    2. Weight loss    3. Frequent falls        Discussion    HTN.  Control is good.  Continue current dose of metoprolol.    Weight loss.  Her weight is currently stable.   Frequent falls.  She is moving to a higher level of care with more assistance.         Future Appointments   Date Time Provider Department Center   7/28/2022  1:00 PM Leola Kennedy APRN [facility]   10/21/2022 11:00 AM Cristal Lema MD [facility]         "

## 2022-07-26 NOTE — PROGRESS NOTES
Anticoagulation Clinic Progress Note    Anticoagulation Summary  As of 2022    INR goal:  2.0-3.0   TTR:  62.5 % (3.7 y)   INR used for dosin.90 (2022)   Warfarin maintenance plan:  4 mg every Mon; 2 mg all other days   Weekly warfarin total:  16 mg   No change documented:  Elinor Cartagena RPH   Plan last modified:  Elinor Cartagena RPH (2022)   Next INR check:  2022   Priority:  Maintenance   Target end date:  Indefinite    Indications    Atrial fibrillation (HCC) [I48.91]  Atrial fibrillation with RVR (HCC) (Resolved) [I48.91]             Anticoagulation Episode Summary     INR check location:      Preferred lab:      Send INR reminders to:  GLENNY ORTIZ  POOL    Comments:  lab per [place] long-term - they will fax to us (phone: [phone])      Anticoagulation Care Providers     Provider Role Specialty Phone number    Vinh Apple MD Referring Cardiology [phone]          Clinic Interview:  Patient Findings     Negatives:  Signs/symptoms of thrombosis, Signs/symptoms of bleeding,   Laboratory test error suspected, Change in health, Change in alcohol use,   Change in activity, Upcoming invasive procedure, Emergency department   visit, Upcoming dental procedure, Missed doses, Extra doses, Change in   medications, Change in diet/appetite, Hospital admission, Bruising, Other   complaints      Clinical Outcomes     Negatives:  Major bleeding event, Thromboembolic event,   Anticoagulation-related hospital admission, Anticoagulation-related ED   visit, Anticoagulation-related fatality        INR History:  Anticoagulation Monitoring 2022   INR 2.90 4.00 1.90   INR Date 2022   INR Goal 2.0-3.0 2.0-3.0 2.0-3.0   Trend Same Down Same   Last Week Total 16 mg 14 mg 16 mg   Next Week Total 18 mg 16 mg 16 mg   Sun 2 mg 2 mg 2 mg   Mon 4 mg - -   Tue 2 mg 2 mg 2 mg   Wed 4 mg 2 mg 2 mg   Thu 2 mg 2 mg 2 mg   Fri 2 mg 2 mg 2  mg   Sat 2 mg 2 mg 2 mg   Visit Report - - -   Some recent data might be hidden       Plan:  1. INR is Subtherapeutic today- see above in Anticoagulation Summary.   Will instruct Citlali LANE Cloxavi to Continue their warfarin regimen- see above in Anticoagulation Summary.  2. Follow up in 1 week (faxed over to [facility] at [phone])  3. They have been instructed to call if any changes in medications, doses, concerns, etc. Patient expresses understanding and has no further questions at this time.    Elinor Cartagena, [facility]

## 2022-08-03 NOTE — PROGRESS NOTES
Anticoagulation Clinic Progress Note    Anticoagulation Summary  As of 8/3/2022    INR goal:  2.0-3.0   TTR:  62.1 % (3.7 y)   INR used for dosin.50 (8/3/2022)   Warfarin maintenance plan:  4 mg every Mon, Wed; 2 mg all other days   Weekly warfarin total:  18 mg   Plan last modified:  Elinor Cartagena RPH (8/3/2022)   Next INR check:  8/10/2022   Priority:  Maintenance   Target end date:  Indefinite    Indications    Atrial fibrillation (HCC) [I48.91]  Atrial fibrillation with RVR (HCC) (Resolved) [I48.91]             Anticoagulation Episode Summary     INR check location:      Preferred lab:      Send INR reminders to:   ABBIE ORTIZ  POOL    Comments:  lab per [facility] - they will fax to us (phone: [phone])      Anticoagulation Care Providers     Provider Role Specialty Phone number    Vihn Apple MD Referring Cardiology [phone]          Clinic Interview:  Patient Findings     Positives:  Other complaints    Negatives:  Signs/symptoms of thrombosis, Signs/symptoms of bleeding,   Laboratory test error suspected, Change in health, Change in alcohol use,   Change in activity, Upcoming invasive procedure, Emergency department   visit, Upcoming dental procedure, Missed doses, Extra doses, Change in   medications, Change in diet/appetite, Hospital admission, Bruising    Comments:  Only took 2 mg daily instead of 4 mg on Monday       Clinical Outcomes     Negatives:  Major bleeding event, Thromboembolic event,   Anticoagulation-related hospital admission, Anticoagulation-related ED   visit, Anticoagulation-related fatality    Comments:  Only took 2 mg daily instead of 4 mg on Monday         INR History:  Anticoagulation Monitoring 2022 2022 8/3/2022   INR 4.00 1.90 1.50   INR Date 2022 2022 8/3/2022   INR Goal 2.0-3.0 2.0-3.0 2.0-3.0   Trend Down Same Up   Last Week Total 14 mg 16 mg 14 mg   Next Week Total 16 mg 16 mg 16 mg   Sun 2 mg 2 mg 2 mg   Mon -  - 2 mg (8/8)   Tue 2 mg 2 mg 2 mg   Wed 2 mg 2 mg 4 mg   Thu 2 mg 2 mg 2 mg   Fri 2 mg 2 mg 2 mg   Sat 2 mg 2 mg 2 mg   Visit Report - - -   Some recent data might be hidden       Plan:  1. INR is Subtherapeutic today- see above in Anticoagulation Summary.   Will instruct Citlali A Clore to Increase their warfarin regimen- see above in Anticoagulation Summary.  2. Follow up in 1 week   3. Faxed over to [facility]. They have been instructed to call if any changes in medications, doses, concerns, etc. Patient expresses understanding and has no further questions at this time.    Elinor Cartagena [facility]

## 2022-08-08 NOTE — ED TRIAGE NOTES
Patient to [facility] via ems from nursing facility they called for low bp but states that patient has been weaker then normal.    Patient is not able to open her eyelids without assistance, which is normal with her  Patient has hx of dementia    Patient wearing mask this Rn in PPE

## 2022-08-09 NOTE — ED PROVIDER NOTES
EMERGENCY DEPARTMENT ENCOUNTER    Room Number:  19/19  Date of encounter:  8/8/2022  PCP: Cristal Lema MD  Historian: patient, 2 daughters      HPI:  Chief Complaint: low blood pressure and confusion   A complete HPI/ROS/PMH/PSH/SH/FH are unobtainable due to: none    Context: Citlali ARIANA Solorio is a 87 y.o. female who presents to the ED c/o low blood pressure and confusion. Patient has a history of progressive supranuclear palsy. Interestingly, she has had recurrent episodes of hypotension in the past that are self limited. She just moved to a new facility and had an episode with BP in 80's/50's. Was confused and couldn't remember her family's names. Family states her BP usually improves within an hour and they would have not taken her but the facility called the  instead of daughters which led to her coming here. They feel patient is back to baseline self. BP improved during EMS transport. They wonder if she could have a UTI. Patient has reportedly had quite a decline recently.      PAST MEDICAL HISTORY  Active Ambulatory Problems     Diagnosis Date Noted   • Progressive supranuclear palsy (HCC) 04/21/2016   • Carotid artery plaque, bilateral 04/21/2016   • Heart failure ([facility]) 04/21/2016   • Gastroesophageal reflux disease 04/21/2016   • Hyperlipidemia 04/21/2016   • Spinal stenosis of lumbar region 04/21/2016   • Cobalamin deficiency 04/21/2016   • Atrial fibrillation ([facility]) 06/24/2013   • Benign essential hypertension 06/24/2013   • Mitral valve insufficiency 07/06/2016   • Tricuspid valve insufficiency 07/06/2016   • Tear of medial meniscus of right knee, current 06/15/2017   • Heart failure with preserved ejection fraction (HCC) 08/13/2019   • History of melanoma 04/13/2021     Resolved Ambulatory Problems     Diagnosis Date Noted   • Anxiety 04/21/2016   • Hypertension 04/21/2016   • Impaired fasting glucose 04/21/2016   • Shoulder pain 04/21/2016   • Stress incontinence 05/23/2016   • Atrial  fibrillation with RVR ([facility]) 08/24/2017   • Cough 09/01/2017   • Diarrhea 01/04/2018   • Metabolic encephalopathy 01/04/2018   • Abnormal EKG 01/04/2018   • Influenza A 01/05/2018   • Acute on chronic diastolic heart failure ([facility]) 01/10/2018   • Compression fracture of L1 vertebra with delayed healing 10/29/2020   • Age-related osteoporosis with current pathological fracture with delayed healing 11/19/2020   • Acute conjunctivitis of left eye 02/23/2022     Past Medical History:   Diagnosis Date   • Abnormal gait    • Anticoagulated    • Arthritis    • Ataxic gait    • Carotid artery stenosis    • Diastolic congestive heart failure ([facility])    • Esophageal reflux    • History of recent fall    • History of vitamin B deficiency    • [place] (hard of hearing)    • Hypercholesterolemia    • IFG (impaired fasting glucose)    • Lumbar canal stenosis    • Lumbar disc disorder    • PAF (paroxysmal atrial fibrillation) ([facility])    • Urinary incontinence    • Vitamin B12 deficiency          PAST SURGICAL HISTORY  Past Surgical History:   Procedure Laterality Date   • BLADDER SURGERY     • CATARACT EXTRACTION Bilateral    • HYSTERECTOMY     • KNEE ARTHROPLASTY Left    • KNEE ARTHROSCOPY Right 6/15/2017    Procedure: RT KNEE ARTHROSCOPIC PARTIAL MEDIAL AND LATERAL MENISECTOMY AND SYNOVECTOMY;  Surgeon: Sam Soni MD;  Location: [facility];  Service:    • KNEE ARTHROSCOPY Right 8/29/2018    Procedure: RT KNEE ARTHROSCOPY WITH PARTIAL LATERAL MENISECTOMY;  Surgeon: Randy Tucker MD;  Location: [facility] OR;  Service: Orthopedics   • KYPHOPLASTY Bilateral 11/9/2020    Procedure: KYPHOPLASTY at L 1;  Surgeon: Pardeep Voss MD;  Location: [facility] OR 18/19;  Service: Neurosurgery;  Laterality: Bilateral;         FAMILY HISTORY  Family History   Problem Relation Age of Onset   • Hypertension Mother    • Heart disease Mother    • Heart disease Father    • Stroke Father    • Hypertension Father    • Hypertension Sister     • Malig Hyperthermia Neg Hx          SOCIAL HISTORY  Social History     Socioeconomic History   • Marital status:    Tobacco Use   • Smoking status: Former Smoker     Packs/day: 0.50     Years: 15.00     Pack years: 7.50     Types: Cigarettes     Quit date:      Years since quittin.6   • Smokeless tobacco: Never Used   Vaping Use   • Vaping Use: Never used   Substance and Sexual Activity   • Alcohol use: Not Currently     Comment: Rare   • Drug use: No         ALLERGIES  Sulfamethoxazole, Atorvastatin, Bactrim [sulfamethoxazole-trimethoprim], Penicillins, Pitavastatin, Rosuvastatin, and Simvastatin        REVIEW OF SYSTEMS  Review of Systems     All systems reviewed and negative except for those discussed in HPI.       PHYSICAL EXAM    I have reviewed the triage vital signs and nursing notes.    ED Triage Vitals [22 1352]   Temp Heart Rate Resp BP SpO2   97.6 °F (36.4 °C) 60 16 123/57 98 %      Temp src Heart Rate Source Patient Position BP Location FiO2 (%)   -- -- -- -- --       Physical Exam  GENERAL: not distressed  HENT: nares patent  EYES: no scleral icterus  CV: regular rhythm, regular rate  RESPIRATORY: normal effort ctab  ABDOMEN: soft nontender  MUSCULOSKELETAL: no deformity  NEURO: awake, answers questions appropriately with a soft voice, oriented to all questions, opens eyes with her fingers. EOMI. Pupils 3mm and reactive minimally to light.  Symmetric smile. Moves all extremities with 4+/5 strength throughout and normal coordination though slow movements. SILT.  SKIN: warm, dry        LAB RESULTS  Recent Results (from the past 24 hour(s))   Comprehensive Metabolic Panel    Collection Time: 22  2:22 PM    Specimen: Blood   Result Value Ref Range    Glucose 82 65 - 99 mg/dL    BUN 19 8 - 23 mg/dL    Creatinine 0.90 0.57 - 1.00 mg/dL    Sodium 136 136 - 145 mmol/L    Potassium 4.0 3.5 - 5.2 mmol/L    Chloride 97 (L) 98 - 107 mmol/L    CO2 27.9 22.0 - 29.0 mmol/L    Calcium 9.0  8.6 - 10.5 mg/dL    Total Protein 7.1 6.0 - 8.5 g/dL    Albumin 3.80 3.50 - 5.20 g/dL    ALT (SGPT) 11 1 - 33 U/L    AST (SGOT) 16 1 - 32 U/L    Alkaline Phosphatase 53 39 - 117 U/L    Total Bilirubin 0.7 0.0 - 1.2 mg/dL    Globulin 3.3 gm/dL    A/G Ratio 1.2 g/dL    BUN/Creatinine Ratio 21.1 7.0 - 25.0    Anion Gap 11.1 5.0 - 15.0 mmol/L    eGFR 62.8 >60.0 mL/min/1.73   Troponin    Collection Time: 08/08/22  2:22 PM    Specimen: Blood   Result Value Ref Range    Troponin T <0.010 0.000 - 0.030 ng/mL   Magnesium    Collection Time: 08/08/22  2:22 PM    Specimen: Blood   Result Value Ref Range    Magnesium 1.9 1.6 - 2.4 mg/dL   Digoxin Level    Collection Time: 08/08/22  2:22 PM    Specimen: Blood   Result Value Ref Range    Digoxin 1.20 0.60 - 1.20 ng/mL   Green Top (Gel)    Collection Time: 08/08/22  2:22 PM   Result Value Ref Range    Extra Tube Hold for add-ons.    Lavender Top    Collection Time: 08/08/22  2:22 PM   Result Value Ref Range    Extra Tube hold for add-on    Gold Top - SST    Collection Time: 08/08/22  2:22 PM   Result Value Ref Range    Extra Tube Hold for add-ons.    Light Blue Top    Collection Time: 08/08/22  2:22 PM   Result Value Ref Range    Extra Tube Hold for add-ons.    CBC Auto Differential    Collection Time: 08/08/22  2:22 PM    Specimen: Blood   Result Value Ref Range    WBC 9.53 3.40 - 10.80 10*3/mm3    RBC 3.95 3.77 - 5.28 10*6/mm3    Hemoglobin 12.0 12.0 - 15.9 g/dL    Hematocrit 37.4 34.0 - 46.6 %    MCV 94.7 79.0 - 97.0 fL    MCH 30.4 26.6 - 33.0 pg    MCHC 32.1 31.5 - 35.7 g/dL    RDW 12.4 12.3 - 15.4 %    RDW-SD 43.0 37.0 - 54.0 fl    MPV 10.6 6.0 - 12.0 fL    Platelets 262 140 - 450 10*3/mm3    Neutrophil % 79.1 (H) 42.7 - 76.0 %    Lymphocyte % 11.5 (L) 19.6 - 45.3 %    Monocyte % 8.1 5.0 - 12.0 %    Eosinophil % 0.6 0.3 - 6.2 %    Basophil % 0.4 0.0 - 1.5 %    Immature Grans % 0.3 0.0 - 0.5 %    Neutrophils, Absolute 7.53 (H) 1.70 - 7.00 10*3/mm3    Lymphocytes, Absolute 1.10  0.70 - 3.10 10*3/mm3    Monocytes, Absolute 0.77 0.10 - 0.90 10*3/mm3    Eosinophils, Absolute 0.06 0.00 - 0.40 10*3/mm3    Basophils, Absolute 0.04 0.00 - 0.20 10*3/mm3    Immature Grans, Absolute 0.03 0.00 - 0.05 10*3/mm3    nRBC 0.0 0.0 - 0.2 /100 WBC   Urinalysis With Microscopic If Indicated (No Culture) - Urine, Catheter    Collection Time: 08/08/22  4:32 PM    Specimen: Urine, Catheter   Result Value Ref Range    Color, UA Yellow Yellow, Straw    Appearance, UA Clear Clear    pH, UA 6.0 5.0 - 8.0    Specific Gravity, UA 1.019 1.005 - 1.030    Glucose, UA Negative Negative    Ketones, UA Trace (A) Negative    Bilirubin, UA Negative Negative    Blood, UA Negative Negative    Protein, UA Negative Negative    Leuk Esterase, UA Negative Negative    Nitrite, UA Negative Negative    Urobilinogen, UA 0.2 E.U./dL 0.2 - 1.0 E.U./dL   ECG 12 Lead    Collection Time: 08/08/22  5:11 PM   Result Value Ref Range    QT Interval 379 ms       Ordered the above labs and independently reviewed the results.        RADIOLOGY  XR Chest 1 View    Result Date: 8/8/2022  XR CHEST 1 VW-  HISTORY: Female who is 87 years-old,  weakness  TECHNIQUE: Frontal view of the chest  COMPARISON: 04/21/2022  FINDINGS: Patient is rotated towards the left. Heart, mediastinum and pulmonary vasculature are unremarkable. No focal pulmonary consolidation, pleural effusion, or pneumothorax. No acute osseous process.      No evidence for acute pulmonary process. Follow-up/further evaluation can be obtained as clinical indications persist.  This report was finalized on 8/8/2022 2:29 PM by Dr. Joo Kimball M.D.        I ordered the above noted radiological studies. Reviewed by me and discussed with radiologist.  See dictation for official radiology interpretation.      PROCEDURES    Procedures      MEDICATIONS GIVEN IN ER    Medications - No data to display      PROGRESS, DATA ANALYSIS, CONSULTS, AND MEDICAL DECISION MAKING    All labs have been  independently reviewed by me.  All radiology studies have been reviewed by me and discussed with radiologist dictating the report.   EKG's independently viewed and interpreted by me.  Discussion below represents my analysis of pertinent findings related to patient's condition, differential diagnosis, treatment plan and final disposition.    Differential diagnosis for altered mental status includes but is not limited to:  - vital sign abnormalities such as HTN encephalopathy, hypotension, hypoxemia, hypercarbia, heat stroke  - toxic/metabolic pathology such as hypoglycemia, DKA, hypo/hyper-natremia, thyroid storm, myxedema coma, medication side effect (either intentional or accidental)  - infectious etiology  - intracranial pathology such as stroke, seizure, intracranial mass, intracranial hemorrhage  - psychiatric pathology      ED Course as of 08/08/22 2255   Mon Aug 08, 2022   1608 Creatinine: 0.90 [TD]   1608 Sodium: 136 [TD]   1608 Potassium: 4.0 [TD]   1608 Troponin T: <0.010 [TD]   1608 WBC: 9.53 [TD]   1608 Hemoglobin: 12.0 [TD]   1608 Chest x-ray interpreted by myself.  No evidence of pneumonia. [TD]   1610 On medical chart review, patient was last seen by PCP on 7/20/2022.  Patient has a history of primary retention and frequent falls.  Patient recently moved to a higher level of care with more assistance.  She was last in the emergency department 4/21/2022 for difficulty waking up.  She has a history of progressive supranuclear palsy. [TD]   1651 Leukocytes, UA: Negative [TD]   1651 Nitrite, UA: Negative [TD]      ED Course User Index  [TD] Julian Jay II, MD       No UTI.    BP has been normal here.     Daughter says this is typical for her symptoms. I discussed option to admit and perform additional testing but daughter declines thinking this is of low diagnostic yield at this point given improved symptoms, back at baseline, history of similar, and reassuring workup currently in the ED. This  seems clinically very reasonable.     PPE: The patient wore a surgical mask throughout the entire patient encounter. I wore an N95.    AS OF 22:55 EDT VITALS:    BP - 139/71  HR - 75  TEMP - 97.6 °F (36.4 °C)  O2 SATS - 93%        DIAGNOSIS  Final diagnoses:   Transient hypotension   Confusion   Progressive supranuclear palsy (HCC)         DISPOSITION  DISCHARGE    FOLLOW-UP  Cristal Lema MD  [address] [address] [phone]    Schedule an appointment as soon as possible for a visit in 3 days      [facility] Emergency Department  [address] [facility] [address] [phone]  Go to   If symptoms worsen         Medication List      Changed    cyanocobalamin 1000 MCG tablet  Commonly known as: VITAMIN B-12  Take 1 tablet by mouth Daily.  What changed:   · when to take this  · additional instructions                     Julian Jay II, MD  08/08/22 5301

## 2022-08-17 NOTE — PROGRESS NOTES
Anticoagulation Clinic Progress Note    Anticoagulation Summary  As of 2022    INR goal:  2.0-3.0   TTR:  62.0 % (3.7 y)   INR used for dosin.50 (2022)   Warfarin maintenance plan:  4 mg every Mon, Wed; 2 mg all other days   Weekly warfarin total:  18 mg   No change documented:  Elinor Cartagena RPH   Plan last modified:  Elinor Cartagena RPH (8/3/2022)   Next INR check:  2022   Priority:  Maintenance   Target end date:  Indefinite    Indications    Atrial fibrillation (HCC) [I48.91]  Atrial fibrillation with RVR (HCC) (Resolved) [I48.91]             Anticoagulation Episode Summary     INR check location:      Preferred lab:      Send INR reminders to:   ABBIE ORTIZ  POOL    Comments:  Labs by [facility] instead of [facility] now      Anticoagulation Care Providers     Provider Role Specialty Phone number    Vinh Apple MD Referring Cardiology [phone]          Clinic Interview:  No pertinent clinical findings have been reported.    INR History:  Anticoagulation Monitoring 2022 8/3/2022 2022   INR 1.90 1.50 2.50   INR Date 2022 8/3/2022 2022   INR Goal 2.0-3.0 2.0-3.0 2.0-3.0   Trend Same Up Same   Last Week Total 16 mg 14 mg 18 mg   Next Week Total 16 mg 16 mg 18 mg   Sun 2 mg 2 mg 2 mg   Mon - 2 mg () 4 mg   Tue 2 mg 2 mg 2 mg   Wed 2 mg 4 mg 4 mg   Thu 2 mg 2 mg 2 mg   Fri 2 mg 2 mg 2 mg   Sat 2 mg 2 mg 2 mg   Visit Report - - -   Some recent data might be hidden       Plan:  1. INR is therapeutic today- see above in Anticoagulation Summary.    Citlali A Clore to continue their warfarin regimen- see above in Anticoagulation Summary.  2. Follow up in 1 week  3. They have been instructed to call if any changes in medications, doses, concerns, etc. Patient expresses understanding and has no further questions at this time.    Elinor Cartagena RPH

## 2022-08-26 NOTE — PROGRESS NOTES
Anticoagulation Clinic Progress Note    Anticoagulation Summary  As of 2022    INR goal:  2.0-3.0   TTR:  62.3 % (3.7 y)   INR used for dosin.00 (2022)   Warfarin maintenance plan:  4 mg every Mon, Wed; 2 mg all other days   Weekly warfarin total:  18 mg   Plan last modified:  Elinor Cartagena RPH (8/3/2022)   Next INR check:  2022   Priority:  Maintenance   Target end date:  Indefinite    Indications    Atrial fibrillation (HCC) [I48.91]  Atrial fibrillation with RVR (HCC) (Resolved) [I48.91]             Anticoagulation Episode Summary     INR check location:      Preferred lab:      Send INR reminders to:  [facility]  POOL    Comments:  Labs by [facility] instead of [place] now      Anticoagulation Care Providers     Provider Role Specialty Phone number    Vinh Apple MD Referring Cardiology [phone]          INR History:  Anticoagulation Monitoring 8/3/2022 2022 2022   INR 1.50 2.50 2.00   INR Date 8/3/2022 2022 2022   INR Goal 2.0-3.0 2.0-3.0 2.0-3.0   Trend Up Same Same   Last Week Total 14 mg 18 mg 18 mg   Next Week Total 16 mg 18 mg 18 mg   Sun 2 mg 2 mg 2 mg   Mon 2 mg () 4 mg 4 mg   Tue 2 mg 2 mg 2 mg   Wed 4 mg 4 mg 4 mg   Thu 2 mg 2 mg 2 mg   Fri 2 mg 2 mg 2 mg   Sat 2 mg 2 mg 2 mg   Visit Report - - -   Some recent data might be hidden       Plan:  1. INR is Therapeutic today- see above in Anticoagulation Summary.   Provided instructions to Marleeni with [facility] to Continue their warfarin regimen- see above in Anticoagulation Summary.  2. Follow up in 2 weeks      Tanvi Alfredo PharmD

## 2022-09-08 NOTE — PROGRESS NOTES
Anticoagulation Clinic Progress Note    Anticoagulation Summary  As of 2022    INR goal:  2.0-3.0   TTR:  62.6 % (3.8 y)   INR used for dosin.40 (2022)   Warfarin maintenance plan:  4 mg every Mon, Wed; 2 mg all other days   Weekly warfarin total:  18 mg   No change documented:  Elinor Cartagena RPH   Plan last modified:  Elinor Cartagena RPH (8/3/2022)   Next INR check:  2022   Priority:  Maintenance   Target end date:  Indefinite    Indications    Atrial fibrillation (HCC) [I48.91]  Atrial fibrillation with RVR (HCC) (Resolved) [I48.91]             Anticoagulation Episode Summary     INR check location:      Preferred lab:      Send INR reminders [facility]:   [facility]  PO[facility]    Comments:  Labs by [facility] instead of [place] now      Anticoagulation Care Providers     Provider Role Specialty Phone number    Vinh Apple MD Referring Cardiology [phone]          Clinic Interview:  Patient Findings     Negatives:  Signs/symptoms of thrombosis, Signs/symptoms of bleeding,   Laboratory test error suspected, Change in health, Change in alcohol use,   Change in activity, Upcoming invasive procedure, Emergency department   visit, Upcoming dental procedure, Missed doses, Extra doses, Change in   medications, Change in diet/appetite, Hospital admission, Bruising, Other   complaints      Clinical Outcomes     Negatives:  Major bleeding event, Thromboembolic event,   Anticoagulation-related hospital admission, Anticoagulation-related ED   visit, Anticoagulation-related fatality        INR History:  Anticoagulation Monitoring 2022   INR 2.50 2.00 2.40   INR Date 2022   INR Goal 2.0-3.0 2.0-3.0 2.0-3.0   Trend Same Same Same   Last Week Total 18 mg 18 mg 18 mg   Next Week Total 18 mg 18 mg 18 mg   Sun 2 mg 2 mg 2 mg   Mon 4 mg 4 mg 4 mg   Tue 2 mg 2 mg 2 mg   Wed 4 mg 4 mg 4 mg   Thu 2 mg 2 mg 2 mg   Fri 2 mg 2 mg 2 mg   Sat 2  mg 2 mg 2 mg   Visit Report - - -   Some recent data might be hidden       Plan:  1. INR is Therapeutic today- see above in Anticoagulation Summary.   Will instruct Citlali ARIANA Clore to Continue their warfarin regimen- see above in Anticoagulation Summary.  2. Follow up in 2 weeks  3. They have been instructed to call if any changes in medications, doses, concerns, etc. Patient expresses understanding and has no further questions at this time.    Elinor Cartagena [facility]

## 2022-09-21 NOTE — PROGRESS NOTES
Anticoagulation Clinic Progress Note    Anticoagulation Summary  As of 2022    INR goal:  2.0-3.0   TTR:  63.0 % (3.8 y)   INR used for dosin.80 (2022)   Warfarin maintenance plan:  4 mg every Mon, Wed; 2 mg all other days   Weekly warfarin total:  18 mg   No change documented:  Elinor Cartagena RPH   Plan last modified:  Elinor aCrtagena RPH (8/3/2022)   Next INR check:  10/5/2022   Priority:  Maintenance   Target end date:  Indefinite    Indications    Atrial fibrillation (HCC) [I48.91]  Atrial fibrillation with RVR (HCC) (Resolved) [I48.91]             Anticoagulation Episode Summary     INR check location:      Preferred lab:      Send INR reminders to:   ABBIE ORTIZ  POOL    Comments:  Labs by [facility] instead of [place] now      Anticoagulation Care Providers     Provider Role Specialty Phone number    Vinh Apple MD Referring Cardiology [phone]          Clinic Interview:  No pertinent clinical findings have been reported.    INR History:  Anticoagulation Monitoring 2022   INR 2.00 2.40 2.80   INR Date 2022   INR Goal 2.0-3.0 2.0-3.0 2.0-3.0   Trend Same Same Same   Last Week Total 18 mg 18 mg 18 mg   Next Week Total 18 mg 18 mg 18 mg   Sun 2 mg 2 mg 2 mg   Mon 4 mg 4 mg 4 mg   Tue 2 mg 2 mg 2 mg   Wed 4 mg 4 mg 4 mg   Thu 2 mg 2 mg 2 mg   Fri 2 mg 2 mg 2 mg   Sat 2 mg 2 mg 2 mg   Visit Report - - -   Some recent data might be hidden       Plan:  1. INR is therapeutic today- see above in Anticoagulation Summary.    Citlali A Clore to continue their warfarin regimen- see above in Anticoagulation Summary.  2. Follow up in 2 weeks. Spoke with Yaneli at [facility]   3. They have been instructed to call if any changes in medications, doses, concerns, etc. Patient expresses understanding and has no further questions at this time.    Elinor Cartagena RPH

## 2022-09-29 NOTE — PROGRESS NOTES
"    CARDIOLOGY        Patient Name: Citlali Solorio  :1935  Age: 87 y.o.  Primary Cardiologist: Vinh Apple MD  Encounter Provider:  FAM Case    Date of Service: 22            CHIEF COMPLAINT / REASON FOR OFFICE VISIT     Atrial Fibrillation (1 yr f/u)      HISTORY OF PRESENT ILLNESS       HPI  Citlali Solorio is a 87 y.o. female who presents today for annual evaluation.     Pt has a  history significant for heart failure, A. fib, hypertension, mitral valve and tricuspid valve insufficiency, PSP.    Patient presents today with daughter.  Patient has been having trouble eating and they are considering placing feeding tube.  This is secondary to her progressive PSP.  She has had a few episodes over the past year where she has become lethargic, BP has been low.  By the time EMS arrived his BP is back to normal.  Patient does go on and evaluate in the ED for symptoms but etiology has not been identified.  She does live in a skilled nursing facility and she has had blood pressures that are occasionally greater than 150s.    The following portions of the patient's history were reviewed and updated as appropriate: allergies, current medications, past family history, past medical history, past social history, past surgical history and problem list.      VITAL SIGNS     Visit Vitals  /70 (BP Location: Right arm, Patient Position: Sitting, Cuff Size: Adult)   Pulse 74   Ht 162.6 cm (64\")   Wt 49 kg (108 lb)   LMP  (LMP Unknown)   SpO2 96%   BMI 18.54 kg/m²         Wt Readings from Last 3 Encounters:   22 49 kg (108 lb)   22 50.8 kg (112 lb)   22 50.5 kg (111 lb 4.8 oz)     Body mass index is 18.54 kg/m².      REVIEW OF SYSTEMS   Review of Systems   Constitutional: Negative for chills, fever, weight gain and weight loss.   Cardiovascular: Negative for leg swelling.   Respiratory: Negative for cough, snoring and wheezing.    Hematologic/Lymphatic: Negative for bleeding " problem. Does not bruise/bleed easily.   Skin: Negative for color change.   Musculoskeletal: Positive for falls. Negative for joint pain and myalgias.   Gastrointestinal: Negative for melena.   Genitourinary: Negative for hematuria.   Neurological: Negative for excessive daytime sleepiness.   Psychiatric/Behavioral: Negative for depression. The patient is not nervous/anxious.            PHYSICAL EXAMINATION     Vitals and nursing note reviewed.   Constitutional:       Appearance: Normal appearance. Well-developed.   Eyes:      Conjunctiva/sclera: Conjunctivae normal.   Neck:      Vascular: No carotid bruit.   Pulmonary:      Breath sounds: Normal breath sounds.   Cardiovascular:      Normal rate. Regular rhythm. Normal S1 with normal intensity. Normal S2 with normal intensity.      Murmurs: There is no murmur.      No gallop. No click. No rub.   Musculoskeletal: Normal range of motion. Skin:     General: Skin is warm and dry.   Neurological:      Mental Status: Alert and oriented to person, place, and time.      GCS: GCS eye subscore is 4. GCS verbal subscore is 5. GCS motor subscore is 6.   Psychiatric:         Speech: Speech normal.         Behavior: Behavior normal.         Thought Content: Thought content normal.         Judgment: Judgment normal.           REVIEWED DATA     Procedures    Cardiac Procedures:  1. Echocardiogram 8/23/17: EF 53.8%.  Right ventricular cavity is mild to moderately dilated.  Left atrial cavity is severely dilated.  Mild to moderate MVR.  Moderate tricuspid valve regurgitation.  2. Echocardiogram 1/21/2019.  LVEF 56%.  LAE.  Moderate mitral valve regurgitation.  Moderate tricuspid valve regurgitation.  Mild LVH.  Calculated RVSP 44.2 mmHg.          Lipid Panel    Lipid Panel 4/12/22 7/13/22   Total Cholesterol 113 120   Triglycerides 74 135   HDL Cholesterol 49 45   VLDL Cholesterol 15 24   LDL Cholesterol  49 51               ASSESSMENT & PLAN     Diagnoses and all orders for this  visit:    1. Atrial fibrillation, unspecified type (HCC) (Primary)  · Heart rate currently very well controlled in the 70s.  · Continue metoprolol tartrate 25 mg twice daily and digoxin 125 mcg/day  · Anticoagulated with warfarin.  No recent falls although patient does have known history of falling    2. Benign essential hypertension  · Blood pressure currently controlled at 106/70  · Consider decreasing hydralazine to as needed only however patient has had recent blood pressure readings greater than 150s so I am fearful of stopping this.  · Continue metoprolol tartrate 25 mg twice daily, furosemide 20 mg/day, hydralazine 25 mg twice daily.        Return in about 1 year (around 9/29/2023) for Dr. Apple- Routine.    Future Appointments       Provider Department Center    10/21/2022 11:00 AM Cristal Lema MD [facility]              MEDICATIONS         Discharge Medications          Accurate as of September 29, 2022  2:16 PM. If you have any questions, ask your nurse or doctor.            Continue These Medications      Instructions Start Date   acetaminophen 650 MG 8 hr tablet  Commonly known as: TYLENOL   650 mg, Oral, Every 8 Hours PRN      cyanocobalamin 1000 MCG tablet  Commonly known as: VITAMIN B-12   1,000 mcg, Oral, Daily      digoxin 125 MCG tablet  Commonly known as: LANOXIN   TAKE ONE TABLET BY MOUTH DAILY      donepezil 10 MG tablet  Commonly known as: ARICEPT   No dose, route, or frequency recorded.      furosemide 20 MG tablet  Commonly known as: LASIX   TAKE ONE TABLET BY MOUTH DAILY      hydrALAZINE 25 MG tablet  Commonly known as: APRESOLINE   TAKE ONE TABLET BY MOUTH TWICE A DAY      metoprolol tartrate 25 MG tablet  Commonly known as: LOPRESSOR   25 mg, Oral, 2 Times Daily      potassium chloride 10 MEQ CR tablet  Commonly known as: K-DUR,KLOR-CON   TAKE ONE TABLET BY MOUTH DAILY      sodium chloride 0.9 % nebulizer solution   1 spray, Inhalation      VITAMIN  D PO   1,000 mg, Oral, Every Evening      warfarin 1 MG tablet  Commonly known as: COUMADIN   Take four tablets by mouth on wed and take two tablets by mouth all other days or as directed                 **Dragon Disclaimer:   Much of this encounter note is an electronic transcription/translation of spoken language to printed text. The electronic translation of spoken language may permit erroneous, or at times, nonsensical words or phrases to be inadvertently transcribed. Although I have reviewed the note for such errors, some may still exist.

## 2022-10-19 NOTE — PROGRESS NOTES
Anticoagulation Clinic Progress Note    Anticoagulation Summary  As of 10/19/2022    INR goal:  2.0-3.0   TTR:  62.3 % (3.9 y)   INR used for dosing:  3.40 (10/19/2022)   Warfarin maintenance plan:  4 mg every Mon; 2 mg all other days   Weekly warfarin total:  16 mg   Plan last modified:  Elinor Cartagena RPH (10/19/2022)   Next INR check:  11/2/2022   Priority:  Maintenance   Target end date:  Indefinite    Indications    Atrial fibrillation (HCC) [I48.91]  Atrial fibrillation with RVR (HCC) (Resolved) [I48.91]             Anticoagulation Episode Summary     INR check location:      Preferred lab:      Send INR reminders to:  [facility] [place]  POOL    Comments:  Labs by [facility] instead of [facility] now      Anticoagulation Care Providers     Provider Role Specialty Phone number    Vinh Apple MD Referring Cardiology [phone]          Clinic Interview:  Patient Findings     Negatives:  Signs/symptoms of thrombosis, Signs/symptoms of bleeding,   Laboratory test error suspected, Change in health, Change in alcohol use,   Change in activity, Upcoming invasive procedure, Emergency department   visit, Upcoming dental procedure, Missed doses, Extra doses, Change in   medications, Change in diet/appetite, Hospital admission, Bruising, Other   complaints      Clinical Outcomes     Negatives:  Major bleeding event, Thromboembolic event,   Anticoagulation-related hospital admission, Anticoagulation-related ED   visit, Anticoagulation-related fatality        INR History:  Anticoagulation Monitoring 9/21/2022 10/5/2022 10/19/2022   INR 2.80 3.10 3.40   INR Date 9/21/2022 10/5/2022 10/19/2022   INR Goal 2.0-3.0 2.0-3.0 2.0-3.0   Trend Same Same Down   Last Week Total 18 mg 18 mg 18 mg   Next Week Total 18 mg 16 mg 16 mg   Sun 2 mg 2 mg 2 mg   Mon 4 mg 4 mg 4 mg   Tue 2 mg 2 mg 2 mg   Wed 4 mg 2 mg (10/5); Otherwise 4 mg 2 mg   Thu 2 mg 2 mg 2 mg   Fri 2 mg 2 mg 2 mg   Sat 2 mg 2 mg 2 mg    Visit Report - - -   Some recent data might be hidden       Plan:  1. INR is Supratherapeutic today- see above in Anticoagulation Summary.   Will instruct Citlali A Clore to Change their warfarin regimen- see above in Anticoagulation Summary.  2. Follow up in 2 weeks  3. Spoke with [facility]/ [facility]. They have been instructed to call if any changes in medications, doses, concerns, etc. Patient expresses understanding and has no further questions at this time.    Elinor Cartagena, [facility]

## 2022-10-21 NOTE — PROGRESS NOTES
Subjective     Citlali ARIANA Solorio is a 87 y.o. female who presents with   Chief Complaint   Patient presents with   • Hip Pain     1 week     • Hypertension   • Hyperlipidemia       History of Present Illness     C/o right sided hip pain.  Outside of right hip.  Hurts to touch.  No injury.    HTN.  Control is good.    HLD.  Due check of labs.     Biggest challenge is mobility and eating with PSP.      Review of Systems   Respiratory: Negative.    Cardiovascular: Negative.        The following portions of the patient's history were reviewed and updated as appropriate: allergies, current medications and problem list.    Patient Active Problem List    Diagnosis Date Noted   • History of melanoma 04/13/2021   • Heart failure with preserved ejection fraction (HCC) 08/13/2019   • Tear of medial meniscus of right knee, current 06/15/2017   • Mitral valve insufficiency 07/06/2016   • Tricuspid valve insufficiency 07/06/2016   • Progressive supranuclear palsy (HCC) 04/21/2016   • Carotid artery plaque, bilateral 04/21/2016     Note Last Updated: 5/31/2017     Description: plaque on screening last done 3/13, 11/13, 10/16     • Heart failure (HCC) 04/21/2016     Note Last Updated: 4/21/2016     Description: admission 7/2013     • Gastroesophageal reflux disease 04/21/2016   • Hyperlipidemia 04/21/2016     Note Last Updated: 4/21/2016     Description: Patient tried Lipitor, Crestor, Zocor, Bacol and Livalo in the past and was intolerant of all of them.     • Spinal stenosis of lumbar region 04/21/2016   • Cobalamin deficiency 04/21/2016     Note Last Updated: 5/31/2017     Description: told at [facility] that B12 was low.  Monthly shots recommended 5/5/15.  Now on oral replacement.      • Atrial fibrillation (HCC) 06/24/2013   • Benign essential hypertension 06/24/2013       Current Outpatient Medications on File Prior to Visit   Medication Sig Dispense Refill   • acetaminophen (TYLENOL) 650 MG 8 hr tablet Take 650 mg by mouth  "Every 8 (Eight) Hours As Needed for Mild Pain .     • Cholecalciferol (VITAMIN D PO) Take 1,000 mg by mouth Every Evening.     • digoxin (LANOXIN) 125 MCG tablet TAKE ONE TABLET BY MOUTH DAILY 90 tablet 3   • donepezil (ARICEPT) 10 MG tablet      • furosemide (LASIX) 20 MG tablet TAKE ONE TABLET BY MOUTH DAILY 90 tablet 3   • hydrALAZINE (APRESOLINE) 25 MG tablet TAKE ONE TABLET BY MOUTH TWICE A  tablet 3   • metoprolol tartrate (LOPRESSOR) 25 MG tablet Take 1 tablet by mouth 2 (Two) Times a Day. 180 tablet 3   • potassium chloride (K-DUR,KLOR-CON) 10 MEQ CR tablet TAKE ONE TABLET BY MOUTH DAILY 90 tablet 1   • sodium chloride 0.9 % nebulizer solution Inhale 1 spray.     • vitamin B-12 (VITAMIN B-12) 1000 MCG tablet Take 1 tablet by mouth Daily.     • warfarin (COUMADIN) 1 MG tablet Take four tablets by mouth on wed and take two tablets by mouth all other days or as directed 205 tablet 0     No current facility-administered medications on file prior to visit.       Objective     /68   Pulse 82   Ht 162.6 cm (64\")   Wt 50.8 kg (112 lb)   LMP  (LMP Unknown)   BMI 19.22 kg/m²     Physical Exam  Constitutional:       Appearance: She is well-developed.   HENT:      Head: Normocephalic and atraumatic.   Cardiovascular:      Rate and Rhythm: Normal rate and regular rhythm.      Heart sounds: Normal heart sounds.   Pulmonary:      Effort: Pulmonary effort is normal.      Breath sounds: Normal breath sounds.   Skin:     General: Skin is warm and dry.   Neurological:      Mental Status: She is alert and oriented to person, place, and time.   Psychiatric:         Behavior: Behavior normal.         Assessment & Plan   Diagnoses and all orders for this visit:    1. Benign essential hypertension (Primary)  -     CBC & Differential  -     Comprehensive Metabolic Panel  -     Lipid Panel  -     TSH Rfx On Abnormal To Free T4    2. Hyperlipidemia, unspecified hyperlipidemia type  -     CBC & Differential  -     " Comprehensive Metabolic Panel  -     Lipid Panel  -     TSH Rfx On Abnormal To Free T4    3. Right hip pain        Discussion    HTN.  Control is good.  The patient is advised to continue current dosage of metoprolol and hydralazine.    HLD.   I recommend a diet high in fruits, vegetables, whole grains, lean meats, nuts and beans.  I recommend limiting red meat, full fat dairy, eggs and processed white carbohydrates.  I recommend aerobic exercise at least 3 days per week.  Right hip pain c/w bursitis.  Trial of topical voltaren gel OTC.         Future Appointments   Date Time Provider Department Center   1/24/2023 11:00 AM Cristal Lema MD [facility]

## 2022-10-24 NOTE — TELEPHONE ENCOUNTER
Pts daughter informed    ----- Message from Cristal Lema MD sent at 10/24/2022  7:47 AM EDT -----  Call and advise.  Overall all labs look good.  LEOPOLDO

## 2022-11-02 NOTE — PROGRESS NOTES
Anticoagulation Clinic Progress Note    Anticoagulation Summary  As of 2022    INR goal:  2.0-3.0   TTR:  62.1 % (3.9 y)   INR used for dosin.80 (2022)   Warfarin maintenance plan:  4 mg every Mon; 2 mg all other days   Weekly warfarin total:  16 mg   No change documented:  Elinor Cartagena RPH   Plan last modified:  Elinor Cartagena RPH (10/19/2022)   Next INR check:  2022   Priority:  Maintenance   Target end date:  Indefinite    Indications    Atrial fibrillation (HCC) [I48.91]  Atrial fibrillation with RVR (HCC) (Resolved) [I48.91]             Anticoagulation Episode Summary     INR check location:      Preferred lab:      Send INR reminders to:   [facility]  POOL    Comments:  Labs by [facility] instead of [facility] now      Anticoagulation Care Providers     Provider Role Specialty Phone number    Vinh Apple MD Referring Cardiology [phone]          Clinic Interview:  No pertinent clinical findings have been reported.    INR History:  Anticoagulation Monitoring 10/5/2022 10/19/2022 2022   INR 3.10 3.40 2.80   INR Date 10/5/2022 10/19/2022 2022   INR Goal 2.0-3.0 2.0-3.0 2.0-3.0   Trend Same Down Same   Last Week Total 18 mg 18 mg 16 mg   Next Week Total 16 mg 16 mg 16 mg   Sun 2 mg 2 mg 2 mg   Mon 4 mg 4 mg 4 mg   Tue 2 mg 2 mg 2 mg   Wed 2 mg (10/5); Otherwise 4 mg 2 mg 2 mg   Thu 2 mg 2 mg 2 mg   Fri 2 mg 2 mg 2 mg   Sat 2 mg 2 mg 2 mg   Visit Report - - -   Some recent data might be hidden       Plan:  1. INR is therapeutic today- see above in Anticoagulation Summary.    Citlali A Clore to continue their warfarin regimen- see above in Anticoagulation Summary.  2. Follow up in 2 weeks  3. Spoke the [facility]. They have been instructed to call if any changes in medications, doses, concerns, etc. Patient expresses understanding and has no further questions at this time.    Elinor Cartagena RPH

## 2022-12-14 NOTE — PROGRESS NOTES
Anticoagulation Clinic Progress Note    Anticoagulation Summary  As of 2022    INR goal:  2.0-3.0   TTR:  63.2 % (4 y)   INR used for dosin.00 (2022)   Warfarin maintenance plan:  4 mg every Mon; 2 mg all other days; Starting 2022   Weekly warfarin total:  16 mg   No change documented:  Daren Osman PharmD   Plan last modified:  Elinor Cartagena RPH (10/19/2022)   Next INR check:  2022   Priority:  Maintenance   Target end date:  Indefinite    Indications    Atrial fibrillation (HCC) [I48.91]  Atrial fibrillation with RVR (HCC) (Resolved) [I48.91]             Anticoagulation Episode Summary     INR check location:      Preferred lab:      Send INR reminders to:  [facility]  POOL    Comments:  Labs by [facility] ([facility])      Anticoagulation Care Providers     Provider Role Specialty Phone number    Vinh Apple MD Referring Cardiology [phone]          INR History:  Anticoagulation Monitoring 10/19/2022 2022 2022   INR 3.40 2.80 2.00   INR Date 10/19/2022 2022 2022   INR Goal 2.0-3.0 2.0-3.0 2.0-3.0   Trend Down Same Same   Last Week Total 18 mg 16 mg 16 mg   Next Week Total 16 mg 16 mg 16 mg   Sun 2 mg 2 mg 2 mg   Mon 4 mg 4 mg 4 mg   Tue 2 mg 2 mg 2 mg   Wed 2 mg 2 mg 2 mg   Thu 2 mg 2 mg 2 mg   Fri 2 mg 2 mg 2 mg   Sat 2 mg 2 mg 2 mg   Visit Report - - -   Some recent data might be hidden       Plan:  1. INR is Therapeutic today- see above in Anticoagulation Summary.   Provided instructions to Duong with [facility] to Continue their warfarin regimen- see above in Anticoagulation Summary.  2. Follow up in 2 weeks      Daren Osman PharmD

## 2022-12-28 NOTE — PROGRESS NOTES
Anticoagulation Clinic Progress Note    Anticoagulation Summary  As of 12/28/2022    INR goal:  2.0-3.0   TTR:  63.1 % (4.1 y)   INR used for dosing:  3.60 (12/28/2022)   Warfarin maintenance plan:  4 mg every Mon; 2 mg all other days; Starting 12/28/2022   Weekly warfarin total:  16 mg   Plan last modified:  Elinor Cartagena RPH (10/19/2022)   Next INR check:  1/4/2023   Priority:  Maintenance   Target end date:  Indefinite    Indications    Atrial fibrillation (HCC) [I48.91]  Atrial fibrillation with RVR (HCC) (Resolved) [I48.91]             Anticoagulation Episode Summary     INR check location:      Preferred lab:      Send INR reminders to:   [facility]  POOL    Comments:  Labs by [facility] ([facility])      Anticoagulation Care Providers     Provider Role Specialty Phone number    Vinh Apple MD Referring Cardiology [phone]          INR History:  Anticoagulation Monitoring 11/2/2022 12/14/2022 12/28/2022   INR 2.80 2.00 3.60   INR Date 11/2/2022 12/14/2022 12/28/2022   INR Goal 2.0-3.0 2.0-3.0 2.0-3.0   Trend Same Same Same   Last Week Total 16 mg 16 mg 16 mg   Next Week Total 16 mg 16 mg 14 mg   Sun 2 mg 2 mg 2 mg   Mon 4 mg 4 mg 4 mg   Tue 2 mg 2 mg 2 mg   Wed 2 mg 2 mg Hold (12/28)   Thu 2 mg 2 mg 2 mg   Fri 2 mg 2 mg 2 mg   Sat 2 mg 2 mg 2 mg   Visit Report - - -   Some recent data might be hidden       Plan:  1. INR is Supratherapeutic today- see above in Anticoagulation Summary.   Provided instructions to Duong with [facility]  to Change their warfarin regimen- see above in Anticoagulation Summary.  2. Follow up in 1 week      Tanvi Alfredo PharmD

## 2023-01-01 ENCOUNTER — APPOINTMENT (OUTPATIENT)
Dept: GENERAL RADIOLOGY | Facility: HOSPITAL | Age: 88
DRG: 193 | End: 2023-01-01
Payer: MEDICARE

## 2023-01-01 ENCOUNTER — ANTICOAGULATION VISIT (OUTPATIENT)
Dept: PHARMACY | Facility: HOSPITAL | Age: 88
End: 2023-01-01
Payer: MEDICARE

## 2023-01-01 ENCOUNTER — OFFICE VISIT (OUTPATIENT)
Dept: INTERNAL MEDICINE | Facility: CLINIC | Age: 88
End: 2023-01-01
Payer: MEDICARE

## 2023-01-01 ENCOUNTER — APPOINTMENT (OUTPATIENT)
Dept: CT IMAGING | Facility: HOSPITAL | Age: 88
DRG: 193 | End: 2023-01-01
Payer: MEDICARE

## 2023-01-01 ENCOUNTER — TELEPHONE (OUTPATIENT)
Dept: PHARMACY | Facility: HOSPITAL | Age: 88
End: 2023-01-01
Payer: MEDICARE

## 2023-01-01 ENCOUNTER — HOSPITAL ENCOUNTER (INPATIENT)
Facility: HOSPITAL | Age: 88
LOS: 3 days | Discharge: NURSING, REHAB OR LONG-TERM CARE | DRG: 193 | End: 2023-01-16
Attending: EMERGENCY MEDICINE | Admitting: STUDENT IN AN ORGANIZED HEALTH CARE EDUCATION/TRAINING PROGRAM
Payer: MEDICARE

## 2023-01-01 ENCOUNTER — APPOINTMENT (OUTPATIENT)
Dept: WOMENS IMAGING | Facility: HOSPITAL | Age: 88
End: 2023-01-01
Payer: MEDICARE

## 2023-01-01 VITALS
TEMPERATURE: 96.6 F | BODY MASS INDEX: 18.82 KG/M2 | SYSTOLIC BLOOD PRESSURE: 146 MMHG | HEIGHT: 64 IN | DIASTOLIC BLOOD PRESSURE: 86 MMHG | WEIGHT: 110.23 LBS | HEART RATE: 88 BPM | RESPIRATION RATE: 18 BRPM | OXYGEN SATURATION: 95 %

## 2023-01-01 VITALS
SYSTOLIC BLOOD PRESSURE: 112 MMHG | HEIGHT: 64 IN | WEIGHT: 114.9 LBS | DIASTOLIC BLOOD PRESSURE: 74 MMHG | BODY MASS INDEX: 19.62 KG/M2 | OXYGEN SATURATION: 96 % | HEART RATE: 83 BPM

## 2023-01-01 DIAGNOSIS — R78.89 ELEVATED DIGOXIN LEVEL: ICD-10-CM

## 2023-01-01 DIAGNOSIS — I10 BENIGN ESSENTIAL HYPERTENSION: Primary | ICD-10-CM

## 2023-01-01 DIAGNOSIS — G23.1 PROGRESSIVE SUPRANUCLEAR PALSY: ICD-10-CM

## 2023-01-01 DIAGNOSIS — M24.549 CONTRACTURE OF JOINT OF HAND, UNSPECIFIED LATERALITY: ICD-10-CM

## 2023-01-01 DIAGNOSIS — R09.02 HYPOXIA: ICD-10-CM

## 2023-01-01 DIAGNOSIS — I48.91 ATRIAL FIBRILLATION, UNSPECIFIED TYPE: ICD-10-CM

## 2023-01-01 DIAGNOSIS — R41.82 ALTERED MENTAL STATUS, UNSPECIFIED ALTERED MENTAL STATUS TYPE: ICD-10-CM

## 2023-01-01 DIAGNOSIS — J18.9 COMMUNITY ACQUIRED PNEUMONIA OF LEFT LOWER LOBE OF LUNG: Primary | ICD-10-CM

## 2023-01-01 DIAGNOSIS — I50.30 HEART FAILURE WITH PRESERVED EJECTION FRACTION, UNSPECIFIED HF CHRONICITY: ICD-10-CM

## 2023-01-01 LAB
ALBUMIN SERPL-MCNC: 3.9 G/DL (ref 3.5–5.2)
ALBUMIN/GLOB SERPL: 1.3 G/DL
ALP SERPL-CCNC: 58 U/L (ref 39–117)
ALT SERPL W P-5'-P-CCNC: 15 U/L (ref 1–33)
ANION GAP SERPL CALCULATED.3IONS-SCNC: 10 MMOL/L (ref 5–15)
ANION GAP SERPL CALCULATED.3IONS-SCNC: 6.3 MMOL/L (ref 5–15)
ANION GAP SERPL CALCULATED.3IONS-SCNC: 8 MMOL/L (ref 5–15)
ANION GAP SERPL CALCULATED.3IONS-SCNC: 9.8 MMOL/L (ref 5–15)
ARTERIAL PATENCY WRIST A: POSITIVE
AST SERPL-CCNC: 16 U/L (ref 1–32)
ATMOSPHERIC PRESS: 754.3 MMHG
BACTERIA SPEC AEROBE CULT: NORMAL
BACTERIA SPEC AEROBE CULT: NORMAL
BASE EXCESS BLDA CALC-SCNC: 4.5 MMOL/L (ref 0–2)
BASOPHILS # BLD AUTO: 0.04 10*3/MM3 (ref 0–0.2)
BASOPHILS NFR BLD AUTO: 0.4 % (ref 0–1.5)
BDY SITE: ABNORMAL
BILIRUB SERPL-MCNC: 1.2 MG/DL (ref 0–1.2)
BILIRUB UR QL STRIP: NEGATIVE
BUN SERPL-MCNC: 16 MG/DL (ref 8–23)
BUN SERPL-MCNC: 18 MG/DL (ref 8–23)
BUN SERPL-MCNC: 19 MG/DL (ref 8–23)
BUN SERPL-MCNC: 19 MG/DL (ref 8–23)
BUN/CREAT SERPL: 18.6 (ref 7–25)
BUN/CREAT SERPL: 23.1 (ref 7–25)
BUN/CREAT SERPL: 23.2 (ref 7–25)
BUN/CREAT SERPL: 26.4 (ref 7–25)
CALCIUM SPEC-SCNC: 8.2 MG/DL (ref 8.6–10.5)
CALCIUM SPEC-SCNC: 8.5 MG/DL (ref 8.6–10.5)
CALCIUM SPEC-SCNC: 8.6 MG/DL (ref 8.6–10.5)
CALCIUM SPEC-SCNC: 9.2 MG/DL (ref 8.6–10.5)
CHLORIDE SERPL-SCNC: 101 MMOL/L (ref 98–107)
CHLORIDE SERPL-SCNC: 102 MMOL/L (ref 98–107)
CHLORIDE SERPL-SCNC: 98 MMOL/L (ref 98–107)
CHLORIDE SERPL-SCNC: 99 MMOL/L (ref 98–107)
CLARITY UR: CLEAR
CO2 SERPL-SCNC: 25 MMOL/L (ref 22–29)
CO2 SERPL-SCNC: 25.2 MMOL/L (ref 22–29)
CO2 SERPL-SCNC: 29 MMOL/L (ref 22–29)
CO2 SERPL-SCNC: 29.7 MMOL/L (ref 22–29)
COLOR UR: YELLOW
CREAT SERPL-MCNC: 0.69 MG/DL (ref 0.57–1)
CREAT SERPL-MCNC: 0.72 MG/DL (ref 0.57–1)
CREAT SERPL-MCNC: 0.78 MG/DL (ref 0.57–1)
CREAT SERPL-MCNC: 1.02 MG/DL (ref 0.57–1)
D-LACTATE SERPL-SCNC: 1.8 MMOL/L (ref 0.5–2)
DEPRECATED RDW RBC AUTO: 40.2 FL (ref 37–54)
DEPRECATED RDW RBC AUTO: 40.5 FL (ref 37–54)
DEPRECATED RDW RBC AUTO: 41.8 FL (ref 37–54)
DEPRECATED RDW RBC AUTO: 42.3 FL (ref 37–54)
DIGOXIN SERPL-MCNC: 0.7 NG/ML (ref 0.6–1.2)
DIGOXIN SERPL-MCNC: 2.2 NG/ML (ref 0.6–1.2)
EGFRCR SERPLBLD CKD-EPI 2021: 54 ML/MIN/1.73
EGFRCR SERPLBLD CKD-EPI 2021: 74.5 ML/MIN/1.73
EGFRCR SERPLBLD CKD-EPI 2021: 82.1 ML/MIN/1.73
EGFRCR SERPLBLD CKD-EPI 2021: 85.2 ML/MIN/1.73
EOSINOPHIL # BLD AUTO: 0.02 10*3/MM3 (ref 0–0.4)
EOSINOPHIL NFR BLD AUTO: 0.2 % (ref 0.3–6.2)
ERYTHROCYTE [DISTWIDTH] IN BLOOD BY AUTOMATED COUNT: 12 % (ref 12.3–15.4)
ERYTHROCYTE [DISTWIDTH] IN BLOOD BY AUTOMATED COUNT: 12.1 % (ref 12.3–15.4)
ERYTHROCYTE [DISTWIDTH] IN BLOOD BY AUTOMATED COUNT: 12.1 % (ref 12.3–15.4)
ERYTHROCYTE [DISTWIDTH] IN BLOOD BY AUTOMATED COUNT: 12.2 % (ref 12.3–15.4)
GLOBULIN UR ELPH-MCNC: 3 GM/DL
GLUCOSE SERPL-MCNC: 149 MG/DL (ref 65–99)
GLUCOSE SERPL-MCNC: 85 MG/DL (ref 65–99)
GLUCOSE SERPL-MCNC: 87 MG/DL (ref 65–99)
GLUCOSE SERPL-MCNC: 94 MG/DL (ref 65–99)
GLUCOSE UR STRIP-MCNC: NEGATIVE MG/DL
HCO3 BLDA-SCNC: 29.6 MMOL/L (ref 22–28)
HCT VFR BLD AUTO: 31.3 % (ref 34–46.6)
HCT VFR BLD AUTO: 32.2 % (ref 34–46.6)
HCT VFR BLD AUTO: 32.3 % (ref 34–46.6)
HCT VFR BLD AUTO: 37.5 % (ref 34–46.6)
HGB BLD-MCNC: 10.4 G/DL (ref 12–15.9)
HGB BLD-MCNC: 10.8 G/DL (ref 12–15.9)
HGB BLD-MCNC: 11 G/DL (ref 12–15.9)
HGB BLD-MCNC: 12.2 G/DL (ref 12–15.9)
HGB UR QL STRIP.AUTO: NEGATIVE
HOLD SPECIMEN: NORMAL
IMM GRANULOCYTES # BLD AUTO: 0.06 10*3/MM3 (ref 0–0.05)
IMM GRANULOCYTES NFR BLD AUTO: 0.5 % (ref 0–0.5)
INR PPP: 1.7
INR PPP: 2
INR PPP: 2 (ref 0.9–1.1)
INR PPP: 2.13 (ref 0.9–1.1)
INR PPP: 2.2
INR PPP: 2.47 (ref 0.9–1.1)
INR PPP: 2.57 (ref 0.9–1.1)
INR PPP: 2.6
INR PPP: 2.72 (ref 0.9–1.1)
INR PPP: 4.5
KETONES UR QL STRIP: NEGATIVE
LEUKOCYTE ESTERASE UR QL STRIP.AUTO: NEGATIVE
LYMPHOCYTES # BLD AUTO: 1.1 10*3/MM3 (ref 0.7–3.1)
LYMPHOCYTES NFR BLD AUTO: 9.8 % (ref 19.6–45.3)
MAGNESIUM SERPL-MCNC: 1.7 MG/DL (ref 1.6–2.4)
MAGNESIUM SERPL-MCNC: 1.7 MG/DL (ref 1.6–2.4)
MAGNESIUM SERPL-MCNC: 2 MG/DL (ref 1.6–2.4)
MCH RBC QN AUTO: 30.7 PG (ref 26.6–33)
MCH RBC QN AUTO: 30.9 PG (ref 26.6–33)
MCH RBC QN AUTO: 31.3 PG (ref 26.6–33)
MCH RBC QN AUTO: 31.7 PG (ref 26.6–33)
MCHC RBC AUTO-ENTMCNC: 32.5 G/DL (ref 31.5–35.7)
MCHC RBC AUTO-ENTMCNC: 33.2 G/DL (ref 31.5–35.7)
MCHC RBC AUTO-ENTMCNC: 33.4 G/DL (ref 31.5–35.7)
MCHC RBC AUTO-ENTMCNC: 34.2 G/DL (ref 31.5–35.7)
MCV RBC AUTO: 91.7 FL (ref 79–97)
MCV RBC AUTO: 92.6 FL (ref 79–97)
MCV RBC AUTO: 94.5 FL (ref 79–97)
MCV RBC AUTO: 95.4 FL (ref 79–97)
MODALITY: ABNORMAL
MONOCYTES # BLD AUTO: 0.55 10*3/MM3 (ref 0.1–0.9)
MONOCYTES NFR BLD AUTO: 4.9 % (ref 5–12)
NEUTROPHILS NFR BLD AUTO: 84.2 % (ref 42.7–76)
NEUTROPHILS NFR BLD AUTO: 9.46 10*3/MM3 (ref 1.7–7)
NITRITE UR QL STRIP: NEGATIVE
NRBC BLD AUTO-RTO: 0 /100 WBC (ref 0–0.2)
NT-PROBNP SERPL-MCNC: 2707 PG/ML (ref 0–1800)
PCO2 BLDA: 45.2 MM HG (ref 35–45)
PH BLDA: 7.42 PH UNITS (ref 7.35–7.45)
PH UR STRIP.AUTO: 7 [PH] (ref 5–8)
PHOSPHATE SERPL-MCNC: 2.4 MG/DL (ref 2.5–4.5)
PHOSPHATE SERPL-MCNC: 2.4 MG/DL (ref 2.5–4.5)
PHOSPHATE SERPL-MCNC: 2.9 MG/DL (ref 2.5–4.5)
PLATELET # BLD AUTO: 217 10*3/MM3 (ref 140–450)
PLATELET # BLD AUTO: 232 10*3/MM3 (ref 140–450)
PLATELET # BLD AUTO: 244 10*3/MM3 (ref 140–450)
PLATELET # BLD AUTO: 273 10*3/MM3 (ref 140–450)
PMV BLD AUTO: 10.5 FL (ref 6–12)
PMV BLD AUTO: 10.6 FL (ref 6–12)
PMV BLD AUTO: 10.6 FL (ref 6–12)
PMV BLD AUTO: 10.7 FL (ref 6–12)
PO2 BLDA: 379.6 MM HG (ref 80–100)
POTASSIUM SERPL-SCNC: 3.4 MMOL/L (ref 3.5–5.2)
POTASSIUM SERPL-SCNC: 4 MMOL/L (ref 3.5–5.2)
POTASSIUM SERPL-SCNC: 4.2 MMOL/L (ref 3.5–5.2)
POTASSIUM SERPL-SCNC: 4.2 MMOL/L (ref 3.5–5.2)
PROCALCITONIN SERPL-MCNC: 0.06 NG/ML (ref 0–0.25)
PROT SERPL-MCNC: 6.9 G/DL (ref 6–8.5)
PROT UR QL STRIP: NEGATIVE
PROTHROMBIN TIME: 23 SECONDS (ref 11.7–14.2)
PROTHROMBIN TIME: 24.1 SECONDS (ref 11.7–14.2)
PROTHROMBIN TIME: 27.1 SECONDS (ref 11.7–14.2)
PROTHROMBIN TIME: 27.9 SECONDS (ref 11.7–14.2)
PROTHROMBIN TIME: 29.2 SECONDS (ref 11.7–14.2)
QT INTERVAL: 376 MS
RBC # BLD AUTO: 3.28 10*6/MM3 (ref 3.77–5.28)
RBC # BLD AUTO: 3.49 10*6/MM3 (ref 3.77–5.28)
RBC # BLD AUTO: 3.51 10*6/MM3 (ref 3.77–5.28)
RBC # BLD AUTO: 3.97 10*6/MM3 (ref 3.77–5.28)
SAO2 % BLDCOA: 100 % (ref 92–99)
SODIUM SERPL-SCNC: 135 MMOL/L (ref 136–145)
SODIUM SERPL-SCNC: 135 MMOL/L (ref 136–145)
SODIUM SERPL-SCNC: 136 MMOL/L (ref 136–145)
SODIUM SERPL-SCNC: 137 MMOL/L (ref 136–145)
SP GR UR STRIP: 1.01 (ref 1–1.03)
TOTAL RATE: 18 BREATHS/MINUTE
TROPONIN T SERPL-MCNC: <0.01 NG/ML (ref 0–0.03)
UROBILINOGEN UR QL STRIP: NORMAL
WBC NRBC COR # BLD: 11.23 10*3/MM3 (ref 3.4–10.8)
WBC NRBC COR # BLD: 7.14 10*3/MM3 (ref 3.4–10.8)
WBC NRBC COR # BLD: 8.08 10*3/MM3 (ref 3.4–10.8)
WBC NRBC COR # BLD: 8.73 10*3/MM3 (ref 3.4–10.8)

## 2023-01-01 PROCEDURE — 25010000002 CEFTRIAXONE PER 250 MG: Performed by: STUDENT IN AN ORGANIZED HEALTH CARE EDUCATION/TRAINING PROGRAM

## 2023-01-01 PROCEDURE — 85025 COMPLETE CBC W/AUTO DIFF WBC: CPT | Performed by: PHYSICIAN ASSISTANT

## 2023-01-01 PROCEDURE — 25010000002 HYDRALAZINE PER 20 MG: Performed by: STUDENT IN AN ORGANIZED HEALTH CARE EDUCATION/TRAINING PROGRAM

## 2023-01-01 PROCEDURE — 87040 BLOOD CULTURE FOR BACTERIA: CPT | Performed by: PHYSICIAN ASSISTANT

## 2023-01-01 PROCEDURE — P9612 CATHETERIZE FOR URINE SPEC: HCPCS

## 2023-01-01 PROCEDURE — 99214 OFFICE O/P EST MOD 30 MIN: CPT | Performed by: INTERNAL MEDICINE

## 2023-01-01 PROCEDURE — 80053 COMPREHEN METABOLIC PANEL: CPT | Performed by: PHYSICIAN ASSISTANT

## 2023-01-01 PROCEDURE — 97116 GAIT TRAINING THERAPY: CPT

## 2023-01-01 PROCEDURE — 84100 ASSAY OF PHOSPHORUS: CPT | Performed by: STUDENT IN AN ORGANIZED HEALTH CARE EDUCATION/TRAINING PROGRAM

## 2023-01-01 PROCEDURE — 83735 ASSAY OF MAGNESIUM: CPT | Performed by: STUDENT IN AN ORGANIZED HEALTH CARE EDUCATION/TRAINING PROGRAM

## 2023-01-01 PROCEDURE — 84484 ASSAY OF TROPONIN QUANT: CPT | Performed by: PHYSICIAN ASSISTANT

## 2023-01-01 PROCEDURE — 82803 BLOOD GASES ANY COMBINATION: CPT

## 2023-01-01 PROCEDURE — 70450 CT HEAD/BRAIN W/O DYE: CPT

## 2023-01-01 PROCEDURE — 92610 EVALUATE SWALLOWING FUNCTION: CPT

## 2023-01-01 PROCEDURE — 97530 THERAPEUTIC ACTIVITIES: CPT

## 2023-01-01 PROCEDURE — 85610 PROTHROMBIN TIME: CPT | Performed by: STUDENT IN AN ORGANIZED HEALTH CARE EDUCATION/TRAINING PROGRAM

## 2023-01-01 PROCEDURE — 80162 ASSAY OF DIGOXIN TOTAL: CPT | Performed by: PHYSICIAN ASSISTANT

## 2023-01-01 PROCEDURE — 99222 1ST HOSP IP/OBS MODERATE 55: CPT | Performed by: STUDENT IN AN ORGANIZED HEALTH CARE EDUCATION/TRAINING PROGRAM

## 2023-01-01 PROCEDURE — 99285 EMERGENCY DEPT VISIT HI MDM: CPT

## 2023-01-01 PROCEDURE — 93010 ELECTROCARDIOGRAM REPORT: CPT | Performed by: INTERNAL MEDICINE

## 2023-01-01 PROCEDURE — 80162 ASSAY OF DIGOXIN TOTAL: CPT | Performed by: STUDENT IN AN ORGANIZED HEALTH CARE EDUCATION/TRAINING PROGRAM

## 2023-01-01 PROCEDURE — 77067 SCR MAMMO BI INCL CAD: CPT | Performed by: RADIOLOGY

## 2023-01-01 PROCEDURE — 25010000002 AZITHROMYCIN PER 500 MG: Performed by: PHYSICIAN ASSISTANT

## 2023-01-01 PROCEDURE — 71045 X-RAY EXAM CHEST 1 VIEW: CPT

## 2023-01-01 PROCEDURE — 77063 BREAST TOMOSYNTHESIS BI: CPT | Performed by: RADIOLOGY

## 2023-01-01 PROCEDURE — 80048 BASIC METABOLIC PNL TOTAL CA: CPT | Performed by: STUDENT IN AN ORGANIZED HEALTH CARE EDUCATION/TRAINING PROGRAM

## 2023-01-01 PROCEDURE — 36415 COLL VENOUS BLD VENIPUNCTURE: CPT | Performed by: PHYSICIAN ASSISTANT

## 2023-01-01 PROCEDURE — 83605 ASSAY OF LACTIC ACID: CPT | Performed by: PHYSICIAN ASSISTANT

## 2023-01-01 PROCEDURE — 85027 COMPLETE CBC AUTOMATED: CPT | Performed by: STUDENT IN AN ORGANIZED HEALTH CARE EDUCATION/TRAINING PROGRAM

## 2023-01-01 PROCEDURE — 81003 URINALYSIS AUTO W/O SCOPE: CPT | Performed by: PHYSICIAN ASSISTANT

## 2023-01-01 PROCEDURE — 36600 WITHDRAWAL OF ARTERIAL BLOOD: CPT

## 2023-01-01 PROCEDURE — 84145 PROCALCITONIN (PCT): CPT | Performed by: PHYSICIAN ASSISTANT

## 2023-01-01 PROCEDURE — 93005 ELECTROCARDIOGRAM TRACING: CPT | Performed by: PHYSICIAN ASSISTANT

## 2023-01-01 PROCEDURE — 83880 ASSAY OF NATRIURETIC PEPTIDE: CPT | Performed by: PHYSICIAN ASSISTANT

## 2023-01-01 PROCEDURE — 85610 PROTHROMBIN TIME: CPT | Performed by: PHYSICIAN ASSISTANT

## 2023-01-01 PROCEDURE — 97162 PT EVAL MOD COMPLEX 30 MIN: CPT

## 2023-01-01 PROCEDURE — 25010000002 CEFTRIAXONE PER 250 MG: Performed by: PHYSICIAN ASSISTANT

## 2023-01-01 RX ORDER — ACETAMINOPHEN 650 MG/1
650 SUPPOSITORY RECTAL EVERY 4 HOURS PRN
Status: DISCONTINUED | OUTPATIENT
Start: 2023-01-01 | End: 2023-01-01 | Stop reason: HOSPADM

## 2023-01-01 RX ORDER — ONDANSETRON 2 MG/ML
4 INJECTION INTRAMUSCULAR; INTRAVENOUS EVERY 6 HOURS PRN
Status: DISCONTINUED | OUTPATIENT
Start: 2023-01-01 | End: 2023-01-01 | Stop reason: HOSPADM

## 2023-01-01 RX ORDER — CHOLECALCIFEROL (VITAMIN D3) 125 MCG
1000 CAPSULE ORAL DAILY
Status: DISCONTINUED | OUTPATIENT
Start: 2023-01-01 | End: 2023-01-01 | Stop reason: HOSPADM

## 2023-01-01 RX ORDER — SODIUM CHLORIDE 9 MG/ML
40 INJECTION, SOLUTION INTRAVENOUS AS NEEDED
Status: DISCONTINUED | OUTPATIENT
Start: 2023-01-01 | End: 2023-01-01 | Stop reason: HOSPADM

## 2023-01-01 RX ORDER — HYDRALAZINE HYDROCHLORIDE 25 MG/1
25 TABLET, FILM COATED ORAL 2 TIMES DAILY
Status: DISCONTINUED | OUTPATIENT
Start: 2023-01-01 | End: 2023-01-01 | Stop reason: HOSPADM

## 2023-01-01 RX ORDER — WARFARIN SODIUM 2 MG/1
2 TABLET ORAL
Status: DISCONTINUED | OUTPATIENT
Start: 2023-01-01 | End: 2023-01-01 | Stop reason: HOSPADM

## 2023-01-01 RX ORDER — ACETAMINOPHEN 160 MG/5ML
650 SOLUTION ORAL EVERY 4 HOURS PRN
Status: DISCONTINUED | OUTPATIENT
Start: 2023-01-01 | End: 2023-01-01 | Stop reason: HOSPADM

## 2023-01-01 RX ORDER — SODIUM CHLORIDE 0.9 % (FLUSH) 0.9 %
10 SYRINGE (ML) INJECTION AS NEEDED
Status: DISCONTINUED | OUTPATIENT
Start: 2023-01-01 | End: 2023-01-01 | Stop reason: HOSPADM

## 2023-01-01 RX ORDER — ECHINACEA PURPUREA EXTRACT 125 MG
1 TABLET ORAL AS NEEDED
Status: DISCONTINUED | OUTPATIENT
Start: 2023-01-01 | End: 2023-01-01 | Stop reason: HOSPADM

## 2023-01-01 RX ORDER — WARFARIN SODIUM 2 MG/1
2 TABLET ORAL
Status: DISCONTINUED | OUTPATIENT
Start: 2023-01-01 | End: 2023-01-01 | Stop reason: DRUGHIGH

## 2023-01-01 RX ORDER — ACETAMINOPHEN 325 MG/1
650 TABLET ORAL EVERY 4 HOURS PRN
Status: DISCONTINUED | OUTPATIENT
Start: 2023-01-01 | End: 2023-01-01 | Stop reason: HOSPADM

## 2023-01-01 RX ORDER — POTASSIUM CHLORIDE 750 MG/1
40 TABLET, FILM COATED, EXTENDED RELEASE ORAL ONCE
Status: COMPLETED | OUTPATIENT
Start: 2023-01-01 | End: 2023-01-01

## 2023-01-01 RX ORDER — DONEPEZIL HYDROCHLORIDE 10 MG/1
10 TABLET, FILM COATED ORAL NIGHTLY
Status: DISCONTINUED | OUTPATIENT
Start: 2023-01-01 | End: 2023-01-01 | Stop reason: HOSPADM

## 2023-01-01 RX ORDER — SODIUM CHLORIDE 0.9 % (FLUSH) 0.9 %
10 SYRINGE (ML) INJECTION EVERY 12 HOURS SCHEDULED
Status: DISCONTINUED | OUTPATIENT
Start: 2023-01-01 | End: 2023-01-01 | Stop reason: HOSPADM

## 2023-01-01 RX ORDER — WARFARIN SODIUM 4 MG/1
4 TABLET ORAL
Status: DISCONTINUED | OUTPATIENT
Start: 2023-01-01 | End: 2023-01-01 | Stop reason: DRUGHIGH

## 2023-01-01 RX ORDER — HYDRALAZINE HYDROCHLORIDE 20 MG/ML
10 INJECTION INTRAMUSCULAR; INTRAVENOUS EVERY 6 HOURS PRN
Status: DISCONTINUED | OUTPATIENT
Start: 2023-01-01 | End: 2023-01-01 | Stop reason: HOSPADM

## 2023-01-01 RX ORDER — ONDANSETRON 4 MG/1
4 TABLET, FILM COATED ORAL EVERY 6 HOURS PRN
Status: DISCONTINUED | OUTPATIENT
Start: 2023-01-01 | End: 2023-01-01 | Stop reason: HOSPADM

## 2023-01-01 RX ADMIN — Medication 10 ML: at 23:41

## 2023-01-01 RX ADMIN — WARFARIN 2 MG: 2 TABLET ORAL at 22:55

## 2023-01-01 RX ADMIN — Medication 10 ML: at 09:25

## 2023-01-01 RX ADMIN — DONEPEZIL HYDROCHLORIDE 10 MG: 10 TABLET, FILM COATED ORAL at 20:36

## 2023-01-01 RX ADMIN — Medication 10 ML: at 21:21

## 2023-01-01 RX ADMIN — Medication 1 PACKET: at 18:14

## 2023-01-01 RX ADMIN — CEFTRIAXONE SODIUM 1 G: 1 INJECTION, POWDER, FOR SOLUTION INTRAMUSCULAR; INTRAVENOUS at 12:20

## 2023-01-01 RX ADMIN — Medication 10 ML: at 23:40

## 2023-01-01 RX ADMIN — WARFARIN 2 MG: 2 TABLET ORAL at 18:15

## 2023-01-01 RX ADMIN — Medication 1000 MCG: at 08:34

## 2023-01-01 RX ADMIN — Medication 10 ML: at 08:35

## 2023-01-01 RX ADMIN — METOPROLOL TARTRATE 25 MG: 25 TABLET ORAL at 20:35

## 2023-01-01 RX ADMIN — CEFTRIAXONE SODIUM 1 G: 1 INJECTION, POWDER, FOR SOLUTION INTRAMUSCULAR; INTRAVENOUS at 09:25

## 2023-01-01 RX ADMIN — METOPROLOL TARTRATE 25 MG: 25 TABLET ORAL at 21:21

## 2023-01-01 RX ADMIN — HYDRALAZINE HYDROCHLORIDE 25 MG: 25 TABLET, FILM COATED ORAL at 21:21

## 2023-01-01 RX ADMIN — CEFTRIAXONE SODIUM 1 G: 1 INJECTION, POWDER, FOR SOLUTION INTRAMUSCULAR; INTRAVENOUS at 08:35

## 2023-01-01 RX ADMIN — WARFARIN 2 MG: 2 TABLET ORAL at 17:45

## 2023-01-01 RX ADMIN — POTASSIUM CHLORIDE 40 MEQ: 10 TABLET, EXTENDED RELEASE ORAL at 15:13

## 2023-01-01 RX ADMIN — DIBASIC SODIUM PHOSPHATE, MONOBASIC POTASSIUM PHOSPHATE AND MONOBASIC SODIUM PHOSPHATE 2 TABLET: 852; 155; 130 TABLET ORAL at 21:30

## 2023-01-01 RX ADMIN — METOPROLOL TARTRATE 25 MG: 25 TABLET ORAL at 21:28

## 2023-01-01 RX ADMIN — METOPROLOL TARTRATE 25 MG: 25 TABLET ORAL at 08:34

## 2023-01-01 RX ADMIN — Medication 10 ML: at 08:36

## 2023-01-01 RX ADMIN — Medication 10 ML: at 20:00

## 2023-01-01 RX ADMIN — DONEPEZIL HYDROCHLORIDE 10 MG: 10 TABLET, FILM COATED ORAL at 21:28

## 2023-01-01 RX ADMIN — SODIUM CHLORIDE 500 MG: 9 INJECTION, SOLUTION INTRAVENOUS at 13:11

## 2023-01-01 RX ADMIN — HYDRALAZINE HYDROCHLORIDE 25 MG: 25 TABLET, FILM COATED ORAL at 21:28

## 2023-01-01 RX ADMIN — HYDRALAZINE HYDROCHLORIDE 10 MG: 20 INJECTION INTRAMUSCULAR; INTRAVENOUS at 21:11

## 2023-01-01 RX ADMIN — METOPROLOL TARTRATE 25 MG: 25 TABLET ORAL at 09:25

## 2023-01-01 RX ADMIN — HYDRALAZINE HYDROCHLORIDE 25 MG: 25 TABLET, FILM COATED ORAL at 08:34

## 2023-01-01 RX ADMIN — METOPROLOL TARTRATE 25 MG: 25 TABLET ORAL at 08:38

## 2023-01-01 RX ADMIN — HYDRALAZINE HYDROCHLORIDE 25 MG: 25 TABLET, FILM COATED ORAL at 08:38

## 2023-01-01 RX ADMIN — HYDRALAZINE HYDROCHLORIDE 25 MG: 25 TABLET, FILM COATED ORAL at 20:36

## 2023-01-01 RX ADMIN — Medication 1000 MCG: at 08:38

## 2023-01-01 RX ADMIN — DONEPEZIL HYDROCHLORIDE 10 MG: 10 TABLET, FILM COATED ORAL at 21:21

## 2023-01-01 RX ADMIN — HYDRALAZINE HYDROCHLORIDE 25 MG: 25 TABLET, FILM COATED ORAL at 09:25

## 2023-01-01 RX ADMIN — SODIUM CHLORIDE 1000 ML: 9 INJECTION, SOLUTION INTRAVENOUS at 11:13

## 2023-01-04 NOTE — PROGRESS NOTES
Anticoagulation Clinic Progress Note    Anticoagulation Summary  As of 2023    INR goal:  2.0-3.0   TTR:  63.0 % (4.1 y)   INR used for dosin.70 (2023)   Warfarin maintenance plan:  4 mg every Mon; 2 mg all other days; Starting 2023   Weekly warfarin total:  16 mg   Plan last modified:  Elinor Cartagena RPH (10/19/2022)   Next INR check:  2023   Priority:  Maintenance   Target end date:  Indefinite    Indications    Atrial fibrillation (HCC) [I48.91]  Atrial fibrillation with RVR (HCC) (Resolved) [I48.91]             Anticoagulation Episode Summary     INR check location:      Preferred lab:      Send INR reminders to:   [facility]  POOL    Comments:  Labs by [facility] ([facility])      Anticoagulation Care Providers     Provider Role Specialty Phone number    Vinh pAple MD Referring Cardiology [phone]          INR History:  Anticoagulation Monitoring 2022   INR 2.00 3.60 1.70   INR Date 2022   INR Goal 2.0-3.0 2.0-3.0 2.0-3.0   Trend Same Same Same   Last Week Total 16 mg 16 mg 14 mg   Next Week Total 16 mg 14 mg 18 mg   Sun 2 mg 2 mg 2 mg   Mon 4 mg 4 mg 4 mg   Tue 2 mg 2 mg 2 mg   Wed 2 mg Hold () 4 mg ()   Thu 2 mg 2 mg 2 mg   Fri 2 mg 2 mg 2 mg   Sat 2 mg 2 mg 2 mg   Visit Report - - -   Some recent data might be hidden       Plan:  1. INR is Subtherapeutic today- see above in Anticoagulation Summary.   Provided [facility] to [facility]  to Change their warfarin regimen- see above in Anticoagulation Summary.  2. Follow up in 1 week      Tanvi Alfredo PharmD

## 2023-01-13 PROBLEM — R53.81 DEBILITY: Status: ACTIVE | Noted: 2023-01-01

## 2023-01-13 PROBLEM — J18.9 COMMUNITY ACQUIRED PNEUMONIA OF LEFT LOWER LOBE OF LUNG: Status: ACTIVE | Noted: 2023-01-01

## 2023-01-13 PROBLEM — T46.0X1A DIGOXIN TOXICITY: Status: ACTIVE | Noted: 2023-01-01

## 2023-01-13 PROBLEM — Z79.01 CHRONIC ANTICOAGULATION: Status: ACTIVE | Noted: 2023-01-01

## 2023-01-13 NOTE — ED PROVIDER NOTES
The ZULEYMA and I have discussed this patient's history, physical exam and treatment plan.  I provided a substantive portion of the care of this patient.  I have reviewed the documentation and personally had a face to face interaction with the patient and personally performed the physical exam, in its entirety.  I affirm the documentation and agree with the treatment and plan.  The following describes my personal findings.      The patient presents brought by EMS from nursing facility with report of last known at her usual baseline of alert and interactive at 8 AM today, found unresponsive at 10:15 PM by daughter.  EMS reports patient with slow respiratory effort, unresponsive, no change with Narcan in route, Accu-Chek not hypoglycemic in route.  Daughter at bedside reports patient has a history of progressive supranuclear nuclear palsy, is to be a DNR, DNI, no life supportive measures, however she reports that patient and family wish to have medical treatment short of life supportive measures.  Daughter denies recent complaints of pain, headache, fever, nausea/vomiting, cough, increased work of breathing, diarrhea, changes to her urinary habits.  Daughter does report that she did see some blood when she wiped the patient after a bowel movement few days ago.  Daughter reports patient fell approximately 1 month ago on the day of her 's , did not get seen by medical provider at that time.      Comprehensive Physical exam:  Patient is unresponsive, does not follow commands, thin, wasted nonverbal, pupils 2 mm equal bilaterally  HEENT: normocephalic, atraumatic  Neck: no goiter, no pain with ROM  Pulmonary: Respiratory rate 16, nonlabored, decreased breath sounds in the bases  cardiovascular: Nontachycardic  Abdomen: Thin, no obvious tenderness  musculoskeletal: No deformity, no edema, withdraws all extremities to noxious stimuli    GCS 6  eye 1  Verbal 1  Motor 4  Neuro/psychiatric:calm, appropriate,  cooperative  Skin:warm, dry    Anticipate admission for further testing, treatment as needed with treatment for pneumonia, altered mental status, hypoxia.    Patient was wearing facemask when I entered the room and throughout our encounter. Full protective equipment was worn throughout this patient encounter including a face mask, eye protection and gloves. Hand hygiene was performed before donning protective equipment and after removal when leaving the room.           Jelena Proctor MD  01/13/23 [id number]

## 2023-01-13 NOTE — ED NOTES
.Nursing report ED to floor  Citlali A Osmin  87 y.o.  female    HPI :   Chief Complaint   Patient presents with    Altered Mental Status       Admitting doctor:   Bimal Horton MD    Admitting diagnosis:   The primary encounter diagnosis was Community acquired pneumonia of left lower lobe of lung. Diagnoses of Altered mental status, unspecified altered mental status type, Elevated digoxin level, and Hypoxia were also pertinent to this visit.    Code status:   Current Code Status       Date Active Code Status Order ID Comments User Context       Prior            Allergies:   Sulfamethoxazole, Atorvastatin, Bactrim [sulfamethoxazole-trimethoprim], Penicillins, Pitavastatin, Rosuvastatin, and Simvastatin    Isolation:   No active isolations    Intake and Output    Intake/Output Summary (Last 24 hours) at 1/13/2023 1314  Last data filed at 1/13/2023 1310  Gross per 24 hour   Intake 1100 ml   Output --   Net 1100 ml       Weight:       01/13/23  1159   Weight: 49.9 kg (110 lb)       Most recent vitals:   Vitals:    01/13/23 1207 01/13/23 1221 01/13/23 1259 01/13/23 1314   BP:  132/98  (!) 140/104   Pulse:  79 92 89   Resp:  16 18 16   Temp:       TempSrc:       SpO2: 100% 100% 100% 99%   Weight:       Height:           Active LDAs/IV Access:   Lines, Drains & Airways       Active LDAs       Name Placement date Placement time Site Days    Peripheral IV 01/13/23 1054 Left Antecubital 01/13/23  1054  Antecubital  less than 1    Peripheral IV 01/13/23 1103 Right Antecubital 01/13/23  1103  Antecubital  less than 1    External Urinary Catheter 01/13/23  1255  --  less than 1                    Labs (abnormal labs have a star):   Labs Reviewed   COMPREHENSIVE METABOLIC PANEL - Abnormal; Notable for the following components:       Result Value    Glucose 149 (*)     Creatinine 1.02 (*)     Sodium 135 (*)     eGFR 54.0 (*)     All other components within normal limits    Narrative:     GFR Normal >60  Chronic Kidney  Disease <60  Kidney Failure <15    The GFR formula is only valid for adults with stable renal function between ages 18 and 70.   PROTIME-INR - Abnormal; Notable for the following components:    Protime 23.0 (*)     INR 2.00 (*)     All other components within normal limits   DIGOXIN LEVEL - Abnormal; Notable for the following components:    Digoxin 2.20 (*)     All other components within normal limits   BNP (IN-HOUSE) - Abnormal; Notable for the following components:    proBNP 2,707.0 (*)     All other components within normal limits    Narrative:     Among patients with dyspnea, NT-proBNP is highly sensitive for the detection of acute congestive heart failure. In addition NT-proBNP of <300 pg/ml effectively rules out acute congestive heart failure with 99% negative predictive value.    Results may be falsely decreased if patient taking Biotin.     CBC WITH AUTO DIFFERENTIAL - Abnormal; Notable for the following components:    WBC 11.23 (*)     RDW 12.2 (*)     Neutrophil % 84.2 (*)     Lymphocyte % 9.8 (*)     Monocyte % 4.9 (*)     Eosinophil % 0.2 (*)     Neutrophils, Absolute 9.46 (*)     Immature Grans, Absolute 0.06 (*)     All other components within normal limits   BLOOD GAS, ARTERIAL - Abnormal; Notable for the following components:    pCO2, Arterial 45.2 (*)     pO2, Arterial 379.6 (*)     HCO3, Arterial 29.6 (*)     Base Excess, Arterial 4.5 (*)     O2 Saturation Calculated 100.0 (*)     All other components within normal limits   LACTIC ACID, PLASMA - Normal   PROCALCITONIN - Normal    Narrative:     As a Marker for Sepsis (Non-Neonates):    1. <0.5 ng/mL represents a low risk of severe sepsis and/or septic shock.  2. >2 ng/mL represents a high risk of severe sepsis and/or septic shock.    As a Marker for Lower Respiratory Tract Infections that require antibiotic therapy:    PCT on Admission    Antibiotic Therapy       6-12 Hrs later    >0.5                Strongly Recommended  >0.25 - <0.5         "Recommended   0.1 - 0.25          Discouraged              Remeasure/reassess PCT  <0.1                Strongly Discouraged     Remeasure/reassess PCT    As 28 day mortality risk marker: \"Change in Procalcitonin Result\" (>80% or <=80%) if Day 0 (or Day 1) and Day 4 values are available. Refer to [email or web address]-pct-calculator.[email or web address]    Change in PCT <=80%  A decrease of PCT levels below or equal to 80% defines a positive change in PCT test result representing a higher risk for 28-day all-cause mortality of patients diagnosed with severe sepsis for septic shock.    Change in PCT >80%  A decrease of PCT levels of more than 80% defines a negative change in PCT result representing a lower risk for 28-day all-cause mortality of patients diagnosed with severe sepsis or septic shock.      TROPONIN (IN-HOUSE) - Normal    Narrative:     Troponin T Reference Range:  <= 0.03 ng/mL-   Negative for AMI  >0.03 ng/mL-     Abnormal for myocardial necrosis.  Clinicians would have to utilize clinical acumen, EKG, Troponin and serial changes to determine if it is an Acute Myocardial Infarction or myocardial injury due to an underlying chronic condition.       Results may be falsely decreased if patient taking Biotin.     BLOOD CULTURE   BLOOD CULTURE   BLOOD GAS, ARTERIAL   URINALYSIS W/ MICROSCOPIC IF INDICATED (NO CULTURE)   CBC AND DIFFERENTIAL    Narrative:     The following orders were created for panel order CBC & Differential.  Procedure                               Abnormality         Status                     ---------                               -----------         ------                     CBC Auto Differential[[id number]]        Abnormal            Final result                 Please view results for these tests on the individual orders.   EXTRA TUBES    Narrative:     The following orders were created for panel order Extra Tubes.  Procedure                               Abnormality         Status                   "   ---------                               -----------         ------                     [facility][[id number]]                                   Final result                 Please view results for these tests on the individual orders.   [facility]       EKG:   ECG 12 Lead Altered Mental Status   Preliminary Result   HEART RATE= 97  bpm   RR Interval= 619  ms   MS Interval=   ms   P Horizontal Axis=   deg   P Front Axis=   deg   QRSD Interval= 89  ms   QT Interval= 376  ms   QRS Axis= 1  deg   T Wave Axis= -78  deg   - ABNORMAL ECG -   Atrial fibrillation   Nonspecific repol abnormality, diffuse leads   Electronically Signed By:    Date and Time of Study: 2023 11:47:19          Meds given in ED:   Medications   sodium chloride 0.9 % flush 10 mL (has no administration in time range)   azithromycin (ZITHROMAX) 500 mg in sodium chloride 0.9 % 250 mL IVPB-VTB (500 mg Intravenous New Bag 23 1311)   sodium chloride 0.9 % bolus 1,000 mL ( Intravenous Currently Infusing 23 1121)   cefTRIAXone (ROCEPHIN) 1 g in sodium chloride 0.9 % 100 mL IVPB-VTB (0 g Intravenous Stopped 23 1310)       Imaging results:  No radiology results for the last day    Ambulatory status:   - bedrest    Social issues:   Social History     Socioeconomic History    Marital status:    Tobacco Use    Smoking status: Former     Packs/day: 0.50     Years: 15.00     Pack years: 7.50     Types: Cigarettes     Quit date:      Years since quittin.0    Smokeless tobacco: Never   Vaping Use    Vaping Use: Never used   Substance and Sexual Activity    Alcohol use: Not Currently     Comment: Rare    Drug use: No       NIH Stroke Scale:  Interval: baseline      Seymour Waldron RN  23 13:14 EST

## 2023-01-13 NOTE — ED PROVIDER NOTES
EMERGENCY DEPARTMENT ENCOUNTER    Room Number:  RAMESH/RAMESH  Date of encounter:  1/13/2023  PCP: Cristal Lema MD  Historian: EMS, daughter  Chronic or social conditions impacting care: Nothing      I used full protective equipment while examining this patient.  This includes face mask, gloves and protective eyewear.  I washed my hands before entering the room and immediately upon leaving the room      HPI:  Chief Complaint: Altered mental status, hypoxic  A complete HPI/ROS/PMH/PSH/SH/FH are unobtainable due to: Altered mental status    Context: Citlali Solorio is a 87 y.o. female who presents to the ED c/o altered mental status.  Patient is a DNR resident of a nursing home.  Patient was reportedly last seen in her normal state of health at 8 AM this morning.  She was found at 1030 this morning in an altered state.  She is unresponsive and hypoxic.  Patient does have a history of progressive supranuclear palsy, atrial fibrillation on Coumadin, hypertension.  Patient was given Narcan en route without improvement.    Review of Medical Records  I reviewed patient's last internal medicine office visit from 10/21/2022.  Patient being followed for hypertension, hyperlipidemia.    PAST MEDICAL HISTORY  Active Ambulatory Problems     Diagnosis Date Noted   • Progressive supranuclear palsy (HCC) 04/21/2016   • Carotid artery plaque, bilateral 04/21/2016   • Heart failure (HCC) 04/21/2016   • Gastroesophageal reflux disease 04/21/2016   • Hyperlipidemia 04/21/2016   • Spinal stenosis of lumbar region 04/21/2016   • Cobalamin deficiency 04/21/2016   • Atrial fibrillation (HCC) 06/24/2013   • Benign essential hypertension 06/24/2013   • Mitral valve insufficiency 07/06/2016   • Tricuspid valve insufficiency 07/06/2016   • Tear of medial meniscus of right knee, current 06/15/2017   • Metabolic encephalopathy 01/04/2018   • Heart failure with preserved ejection fraction (HCC) 08/13/2019   • History of melanoma 04/13/2021      Resolved Ambulatory Problems     Diagnosis Date Noted   • Anxiety 04/21/2016   • Hypertension 04/21/2016   • Impaired fasting glucose 04/21/2016   • Shoulder pain 04/21/2016   • Stress incontinence 05/23/2016   • Atrial fibrillation with RVR ([facility]) 08/24/2017   • Cough 09/01/2017   • Diarrhea 01/04/2018   • Abnormal EKG 01/04/2018   • Influenza A 01/05/2018   • Acute on chronic diastolic heart failure (HCC) 01/10/2018   • Compression fracture of L1 vertebra with delayed healing 10/29/2020   • Age-related osteoporosis with current pathological fracture with delayed healing 11/19/2020   • Acute conjunctivitis of left eye 02/23/2022     Past Medical History:   Diagnosis Date   • Abnormal gait    • Anticoagulated    • Arthritis    • Ataxic gait    • Carotid artery stenosis    • Diastolic congestive heart failure ([facility])    • Esophageal reflux    • History of recent fall    • History of vitamin B deficiency    • Kake (hard of hearing)    • Hypercholesterolemia    • IFG (impaired fasting glucose)    • Lumbar canal stenosis    • Lumbar disc disorder    • PAF (paroxysmal atrial fibrillation) ([facility])    • Urinary incontinence    • Vitamin B12 deficiency          PAST SURGICAL HISTORY  Past Surgical History:   Procedure Laterality Date   • BLADDER SURGERY     • CATARACT EXTRACTION Bilateral    • HYSTERECTOMY     • KNEE ARTHROPLASTY Left    • KNEE ARTHROSCOPY Right 06/15/2017    Procedure: RT KNEE ARTHROSCOPIC PARTIAL MEDIAL AND LATERAL MENISECTOMY AND SYNOVECTOMY;  Surgeon: Sam Soni MD;  Location: [facility];  Service:    • KNEE ARTHROSCOPY Right 08/29/2018    Procedure: RT KNEE ARTHROSCOPY WITH PARTIAL LATERAL MENISECTOMY;  Surgeon: Randy Tucker MD;  Location: [facility] MAIN OR;  Service: Orthopedics   • KYPHOPLASTY Bilateral 11/09/2020    Procedure: KYPHOPLASTY at L 1;  Surgeon: Pardeep Voss MD;  Location: [facility] OR 18/19;  Service: Neurosurgery;  Laterality: Bilateral;   • REPLACEMENT TOTAL KNEE            FAMILY HISTORY  Family History   Problem Relation Age of Onset   • Hypertension Mother    • Heart disease Mother    • Heart disease Father    • Stroke Father    • Hypertension Father    • Hypertension Sister    • Malig Hyperthermia Neg Hx          SOCIAL HISTORY  Social History     Socioeconomic History   • Marital status:    Tobacco Use   • Smoking status: Former     Packs/day: 0.50     Years: 15.00     Pack years: 7.50     Types: Cigarettes     Quit date:      Years since quittin.0   • Smokeless tobacco: Never   Vaping Use   • Vaping Use: Never used   Substance and Sexual Activity   • Alcohol use: Not Currently     Comment: Rare   • Drug use: No         ALLERGIES  Sulfamethoxazole, Atorvastatin, Bactrim [sulfamethoxazole-trimethoprim], Penicillins, Pitavastatin, Rosuvastatin, and Simvastatin        REVIEW OF SYSTEMS  Unobtainable secondary to altered mental status      PHYSICAL EXAM    I have reviewed the triage vital signs and nursing notes.    ED Triage Vitals [23 1100]   Temp Heart Rate Resp BP SpO2   -- 77 22 -- 99 %      Temp src Heart Rate Source Patient Position BP Location FiO2 (%)   -- -- -- -- --       Physical Exam  GENERAL: Unresponsive, elderly, mild distress on a nonrebreather  HENT: head atraumatic, no nuchal rigidity  EYES: no scleral icterus, pupils 2 mm bilaterally  CV: regular rhythm, regular rate, no murmur  RESPIRATORY: normal effort, CTA  ABDOMEN: soft  MUSCULOSKELETAL: no deformity, FROM, no calf swelling or tenderness  NEURO: Unresponsive, aphasic  SKIN: warm, dry        LAB RESULTS  Recent Results (from the past 24 hour(s))   Comprehensive Metabolic Panel    Collection Time: 23 11:02 AM    Specimen: Blood   Result Value Ref Range    Glucose 149 (H) 65 - 99 mg/dL    BUN 19 8 - 23 mg/dL    Creatinine 1.02 (H) 0.57 - 1.00 mg/dL    Sodium 135 (L) 136 - 145 mmol/L    Potassium 4.2 3.5 - 5.2 mmol/L    Chloride 98 98 - 107 mmol/L    CO2 29.0 22.0 - 29.0 mmol/L     Calcium 9.2 8.6 - 10.5 mg/dL    Total Protein 6.9 6.0 - 8.5 g/dL    Albumin 3.9 3.5 - 5.2 g/dL    ALT (SGPT) 15 1 - 33 U/L    AST (SGOT) 16 1 - 32 U/L    Alkaline Phosphatase 58 39 - 117 U/L    Total Bilirubin 1.2 0.0 - 1.2 mg/dL    Globulin 3.0 gm/dL    A/G Ratio 1.3 g/dL    BUN/Creatinine Ratio 18.6 7.0 - 25.0    Anion Gap 8.0 5.0 - 15.0 mmol/L    eGFR 54.0 (L) >60.0 mL/min/1.73   Protime-INR    Collection Time: 01/13/23 11:02 AM    Specimen: Blood   Result Value Ref Range    Protime 23.0 (H) 11.7 - 14.2 Seconds    INR 2.00 (H) 0.90 - 1.10   Digoxin Level    Collection Time: 01/13/23 11:02 AM    Specimen: Blood   Result Value Ref Range    Digoxin 2.20 (H) 0.60 - 1.20 ng/mL   Lactic Acid, Plasma    Collection Time: 01/13/23 11:02 AM    Specimen: Blood   Result Value Ref Range    Lactate 1.8 0.5 - 2.0 mmol/L   Procalcitonin    Collection Time: 01/13/23 11:02 AM    Specimen: Blood   Result Value Ref Range    Procalcitonin 0.06 0.00 - 0.25 ng/mL   BNP    Collection Time: 01/13/23 11:02 AM    Specimen: Blood   Result Value Ref Range    proBNP 2,707.0 (H) 0.0 - 1,800.0 pg/mL   Troponin    Collection Time: 01/13/23 11:02 AM    Specimen: Blood   Result Value Ref Range    Troponin T <0.010 0.000 - 0.030 ng/mL   CBC Auto Differential    Collection Time: 01/13/23 11:02 AM    Specimen: Blood   Result Value Ref Range    WBC 11.23 (H) 3.40 - 10.80 10*3/mm3    RBC 3.97 3.77 - 5.28 10*6/mm3    Hemoglobin 12.2 12.0 - 15.9 g/dL    Hematocrit 37.5 34.0 - 46.6 %    MCV 94.5 79.0 - 97.0 fL    MCH 30.7 26.6 - 33.0 pg    MCHC 32.5 31.5 - 35.7 g/dL    RDW 12.2 (L) 12.3 - 15.4 %    RDW-SD 42.3 37.0 - 54.0 fl    MPV 10.5 6.0 - 12.0 fL    Platelets 273 140 - 450 10*3/mm3    Neutrophil % 84.2 (H) 42.7 - 76.0 %    Lymphocyte % 9.8 (L) 19.6 - 45.3 %    Monocyte % 4.9 (L) 5.0 - 12.0 %    Eosinophil % 0.2 (L) 0.3 - 6.2 %    Basophil % 0.4 0.0 - 1.5 %    Immature Grans % 0.5 0.0 - 0.5 %    Neutrophils, Absolute 9.46 (H) 1.70 - 7.00 10*3/mm3     Lymphocytes, Absolute 1.10 0.70 - 3.10 10*3/mm3    Monocytes, Absolute 0.55 0.10 - 0.90 10*3/mm3    Eosinophils, Absolute 0.02 0.00 - 0.40 10*3/mm3    Basophils, Absolute 0.04 0.00 - 0.20 10*3/mm3    Immature Grans, Absolute 0.06 (H) 0.00 - 0.05 10*3/mm3    nRBC 0.0 0.0 - 0.2 /100 WBC   Gold Top - SST    Collection Time: 01/13/23 11:02 AM   Result Value Ref Range    Extra Tube Hold for add-ons.    Blood Gas, Arterial -    Collection Time: 01/13/23 11:07 AM    Specimen: Arterial Blood   Result Value Ref Range    Site Arterial: right radial     Jimy's Test Positive     pH, Arterial 7.425 7.350 - 7.450 pH units    pCO2, Arterial 45.2 (H) 35.0 - 45.0 mm Hg    pO2, Arterial 379.6 (H) 80.0 - 100.0 mm Hg    HCO3, Arterial 29.6 (H) 22.0 - 28.0 mmol/L    Base Excess, Arterial 4.5 (H) 0.0 - 2.0 mmol/L    O2 Saturation Calculated 100.0 (H) 92.0 - 99.0 %    Barometric Pressure for Blood Gas 754.3 mmHg    Modality NRB     Rate 18 Breaths/minute   ECG 12 Lead Altered Mental Status    Collection Time: 01/13/23 11:47 AM   Result Value Ref Range    QT Interval 376 ms   Urinalysis With Microscopic If Indicated (No Culture) - Urine, Catheter    Collection Time: 01/13/23  1:12 PM    Specimen: Urine, Catheter   Result Value Ref Range    Color, UA Yellow Yellow, Straw    Appearance, UA Clear Clear    pH, UA 7.0 5.0 - 8.0    Specific Gravity, UA 1.009 1.005 - 1.030    Glucose, UA Negative Negative    Ketones, UA Negative Negative    Bilirubin, UA Negative Negative    Blood, UA Negative Negative    Protein, UA Negative Negative    Leuk Esterase, UA Negative Negative    Nitrite, UA Negative Negative    Urobilinogen, UA 0.2 E.U./dL 0.2 - 1.0 E.U./dL       Ordered the above labs and independently reviewed the results.        RADIOLOGY  CT Head Without Contrast    Result Date: 1/13/2023  CT HEAD WITHOUT CONTRAST  HISTORY: Altered mental status.  COMPARISON: CT head 04/21/2022.  FINDINGS: The brain and ventricles are symmetrical. There is  age-appropriate atrophy. There is no evidence of hemorrhage, hydrocephalus or of abnormal extraaxial fluid. Decreased attenuation involving the white matter of the cerebral hemispheres is appreciated consistent with moderate small vessel ischemic disease, similar in appearance as compared to the CT examination of 04/21/2022. No focal area of decreased attenuation to suggest acute infarction is identified. Further evaluation could be performed with an MRI examination of the brain as indicated.    Radiation dose reduction techniques were utilized, including automated exposure control and exposure modulation based on body size.  This report was finalized on 1/13/2023 2:24 PM by Dr. Duong Reyes M.D.      XR Chest 1 View    Result Date: 1/13/2023  XR CHEST 1 VW-  Clinical: Hypoxia with respiratory distress  COMPARISON 8/8/2022  FINDINGS: The right lung is clear. Cardiac size upper normal to mildly enlarged as before. There is atherosclerotic calcification of the aorta. Infiltrate left mid and lower lung zone, portions having a somewhat nodular configuration. Serial radiographs of the chest recommended to document clearing. No vascular congestion or pleural effusion seen. The remainder is unremarkable.  This report was finalized on 1/13/2023 11:22 AM by Dr. Orlando uCrrie M.D.        I ordered the above noted radiological studies. Reviewed by me and discussed with radiologist.  See dictation for official radiology interpretation.      MEDICATIONS GIVEN IN ER    Medications   sodium chloride 0.9 % flush 10 mL (has no administration in time range)   sodium chloride 0.9 % flush 10 mL (has no administration in time range)   sodium chloride 0.9 % flush 10 mL (has no administration in time range)   sodium chloride 0.9 % infusion 40 mL (has no administration in time range)   acetaminophen (TYLENOL) tablet 650 mg (has no administration in time range)     Or   acetaminophen (TYLENOL) 160 MG/5ML solution 650 mg (has no  administration in time range)     Or   acetaminophen (TYLENOL) suppository 650 mg (has no administration in time range)   ondansetron (ZOFRAN) tablet 4 mg (has no administration in time range)     Or   ondansetron (ZOFRAN) injection 4 mg (has no administration in time range)   sodium chloride 0.9 % bolus 1,000 mL (0 mL Intravenous Stopped 1/13/23 1452)   cefTRIAXone (ROCEPHIN) 1 g in sodium chloride 0.9 % 100 mL IVPB-VTB (0 g Intravenous Stopped 1/13/23 1310)   azithromycin (ZITHROMAX) 500 mg in sodium chloride 0.9 % 250 mL IVPB-VTB (0 mg Intravenous Stopped 1/13/23 1529)         ADDITIONAL ORDERS CONSIDERED BUT NOT ORDERED:  Nothing      PROGRESS, DATA ANALYSIS, CONSULTS, AND MEDICAL DECISION MAKING    All labs have been independently reviewed by me.  All radiology studies have been reviewed by me and discussed with radiologist dictating the report.   EKG's independently viewed and interpreted by me.  Discussion below represents my analysis of pertinent findings related to patient's condition, differential diagnosis, treatment plan and final disposition.    I have discussed case with Dr. Proctor, emergency room physician.  She has performed her own bedside examination and agrees with treatment plan.    ED Course as of 01/13/23 1538   Fri Jan 13, 2023   1050 Patient was a call ahead from EMS for altered mental status.  Patient last seen normal at 8 AM this morning.  She was found to be unresponsive at 1030.  Stable blood pressure, pulse.  Patient is hypoxic.  She is altered with left-sided deviation.  She is unresponsive to painful stimuli.  Differential diagnoses include but not limited to intracranial hemorrhage, sepsis, electrolyte abnormality.  Patient is a DNR.  Have called and left a message with the patient's daughter. [EE]   1114 Recheck of patient.  She has stable vitals on a nonrebreather.  No change in mental status. [EE]   1115 pCO2, Arterial(!): 45.2 [EE]   1115 pH, Arterial: 7.425 [EE]   1115 HCO3,  Arterial(!): 29.6 [EE]   1133 Chest x-ray interpreted myself shows left lower lobe infiltrates.  Antibiotics ordered.  Recheck of patient.  She is able to answer yes or no.    I have discussed treatment options with patient's family, who is now at bedside.  They would like the patient to remain a DNR however they would like to treat her with antibiotics and supportive care. [EE]   1136 WBC(!): 11.23 [EE]   1157 Recheck of patient.  She is now more alert and responsive.  Patient's oxygen 100% on nonrebreather.  I have discussed this with the respiratory therapist we will attempt high flow nasal cannula. [EE]   1232 EKG interpreted myself.  Time 1147.  Atrial fibrillation, rate 97.  QRS normal with normal axis.  Nonspecific T wave changes diffusely.  EKG similar in comparison to prior EKG from 8/8/2022. [EE]   1233 Digoxin(!): 2.20 [EE]   1250 I discussed the case with Dr. Wright [facility].  He agrees to admit. [EE]      ED Course User Index  [EE] Celestino Ledbetter PA       AS OF 15:38 EST VITALS:    BP - 164/90  HR - 80  TEMP - 97.7 °F (36.5 °C)  O2 SATS - 99%        DIAGNOSIS  Final diagnoses:   Community acquired pneumonia of left lower lobe of lung   Altered mental status, unspecified altered mental status type   Elevated digoxin level   Hypoxia         DISPOSITION  Admitted      Dictated utilizing Dragon dictation     Celestino Ledbetter PA  01/13/23 6329

## 2023-01-13 NOTE — ED NOTES
Pt arrives via ems from [facility].  Pt found unresponsive at 1010, LKW 0800.   Hx of parkinsons.  2 narcan given with ems, no response.  18 L ac placed with ems. Afib on monitor.    Pt found 80% RA, placed on 15L NRB. 98% on 15L NRB.

## 2023-01-13 NOTE — H&P
"    Patient Name:  Citlali Solorio  YOB: 1935  MRN:  [id number]  Admit Date:  1/13/2023  Patient Care Team:  Cristal Lema MD as PCP - General (Internal Medicine)      Subjective   History Present Illness     Chief Complaint   Patient presents with   • Altered Mental Status       Ms. Solorio is a 87 y.o. former smoker with a history of hypertension, chronic anticoagulation, atrial fibrillation, progressive supra altered mental status.  Nuclear palsy that presents to [facility] complaining of altered mental status.    Daughter is at bedside serves as primary historian.  From detention (baseline walker /wheelchair) with \"fixed pinpoint pupils\" & very pale & \"completely unresponsive\" with PSP (yet takes regular consistency diet).    Concerns for CAP azithromycin and ceftriaxone treatment started in ER.  CT head unremarkable for acute findings as well as urinalysis.    Recommendation pending hospital course.  Details below.    History of Present Illness  Review of Systems   Reason unable to perform ROS: limited ROS & daughter provides most answers regarding ROS.   Constitutional: Positive for activity change. Negative for chills and fever.   HENT: Negative for congestion and rhinorrhea.    Respiratory: Negative for cough and shortness of breath.    Cardiovascular: Negative for chest pain and leg swelling.   Gastrointestinal: Negative for abdominal pain, constipation, diarrhea, nausea and vomiting.   Endocrine: Negative for polydipsia, polyphagia and polyuria.   Genitourinary: Negative for difficulty urinating and dysuria.   Musculoskeletal: Positive for gait problem. Negative for myalgias.   Skin: Negative for rash and wound.   Neurological: Negative for syncope and light-headedness.   Psychiatric/Behavioral: Negative for confusion and hallucinations.        Personal History     Past Medical History:   Diagnosis Date   • Abnormal EKG    • Abnormal gait     \"frozen gait\"  Seen at [facility] " with full work up.   • Acute on chronic diastolic heart failure (HCC)    • Anticoagulated     WARFARIN FOR A FIB. SEES DR CHASE, HELD 11/4/2020   • Anxiety    • Arthritis    • Ataxic gait    • Atrial fibrillation (HCC)    • Atrial fibrillation with RVR (HCC)    • Benign essential hypertension    • Carotid artery stenosis     plaque on screening last done 3/13, 11/13   • Compression fracture of L1 vertebra with delayed healing 10/29/2020   • Diarrhea     OCCASIONALLY   • Diastolic congestive heart failure (HCC)     addmission 07/2013   • Esophageal reflux    • History of recent fall    • History of vitamin B deficiency    • [place] (hard of hearing)    • Hypercholesterolemia     Patient tried Lipitor, Crestor, Zocor, Bacol and Livalo in the past and was intolerant of all of them.   • Hypertension    • IFG (impaired fasting glucose)    • Lumbar canal stenosis    • Lumbar disc disorder     LUMBAR ONE FROM FALL   • Metabolic encephalopathy    • PAF (paroxysmal atrial fibrillation) (HCC)    • Progressive supranuclear palsy (HCC)     STUTTER FIRST SIGN   • Urinary incontinence    • Vitamin B12 deficiency     told at [facility] that B12 was low.  Monthly shots recommended 5/5/15.     Past Surgical History:   Procedure Laterality Date   • BLADDER SURGERY     • CATARACT EXTRACTION Bilateral    • HYSTERECTOMY     • KNEE ARTHROPLASTY Left    • KNEE ARTHROSCOPY Right 06/15/2017    Procedure: RT KNEE ARTHROSCOPIC PARTIAL MEDIAL AND LATERAL MENISECTOMY AND SYNOVECTOMY;  Surgeon: Sam Soni MD;  Location: [facility] OR [facility] [place];  Service:    • KNEE ARTHROSCOPY Right 08/29/2018    Procedure: RT KNEE ARTHROSCOPY WITH PARTIAL LATERAL MENISECTOMY;  Surgeon: Randy Tucker MD;  Location: [facility] MAIN OR;  Service: Orthopedics   • KYPHOPLASTY Bilateral 11/09/2020    Procedure: KYPHOPLASTY at L 1;  Surgeon: Pardeep Voss MD;  Location: [facility] OR 18/19;  Service: Neurosurgery;  Laterality: Bilateral;   • REPLACEMENT TOTAL KNEE        Family History   Problem Relation Age of Onset   • Hypertension Mother    • Heart disease Mother    • Heart disease Father    • Stroke Father    • Hypertension Father    • Hypertension Sister    • Malig Hyperthermia Neg Hx      Social History     Tobacco Use   • Smoking status: Former     Packs/day: 0.50     Years: 15.00     Pack years: 7.50     Types: Cigarettes     Quit date:      Years since quittin.0   • Smokeless tobacco: Never   Vaping Use   • Vaping Use: Never used   Substance Use Topics   • Alcohol use: Not Currently     Comment: Rare   • Drug use: No     No current facility-administered medications on file prior to encounter.     Current Outpatient Medications on File Prior to Encounter   Medication Sig Dispense Refill   • acetaminophen (TYLENOL) 650 MG 8 hr tablet Take 650 mg by mouth Every 8 (Eight) Hours As Needed for Mild Pain .     • Cholecalciferol (VITAMIN D PO) Take 1,000 mg by mouth Every Evening.     • digoxin (LANOXIN) 125 MCG tablet TAKE ONE TABLET BY MOUTH DAILY 90 tablet 3   • donepezil (ARICEPT) 10 MG tablet      • furosemide (LASIX) 20 MG tablet TAKE ONE TABLET BY MOUTH DAILY 90 tablet 3   • hydrALAZINE (APRESOLINE) 25 MG tablet TAKE ONE TABLET BY MOUTH TWICE A  tablet 3   • metoprolol tartrate (LOPRESSOR) 25 MG tablet Take 1 tablet by mouth 2 (Two) Times a Day. 180 tablet 3   • potassium chloride (K-DUR,KLOR-CON) 10 MEQ CR tablet TAKE ONE TABLET BY MOUTH DAILY 90 tablet 1   • vitamin B-12 (VITAMIN B-12) 1000 MCG tablet Take 1 tablet by mouth Daily.     • sodium chloride 0.9 % nebulizer solution Inhale 1 spray.     • warfarin (COUMADIN) 1 MG tablet Take four tablets by mouth on mon and take two tablets by mouth all other days or as directed 205 tablet 0     Allergies   Allergen Reactions   • Sulfamethoxazole Hallucinations and Unknown - High Severity     Other reaction(s): sulfamethoxazole   • Atorvastatin Other (See Comments)     LEG CRAMPS   • Bactrim  [Sulfamethoxazole-Trimethoprim] Hallucinations   • Penicillins Itching   • Pitavastatin Other (See Comments)     LEG CRAMPS   • Rosuvastatin Other (See Comments)     LEG CRAMPS   • Simvastatin Other (See Comments)     LEG CRAMPS       Objective    Objective     Vital Signs  Temp:  [96.6 °F (35.9 °C)-97.7 °F (36.5 °C)] 97.7 °F (36.5 °C)  Heart Rate:  [66-92] 80  Resp:  [16-22] 16  BP: (129-164)/() 164/90  SpO2:  [99 %-100 %] 99 %  on  Flow (L/min):  [4-15] 4;   Device (Oxygen Therapy): high-flow nasal cannula  Body mass index is 18.88 kg/m².    Physical Exam  Constitutional:       General: She is not in acute distress.     Appearance: She is not toxic-appearing.      Comments: Elderly, frail, generally weak   HENT:      Head: Normocephalic and atraumatic.   Eyes:      Extraocular Movements: Extraocular movements intact.      Conjunctiva/sclera: Conjunctivae normal.   Cardiovascular:      Rate and Rhythm: Normal rate.      Heart sounds: Normal heart sounds.   Pulmonary:      Effort: Pulmonary effort is normal.      Breath sounds: Normal breath sounds.   Abdominal:      General: Bowel sounds are normal.      Palpations: Abdomen is soft.   Musculoskeletal:      Cervical back: Normal range of motion and neck supple.      Right lower leg: No edema.      Left lower leg: No edema.   Skin:     General: Skin is warm and dry.   Neurological:      General: No focal deficit present.      Mental Status: She is alert and oriented to person, place, and time.   Psychiatric:         Behavior: Behavior normal.         Thought Content: Thought content normal.      Comments: Flat affect         Results Review:  I reviewed the patient's new clinical results.  I reviewed the patient's new imaging results and agree with the interpretation.  I reviewed the patient's other test results and agree with the interpretation  I personally viewed and interpreted the patient's EKG/Telemetry data  Discussed with ED provider.    Lab Results  (last 24 hours)     Procedure Component Value Units Date/Time    CBC & Differential [[id number]]  (Abnormal) Collected: 01/13/23 1102    Specimen: Blood Updated: 01/13/23 1135    Narrative:      The following orders were created for panel order CBC & Differential.  Procedure                               Abnormality         Status                     ---------                               -----------         ------                     CBC Auto Differential[[id number]]        Abnormal            Final result                 Please view results for these tests on the individual orders.    Comprehensive Metabolic Panel [[id number]]  (Abnormal) Collected: 01/13/23 1102    Specimen: Blood Updated: 01/13/23 1202     Glucose 149 mg/dL      BUN 19 mg/dL      Creatinine 1.02 mg/dL      Sodium 135 mmol/L      Potassium 4.2 mmol/L      Chloride 98 mmol/L      CO2 29.0 mmol/L      Calcium 9.2 mg/dL      Total Protein 6.9 g/dL      Albumin 3.9 g/dL      ALT (SGPT) 15 U/L      AST (SGOT) 16 U/L      Alkaline Phosphatase 58 U/L      Total Bilirubin 1.2 mg/dL      Globulin 3.0 gm/dL      A/G Ratio 1.3 g/dL      BUN/Creatinine Ratio 18.6     Anion Gap 8.0 mmol/L      eGFR 54.0 mL/min/1.73      Comment: National Kidney Foundation and American Society of Nephrology (ASN) Task Force recommended calculation based on the Chronic Kidney Disease Epidemiology Collaboration (CKD-EPI) equation refit without adjustment for race.       Narrative:      GFR Normal >60  Chronic Kidney Disease <60  Kidney Failure <15    The GFR formula is only valid for adults with stable renal function between ages 18 and 70.    Protime-INR [[id number]]  (Abnormal) Collected: 01/13/23 1102    Specimen: Blood Updated: 01/13/23 1139     Protime 23.0 Seconds      INR 2.00    Digoxin Level [[id number]]  (Abnormal) Collected: 01/13/23 1102    Specimen: Blood Updated: 01/13/23 1202     Digoxin 2.20 ng/mL     Lactic Acid, Plasma [[id number]]  (Normal) Collected: 01/13/23 1102  "   Specimen: Blood Updated: 01/13/23 1159     Lactate 1.8 mmol/L     Procalcitonin [[id number]]  (Normal) Collected: 01/13/23 1102    Specimen: Blood Updated: 01/13/23 1226     Procalcitonin 0.06 ng/mL     Narrative:      As a Marker for Sepsis (Non-Neonates):    1. <0.5 ng/mL represents a low risk of severe sepsis and/or septic shock.  2. >2 ng/mL represents a high risk of severe sepsis and/or septic shock.    As a Marker for Lower Respiratory Tract Infections that require antibiotic therapy:    PCT on Admission    Antibiotic Therapy       6-12 Hrs later    >0.5                Strongly Recommended  >0.25 - <0.5        Recommended   0.1 - 0.25          Discouraged              Remeasure/reassess PCT  <0.1                Strongly Discouraged     Remeasure/reassess PCT    As 28 day mortality risk marker: \"Change in Procalcitonin Result\" (>80% or <=80%) if Day 0 (or Day 1) and Day 4 values are available. Refer to http://www.[employer]-pct-calculator.com    Change in PCT <=80%  A decrease of PCT levels below or equal to 80% defines a positive change in PCT test result representing a higher risk for 28-day all-cause mortality of patients diagnosed with severe sepsis for septic shock.    Change in PCT >80%  A decrease of PCT levels of more than 80% defines a negative change in PCT result representing a lower risk for 28-day all-cause mortality of patients diagnosed with severe sepsis or septic shock.       BNP [[id number]]  (Abnormal) Collected: 01/13/23 1102    Specimen: Blood Updated: 01/13/23 1226     proBNP 2,707.0 pg/mL     Narrative:      Among patients with dyspnea, NT-proBNP is highly sensitive for the detection of acute congestive heart failure. In addition NT-proBNP of <300 pg/ml effectively rules out acute congestive heart failure with 99% negative predictive value.    Results may be falsely decreased if patient taking Biotin.      Troponin [[id number]]  (Normal) Collected: 01/13/23 1102    Specimen: Blood Updated: " 01/13/23 1226     Troponin T <0.010 ng/mL     Narrative:      Troponin T Reference Range:  <= 0.03 ng/mL-   Negative for AMI  >0.03 ng/mL-     Abnormal for myocardial necrosis.  Clinicians would have to utilize clinical acumen, EKG, Troponin and serial changes to determine if it is an Acute Myocardial Infarction or myocardial injury due to an underlying chronic condition.       Results may be falsely decreased if patient taking Biotin.      CBC Auto Differential [[id number]]  (Abnormal) Collected: 01/13/23 1102    Specimen: Blood Updated: 01/13/23 1135     WBC 11.23 10*3/mm3      RBC 3.97 10*6/mm3      Hemoglobin 12.2 g/dL      Hematocrit 37.5 %      MCV 94.5 fL      MCH 30.7 pg      MCHC 32.5 g/dL      RDW 12.2 %      RDW-SD 42.3 fl      MPV 10.5 fL      Platelets 273 10*3/mm3      Neutrophil % 84.2 %      Lymphocyte % 9.8 %      Monocyte % 4.9 %      Eosinophil % 0.2 %      Basophil % 0.4 %      Immature Grans % 0.5 %      Neutrophils, Absolute 9.46 10*3/mm3      Lymphocytes, Absolute 1.10 10*3/mm3      Monocytes, Absolute 0.55 10*3/mm3      Eosinophils, Absolute 0.02 10*3/mm3      Basophils, Absolute 0.04 10*3/mm3      Immature Grans, Absolute 0.06 10*3/mm3      nRBC 0.0 /100 WBC     Blood Gas, Arterial - [[id number]]  (Abnormal) Collected: 01/13/23 1107    Specimen: Arterial Blood Updated: 01/13/23 1110     Site Arterial: right radial     Jimy's Test Positive     pH, Arterial 7.425 pH units      pCO2, Arterial 45.2 mm Hg      pO2, Arterial 379.6 mm Hg      HCO3, Arterial 29.6 mmol/L      Base Excess, Arterial 4.5 mmol/L      O2 Saturation Calculated 100.0 %      Barometric Pressure for Blood Gas 754.3 mmHg      Comment: Meter: [id number] : nicho Glass        Modality NRB     Rate 18 Breaths/minute     Blood Culture - Blood, Arm, Right [[id number]] Collected: 01/13/23 1207    Specimen: Blood from Arm, Right Updated: 01/13/23 1212    Urinalysis With Microscopic If Indicated (No Culture) -  Urine, Catheter [[id number]]  (Normal) Collected: 01/13/23 1312    Specimen: Urine, Catheter Updated: 01/13/23 1335     Color, UA Yellow     Appearance, UA Clear     pH, UA 7.0     Specific Gravity, UA 1.009     Glucose, UA Negative     Ketones, UA Negative     Bilirubin, UA Negative     Blood, UA Negative     Protein, UA Negative     Leuk Esterase, UA Negative     Nitrite, UA Negative     Urobilinogen, UA 0.2 E.U./dL    Narrative:      Urine microscopic not indicated.          Imaging Results (Last 24 Hours)     Procedure Component Value Units Date/Time    CT Head Without Contrast [[id number]] Collected: 01/13/23 1143     Updated: 01/13/23 1427    Narrative:      CT HEAD WITHOUT CONTRAST     HISTORY: Altered mental status.     COMPARISON: CT head 04/21/2022.     FINDINGS: The brain and ventricles are symmetrical. There is  age-appropriate atrophy. There is no evidence of hemorrhage,  hydrocephalus or of abnormal extraaxial fluid. Decreased attenuation  involving the white matter of the cerebral hemispheres is appreciated  consistent with moderate small vessel ischemic disease, similar in  appearance as compared to the CT examination of 04/21/2022. No focal  area of decreased attenuation to suggest acute infarction is identified.  Further evaluation could be performed with an MRI examination of the  brain as indicated.           Radiation dose reduction techniques were utilized, including automated  exposure control and exposure modulation based on body size.     This report was finalized on 1/13/2023 2:24 PM by Dr. Duong Reyes M.D.       XR Chest 1 View [[id number]] Collected: 01/13/23 1117     Updated: 01/13/23 1126    Narrative:      XR CHEST 1 VW-     Clinical: Hypoxia with respiratory distress     COMPARISON 8/8/2022     FINDINGS: The right lung is clear. Cardiac size upper normal to mildly  enlarged as before. There is atherosclerotic calcification of the aorta.  Infiltrate left mid and lower lung zone,  portions having a somewhat  nodular configuration. Serial radiographs of the chest recommended to  document clearing. No vascular congestion or pleural effusion seen. The  remainder is unremarkable.     This report was finalized on 1/13/2023 11:22 AM by Dr. Orlando Currie M.D.             Results for orders placed during the hospital encounter of 01/21/19    Adult Transthoracic Echo Complete W/ Cont if Necessary Per Protocol    Interpretation Summary  · Left ventricular wall thickness is consistent with mild concentric hypertrophy.  · Estimated EF = 56%.  · Left ventricular systolic function is normal.  · Left atrial cavity size is severely dilated.  · Moderate mitral valve regurgitation is present  · Moderate tricuspid valve regurgitation is present.  · Calculated right ventricular systolic pressure from tricuspid regurgitation is 44.2 mmHg.      ECG 12 Lead Altered Mental Status   Preliminary Result   HEART RATE= 97  bpm   RR Interval= 619  ms   AK Interval=   ms   P Horizontal Axis=   deg   P Front Axis=   deg   QRSD Interval= 89  ms   QT Interval= 376  ms   QRS Axis= 1  deg   T Wave Axis= -78  deg   - ABNORMAL ECG -   Atrial fibrillation   Nonspecific repol abnormality, diffuse leads   Electronically Signed By:    Date and Time of Study: 2023-01-13 11:47:19           Assessment/Plan     Active Hospital Problems    Diagnosis  POA   • **Digoxin toxicity [T46.0X1A]  Yes   • Community acquired pneumonia of left lower lobe of lung [J18.9]  Yes   • Chronic anticoagulation [Z79.01]  Not Applicable   • Debility [R53.81]  Yes   • Heart failure with preserved ejection fraction (HCC) [I50.30]  Yes   • Metabolic encephalopathy [G93.41]  Yes   • Progressive supranuclear palsy (HCC) [G23.1]  Yes   • Benign essential hypertension [I10]  Yes   • Atrial fibrillation (HCC) [I48.91]  Yes      Resolved Hospital Problems   No resolved problems to display.       Ms. Solorio is a 87 y.o. former smoker with a history of hypertension,  chronic anticoagulation, atrial fibrillation, progressive supra altered mental status.  Nuclear palsy that presents to [facility] complaining of altered mental status and admitted for digoxin toxicity.      Digoxin toxicity: Serum digoxin 2.2.  Hold digoxin for now.  Consult cardiology to advise further.  Repeat labs in AM. ?  Digibind.       Metabolic encephalopathy: CT head unremarkable for acute findings.  No evidence of lateralizing features on exam to suggest acute neurological event.  Urinalysis unremarkable.  Suspect due to digoxin toxicity contributing.  Consider neurology consultation this admission.      Community acquired pneumonia of left lower lobe of lung: Status post IV azithromycin and ceftriaxone once in ER.  Unremarkable procalcitonin.  At risk for aspiration pneumonia given history of dysphagia and noncompliance with previous recommendation for dysphagia diet.      Progressive supranuclear palsy (HCC): Complicating all problems.  Patient's daughter states modified diet PTA based on previous recommendation; however, patient and family prefer regular consistency thin liquids and not interested in artificial nutrition or tube feeds.  Aspiration cautions.  HOB >30 at all times.      Atrial fibrillation (HCC) /   Chronic anticoagulation: Rate controlled.  Anticoagulation continued per pharmacy dosing.      Benign essential hypertension: BP greater than optimal.  Anticipate resumption of antihypertensive regimen pending RN verification of home meds.      Heart failure with preserved ejection fraction (HCC):  TTE LV EF 56% (1/2019).        Debility: Baseline walker wheelchair for long distance travel PT / OT consult.  Falls precautions.    · I discussed the patient's findings and my recommendations with patient, daughter and Dr. Horton.    *Home meds pending RN verification prior to reconciliation    VTE Prophylaxis - warfarin (home medication)  Code Status -no CPR per patient's daughter  at bedside       FAM Bartholomew  [facility]  01/13/23  15:06 EST

## 2023-01-14 NOTE — THERAPY EVALUATION
"Acute Care - Speech Language Pathology   Swallow Initial Evaluation [facility]     Patient Name: Citlali Solorio  : 1935  MRN: [id number]  Today's Date: 2023               Admit Date: 2023    Visit Dx:     ICD-10-CM ICD-9-CM   1. Community acquired pneumonia of left lower lobe of lung  J18.9 486   2. Altered mental status, unspecified altered mental status type  R41.82 780.97   3. Elevated digoxin level  R78.89 796.0   4. Hypoxia  R09.02 799.02     Patient Active Problem List   Diagnosis   • Progressive supranuclear palsy (HCC)   • Carotid artery plaque, bilateral   • Heart failure (HCC)   • Gastroesophageal reflux disease   • Hyperlipidemia   • Spinal stenosis of lumbar region   • Cobalamin deficiency   • Atrial fibrillation (HCC)   • Benign essential hypertension   • Mitral valve insufficiency   • Tricuspid valve insufficiency   • Tear of medial meniscus of right knee, current   • Metabolic encephalopathy   • Heart failure with preserved ejection fraction (HCC)   • History of melanoma   • Community acquired pneumonia of left lower lobe of lung   • Digoxin toxicity   • Chronic anticoagulation   • Debility     Past Medical History:   Diagnosis Date   • Abnormal EKG    • Abnormal gait     \"frozen gait\"  Seen at [facility] with full work up.   • Acute on chronic diastolic heart failure (HCC)    • Anticoagulated     WARFARIN FOR A FIB. SEES DR CHASE, HELD 2020   • Anxiety    • Arthritis    • Ataxic gait    • Atrial fibrillation (HCC)    • Atrial fibrillation with RVR (HCC)    • Benign essential hypertension    • Carotid artery stenosis     plaque on screening last done 3/13,    • Compression fracture of L1 vertebra with delayed healing 10/29/2020   • Diarrhea     OCCASIONALLY   • Diastolic congestive heart failure (HCC)     addmission 2013   • Esophageal reflux    • History of recent fall    • History of vitamin B deficiency    • [place] (hard of hearing)    • Hypercholesterolemia     " Patient tried Lipitor, Crestor, Zocor, Bacol and Livalo in the past and was intolerant of all of them.   • Hypertension    • IFG (impaired fasting glucose)    • Lumbar canal stenosis    • Lumbar disc disorder     LUMBAR ONE FROM FALL   • Metabolic encephalopathy    • PAF (paroxysmal atrial fibrillation) (HCC)    • Progressive supranuclear palsy (HCC)     STUTTER FIRST SIGN   • Urinary incontinence    • Vitamin B12 deficiency     told at [facility] that B12 was low.  Monthly shots recommended 5/5/15.     Past Surgical History:   Procedure Laterality Date   • BLADDER SURGERY     • CATARACT EXTRACTION Bilateral    • HYSTERECTOMY     • KNEE ARTHROPLASTY Left    • KNEE ARTHROSCOPY Right 06/15/2017    Procedure: RT KNEE ARTHROSCOPIC PARTIAL MEDIAL AND LATERAL MENISECTOMY AND SYNOVECTOMY;  Surgeon: Sam Soni MD;  Location: [facility] OR [facility];  Service:    • KNEE ARTHROSCOPY Right 08/29/2018    Procedure: RT KNEE ARTHROSCOPY WITH PARTIAL LATERAL MENISECTOMY;  Surgeon: Randy Tucker MD;  Location: [facility] MAIN OR;  Service: Orthopedics   • KYPHOPLASTY Bilateral 11/09/2020    Procedure: KYPHOPLASTY at L 1;  Surgeon: Pardeep Voss MD;  Location: [facility] HYBRID OR 18/19;  Service: Neurosurgery;  Laterality: Bilateral;   • REPLACEMENT TOTAL KNEE         SLP Recommendation and Plan  SLP Swallowing Diagnosis: suspected pharyngeal dysphagia (01/14/23 1700)  SLP Diet Recommendation: long term alternate methods of nutrition/hydration, comfort diet, per physician discretion (01/14/23 1700)  Recommended Precautions and Strategies: upright posture during/after eating, small bites of food and sips of liquid, 1:1 supervision, fatigue precautions (01/14/23 1700)  SLP Rec. for Method of Medication Administration: meds crushed, with puree, as tolerated (01/14/23 1700)     Monitor for Signs of Aspiration: yes (01/14/23 1700)  Recommended Diagnostics: VFSS (MBS) (01/14/23 1700)  Swallow Criteria for Skilled Therapeutic Interventions  Met: patient/family declined skilled intervention at this time (01/14/23 1700)  Anticipated Discharge Disposition (SLP): unknown (01/14/23 1812)  Rehab Potential/Prognosis, Swallowing: good, to achieve stated therapy goals (01/14/23 1700)  Therapy Frequency (Swallow): evaluation only (01/14/23 1700)  Predicted Duration Therapy Intervention (Days): other (see comments) (Pt not interested in sp tx) (01/14/23 1700)                                   Reason for Discharge: patient/family request discontinuation of services (01/14/23 1812)    Plan of Care Reviewed With: patient, family  Progress: no change  Outcome Evaluation: Pt seen for clinical swallow eval due to recent suspected Asp Pna. Pt w/hx of Oropharyngeal Dysphagia and non-compliance with mod diet/PEG recommendations. Pt continues to demonstrate s/s aspiration. Discussed recommendations of VFSS to determine any swallowing strategies, use of PEG along with comfort diet for quality of life, risk of continued Po diet extensively. Pt declined all recommendations and prefers to continue with regular diet. ST to sign off. Re-consult if POC changes.      SWALLOW EVALUATION (last 72 hours)     SLP Adult Swallow Evaluation     Row Name 01/14/23 1700                   Rehab Evaluation    Document Type evaluation  -JT        Subjective Information no complaints  -JT        Patient Observations alert;cooperative;agree to therapy  -JT        Patient/Family/Caregiver Comments/Observations pt upright in bed; daughter at bedside supportive  -JT        Patient Effort good  -JT        Comment RN reported pt having difficulty swallowing pills prior to BSE.  -JT        Symptoms Noted During/After Treatment fatigue  -JT           General Information    Patient Profile Reviewed yes  -JT        Pertinent History Of Current Problem 87 y.o.pmh htn, chronic anticoagulation, afib, progressive supranuclear palsy w/AMS.   CXR with PNA LLL.    ? Asp pna pt w/hx of dysphagia 2 to PSP; pt  noncompliant w/mod diet  -JT        Current Method of Nutrition regular textures;thin liquids  -JT        Precautions/Limitations, Hearing hearing impairment, bilaterally  -JT        Prior Level of Function-Communication motor speech impairment  -JT        Prior Level of Function-Swallowing other (see comments)  Per hx, pt previously advised for PEG tube. Most recent VFSS here in 2017 showed mild Dysphagia.  -JT        Plans/Goals Discussed with patient and family  -JT        Barriers to Rehab medically complex  -JT        Patient's Goals for Discharge return to regular diet  -JT        Family Goals for Discharge --  pt to make her own choices  -JT           Pain    Additional Documentation Pain Scale: FACES Pre/Post-Treatment (Group)  -JT           Pain Scale: FACES Pre/Post-Treatment    Pain: FACES Scale, Pretreatment 0-->no hurt  -JT        Posttreatment Pain Rating 0-->no hurt  -JT           Oral Motor Structure and Function    Dentition Assessment missing teeth;teeth are in poor condition  few teeth left  -JT        Secretion Management WNL/WFL  -JT        Mucosal Quality moist, healthy  -JT        Volitional Swallow delayed;weak  -JT        Volitional Cough weak;reduced respiratory support  -JT           Oral Musculature and Cranial Nerve Assessment    Oral Motor General Assessment generalized oral motor weakness;oral labial or buccal impairment;lingual impairment;mandibular impairment  -JT        Mandibular Impairment Detail, Cranial Nerve V (Trigeminal) reduced mandibular ROM  -JT        Oral Labial or Buccal Impairment, Detail, Cranial Nerve VII (Facial): reduced ROM;reduced strength bilaterally  -JT        Lingual Impairment, Detail. Cranial Nerves IX, XII (Glossopharyngeal and Hypoglossal) reduced lingual ROM  -JT           General Eating/Swallowing Observations    Respiratory Support Currently in Use room air  -JT        Eating/Swallowing Skills self-fed  -JT        Positioning During Eating upright 90  degree;upright in bed  -JT        Utensils Used spoon;straw  -JT        Consistencies Trialed pureed;thin liquids  -JT           Clinical Swallow Eval    Oral Prep Phase impaired  -JT        Oral Transit impaired  -JT        Pharyngeal Phase suspected pharyngeal impairment  -JT        Esophageal Phase unremarkable  -JT        Clinical Swallow Evaluation Summary Pt seen w/known history of Oropharyngeal Dsyphagia related to Progressive Supranuclear palsy. Rn reported that prior to SLP visit pt required extensive time to swallow 4 pills with puree. Pt reports frequent difficulty with meds. Pt demonstrated prolonged bolus prep, oral holding, and moderate to severe oral residue with puree. Pt demonstrated large consecutive straw sips of thin with no overt s/s aspiration. However silent aspiration suspected due to medical complexity. Pt educated extensively regarding options and recommendations, but expressed that she does not wish to pursue any further evaluation or modifications.  -JT           Oral Prep Concerns    Oral Prep Concerns oral holding;increased prep time  -JT        Oral Holding thin;pudding  -JT        Increased Prep Time thin;pudding  -JT           Oral Transit Concerns    Oral Transit Concerns delayed initiation of bolus transit;increased oral transit time  -JT        Delayed Intiation of Bolus Transit all consistencies  -JT        Increased Oral Transit Time all consistencies  -JT           Pharyngeal Phase Concerns    Pharyngeal Phase Concerns multiple swallows  -JT        Multiple Swallows all consistencies  -JT           SLP Evaluation Clinical Impression    SLP Swallowing Diagnosis suspected pharyngeal dysphagia  -JT        Functional Impact risk of aspiration/pneumonia;risk of malnutrition;risk of dehydration  -JT        Rehab Potential/Prognosis, Swallowing good, to achieve stated therapy goals  -JT        Swallow Criteria for Skilled Therapeutic Interventions Met patient/family declined skilled  intervention at this time  -JT           Recommendations    Therapy Frequency (Swallow) evaluation only  -JT        Predicted Duration Therapy Intervention (Days) other (see comments)  Pt not interested in sp tx  -JT        SLP Diet Recommendation long term alternate methods of nutrition/hydration;comfort diet, per physician discretion  -JT        Recommended Diagnostics VFSS (MBS)  -JT        Recommended Precautions and Strategies upright posture during/after eating;small bites of food and sips of liquid;1:1 supervision;fatigue precautions  -JT        Oral Care Recommendations Oral Care before breakfast, after meals and PRN;Toothbrush  -JT        SLP Rec. for Method of Medication Administration meds crushed;with puree;as tolerated  -JT        Monitor for Signs of Aspiration yes  -JT        Anticipated Discharge Disposition (SLP) unknown  -JT              User Key  (r) = Recorded By, (t) = Taken By, (c) = Cosigned By    Initials Name Effective Dates    Yoselyn Mistry 06/16/21 -                 EDUCATION  The patient has been educated in the following areas:   Dysphagia (Swallowing Impairment).              Time Calculation:    Time Calculation- SLP     Row Name 01/14/23 1812             Time Calculation- SLP    SLP Start Time 1600  -JT      SLP Stop Time 1700  -JT      SLP Time Calculation (min) 60 min  -JT      SLP Received On 01/14/23  -JT         Untimed Charges    22229-JR Eval Oral Pharyng Swallow Minutes 60  -JT         Total Minutes    Untimed Charges Total Minutes 60  -JT       Total Minutes 60  -JT            User Key  (r) = Recorded By, (t) = Taken By, (c) = Cosigned By    Initials Name Provider Type    Yoselyn Mistry Speech and Language Pathologist                Therapy Charges for Today     Code Description Service Date Service Provider Modifiers Qty    [id number]  ST EVAL ORAL PHARYNG SWALLOW 4 1/14/2023 Yoselyn Dickson GN 1               Yoselyn Cook  Yessi  1/14/2023

## 2023-01-14 NOTE — PROGRESS NOTES
Name: Citlali Solorio ADMIT: 2023   : 1935  PCP: Cristal Lema MD    MRN: [id number] LOS: 1 days   AGE/SEX: 87 y.o. female  ROOM: 124/1     Subjective   Subjective   Laying in bed.  No acute events overnight. .  Garbled speech, difficult to understand.  Had bowel movement overnight..    Review of Systems   as above  Objective   Objective   Vital Signs  Temp:  [97.4 °F (36.3 °C)-98.3 °F (36.8 °C)] 98.3 °F (36.8 °C)  Heart Rate:  [66-92] 68  Resp:  [14-18] 18  BP: (117-201)/(56-90) 117/57  SpO2:  [95 %-100 %] 97 %  on  Flow (L/min):  [2-4] 2;   Device (Oxygen Therapy): nasal cannula  Body mass index is 18.88 kg/m².  Physical Exam    General: Alert, laying in bed, not in distress, ill-appearing  HEENT: Normocephalic, atraumatic  Lungs: decreased, nonlabored breathing,  CV: Irregular rhythm, no murmurs  Abdomen: Soft, nontender, nondistended.  Normoactive bowel sounds  Extremities: No significant peripheral edema  Results Review     I reviewed the patient's new clinical results.  Results from last 7 days   Lab Units 23  0720 23  1102   WBC 10*3/mm3 7.14 11.23*   HEMOGLOBIN g/dL 11.0* 12.2   PLATELETS 10*3/mm3 232 273     Results from last 7 days   Lab Units 23  0720 23  1102   SODIUM mmol/L 135* 135*   POTASSIUM mmol/L 3.4* 4.2   CHLORIDE mmol/L 99 98   CO2 mmol/L 29.7* 29.0   BUN mg/dL 18 19   CREATININE mg/dL 0.78 1.02*   GLUCOSE mg/dL 87 149*   Estimated Creatinine Clearance: 41.5 mL/min (by C-G formula based on SCr of 0.78 mg/dL).  Results from last 7 days   Lab Units 23  1102   ALBUMIN g/dL 3.9   BILIRUBIN mg/dL 1.2   ALK PHOS U/L 58   AST (SGOT) U/L 16   ALT (SGPT) U/L 15     Results from last 7 days   Lab Units 23  0720 23  1102   CALCIUM mg/dL 8.6 9.2   ALBUMIN g/dL  --  3.9   MAGNESIUM mg/dL 1.7  --    PHOSPHORUS mg/dL 2.4*  --      Results from last 7 days   Lab Units 23  1102   PROCALCITONIN ng/mL 0.06   LACTATE mmol/L 1.8     SARS-CoV-2 PCR    Date Value Ref Range Status   11/06/2020 Not Detected Not Detected Final     Comment:     Nucleic acid specific to SARS-CoV-2 (COVID-19) virus was not detected in  this sample by the TaqPath (TM) COVID-19 Combo Kit.          SARS-CoV-2 (COVID-19) nucleic acid testing performed using [employer] Aptima (R) SARS-CoV-2 Assay or [employer] TaqPath (TM)  COVID-19 Combo Kit as indicated above under Emergency Use Authorization (EUA) from the FDA. Aptima (R) and TaqPath (TM) test performance  characteristics verified by [facility] in accordance with the FDAs Guidance Document (Policy for Diagnostic Tests for Coronavirus Disease-2019  during the Public Health Emergency) issued on March 16, 2020. The laboratory is regulated under CLIA as qualified to perform high-complexity testing  Unless otherwise noted, all testing was performed at [facility], CLIA No. [id number], [place] State Licensee No. [id number]     No results found for: HGBA1C, POCGLU    CT Head Without Contrast  CT HEAD WITHOUT CONTRAST     HISTORY: Altered mental status.     COMPARISON: CT head 04/21/2022.     FINDINGS: The brain and ventricles are symmetrical. There is  age-appropriate atrophy. There is no evidence of hemorrhage,  hydrocephalus or of abnormal extraaxial fluid. Decreased attenuation  involving the white matter of the cerebral hemispheres is appreciated  consistent with moderate small vessel ischemic disease, similar in  appearance as compared to the CT examination of 04/21/2022. No focal  area of decreased attenuation to suggest acute infarction is identified.  Further evaluation could be performed with an MRI examination of the  brain as indicated.           Radiation dose reduction techniques were utilized, including automated  exposure control and exposure modulation based on body size.     This report was finalized on 1/13/2023 2:24 PM by Dr. Duong Reyes M.D.     XR Chest 1 View  XR CHEST 1 VW-     Clinical: Hypoxia with  respiratory distress     COMPARISON 8/8/2022     FINDINGS: The right lung is clear. Cardiac size upper normal to mildly  enlarged as before. There is atherosclerotic calcification of the aorta.  Infiltrate left mid and lower lung zone, portions having a somewhat  nodular configuration. Serial radiographs of the chest recommended to  document clearing. No vascular congestion or pleural effusion seen. The  remainder is unremarkable.     This report was finalized on 1/13/2023 11:22 AM by Dr. Orlando Currie M.D.       Scheduled Medications  cefTRIAXone, 1 g, Intravenous, Q24H  donepezil, 10 mg, Oral, Nightly  hydrALAZINE, 25 mg, Oral, BID  metoprolol tartrate, 25 mg, Oral, BID  potassium & sodium phosphates, 1 packet, Oral, Once  potassium chloride, 40 mEq, Oral, Once  sodium chloride, 10 mL, Intravenous, Q12H  cyanocobalamin, 1,000 mcg, Oral, Daily  warfarin, 2 mg, Oral, Once per day on Sun Tue Wed Thu Fri Sat  [START ON 1/16/2023] warfarin, 4 mg, Oral, Once per day on Mon    Infusions  Pharmacy to dose warfarin,   Pharmacy to dose warfarin,     Diet  Diet: Regular/House Diet; Texture: Regular Texture (IDDSI 7); Fluid Consistency: Thin (IDDSI 0)       Assessment/Plan     Active Hospital Problems    Diagnosis  POA   • **Digoxin toxicity [T46.0X1A]  Yes   • Community acquired pneumonia of left lower lobe of lung [J18.9]  Yes   • Chronic anticoagulation [Z79.01]  Not Applicable   • Debility [R53.81]  Yes   • Heart failure with preserved ejection fraction (HCC) [I50.30]  Yes   • Metabolic encephalopathy [G93.41]  Yes   • Progressive supranuclear palsy (HCC) [G23.1]  Yes   • Benign essential hypertension [I10]  Yes   • Atrial fibrillation (HCC) [I48.91]  Yes      Resolved Hospital Problems   No resolved problems to display.       Brenda Solorio is a 87 y.o. former smoker with a history of hypertension, chronic anticoagulation, atrial fibrillation, progressive supranuclear palsy presented to the ED from nursing facility for  evaluation of altered mental status.     Encephalopathy: CT head with no acute findings.  No focal neurological deficits.  Likely combination secondary to community acquired pneumonia and possible digoxin toxicity.  Improved.  Close to baseline per daughter.       Pneumonia: Chest x-ray with pneumonia left lower lung.  Continue IV ceftriaxone.  Speech therapy to evaluate for aspiration pneumonia.       Atrial fibrillation: Continue metoprolol, warfarin.  Digoxin held.  Cardiology evaluated, recommended to discontinue digoxin going forward.     Essential hypertension: Continue home meds, BP stable.    Hypokalemia/hypophosphatemia: Ordered p.o. replacement.  Monitor daily.      CODE STATUS: DNR  Disposition: Likely back to nursing facility, probably stable to be discharged tomorrow.  CCP consulted.        I wore protective equipment throughout this patient encounter including a face mask, gloves and protective eyewear.  Hand hygiene was performed before donning protective equipment and after removal when leaving the room.      Dictated utilizing Dragon dictation        Bimal Horton MD  [facility]  01/14/23  14:52 EST

## 2023-01-14 NOTE — PROGRESS NOTES
[facility] Clinical Pharmacy Services: Warfarin Dosing/Monitoring Consult    Citlali ARIANA Solorio is a 87 y.o. female, estimated creatinine clearance is 31.8 mL/min (A) (by C-G formula based on SCr of 1.02 mg/dL (H)). weighing 49.9 kg (110 lb).    Results from last 7 days   Lab Units 01/13/23  1102   INR  2.00*   HEMOGLOBIN g/dL 12.2   HEMATOCRIT % 37.5   PLATELETS 10*3/mm3 273     Prior to admission anticoagulation: 2mg daily instead of 4mg on Monday    Hospital Anticoagulation:  Consulting provider: Dr. Horton  Start date: PTA  Indication: A Fib - requiring full anticoagulation  Target INR: 2 - 3  Expected duration: indefinite    Bridge Therapy: No      Potential food or drug interactions:     Education complete?/Date: No; plan for follow up TBD    Assessment/Plan:  INR is therapeutic will continue with home dose.   Dose: 2mg daily except 4mg on Monday  Monitor for any signs or symptoms of bleeding  Follow up daily INRs and dose adjustments    Pharmacy will continue to follow until discharge or discontinuation of warfarin.     Matti Eckret [facility]  Clinical Pharmacist

## 2023-01-14 NOTE — CONSULTS
[facility]        Patient Name: Citlali Solorio  Age/Sex: 87 y.o. female  : 1935  MRN: [id number]    Date of Admission: 2023  Date of Encounter Visit: 23  Encounter Provider: Celestino Mahoney MD  Referring Provider: Bimal Horton MD  Place of Service: [facility] CARDIOLOGY  Patient Care Team:  Cristal Lema MD as PCP - General (Internal Medicine)    Subjective:     Chief Complaint: Altered mental status    Reason for consult: Atrial fibrillation, digoxin toxicity    History of Present Illness:  Citlali Solorio is a 87 y.o. female who follows Dr. Apple in our office.  She was most recently evaluated by [facility] 2022 for an annual evaluation.  At that time patient was having difficulty with eating and there was consideration of placing a feeding tube.  Patient has battled with episodes of hypotension.  She resides at a skilled nursing facility.    Patient has history significant for diastolic heart failure, atrial fibrillation (anticoagulated with warfarin), hypertension, mitral and tricuspid valve insufficiency, progressive supranuclear palsy    Patient was admitted with altered mental status.  We have been asked to be seen for atrial fibrillation, digoxin toxicity.     Patient presented to the [facility] emergency department 2022 with altered mental status.  Patient is a DNR resident of nursing home according to ED records.  Patient was in her normal state of health at 8 AM on the morning of .  At 10:30 AM on  she was found to have altered mental status.  Patient was unresponsive and hypoxic.  Laboratory studies revealed creatinine 1.02, , K4.2, INR 2.0, digoxin 2.20, proBNP 2707, negative troponin, WBC 11.23, Hgb 12.2, HCT 37.5.  CT of the head was negative for acute findings.  Chest x-ray revealed left lower lobe infiltrates.  Patient treated with appropriate IV antibiotics.  Patient  "received IV fluid resuscitation in the emergency department as well.  Currently she denies complaints, but history somewhat limited due to her PSP.    Cardiac Procedures:  1. Echocardiogram 8/23/17: EF 53.8%.  Right ventricular cavity is mild to moderately dilated.  Left atrial cavity is severely dilated.  Mild to moderate MVR.  Moderate tricuspid valve regurgitation.  2. Echocardiogram 1/21/2019.  LVEF 56%.  LAE.  Moderate mitral valve regurgitation.  Moderate tricuspid valve regurgitation.  Mild LVH.  Calculated RVSP 44.2 mmHg.        Past Medical History:  Past Medical History:   Diagnosis Date   • Abnormal EKG    • Abnormal gait     \"frozen gait\"  Seen at [facility] with full work up.   • Acute on chronic diastolic heart failure (HCC)    • Anticoagulated     WARFARIN FOR A FIB. SEES DR CHASE, HELD 11/4/2020   • Anxiety    • Arthritis    • Ataxic gait    • Atrial fibrillation (HCC)    • Atrial fibrillation with RVR (HCC)    • Benign essential hypertension    • Carotid artery stenosis     plaque on screening last done 3/13, 11/13   • Compression fracture of L1 vertebra with delayed healing 10/29/2020   • Diarrhea     OCCASIONALLY   • Diastolic congestive heart failure (HCC)     addmission 07/2013   • Esophageal reflux    • History of recent fall    • History of vitamin B deficiency    • [place] (hard of hearing)    • Hypercholesterolemia     Patient tried Lipitor, Crestor, Zocor, Bacol and Livalo in the past and was intolerant of all of them.   • Hypertension    • IFG (impaired fasting glucose)    • Lumbar canal stenosis    • Lumbar disc disorder     LUMBAR ONE FROM FALL   • Metabolic encephalopathy    • PAF (paroxysmal atrial fibrillation) ([facility])    • Progressive supranuclear palsy ([facility])     STUTTER FIRST SIGN   • Urinary incontinence    • Vitamin B12 deficiency     told at [facility] that B12 was low.  Monthly shots recommended 5/5/15.       Past Surgical History:   Procedure Laterality Date   • BLADDER SURGERY   "   • CATARACT EXTRACTION Bilateral    • HYSTERECTOMY     • KNEE ARTHROPLASTY Left    • KNEE ARTHROSCOPY Right 06/15/2017    Procedure: RT KNEE ARTHROSCOPIC PARTIAL MEDIAL AND LATERAL MENISECTOMY AND SYNOVECTOMY;  Surgeon: Sam Soni MD;  Location: [facility] [place] [facility];  Service:    • KNEE ARTHROSCOPY Right 08/29/2018    Procedure: RT KNEE ARTHROSCOPY WITH PARTIAL LATERAL MENISECTOMY;  Surgeon: Randy Tucker MD;  Location: [facility] MAIN OR;  Service: Orthopedics   • KYPHOPLASTY Bilateral 11/09/2020    Procedure: KYPHOPLASTY at L 1;  Surgeon: Pardeep Voss MD;  Location: [facility] HYBRID OR 18/19;  Service: Neurosurgery;  Laterality: Bilateral;   • REPLACEMENT TOTAL KNEE         Home Medications:   Medications Prior to Admission   Medication Sig Dispense Refill Last Dose   • acetaminophen (TYLENOL) 650 MG 8 hr tablet Take 650 mg by mouth Every 8 (Eight) Hours As Needed for Mild Pain .      • Cholecalciferol (VITAMIN D PO) Take 1,000 mg by mouth Every Evening.      • digoxin (LANOXIN) 125 MCG tablet TAKE ONE TABLET BY MOUTH DAILY 90 tablet 3    • donepezil (ARICEPT) 10 MG tablet       • furosemide (LASIX) 20 MG tablet TAKE ONE TABLET BY MOUTH DAILY 90 tablet 3    • hydrALAZINE (APRESOLINE) 25 MG tablet TAKE ONE TABLET BY MOUTH TWICE A  tablet 3    • metoprolol tartrate (LOPRESSOR) 25 MG tablet Take 1 tablet by mouth 2 (Two) Times a Day. 180 tablet 3    • potassium chloride (K-DUR,KLOR-CON) 10 MEQ CR tablet TAKE ONE TABLET BY MOUTH DAILY 90 tablet 1    • vitamin B-12 (VITAMIN B-12) 1000 MCG tablet Take 1 tablet by mouth Daily.      • sodium chloride 0.9 % nebulizer solution Inhale 1 spray.      • warfarin (COUMADIN) 1 MG tablet Take four tablets by mouth on mon and take two tablets by mouth all other days or as directed 205 tablet 0        Allergies:  Allergies   Allergen Reactions   • Sulfamethoxazole Hallucinations and Unknown - High Severity     Other reaction(s): sulfamethoxazole   • Atorvastatin Other (See  Comments)     LEG CRAMPS   • Bactrim [Sulfamethoxazole-Trimethoprim] Hallucinations   • Penicillins Itching   • Pitavastatin Other (See Comments)     LEG CRAMPS   • Rosuvastatin Other (See Comments)     LEG CRAMPS   • Simvastatin Other (See Comments)     LEG CRAMPS       Past Social History:  Social History     Socioeconomic History   • Marital status:    Tobacco Use   • Smoking status: Former     Packs/day: 0.50     Years: 15.00     Pack years: 7.50     Types: Cigarettes     Quit date:      Years since quittin.0   • Smokeless tobacco: Never   Vaping Use   • Vaping Use: Never used   Substance and Sexual Activity   • Alcohol use: Not Currently     Comment: Rare   • Drug use: No       Past Family History:  Family History   Problem Relation Age of Onset   • Hypertension Mother    • Heart disease Mother    • Heart disease Father    • Stroke Father    • Hypertension Father    • Hypertension Sister    • Malig Hyperthermia Neg Hx        REVIEW OF SYSTEMS:   14 point ROS was performed and is negative except as outlined in HPI        Objective:   Temp:  [96.6 °F (35.9 °C)-98 °F (36.7 °C)] 97.5 °F (36.4 °C)  Heart Rate:  [66-92] 79  Resp:  [14-22] 16  BP: (117-201)/() 131/66     Intake/Output Summary (Last 24 hours) at 2023 0932  Last data filed at 2023 2200  Gross per 24 hour   Intake 1150 ml   Output 800 ml   Net 350 ml     Body mass index is 18.88 kg/m².      23  1159   Weight: 49.9 kg (110 lb)     Weight change:     General Appearance:    Alert, cooperative, in no acute distress, arouses to questioning, able to mumble words   Head:    Normocephalic, without obvious abnormality, atraumatic   Eyes:            Lids and lashes normal, conjunctivae and sclerae normal, no   icterus, no pallor, corneas clear, PERRLA   Ears:    Ears appear intact with no abnormalities noted   Throat:   No oral lesions, no thrush, oral mucosa moist   Neck:   No adenopathy, supple, trachea midline, no  thyromegaly, no   carotid bruit, no JVD   Back:     No kyphosis present, no scoliosis present, no skin lesions, erythema or scars, no tenderness to percussion or palpation, range of motion normal   Lungs:     Clear to auscultation,respirations regular, even and unlabored    Heart:   Irregularly irregular, controlled rate, normal S1 and S2, no murmur, no gallop, no rub, no click   Chest Wall:    No abnormalities observed   Abdomen:     Normal bowel sounds, no masses, no organomegaly, soft, nontender, nondistended, no guarding, no rebound  tenderness   Extremities:   Moves all extremities well, diminished effort   Pulses:   Pulses palpable and equal bilaterally. Normal radial, carotid, femoral, dorsalis pedis and posterior tibial pulses bilaterally. Normal abdominal aorta   Skin:  Psychiatric:   No bleeding, bruising or rash    Alert and oriented x 1, blunted mood and affect     Lab Review:   Results from last 7 days   Lab Units 01/14/23  0720 01/13/23  1102   SODIUM mmol/L 135* 135*   POTASSIUM mmol/L 3.4* 4.2   CHLORIDE mmol/L 99 98   CO2 mmol/L 29.7* 29.0   BUN mg/dL 18 19   CREATININE mg/dL 0.78 1.02*   GLUCOSE mg/dL 87 149*   CALCIUM mg/dL 8.6 9.2   AST (SGOT) U/L  --  16   ALT (SGPT) U/L  --  15     Results from last 7 days   Lab Units 01/13/23  1102   TROPONIN T ng/mL <0.010     Results from last 7 days   Lab Units 01/14/23  0720 01/13/23  1102   WBC 10*3/mm3 7.14 11.23*   HEMOGLOBIN g/dL 11.0* 12.2   HEMATOCRIT % 32.2* 37.5   PLATELETS 10*3/mm3 232 273     Results from last 7 days   Lab Units 01/14/23  0720 01/13/23  2048 01/13/23  1102   INR  2.47* 2.13* 2.00*     Results from last 7 days   Lab Units 01/14/23  0720   MAGNESIUM mg/dL 1.7           Invalid input(s): LDLCALC  Results from last 7 days   Lab Units 01/13/23  1102   PROBNP pg/mL 2,707.0*           Echo EF Estimated  Lab Results   Component Value Date    ECHOEFEST 56 01/21/2019       EKG:   I personally viewed and interpreted the patient's EKG      I  reviewed her EKG.  Notable for atrial fibrillation with controlled rate.  97 bpm.  Nonspecific ST segment changes.  No overt scooping of the ST segments to suggest digoxin toxicity.    Imaging:  Imaging Results (Most Recent)     Procedure Component Value Units Date/Time    CT Head Without Contrast [[id number]] Collected: 01/13/23 1143     Updated: 01/13/23 1427    Narrative:      CT HEAD WITHOUT CONTRAST     HISTORY: Altered mental status.     COMPARISON: CT head 04/21/2022.     FINDINGS: The brain and ventricles are symmetrical. There is  age-appropriate atrophy. There is no evidence of hemorrhage,  hydrocephalus or of abnormal extraaxial fluid. Decreased attenuation  involving the white matter of the cerebral hemispheres is appreciated  consistent with moderate small vessel ischemic disease, similar in  appearance as compared to the CT examination of 04/21/2022. No focal  area of decreased attenuation to suggest acute infarction is identified.  Further evaluation could be performed with an MRI examination of the  brain as indicated.           Radiation dose reduction techniques were utilized, including automated  exposure control and exposure modulation based on body size.     This report was finalized on 1/13/2023 2:24 PM by Dr. Duong Reyes M.D.       XR Chest 1 View [[id number]] Collected: 01/13/23 1117     Updated: 01/13/23 1126    Narrative:      XR CHEST 1 VW-     Clinical: Hypoxia with respiratory distress     COMPARISON 8/8/2022     FINDINGS: The right lung is clear. Cardiac size upper normal to mildly  enlarged as before. There is atherosclerotic calcification of the aorta.  Infiltrate left mid and lower lung zone, portions having a somewhat  nodular configuration. Serial radiographs of the chest recommended to  document clearing. No vascular congestion or pleural effusion seen. The  remainder is unremarkable.     This report was finalized on 1/13/2023 11:22 AM by Dr. Orlando Currie M.D.                  Assessment:     Active Hospital Problems    Diagnosis  POA   • **Digoxin toxicity [T46.0X1A]  Yes   • Community acquired pneumonia of left lower lobe of lung [J18.9]  Yes   • Chronic anticoagulation [Z79.01]  Not Applicable   • Debility [R53.81]  Yes   • Heart failure with preserved ejection fraction (HCC) [I50.30]  Yes   • Metabolic encephalopathy [G93.41]  Yes   • Progressive supranuclear palsy (HCC) [G23.1]  Yes   • Benign essential hypertension [I10]  Yes   • Atrial fibrillation (HCC) [I48.91]  Yes      Resolved Hospital Problems   No resolved problems to display.          Plan:     1. Digoxin level elevation: I suspect this is related to her acute illness.  Unclear if this is actually contributory.  Her EKG does not reveal digoxin toxicity.  Her level is elevated at 2.2.  1. No acute indication for reversal with Digibind.  Heart rate in the 90s, no EKG findings to suggest overt toxicity.  2. Continue warfarin pharmacy to dose  3. Stop digoxin.  I do not see her needing this medication going forward.  1. Check digoxin level daily until normalized  4. Continue metoprolol 25 twice daily for now.  1. If heart rate becomes an issue with holding digoxin, will increase to 25 every 6, increase from there  2. Progressive supranuclear palsy: Noted primary team work-up for basilar consolidations with concerns for aspiration.  3. Hypertension  1. Metoprolol per above.  Would hold other scheduled antihypertensives to favor maximize metoprolol if needed.  She has had issues with hypotension before reportedly, may be some component of neuro autonomic issues related to her PSP    Thank you for allowing me to participate in the care of Citlali Solorio.  I would permanently stop her digoxin per above.  Unclear if this is the cause of her presentation, or perhaps more likely that that level became elevated in the setting of her acute illness with possible aspiration pneumonia.  Either way, she has lots of room to go  with other rate control agents besides digoxin.  Cardiology will follow rate control peripherally over the weekend, does not plan to see daily.  Feel free to contact cardiology directly with any further questions or concerns.    Celestino Mahoney MD  [facility]  01/14/23  08:46 EST      EMR Dragon/Transcription disclaimer:   Much of this encounter note is an electronic transcription/translation of spoken language to printed text. The electronic translation of spoken language may permit erroneous, or at times, nonsensical words or phrases to be inadvertently transcribed; Although I have reviewed the note for such errors, some may still exist.

## 2023-01-14 NOTE — PLAN OF CARE
Alert to self. Speech garbled at times. Bed alarm active. BM x 1 , loose. Brief, purewick in place. IV saline locked x 2. BP elevated early in shift, stable now. Home meds restarted. PRN hydralazine if needed. Daily coumadin restarted. Dig on hold.       Problem: Fall Injury Risk  Goal: Absence of Fall and Fall-Related Injury  Intervention: Identify and Manage Contributors  Recent Flowsheet Documentation  Taken 1/13/2023 2342 by Raya Moss RN  Medication Review/Management: medications reviewed  Self-Care Promotion:  • independence encouraged  • BADL personal objects within reach  • BADL personal routines maintained  Taken 1/13/2023 2215 by Raya Moss RN  Self-Care Promotion:  • independence encouraged  • BADL personal objects within reach  • BADL personal routines maintained  Taken 1/13/2023 1928 by Raya Moss RN  Medication Review/Management: medications reviewed  Self-Care Promotion:  • independence encouraged  • BADL personal objects within reach  • BADL personal routines maintained  Intervention: Promote Injury-Free Environment  Recent Flowsheet Documentation  Taken 1/14/2023 0402 by Raya Moss RN  Safety Promotion/Fall Prevention: safety round/check completed  Taken 1/14/2023 0151 by Raya Moss RN  Safety Promotion/Fall Prevention: safety round/check completed  Taken 1/13/2023 2342 by Raya Moss RN  Safety Promotion/Fall Prevention: safety round/check completed  Taken 1/13/2023 2215 by Raya Moss RN  Safety Promotion/Fall Prevention: safety round/check completed  Taken 1/13/2023 1928 by Raya Moss RN  Safety Promotion/Fall Prevention: safety round/check completed   Goal Outcome Evaluation:

## 2023-01-14 NOTE — PLAN OF CARE
Problem: Adult Inpatient Plan of Care  Goal: Plan of Care Review  Outcome: Ongoing, Progressing  Flowsheets (Taken 1/14/2023 1809)  Progress: no change  Plan of Care Reviewed With:   patient   family  Outcome Evaluation: Pt seen for clinical swallow eval due to recent suspected Asp Pna. Pt w/hx of Oropharyngeal Dysphagia and non-compliance with mod diet/PEG recommendations. Pt continues to demonstrate s/s aspiration. Discussed recommendations of VFSS to determine any swallowing strategies, use of PEG along with comfort diet for quality of life, risk of continued Po diet extensively. Pt declined all recommendations and prefers to continue with regular diet. ST to sign off. Re-consult if POC changes.   Goal Outcome Evaluation:  Plan of Care Reviewed With: patient, family        Progress: no change  Outcome Evaluation: Pt seen for clinical swallow eval due to recent suspected Asp Pna. Pt w/hx of Oropharyngeal Dysphagia and non-compliance with mod diet/PEG recommendations. Pt continues to demonstrate s/s aspiration. Discussed recommendations of VFSS to determine any swallowing strategies, use of PEG along with comfort diet for quality of life, risk of continued Po diet extensively. Pt declined all recommendations and prefers to continue with regular diet. ST to sign off. Re-consult if POC changes.

## 2023-01-15 NOTE — PLAN OF CARE
Goal Outcome Evaluation:      VSS. No shortness of air, no c/o pain. Pt requires help with eating, at times she has difficulty swallowing due to Parkinson's. Appetite is good.

## 2023-01-15 NOTE — PLAN OF CARE
Goal Outcome Evaluation:              Outcome Evaluation: VSS, on 2 L O2NC, turns and repositioning provided, medications given with applesauce, pt speaks very softly, purewick in place, brief on, no c/o pain or nausea, on falls with bed alarm for safety, will continue to monitor.

## 2023-01-15 NOTE — PLAN OF CARE
Digoxin levels now no longer elevated.  Heart rate remained stable, max charted heart rate appears around 90s.    I would stop digoxin permanently.  Continue metoprolol 25 twice daily.  If her heart rates remain consistently greater than 90 at rest, can increase metoprolol to 50 twice daily.    Thank you for the consult, cardiology will sign off at this time.  Please reach out with any questions or concerns.

## 2023-01-15 NOTE — PROGRESS NOTES
Name: Citlali Solorio ADMIT: 2023   : 1935  PCP: Cristal Lema MD    MRN: [id number] LOS: 2 days   AGE/SEX: 87 y.o. female  ROOM: [facility]     Subjective   Subjective   More alert this morning.  Speech is more clear, able to tell me her name, Follows commands, Speech therapy evaluated, patient continues to refuse modified diet.     Review of Systems   as above  Objective   Objective   Vital Signs  Temp:  [96.6 °F (35.9 °C)-99.2 °F (37.3 °C)] 96.6 °F (35.9 °C)  Heart Rate:  [63-99] 63  Resp:  [18] 18  BP: (117-159)/(59-84) 117/59  SpO2:  [98 %-99 %] 99 %  on  Flow (L/min):  [2] 2;   Device (Oxygen Therapy): nasal cannula  Body mass index is 18.92 kg/m².  Physical Exam    General: Alert, laying in bed, not in distress, ill-appearing  HEENT: Normocephalic, atraumatic  Lungs: CTA, no wheezing,   CV: Irregular rhythm, no murmurs  Abdomen: Soft, nontender, nondistended.  Normoactive bowel sounds  Extremities: No significant peripheral edema  Results Review     I reviewed the patient's new clinical results.  Results from last 7 days   Lab Units 01/15/23  0434 23  0720 23  1102   WBC 10*3/mm3 8.73 7.14 11.23*   HEMOGLOBIN g/dL 10.4* 11.0* 12.2   PLATELETS 10*3/mm3 217 232 273     Results from last 7 days   Lab Units 01/15/23  0434 23  0720 23  1102   SODIUM mmol/L 137 135* 135*   POTASSIUM mmol/L 4.2 3.4* 4.2   CHLORIDE mmol/L 102 99 98   CO2 mmol/L 25.0 29.7* 29.0   BUN mg/dL 19 18 19   CREATININE mg/dL 0.72 0.78 1.02*   GLUCOSE mg/dL 85 87 149*   Estimated Creatinine Clearance: 45.1 mL/min (by C-G formula based on SCr of 0.72 mg/dL).  Results from last 7 days   Lab Units 23  1102   ALBUMIN g/dL 3.9   BILIRUBIN mg/dL 1.2   ALK PHOS U/L 58   AST (SGOT) U/L 16   ALT (SGPT) U/L 15     Results from last 7 days   Lab Units 01/15/23  0434 23  0720 23  1102   CALCIUM mg/dL 8.5* 8.6 9.2   ALBUMIN g/dL  --   --  3.9   MAGNESIUM mg/dL 2.0 1.7  --    PHOSPHORUS mg/dL 2.4*  2.4*  --      Results from last 7 days   Lab Units 01/13/23  1102   PROCALCITONIN ng/mL 0.06   LACTATE mmol/L 1.8     SARS-CoV-2 PCR   Date Value Ref Range Status   11/06/2020 Not Detected Not Detected Final     Comment:     Nucleic acid specific to SARS-CoV-2 (COVID-19) virus was not detected in  this sample by the TaqPath (TM) COVID-19 Combo Kit.          SARS-CoV-2 (COVID-19) nucleic acid testing performed using [employer] Aptima (R) SARS-CoV-2 Assay or [employer] TaqPath (TM)  COVID-19 Combo Kit as indicated above under Emergency Use Authorization (EUA) from the FDA. Aptima (R) and TaqPath (TM) test performance  characteristics verified by [facility] in accordance with the FDAs Guidance Document (Policy for Diagnostic Tests for Coronavirus Disease-2019  during the Public Health Emergency) issued on March 16, 2020. The laboratory is regulated under CLIA as qualified to perform high-complexity testing  Unless otherwise noted, all testing was performed at [facility], CLIA No. [id number], [facility] Licensee No. [id number]     No results found for: HGBA1C, POCGLU    CT Head Without Contrast  CT HEAD WITHOUT CONTRAST     HISTORY: Altered mental status.     COMPARISON: CT head 04/21/2022.     FINDINGS: The brain and ventricles are symmetrical. There is  age-appropriate atrophy. There is no evidence of hemorrhage,  hydrocephalus or of abnormal extraaxial fluid. Decreased attenuation  involving the white matter of the cerebral hemispheres is appreciated  consistent with moderate small vessel ischemic disease, similar in  appearance as compared to the CT examination of 04/21/2022. No focal  area of decreased attenuation to suggest acute infarction is identified.  Further evaluation could be performed with an MRI examination of the  brain as indicated.           Radiation dose reduction techniques were utilized, including automated  exposure control and exposure modulation based on body size.     This report  was finalized on 1/13/2023 2:24 PM by Dr. Duong Reyes M.D.     XR Chest 1 View  XR CHEST 1 VW-     Clinical: Hypoxia with respiratory distress     COMPARISON 8/8/2022     FINDINGS: The right lung is clear. Cardiac size upper normal to mildly  enlarged as before. There is atherosclerotic calcification of the aorta.  Infiltrate left mid and lower lung zone, portions having a somewhat  nodular configuration. Serial radiographs of the chest recommended to  document clearing. No vascular congestion or pleural effusion seen. The  remainder is unremarkable.     This report was finalized on 1/13/2023 11:22 AM by Dr. Orlando Currie M.D.       Scheduled Medications  cefTRIAXone, 1 g, Intravenous, Q24H  donepezil, 10 mg, Oral, Nightly  hydrALAZINE, 25 mg, Oral, BID  metoprolol tartrate, 25 mg, Oral, BID  phosphorus, 500 mg, Oral, BID  sodium chloride, 10 mL, Intravenous, Q12H  cyanocobalamin, 1,000 mcg, Oral, Daily  warfarin, 2 mg, Oral, Once per day on Sun Tue Wed Thu Fri Sat  [START ON 1/16/2023] warfarin, 4 mg, Oral, Once per day on Mon    Infusions  Pharmacy to dose warfarin,     Diet  Diet: Regular/House Diet; Texture: Regular Texture (IDDSI 7); Fluid Consistency: Thin (IDDSI 0)       Assessment/Plan     Active Hospital Problems    Diagnosis  POA   • **Digoxin toxicity [T46.0X1A]  Yes   • Community acquired pneumonia of left lower lobe of lung [J18.9]  Yes   • Chronic anticoagulation [Z79.01]  Not Applicable   • Debility [R53.81]  Yes   • Heart failure with preserved ejection fraction (HCC) [I50.30]  Yes   • Metabolic encephalopathy [G93.41]  Yes   • Progressive supranuclear palsy (HCC) [G23.1]  Yes   • Benign essential hypertension [I10]  Yes   • Atrial fibrillation (HCC) [I48.91]  Yes      Resolved Hospital Problems   No resolved problems to displayBrenda Solorio is a 87 y.o. former smoker with a history of hypertension, chronic anticoagulation, atrial fibrillation, progressive supranuclear palsy presented to the ED  from nursing facility for evaluation of altered mental status.     Encephalopathy: CT head with no acute findings.  No focal neurological deficits.  Likely combination secondary to community acquired pneumonia and possible digoxin toxicity.  Improved, oriented to name, speech is more clear.     Pneumonia: Chest x-ray with pneumonia left lower lung.  Continue IV ceftriaxone.  Speech therapy evaluated.  Patient continues to demonstrate signs of aspiration, However  refuses modified diet and continues with regular diet.  Discussed with daughter, patient does not want PEG tube either.  Discussed about palliative care options, however daughter stated that when not ready currently and if decide on palliative care will let us know.     Atrial fibrillation: Continue metoprolol, warfarin.  Digoxin held.  Cardiology evaluated, recommended to discontinue digoxin, increase metoprolol dose if needed     Essential hypertension: Continue home meds, BP stable.    Hypokalemia/hypophosphatemia:   potassium improved, phosphorous level remains low.  Ordered p.o. replacement.  Monitor.      CODE STATUS: DNR  Disposition: Likely back to nursing facility, CCP consulted.  Stable to be discharged.        I wore protective equipment throughout this patient encounter including a face mask, gloves and protective eyewear.  Hand hygiene was performed before donning protective equipment and after removal when leaving the room.      Dictated utilizing Dragon dictation        Bimal Horton MD  [facility]  01/15/23  15:33 EST

## 2023-01-16 NOTE — DISCHARGE SUMMARY
Patient Name: Citlali Solorio  : 1935  MRN: [id number]    Date of Admission: 2023  Date of Discharge:  2023  Primary Care Physician: Cristal Lema MD      Chief Complaint:   Altered Mental Status      Discharge Diagnoses     Active Hospital Problems    Diagnosis  POA   • **Digoxin toxicity [T46.0X1A]  Yes   • Community acquired pneumonia of left lower lobe of lung [J18.9]  Yes   • Chronic anticoagulation [Z79.01]  Not Applicable   • Debility [R53.81]  Yes   • Heart failure with preserved ejection fraction (HCC) [I50.30]  Yes   • Metabolic encephalopathy [G93.41]  Yes   • Progressive supranuclear palsy (HCC) [G23.1]  Yes   • Benign essential hypertension [I10]  Yes   • Atrial fibrillation (HCC) [I48.91]  Yes      Resolved Hospital Problems   No resolved problems to display.        Hospital Course     Ms. Solorio is a 87 y.o. female with a history of hypertension, chronic anticoagulation, atrial fibrillation, progressive supranuclear palsy who presented to [facility] initially complaining of altered mental status.  Please see the admitting history and physical for further details.  She was found to have digoxin toxicity and was admitted to the hospital for further evaluation and treatment.      From assisted living facility with baseline walker/wheelchair requirements for ambulatory assist device.    Per patient's daughter at bedside orientation/mentation returned to baseline prior to hospital discharge.  CT head negative for acute findings.  No evidence of neurological deficits outside of progressive supranuclear palsy.  Suspect encephalopathy combination of acute acquired pneumonia and digoxin toxicity (serum digoxin normalized prior to discharge).  Cardiology recommend discontinue digoxin and continue metoprolol tartrate 25 mg p.o. twice daily and anticoagulation for atrial fibrillation with follow-up cardiology as needed.  Consider increase metoprolol to tartrate 50 mg twice  daily if heart rate consistently greater than 90 at rest.    Electrolytes normalized following potassium & phosphorus replete.    Empiric IV ceftriaxone provided during hospital admission and speech therapy recommend modified diet given consistent demonstration of aspiration; however, patient and family refuse diet modification prefer regular consistency diet.  Patient family also not interested in PEG tube placement; however, not interested in palliative care discussions for goals of care at this time.    Patient appears medically stable for discharge to facility on 1/16/2023.  Recommend follow-up primary care provider for continued monitoring blood pressure and CMP.    Day of Discharge     Physical Exam:  Temp:  [96.6 °F (35.9 °C)-98.7 °F (37.1 °C)] 96.6 °F (35.9 °C)  Heart Rate:  [82-94] 88  Resp:  [18] 18  BP: (142-172)/(77-88) 146/86  Body mass index is 18.92 kg/m².     Physical Exam  Constitutional:       General: She is not in acute distress.     Appearance: She is ill-appearing (chronic). She is not toxic-appearing.      Comments: Generally weak & elderly   HENT:      Head: Normocephalic and atraumatic.   Eyes:      Extraocular Movements: Extraocular movements intact.      Conjunctiva/sclera: Conjunctivae normal.   Cardiovascular:      Rate and Rhythm: Normal rate. Rhythm irregular.   Pulmonary:      Effort: Pulmonary effort is normal.      Comments: Diminished on expiration  Abdominal:      General: Bowel sounds are normal.      Palpations: Abdomen is soft.   Musculoskeletal:         General: No tenderness.      Cervical back: Normal range of motion and neck supple.      Right lower leg: No edema.      Left lower leg: No edema.   Skin:     General: Skin is warm and dry.   Neurological:      Mental Status: She is alert and oriented to person, place, and time.      Cranial Nerves: No cranial nerve deficit.   Psychiatric:         Behavior: Behavior normal.         Thought Content: Thought content normal.          Consultants     Consult Orders (all) (From admission, onward)     Start     Ordered    01/16/23 0851  Inpatient Case Management  Consult  Once        Provider:  (Not yet assigned)    01/16/23 0850    01/13/23 1509  Inpatient Case Management  Consult  Once        Provider:  (Not yet assigned)    01/13/23 1508    01/13/23 1501  Inpatient Cardiology Consult  Once        Specialty:  Cardiology  Provider:  Vinh Apple MD    01/13/23 1501    01/13/23 1241  [facility] (on-call MD unless specified) Details  Once        Specialty:  Hospitalist  Provider:  Bimal Horton MD    01/13/23 1241              Procedures     * Surgery not found *      Imaging Results (All)     Procedure Component Value Units Date/Time    CT Head Without Contrast [[id number]] Collected: 01/13/23 1143     Updated: 01/13/23 1427    Narrative:      CT HEAD WITHOUT CONTRAST     HISTORY: Altered mental status.     COMPARISON: CT head 04/21/2022.     FINDINGS: The brain and ventricles are symmetrical. There is  age-appropriate atrophy. There is no evidence of hemorrhage,  hydrocephalus or of abnormal extraaxial fluid. Decreased attenuation  involving the white matter of the cerebral hemispheres is appreciated  consistent with moderate small vessel ischemic disease, similar in  appearance as compared to the CT examination of 04/21/2022. No focal  area of decreased attenuation to suggest acute infarction is identified.  Further evaluation could be performed with an MRI examination of the  brain as indicated.           Radiation dose reduction techniques were utilized, including automated  exposure control and exposure modulation based on body size.     This report was finalized on 1/13/2023 2:24 PM by Dr. Duong Reyes M.D.       XR Chest 1 View [[id number]] Collected: 01/13/23 1117     Updated: 01/13/23 1126    Narrative:      XR CHEST 1 VW-     Clinical: Hypoxia with respiratory distress     COMPARISON  8/8/2022     FINDINGS: The right lung is clear. Cardiac size upper normal to mildly  enlarged as before. There is atherosclerotic calcification of the aorta.  Infiltrate left mid and lower lung zone, portions having a somewhat  nodular configuration. Serial radiographs of the chest recommended to  document clearing. No vascular congestion or pleural effusion seen. The  remainder is unremarkable.     This report was finalized on 1/13/2023 11:22 AM by Dr. Orlando Currie M.D.           Results for orders placed during the hospital encounter of 10/02/19    Duplex Carotid Ultrasound CAR    Interpretation Summary  · Right internal carotid artery plaque without significant stenosis.  · Left internal carotid artery plaque without significant stenosis.    Results for orders placed during the hospital encounter of 01/21/19    Adult Transthoracic Echo Complete W/ Cont if Necessary Per Protocol    Interpretation Summary  · Left ventricular wall thickness is consistent with mild concentric hypertrophy.  · Estimated EF = 56%.  · Left ventricular systolic function is normal.  · Left atrial cavity size is severely dilated.  · Moderate mitral valve regurgitation is present  · Moderate tricuspid valve regurgitation is present.  · Calculated right ventricular systolic pressure from tricuspid regurgitation is 44.2 mmHg.    Pertinent Labs     Results from last 7 days   Lab Units 01/16/23  0646 01/15/23  0434 01/14/23  0720 01/13/23  1102   WBC 10*3/mm3 8.08 8.73 7.14 11.23*   HEMOGLOBIN g/dL 10.8* 10.4* 11.0* 12.2   PLATELETS 10*3/mm3 244 217 232 273     Results from last 7 days   Lab Units 01/16/23  0646 01/15/23  0434 01/14/23  0720 01/13/23  1102   SODIUM mmol/L 136 137 135* 135*   POTASSIUM mmol/L 4.0 4.2 3.4* 4.2   CHLORIDE mmol/L 101 102 99 98   CO2 mmol/L 25.2 25.0 29.7* 29.0   BUN mg/dL 16 19 18 19   CREATININE mg/dL 0.69 0.72 0.78 1.02*   GLUCOSE mg/dL 94 85 87 149*   EGFR mL/min/1.73 85.2 82.1 74.5 54.0*     Results from last  7 days   Lab Units 01/13/23  1102   ALBUMIN g/dL 3.9   BILIRUBIN mg/dL 1.2   ALK PHOS U/L 58   AST (SGOT) U/L 16   ALT (SGPT) U/L 15     Results from last 7 days   Lab Units 01/16/23  0646 01/15/23  0434 01/14/23  0720 01/13/23  1102   CALCIUM mg/dL 8.2* 8.5* 8.6 9.2   ALBUMIN g/dL  --   --   --  3.9   MAGNESIUM mg/dL 1.7 2.0 1.7  --    PHOSPHORUS mg/dL 2.9 2.4* 2.4*  --        Results from last 7 days   Lab Units 01/15/23  0434 01/13/23  1102   TROPONIN T ng/mL  --  <0.010   PROBNP pg/mL  --  2,707.0*   DIGOXIN LVL ng/mL 0.70 2.20*           Invalid input(s): LDLCALC  Results from last 7 days   Lab Units 01/14/23  0827 01/13/23  1207   BLOODCX  No growth at 2 days No growth at 3 days         Test Results Pending at Discharge     Pending Labs     Order Current Status    Blood Culture - Blood, Arm, Right Preliminary result    Blood Culture - Blood, Hand, Right Preliminary result          Discharge Details        Discharge Medications      Continue These Medications      Instructions Start Date   acetaminophen 650 MG 8 hr tablet  Commonly known as: TYLENOL   650 mg, Oral, Every 8 Hours PRN      cyanocobalamin 1000 MCG tablet  Commonly known as: VITAMIN B-12   1,000 mcg, Oral, Daily      donepezil 10 MG tablet  Commonly known as: ARICEPT   No dose, route, or frequency recorded.      furosemide 20 MG tablet  Commonly known as: LASIX   TAKE ONE TABLET BY MOUTH DAILY      hydrALAZINE 25 MG tablet  Commonly known as: APRESOLINE   TAKE ONE TABLET BY MOUTH TWICE A DAY      metoprolol tartrate 25 MG tablet  Commonly known as: LOPRESSOR   25 mg, Oral, 2 Times Daily      potassium chloride 10 MEQ CR tablet  Commonly known as: K-DUR,KLOR-CON   TAKE ONE TABLET BY MOUTH DAILY      sodium chloride 0.9 % nebulizer solution   1 spray, Inhalation      VITAMIN D PO   1,000 mg, Oral, Every Evening      warfarin 1 MG tablet  Commonly known as: COUMADIN   Take four tablets by mouth on mon and take two tablets by mouth all other days or as  directed         Stop These Medications    digoxin 125 MCG tablet  Commonly known as: LANOXIN            Allergies   Allergen Reactions   • Sulfamethoxazole Hallucinations and Unknown - High Severity     Other reaction(s): sulfamethoxazole   • Atorvastatin Other (See Comments)     LEG CRAMPS   • Bactrim [Sulfamethoxazole-Trimethoprim] Hallucinations   • Penicillins Itching   • Pitavastatin Other (See Comments)     LEG CRAMPS   • Rosuvastatin Other (See Comments)     LEG CRAMPS   • Simvastatin Other (See Comments)     LEG CRAMPS       Discharge Disposition:  Skilled Nursing Facility (DC - External)      Discharge Diet:  Diet Order   Procedures   • Diet: Regular/House Diet; Texture: Regular Texture (IDDSI 7); Fluid Consistency: Thin (IDDSI 0)       Discharge Activity:   Activity Instructions     Activity as Tolerated      Activity as Tolerated      Up WIth Assist            CODE STATUS:    Code Status and Medical Interventions:   Ordered at: 01/13/23 1349     Medical Intervention Limits:    NO intubation (DNI)    NO cardioversion    NO artificial nutrition     Code Status (Patient has no pulse and is not breathing):    No CPR (Do Not Attempt to Resuscitate)     Medical Interventions (Patient has pulse or is breathing):    Limited Support       Future Appointments   Date Time Provider Department Center   1/24/2023 11:00 AM Cristal Lema MD [facility]     Additional Instructions for the Follow-ups that You Need to Schedule     Discharge Follow-up with PCP   As directed       Currently Documented PCP:    Cristal Lema MD    PCP Phone Number:    [phone]     Follow Up Details: Please call to schedule a 1 week or earliest available follow-up appointment PCP; BP monitoring, BMP            Follow-up Information     Cristal Lema MD .    Specialty: Internal Medicine  Why: Please call to schedule a 1 week or earliest available follow-up appointment PCP; BP monitoring, BMP  Contact information:  [id number] [address]  [phone]                         Additional Instructions for the Follow-ups that You Need to Schedule     Discharge Follow-up with PCP   As directed       Currently Documented PCP:    Cristal Lema MD    PCP Phone Number:    [phone]     Follow Up Details: Please call to schedule a 1 week or earliest available follow-up appointment PCP; BP monitoring, BMP           Time Spent on Discharge:  Greater than 30 minutes      FAM Bartholomew  [facility]  01/16/23  13:26 EST

## 2023-01-16 NOTE — PROGRESS NOTES
Continued Stay Note  [facility]     Patient Name: Citlali Solorio  MRN: [id number]  Today's Date: 1/16/2023    Admit Date: 1/13/2023    Plan: Return to [facility]   Discharge Plan     Row Name 01/16/23 1423       Plan    Plan Return to [facility]    Plan Comments RN call and spoke to facility. PT notes read and questions answered according. Facility confirmed patient is able to return. DC summary placed in packet and packet given to RN. Family will transport               Discharge Codes    No documentation.               Expected Discharge Date and Time     Expected Discharge Date Expected Discharge Time    Jan 16, 2023             Nicole Mina, RN

## 2023-01-16 NOTE — DISCHARGE PLACEMENT REQUEST
"Olive Dash (87 y.o. Female)     Date of Birth   1935    Social Security Number       Address   [address] [place] [address]    Home Phone   [phone]    MRN   [id number]       Islam   [employer]yb[employer]ian    Marital Status                               Admission Date   1/13/23    Admission Type   Emergency    Admitting Provider   Bimal Horton MD    Attending Provider   Bimal Horton MD    Department, Room/Bed   [address], P695/1       Discharge Date       Discharge Disposition   Skilled Nursing Facility (DC - External)    Discharge Destination                               Attending Provider: Bimal Horton MD    Allergies: Sulfamethoxazole, Atorvastatin, Bactrim [Sulfamethoxazole-trimethoprim], Penicillins, Pitavastatin, Rosuvastatin, Simvastatin    Isolation: None   Infection: None   Code Status: No CPR    Ht: 162.6 cm (64\")   Wt: 50 kg (110 lb 3.7 oz)    Admission Cmt: None   Principal Problem: Digoxin toxicity [T46.0X1A]                 Active Insurance as of 1/13/2023     Primary Coverage     Payor Plan Insurance Group Employer/Plan Group    MEDICARE MEDICARE A & B      Payor Plan Address Payor Plan Phone Number Payor Plan Fax Number Effective Dates    [address] [phone]  6/1/2002 - None Entered    [facility] [id number]       Subscriber Name Subscriber Birth Date Member ID       OLIVE DASH 1935 [id number]           Secondary Coverage     Payor Plan Insurance Group Employer/Plan Group    [facility] SUPP [id number]     Payor Plan Address Payor Plan Phone Number Payor Plan Fax Number Effective Dates    [address]   12/1/2016 - None Entered    [facility] [id number]       Subscriber Name Subscriber Birth Date Member ID       OLIVE DASH 1935 [id number]                 Emergency Contacts      (Rel.) Home Phone Work Phone Mobile Phone    Agusto Rodarte (Daughter) " [phone] -- [phone]    Laura Luu (Daughter) [phone] -- [phone]

## 2023-01-16 NOTE — PROGRESS NOTES
Case Management Discharge Note      Final Note: Discharged home to Susan [facility], [place]         Selected Continued Care - Discharged on 1/16/2023 Admission date: 1/13/2023 - Discharge disposition: Skilled Nursing Facility (DC - External)    Destination Coordination complete.    Service Provider Selected Services Address Phone Fax Patient Preferred    [facility] [address] [place], [place] [id number] [phone] -- --         Transportation Services  Private: Car    Final Discharge Disposition Code: 01 - home or self-care

## 2023-01-16 NOTE — PLAN OF CARE
"Goal Outcome Evaluation:  Plan of Care Reviewed With: patient           Outcome Evaluation: (P) Pt seen for PT today with dx Digoxin toxicity and hx of Parkinson's impacting functional mobility. Difficulty assessing PLOF with pt today due to slowed, slurred speech and observed lethargy. Pt previously at [facility] but unable to communicate level of assist from staff. Pt mentions being IND at the beginning of the session but when asked again, she reports she recieves help with care \"sometimes\". Does state she uses RW to walk. Supine-sit in bed mod-maxAx1 with increased rigidity of trunk. Able to maintain sitting balance EOB SBA. STS modAx1. Demos narrow DONI with difficulty following commands to improve posture and stance. Lateral steps to HOB minAx2, able to amb with RW within hospital room minAx2. PT needed to navigate RW, provide cues when turning. Festinating/shuffling gait observed and difficulty keeping RW close while walking. D/C recommendations pending back to [facility] vs SNF given inability to fully assess baseline PLOF and observed risks to safety, awareness, and risk of falls. Pt would benefit from skilled PT for stated deficits.  "

## 2023-01-16 NOTE — PROGRESS NOTES
Continued Stay Note  [facility]     Patient Name: Citlali Solorio  MRN: [id number]  Today's Date: 1/16/2023    Admit Date: 1/13/2023    Plan: PENDING   Discharge Plan     Row Name 01/16/23 0920       Plan    Plan PENDING    Plan Comments Discharge order noted. Spoke to Olesya with [facility] at [phone]  who stated she will need a PT eval before returning. Informed APRN and MD for order. Olesya stated RN will need to also call to discuss once eval obatined. Updated RN               Discharge Codes    No documentation.               Expected Discharge Date and Time     Expected Discharge Date Expected Discharge Time    Jan 16, 2023             Nicole Mina, RN

## 2023-01-16 NOTE — PLAN OF CARE
Goal Outcome Evaluation:  Plan of Care Reviewed With: patient        Progress: no change  Outcome Evaluation: VSS, Q 2 turns, falls precautions maintained, takes pills whole in applesauce, no c/o pain, purewick in place

## 2023-01-16 NOTE — THERAPY EVALUATION
"Patient Name: Citlali Solorio  : 1935    MRN: [id number]                              Today's Date: 2023       Admit Date: 2023    Visit Dx:     ICD-10-CM ICD-9-CM   1. Community acquired pneumonia of left lower lobe of lung  J18.9 486   2. Altered mental status, unspecified altered mental status type  R41.82 780.97   3. Elevated digoxin level  R78.89 796.0   4. Hypoxia  R09.02 799.02     Patient Active Problem List   Diagnosis   • Progressive supranuclear palsy (HCC)   • Carotid artery plaque, bilateral   • Heart failure (HCC)   • Gastroesophageal reflux disease   • Hyperlipidemia   • Spinal stenosis of lumbar region   • Cobalamin deficiency   • Atrial fibrillation (HCC)   • Benign essential hypertension   • Mitral valve insufficiency   • Tricuspid valve insufficiency   • Tear of medial meniscus of right knee, current   • Metabolic encephalopathy   • Heart failure with preserved ejection fraction (HCC)   • History of melanoma   • Community acquired pneumonia of left lower lobe of lung   • Digoxin toxicity   • Chronic anticoagulation   • Debility     Past Medical History:   Diagnosis Date   • Abnormal EKG    • Abnormal gait     \"frozen gait\"  Seen at [facility] with full work up.   • Acute on chronic diastolic heart failure (HCC)    • Anticoagulated     WARFARIN FOR A FIB. SEES DR CHASE, HELD 2020   • Anxiety    • Arthritis    • Ataxic gait    • Atrial fibrillation (HCC)    • Atrial fibrillation with RVR (HCC)    • Benign essential hypertension    • Carotid artery stenosis     plaque on screening last done 3/13,    • Compression fracture of L1 vertebra with delayed healing 10/29/2020   • Diarrhea     OCCASIONALLY   • Diastolic congestive heart failure (HCC)     addmission 2013   • Esophageal reflux    • History of recent fall    • History of vitamin B deficiency    • [place] (hard of hearing)    • Hypercholesterolemia     Patient tried Lipitor, Crestor, Zocor, Bacol and Livalo in the " past and was intolerant of all of them.   • Hypertension    • IFG (impaired fasting glucose)    • Lumbar canal stenosis    • Lumbar disc disorder     LUMBAR ONE FROM FALL   • Metabolic encephalopathy    • PAF (paroxysmal atrial fibrillation) (HCC)    • Progressive supranuclear palsy (HCC)     STUTTER FIRST SIGN   • Urinary incontinence    • Vitamin B12 deficiency     told at [facility] that B12 was low.  Monthly shots recommended 5/5/15.     Past Surgical History:   Procedure Laterality Date   • BLADDER SURGERY     • CATARACT EXTRACTION Bilateral    • HYSTERECTOMY     • KNEE ARTHROPLASTY Left    • KNEE ARTHROSCOPY Right 06/15/2017    Procedure: RT KNEE ARTHROSCOPIC PARTIAL MEDIAL AND LATERAL MENISECTOMY AND SYNOVECTOMY;  Surgeon: Sam Soni MD;  Location: [facility] OR [facility];  Service:    • KNEE ARTHROSCOPY Right 08/29/2018    Procedure: RT KNEE ARTHROSCOPY WITH PARTIAL LATERAL MENISECTOMY;  Surgeon: Randy Tucker MD;  Location: [facility] OR;  Service: Orthopedics   • KYPHOPLASTY Bilateral 11/09/2020    Procedure: KYPHOPLASTY at L 1;  Surgeon: Pardeep Voss MD;  Location: [facility] OR 18/19;  Service: Neurosurgery;  Laterality: Bilateral;   • REPLACEMENT TOTAL KNEE        General Information     Row Name 01/16/23 1012          Physical Therapy Time and Intention    Document Type evaluation  -MS (r) MB (t) MS (c)     Mode of Treatment physical therapy  -MS (r) MB (t) MS (c)     Row Name 01/16/23 1012          General Information    Prior Level of Function --  difficult to assess PLOF  -MS (r) MB (t) MS (c)     Existing Precautions/Restrictions fall  -MS (r) MB (t) MS (c)     Barriers to Rehab medically complex;previous functional deficit;cognitive status  -MS (r) MB (t) MS (c)     Row Name 01/16/23 1012          Living Environment    People in Home facility resident  -MS (r) MB (t) MS (c)     Row Name 01/16/23 1012          Home Main Entrance    Number of Stairs, Main Entrance none  -MS (r) MB (t) MS  (c)     Row Name 01/16/23 1012          Cognition    Orientation Status (Cognition) oriented x 3;verbal cues/prompts needed for orientation  -MS (r) MB (t) MS (c)     Row Name 01/16/23 1012          Safety Issues, Functional Mobility    Safety Issues Affecting Function (Mobility) ability to follow commands;awareness of need for assistance;insight into deficits/self-awareness;safety precaution awareness;safety precautions follow-through/compliance;sequencing abilities  -MS (r) MB (t) MS (c)     Impairments Affecting Function (Mobility) balance;cognition;endurance/activity tolerance;postural/trunk control;strength  -MS (r) MB (t) MS (c)     Cognitive Impairments, Mobility Safety/Performance attention;insight into deficits/self-awareness;safety precaution awareness;safety precaution follow-through;sequencing abilities  -MS (r) MB (t) MS (c)     Comment, Safety Issues/Impairments (Mobility) difficulty communicating, responding to commands  -MS (r) MB (t) MS (c)           User Key  (r) = Recorded By, (t) = Taken By, (c) = Cosigned By    Initials Name Provider Type    Shellie Hutchins, PT Physical Therapist    Tyler Banuelos, PT Student PT Student               Mobility     Row Name 01/16/23 1015          Bed Mobility    Bed Mobility supine-sit;sit-supine;scooting/bridging  -MS (r) MB (t) MS (c)     Scooting/Bridging [place] (Bed Mobility) maximum assist (25% patient effort);2 person assist;nonverbal cues (demo/gesture);verbal cues  -MS (r) MB (t) MS (c)     Supine-Sit [place] (Bed Mobility) moderate assist (50% patient effort);maximum assist (25% patient effort);1 person assist;verbal cues;nonverbal cues (demo/gesture)  -MS (r) MB (t) MS (c)     Sit-Supine [place] (Bed Mobility) moderate assist (50% patient effort);1 person assist;verbal cues;nonverbal cues (demo/gesture)  -MS (r) MB (t) MS (c)     Assistive Device (Bed Mobility) bed rails;draw sheet;head of bed elevated  -MS (r) MB (t) MS (c)      Comment, (Bed Mobility) demos increased lethargy, slowed movements to perform bed mobility  -MS (r) MB (t) MS (c)     Row Name 01/16/23 1015          Sit-Stand Transfer    Sit-Stand [place] (Transfers) moderate assist (50% patient effort);1 person assist;nonverbal cues (demo/gesture);verbal cues  -MS (r) MB (t) MS (c)     Assistive Device (Sit-Stand Transfers) walker, front-wheeled  -MS (r) MB (t) MS (c)     Row Name 01/16/23 1015          Gait/Stairs (Locomotion)    [place]azel Level (Gait) maximum assist (25% patient effort);2 person assist;other (see comments)  festinating/shuffling steps; TS needed to navigate RW  -MS (r) MB (t) MS (c)     Assistive Device (Gait) walker, front-wheeled  -MS (r) MB (t) MS (c)     Distance in Feet (Gait) 12' total  -MS (r) MB (t) MS (c)     Deviations/Abnormal Patterns (Gait) base of support, narrow;festinating/shuffling;gait speed decreased;stride length decreased;armand decreased  -MS (r) MB (t) MS (c)     Bilateral Gait Deviations forward flexed posture  -MS (r) MB (t) MS (c)     [place]el Level (Stairs) unable to assess  -MS (r) MB (t) MS (c)           User Key  (r) = Recorded By, (t) = Taken By, (c) = Cosigned By    Initials Name Provider Type    Shellie Hutchins, PT Physical Therapist    Tyler Banuelos, PT Student PT Student               Obj/Interventions     Row Name 01/16/23 1020          Strength Comprehensive (MMT)    General Manual Muscle Testing (MMT) Assessment lower extremity strength deficits identified  -MS (r) MB (t) MS (c)     Comment, General Manual Muscle Testing (MMT) Assessment Gross BLE strength 4/5  -MS (r) MB (t) MS (c)     Row Name 01/16/23 1020          Balance    Balance Assessment sitting static balance;sit to stand dynamic balance;standing static balance;standing dynamic balance  -MS (r) MB (t) MS (c)     Static Sitting Balance standby assist  -MS (r) MB (t) MS (c)     Position, Sitting Balance sitting edge of bed  -MS (r) MB (t) MS  (c)     Sit to Stand Dynamic Balance verbal cues;non-verbal cues (demo/gesture);minimal assist;1-person assist  -MS (r) MB (t) MS (c)     Static Standing Balance verbal cues;non-verbal cues (demo/gesture);minimal assist;1-person assist  cues to increase DONI and improve posture  -MS (r) MB (t) MS (c)     Dynamic Standing Balance minimal assist;1-person assist;verbal cues;non-verbal cues (demo/gesture)  -MS (r) MB (t) MS (c)     Position/Device Used, Standing Balance walker, front-wheeled  -MS (r) MB (t) MS (c)     Comment, Balance unsteadiness, forward flexed posture, narrow DONI  -MS (r) MB (t) MS (c)           User Key  (r) = Recorded By, (t) = Taken By, (c) = Cosigned By    Initials Name Provider Type    Shellie Hutchins, PT Physical Therapist    Tyler Banuelos, PT Student PT Student               Goals/Plan     Row Name 01/16/23 [facility]          Bed Mobility Goal 1 (PT)    Activity/Assistive Device (Bed Mobility Goal 1, PT) bed mobility activities, all  -MS (r) MB (t) MS (c)     [place] Level/Cues Needed (Bed Mobility Goal 1, PT) minimum assist (75% or more patient effort)  -MS (r) MB (t) MS (c)     Time Frame (Bed Mobility Goal 1, PT) short term goal (STG);1 week  -MS (r) MB (t) MS (c)     Row Name 01/16/23 [facility]          Transfer Goal 1 (PT)    Activity/Assistive Device (Transfer Goal 1, PT) transfers, all  -MS (r) MB (t) MS (c)     [place] Level/Cues Needed (Transfer Goal 1, PT) contact guard required;tactile cues required;verbal cues required  -MS (r) MB (t) MS (c)     Time Frame (Transfer Goal 1, PT) short term goal (STG);1 week  -MS (r) MB (t) MS (c)     Row Name 01/16/23 [facility]          Gait Training Goal 1 (PT)    Activity/Assistive Device (Gait Training Goal 1, PT) gait (walking locomotion);assistive device use;decrease fall risk  -MS (r) MB (t) MS (c)     [place] Level (Gait Training Goal 1, PT) contact guard required;verbal cues required;tactile cues required  -MS (r) MB (t) MS (c)      "Time Frame (Gait Training Goal 1, PT) short term goal (STG);1 week  -MS (r) MB (t) MS (c)           User Key  (r) = Recorded By, (t) = Taken By, (c) = Cosigned By    Initials Name Provider Type    Shellie Hutchins ARIANA, PT Physical Therapist    Tyler Banuelos, PT Student PT Student               Clinical Impression     Row Name 01/16/23 1023          Pain    Pretreatment Pain Rating 0/10 - no pain  -MS (r) MB (t) MS (c)     Posttreatment Pain Rating 0/10 - no pain  -MS (r) MB (t) MS (c)     Pain Intervention(s) Rest;Repositioned  -MS (r) MB (t) MS (c)     Row Name 01/16/23 1023          Plan of Care Review    Plan of Care Reviewed With patient  -MS (r) MB (t) MS (c)     Outcome Evaluation Pt seen for PT today with dx Digoxin toxicity and hx of Parkinson's impacting functional mobility. Difficulty assessing PLOF with pt today due to slowed, slurred speech and observed lethargy. Pt previously at [facility] but unable to communicate level of assist from staff. Pt mentions being IND at the beginning of the session but when asked again, she reports she recieves help with care \"sometimes\". Does state she uses RW to walk. Supine-sit in bed mod-maxAx1 with increased rigidity of trunk. Able to maintain sitting balance EOB SBA. STS modAx1. Demos narrow DONI with difficulty following commands to improve posture and stance. Lateral steps to HOB minAx2, able to amb with RW within hospital room minAx2. PT needed to navigate RW, provide cues when turning. Festinating/shuffling gait observed and difficulty keeping RW close while walking. D/C recommendations pending back to [facility] vs SNF given inability to fully assess baseline PLOF and observed risks to safety, awareness, and risk of falls. Pt would benefit from skilled PT for stated deficits. (P)   -MB     Row Name 01/16/23 1023          Therapy Assessment/Plan (PT)    Rehab Potential (PT) fair, will monitor progress closely  -MS (r) MB (t) MS (c)     Therapy Frequency (PT) 5 times/wk  -MS " (r) MB (t) MS (c)     Row Name 01/16/23 1023          Vital Signs    O2 Delivery Pre Treatment room air  -MS (r) MB (t) MS (c)     Pre Patient Position Supine  -MS (r) MB (t) MS (c)     Intra Patient Position Standing  -MS (r) MB (t) MS (c)     Post Patient Position Supine  -MS (r) MB (t) MS (c)     Row Name 01/16/23 1023          Positioning and Restraints    Pre-Treatment Position in bed  -MS (r) MB (t) MS (c)     Post Treatment Position bed  -MS (r) MB (t) MS (c)     In Bed fowlers;notified nsg;call light within reach;encouraged to call for assist;exit alarm on;side rails up x3  -MS (r) MB (t) MS (c)           User Key  (r) = Recorded By, (t) = Taken By, (c) = Cosigned By    Initials Name Provider Type    MS ReidShellie, PT Physical Therapist    Tyler Banuelos, PT Student PT Student               Outcome Measures     Row Name 01/16/23 1038 01/16/23 0838       How much help from another person do you currently need...    Turning from your back to your side while in flat bed without using bedrails? 2  -MS (r) MB (t) MS (c) 3  -MA    Moving from lying on back to sitting on the side of a flat bed without bedrails? 2  -MS (r) MB (t) MS (c) 2  -MA    Moving to and from a bed to a chair (including a wheelchair)? 2  -MS (r) MB (t) MS (c) 2  -MA    Standing up from a chair using your arms (e.g., wheelchair, bedside chair)? 2  -MS (r) MB (t) MS (c) 2  -MA    Climbing 3-5 steps with a railing? 1  -MS (r) MB (t) MS (c) 1  -MA    To walk in hospital room? 2  -MS (r) MB (t) MS (c) 1  -MA    AM-PAC 6 Clicks Score (PT) 11  -MS (r) MB (t) 11  -MA    Highest level of mobility 4 --> Transferred to chair/commode  -MS (r) MB (t) 4 --> Transferred to chair/commode  -MA    Row Name 01/16/23 1038          Functional Assessment    Outcome Measure Options AM-PAC 6 Clicks Basic Mobility (PT)  -MS (r) MB (t) MS (c)           User Key  (r) = Recorded By, (t) = Taken By, (c) = Cosigned By    Initials Name Provider Type    HOLLEY Mcpherson,  "Diana MALHOTRA RN Registered Nurse    Shellie Hutchins, PT Physical Therapist    Tyler Banuelos, PT Student PT Student                             Physical Therapy Education     Title: PT OT SLP Therapies (In Progress)     Topic: Physical Therapy (In Progress)     Point: Mobility training (Done)     Learning Progress Summary           Patient Acceptance, E, VU,NR by MB at 1/16/2023 1039                   Point: Home exercise program (Not Started)     Learner Progress:  Not documented in this visit.          Point: Body mechanics (Not Started)     Learner Progress:  Not documented in this visit.          Point: Precautions (Done)     Learning Progress Summary           Patient Acceptance, E, VU,NR by MB at 1/16/2023 1039                               User Key     Initials Effective Dates Name Provider Type Discipline    MB 12/30/22 -  Tyler Humphrey, PT Student PT Student PT              PT Recommendation and Plan     Plan of Care Reviewed With: patient  Outcome Evaluation: (P) Pt seen for PT today with dx Digoxin toxicity and hx of Parkinson's impacting functional mobility. Difficulty assessing PLOF with pt today due to slowed, slurred speech and observed lethargy. Pt previously at [facility] but unable to communicate level of assist from staff. Pt mentions being IND at the beginning of the session but when asked again, she reports she recieves help with care \"sometimes\". Does state she uses RW to walk. Supine-sit in bed mod-maxAx1 with increased rigidity of trunk. Able to maintain sitting balance EOB SBA. STS modAx1. Demos narrow DONI with difficulty following commands to improve posture and stance. Lateral steps to HOB minAx2, able to amb with RW within hospital room minAx2. PT needed to navigate RW, provide cues when turning. Festinating/shuffling gait observed and difficulty keeping RW close while walking. D/C recommendations pending back to [facility] vs SNF given inability to fully assess baseline PLOF and observed risks to " safety, awareness, and risk of falls. Pt would benefit from skilled PT for stated deficits.     Time Calculation:    PT Charges     Row Name 01/16/23 1052 01/16/23 1011          Time Calculation    Start Time -- 0939  -MS (r) MB (t) MS (c)     Stop Time -- 1003  -MS (r) MB (t) MS (c)     Time Calculation (min) -- 24 min  -MS (r) MB (t)     PT Received On -- 01/16/23  -MS (r) MB (t) MS (c)     PT - Next Appointment -- 01/17/23  -MS (r) MB (t) MS (c)     PT Goal Re-Cert Due Date -- 01/23/23  -MS (r) MB (t) MS (c)        Time Calculation- PT    Total Timed Code Minutes- PT 15 minute(s) (P)   -MB 20 minute(s)  -MS (r) MB (t) MS (c)        Timed Charges    58148 - Gait Training Minutes  -- 8  -MS (r) MB (t) MS (c)     60717 - PT Therapeutic Activity Minutes -- 12  -MS (r) MB (t) MS (c)        Total Minutes    Timed Charges Total Minutes -- 20  -MS (r) MB (t)      Total Minutes -- 20  -MS (r) MB (t)           User Key  (r) = Recorded By, (t) = Taken By, (c) = Cosigned By    Initials Name Provider Type    Shellie Hutchins, PT Physical Therapist    Tyler Banuelos, PT Student PT Student              Therapy Charges for Today     Code Description Service Date Service Provider Modifiers Qty    44431140197 HC PT THER SUPP EA 15 MIN 1/16/2023 Tyler Humphrey, PT Student GP 1    33073173013 HC PT EVAL MOD COMPLEXITY 3 1/16/2023 Tyler Humphrey, PT Student GP 1    06670418503 HC PT THERAPEUTIC ACT EA 15 MIN 1/16/2023 Tyler Humphrey, PT Student GP 1          PT G-Codes  Outcome Measure Options: AM-PAC 6 Clicks Basic Mobility (PT)  AM-PAC 6 Clicks Score (PT): 11  PT Discharge Summary  Anticipated Discharge Disposition (PT): assisted living, skilled nursing facility    Tyler Humphrey PT Student  1/16/2023

## 2023-01-16 NOTE — PROGRESS NOTES
[facility] Clinical Pharmacy Services: Warfarin Dosing/Monitoring Consult    Citlali ARIANA Solorio is a 87 y.o. female, estimated creatinine clearance is 47.1 mL/min (by C-G formula based on SCr of 0.69 mg/dL). weighing 50 kg (110 lb 3.7 oz).    Results from last 7 days   Lab Units 01/16/23  0646 01/15/23  0434 01/14/23  0720 01/13/23  2048 01/13/23  1102   INR  2.72* 2.57* 2.47* 2.13* 2.00*   HEMOGLOBIN g/dL 10.8* 10.4* 11.0*  --  12.2   HEMATOCRIT % 32.3* 31.3* 32.2*  --  37.5   PLATELETS 10*3/mm3 244 217 232  --  273     Prior to admission anticoagulation: 2mg daily instead of 4mg on Monday    Hospital Anticoagulation:  Consulting provider: Dr. Horton  Start date: PTA  Indication: A Fib - requiring full anticoagulation  Target INR: 2 - 3  Expected duration: indefinite    Bridge Therapy: No      Potential food or drug interactions: ceftriaxone may increase the anticoagulant effect of warfarin    Education complete?/Date: No; plan for follow up TBD    Assessment/Plan:  INR is therapeutic but steadily increasing. Will give 2 mg today instead of home dose of 4 mg. This is likely due to start of ceftriaxone.  Monitor for any signs or symptoms if bleeding.   Follow up daily INRs and dose adjustments.    Pharmacy will continue to follow until discharge or discontinuation of warfarin.       Jeny Shane, PharmD  Clinical Pharmacist

## 2023-01-24 PROBLEM — T46.0X1A DIGOXIN TOXICITY: Status: RESOLVED | Noted: 2023-01-01 | Resolved: 2023-01-01

## 2023-01-24 PROBLEM — G93.41 METABOLIC ENCEPHALOPATHY: Status: RESOLVED | Noted: 2018-01-04 | Resolved: 2023-01-01

## 2023-01-24 PROBLEM — Z66 DNR NO CODE (DO NOT RESUSCITATE): Status: ACTIVE | Noted: 2023-01-01

## 2023-01-24 NOTE — TELEPHONE ENCOUNTER
Called [facility] to check on Ms Solorio (discharged on 1/16).  Spoke with Duong and he is planning for INR tomorrow AM and will fax over afternoon.

## 2023-01-24 NOTE — PROGRESS NOTES
Subjective     Citlali ARIANA Solorio is a 87 y.o. female who presents with   Chief Complaint   Patient presents with   • Hypertension   • Congestive Heart Failure       History of Present Illness     The following data was reviewed by: Cristal Lema MD on 01/24/2023:  CMP    CMP 1/14/23 1/15/23 1/16/23   Glucose 87 85 94   BUN 18 19 16   Creatinine 0.78 0.72 0.69   eGFR 74.5 82.1 85.2   Sodium 135 (A) 137 136   Potassium 3.4 (A) 4.2 4.0   Chloride 99 102 101   Calcium 8.6 8.5 (A) 8.2 (A)   BUN/Creatinine Ratio 23.1 26.4 (A) 23.2   Anion Gap 6.3 10.0 9.8   (A) Abnormal value       Comments are available for some flowsheets but are not being displayed.           CBC    CBC 1/14/23 1/15/23 1/16/23   WBC 7.14 8.73 8.08   RBC 3.51 (A) 3.28 (A) 3.49 (A)   Hemoglobin 11.0 (A) 10.4 (A) 10.8 (A)   Hematocrit 32.2 (A) 31.3 (A) 32.3 (A)   MCV 91.7 95.4 92.6   MCH 31.3 31.7 30.9   MCHC 34.2 33.2 33.4   RDW 12.1 (A) 12.1 (A) 12.0 (A)   Platelets 232 217 244   (A) Abnormal value            CBC w/diff    CBC w/Diff 1/14/23 1/15/23 1/16/23   WBC 7.14 8.73 8.08   RBC 3.51 (A) 3.28 (A) 3.49 (A)   Hemoglobin 11.0 (A) 10.4 (A) 10.8 (A)   Hematocrit 32.2 (A) 31.3 (A) 32.3 (A)   MCV 91.7 95.4 92.6   MCH 31.3 31.7 30.9   MCHC 34.2 33.2 33.4   RDW 12.1 (A) 12.1 (A) 12.0 (A)   Platelets 232 217 244   (A) Abnormal value            Lipid Panel    Lipid Panel 4/12/22 7/13/22 10/21/22   Total Cholesterol 113 120 142   Triglycerides 74 135 83   HDL Cholesterol 49 45 58   VLDL Cholesterol 15 24 16   LDL Cholesterol  49 51 68      Comments are available for some flowsheets but are not being displayed.           TSH    TSH 4/12/22 10/21/22   TSH 2.490 2.050           Electrolytes    Electrolytes 1/14/23 1/15/23 1/16/23   Sodium 135 (A) 137 136   Potassium 3.4 (A) 4.2 4.0   Chloride 99 102 101   Calcium 8.6 8.5 (A) 8.2 (A)   (A) Abnormal value       Comments are available for some flowsheets but are not being displayed.           Data reviewed:  Radiologic studies cxr     HTN.  Control is good.  CHF/afib.  She is anticoagulated and rate controlled.  She is euvolemic.    Patient presents in hospital f/u.  She was admitted for digoxin toxicity and pneumonia.  I have reviewed discharge summary, labs, scans, cardiovascular studies and medication changes.  She was taken off digoxin. She is back to baseline functioning.      PSP.  Followed by neurology.  Eating is an continued challenge.  She coughs and aspirates at times with eating.  She has refused diet alteration because it is only osvaldo she has in life.  She is not interested in a PEG tube which has been discussed on multiple occasions.         Review of Systems   Constitutional: Positive for fatigue.   Respiratory: Positive for cough.        The following portions of the patient's history were reviewed and updated as appropriate: allergies, current medications and problem list.    Patient Active Problem List    Diagnosis Date Noted   • Community acquired pneumonia of left lower lobe of lung 01/13/2023   • Chronic anticoagulation 01/13/2023   • Debility 01/13/2023   • History of melanoma 04/13/2021   • Heart failure with preserved ejection fraction (HCC) 08/13/2019   • Metabolic encephalopathy 01/04/2018   • Tear of medial meniscus of right knee, current 06/15/2017   • Mitral valve insufficiency 07/06/2016   • Tricuspid valve insufficiency 07/06/2016   • Progressive supranuclear palsy (HCC) 04/21/2016   • Carotid artery plaque, bilateral 04/21/2016     Note Last Updated: 5/31/2017     Description: plaque on screening last done 3/13, 11/13, 10/16     • Gastroesophageal reflux disease 04/21/2016   • Hyperlipidemia 04/21/2016     Note Last Updated: 4/21/2016     Description: Patient tried Lipitor, Crestor, Zocor, Bacol and Livalo in the past and was intolerant of all of them.     • Spinal stenosis of lumbar region 04/21/2016   • Cobalamin deficiency 04/21/2016     Note Last Updated: 5/31/2017     Description:  "told at [facility] that B12 was low.  Monthly shots recommended 5/5/15.  Now on oral replacement.      • Atrial fibrillation (HCC) 06/24/2013   • Benign essential hypertension 06/24/2013       Current Outpatient Medications on File Prior to Visit   Medication Sig Dispense Refill   • acetaminophen (TYLENOL) 650 MG 8 hr tablet Take 650 mg by mouth Every 8 (Eight) Hours As Needed for Mild Pain .     • Cholecalciferol (VITAMIN D PO) Take 1,000 mg by mouth Every Evening.     • donepezil (ARICEPT) 10 MG tablet      • furosemide (LASIX) 20 MG tablet TAKE ONE TABLET BY MOUTH DAILY 90 tablet 3   • hydrALAZINE (APRESOLINE) 25 MG tablet TAKE ONE TABLET BY MOUTH TWICE A  tablet 3   • metoprolol tartrate (LOPRESSOR) 25 MG tablet Take 1 tablet by mouth 2 (Two) Times a Day. 180 tablet 3   • potassium chloride (K-DUR,KLOR-CON) 10 MEQ CR tablet TAKE ONE TABLET BY MOUTH DAILY 90 tablet 1   • sodium chloride 0.9 % nebulizer solution Inhale 1 spray.     • vitamin B-12 (VITAMIN B-12) 1000 MCG tablet Take 1 tablet by mouth Daily.     • warfarin (COUMADIN) 1 MG tablet Take four tablets by mouth on mon and take two tablets by mouth all other days or as directed 205 tablet 0     No current facility-administered medications on file prior to visit.       Objective     /74   Pulse 83   Ht 162.6 cm (64.02\")   Wt 52.1 kg (114 lb 14.4 oz)   LMP  (LMP Unknown)   SpO2 96%   BMI 19.71 kg/m²     Physical Exam  Constitutional:       Appearance: She is well-developed.   HENT:      Head: Normocephalic and atraumatic.   Cardiovascular:      Rate and Rhythm: Normal rate and regular rhythm.      Heart sounds: Normal heart sounds.   Pulmonary:      Effort: Pulmonary effort is normal.      Breath sounds: Normal breath sounds.   Musculoskeletal:      Right hand: Deformity present.      Left hand: Deformity present.   Skin:     General: Skin is warm and dry.   Neurological:      Mental Status: She is alert and oriented to person, place, and " time.   Psychiatric:         Behavior: Behavior normal.         Assessment & Plan   Diagnoses and all orders for this visit:    1. Benign essential hypertension (Primary)    2. Heart failure with preserved ejection fraction, unspecified HF chronicity (HCC)    3. Atrial fibrillation, unspecified type (HCC)    4. Progressive supranuclear palsy (HCC)    5. Contracture of joint of hand, unspecified laterality  -     Ambulatory Referral to Hand Surgery        Discussion    HTN.  Control is good.  The patient is advised to continue current dosage of metoprolol.  CHF.  She is euvolemic, rate controlled and anticoagulated.    PSP.  Continue with neurology.  Continue with regular diet.  She declines dietary alternation or consideration of PEG placement.    Bilateral hand contractures.  Refer to hand surgery to see if they have anything to offer.           Future Appointments   Date Time Provider Department Center   4/25/2023  9:50 AM [facility]   5/3/2023  1:30 PM Cristal Lema MD [facility]

## 2023-01-25 NOTE — PROGRESS NOTES
Anticoagulation Clinic Progress Note    Anticoagulation Summary  As of 2023    INR goal:  2.0-3.0   TTR:  62.8 % (4.1 y)   INR used for dosin.60 (2023)   Warfarin maintenance plan:  2 mg every day; Starting 2023   Weekly warfarin total:  14 mg   Plan last modified:  Tanvi Alfredo, Kory (2023)   Next INR check:  2023   Priority:  Maintenance   Target end date:  Indefinite    Indications    Atrial fibrillation (HCC) [I48.91]  Atrial fibrillation with RVR (HCC) (Resolved) [I48.91]             Anticoagulation Episode Summary     INR check location:      Preferred lab:      Send INR reminders to:   ABBIE ORTIZ  POOL    Comments:  Labs by [facility] ([facility])      Anticoagulation Care Providers     Provider Role Specialty Phone number    Vinh Apple MD Referring Cardiology [phone]          INR History:  Anticoagulation Monitoring 2022   INR 3.60 1.70 2.60   INR Date 2022   INR Goal 2.0-3.0 2.0-3.0 2.0-3.0   Trend Same Same Down   Last Week Total 16 mg 14 mg 16 mg   Next Week Total 14 mg 18 mg 14 mg   Sun 2 mg 2 mg 2 mg   Mon 4 mg 4 mg 2 mg   Tue 2 mg 2 mg 2 mg   Wed Hold () 4 mg () 2 mg   Thu 2 mg 2 mg 2 mg   Fri 2 mg 2 mg 2 mg   Sat 2 mg 2 mg 2 mg   Visit Report - - -   Some recent data might be hidden       Plan:  1. INR is Therapeutic today- see above in Anticoagulation Summary.   Provided instructions to Duong  with [facility]  to Continue their warfarin regimen- see above in Anticoagulation Summary.  2. Follow up in 1 week      Tamela FigueroaD

## 2023-02-02 NOTE — PROGRESS NOTES
Anticoagulation Clinic Progress Note    Anticoagulation Summary  As of 2023    INR goal:  2.0-3.0   TTR:  62.5 % (4.2 y)   INR used for dosin.50 (2023)   Warfarin maintenance plan:  2 mg every day; Starting 2023   Weekly warfarin total:  14 mg   Plan last modified:  Tanvi Alfredo PharmD (2023)   Next INR check:  2023   Priority:  Maintenance   Target end date:  Indefinite    Indications    Atrial fibrillation (HCC) [I48.91]  Atrial fibrillation with RVR (HCC) (Resolved) [I48.91]             Anticoagulation Episode Summary     INR check location:      Preferred lab:      Send INR reminders to:   ABBIE ORTIZ  POOL    Comments:  Labs by [facility] ([facility])      Anticoagulation Care Providers     Provider Role Specialty Phone number    Vinh Apple MD Referring Cardiology [phone]          INR History:  Anticoagulation Monitoring 2023   INR 1.70 2.60 4.50   INR Date 2023   INR Goal 2.0-3.0 2.0-3.0 2.0-3.0   Trend Same Down Same   Last Week Total 14 mg 16 mg 14 mg   Next Week Total 18 mg 14 mg 11 mg   Sun 2 mg 2 mg 2 mg   Mon 4 mg 2 mg 2 mg   Tue 2 mg 2 mg 2 mg   Wed 4 mg () 2 mg -   Thu 2 mg 2 mg Hold ()   Fri 2 mg 2 mg 1 mg (2/3)   Sat 2 mg 2 mg 2 mg   Visit Report - - -   Some recent data might be hidden       Plan:  1. INR is Supratherapeutic today- see above in Anticoagulation Summary.   Provided instructions to Duong with [facility] to Change their warfarin regimen- see above in Anticoagulation Summary.  2. Follow up in 1 week      Tanvi Alfredo PharmD

## 2023-02-08 NOTE — PROGRESS NOTES
Anticoagulation Clinic Progress Note    Anticoagulation Summary  As of 2023    INR goal:  2.0-3.0   TTR:  62.5 % (4.2 y)   INR used for dosin.20 (2023)   Warfarin maintenance plan:  2 mg every day; Starting 2023   Weekly warfarin total:  14 mg   No change documented:  Tanvi Alfredo PharmD   Plan last modified:  Tanvi Alfredo PharmD (2023)   Next INR check:  2/15/2023   Priority:  Maintenance   Target end date:  Indefinite    Indications    Atrial fibrillation (HCC) [I48.91]  Atrial fibrillation with RVR (HCC) (Resolved) [I48.91]             Anticoagulation Episode Summary     INR check location:      Preferred lab:      Send INR reminders to:  [facility]  POOL    Comments:  Labs by [facility] ([facility])      Anticoagulation Care Providers     Provider Role Specialty Phone number    Vinh Apple MD Referring Cardiology [phone]          Clinic Interview:  Patient Findings     Negatives:  Signs/symptoms of thrombosis, Signs/symptoms of bleeding,   Laboratory test error suspected, Change in health, Change in alcohol use,   Change in activity, Upcoming invasive procedure, Emergency department   visit, Upcoming dental procedure, Missed doses, Extra doses, Change in   medications, Change in diet/appetite, Hospital admission, Bruising, Other   complaints      Clinical Outcomes     Negatives:  Major bleeding event, Thromboembolic event,   Anticoagulation-related hospital admission, Anticoagulation-related ED   visit, Anticoagulation-related fatality        INR History:  Anticoagulation Monitoring 2023   INR 2.60 4.50 2.20   INR Date 2023   INR Goal 2.0-3.0 2.0-3.0 2.0-3.0   Trend Down Same Same   Last Week Total 16 mg 14 mg 11 mg   Next Week Total 14 mg 11 mg 14 mg   Sun 2 mg 2 mg 2 mg   Mon 2 mg 2 mg 2 mg   Tue 2 mg 2 mg 2 mg   Wed 2 mg - 2 mg   Thu 2 mg Hold () 2 mg   Fri 2 mg 1 mg (/3) 2 mg    Sat 2 mg 2 mg 2 mg   Visit Report - - -   Some recent data might be hidden       Plan:  1. INR is Therapeutic today- see above in Anticoagulation Summary.   Will instruct Citlalibonilla Solorio to Continue their warfarin regimen- see above in Anticoagulation Summary.  2. Follow up in 1 weeks  3. Spoke with Duong at [facility]. They have been instructed to call if any changes in medications, doses, concerns, etc. Patient expresses understanding and has no further questions at this time.    Tanvi Alfredo, PharmD

## 2023-02-15 NOTE — PROGRESS NOTES
"Anticoagulation Clinic Progress Note    Anticoagulation Summary  As of 2/15/2023    INR goal:  2.0-3.0   TTR:  62.6 % (4.2 y)   INR used for dosin.00 (2/15/2023)   Warfarin maintenance plan:  2 mg every day; Starting 2/15/2023   Weekly warfarin total:  14 mg   Plan last modified:  Tanvi Alfredo, PharmD (2023)   Next INR check:  2023   Priority:  Maintenance   Target end date:  Indefinite    Indications    Atrial fibrillation (HCC) [I48.91]  Atrial fibrillation with RVR (HCC) (Resolved) [I48.91]             Anticoagulation Episode Summary     INR check location:      Preferred lab:      Send INR reminders to:  GLENNY ORTIZ  POOL    Comments:  Labs by [facility] ([facility])      Anticoagulation Care Providers     Provider Role Specialty Phone number    Vinh Apple MD Referring Cardiology [phone]          Clinic Interview:  Patient Findings     Positives:  Other complaints    Negatives:  Signs/symptoms of thrombosis, Signs/symptoms of bleeding,   Laboratory test error suspected, Change in health, Change in alcohol use,   Change in activity, Upcoming invasive procedure, Emergency department   visit, Upcoming dental procedure, Missed doses, Extra doses, Change in   medications, Change in diet/appetite, Hospital admission, Bruising    Comments:  Duong from [facility] reports the patient has been taking 1   mg on  and 2 mg ADO \"for a couple of months\"      Clinical Outcomes     Negatives:  Major bleeding event, Thromboembolic event,   Anticoagulation-related hospital admission, Anticoagulation-related ED   visit, Anticoagulation-related fatality    Comments:  Duong from [facility] reports the patient has been taking 1   mg on  and 2 mg ADO \"for a couple of months\"        INR History:  Anticoagulation Monitoring 2023 2023 2/15/2023   INR 4.50 2.20 2.00   INR Date 2023 2023 2/15/2023   INR Goal 2.0-3.0 2.0-3.0 2.0-3.0   Trend " Same Same Same   Last Week Total 14 mg 11 mg 13 mg   Next Week Total 11 mg 14 mg 13 mg   Sun 2 mg 2 mg 1 mg (2/19)   Mon 2 mg 2 mg 2 mg   Tue 2 mg 2 mg 2 mg   Wed - 2 mg 2 mg   Thu Hold (2/2) 2 mg 2 mg   Fri 1 mg (2/3) 2 mg 2 mg   Sat 2 mg 2 mg 2 mg   Visit Report - - -   Some recent data might be hidden       Plan:  1. INR is Therapeutic today- see above in Anticoagulation Summary.   Will instruct Citlali Solorio to Continue their warfarin regimen- see above in Anticoagulation Summary.  2. Follow up in 1 weeks  3. They have been instructed to call if any changes in medications, doses, concerns, etc. Patient expresses understanding and has no further questions at this time.    Elinor Cartagena [facility]

## 2024-08-26 NOTE — PROGRESS NOTES
Anticoagulation Clinic Progress Note    Anticoagulation Summary  As of 2021    INR goal:  2.0-3.0   TTR:  67.8 % (2.2 y)   INR used for dosin.97 (2/3/2021)   Warfarin maintenance plan:  1 mg every Sun, Tue, Thu; 2 mg all other days   Weekly warfarin total:  11 mg   No change documented:  Daren Osman RPH   Plan last modified:  Daren Osman RPH (2021)   Next INR check:  2/10/2021   Priority:  Maintenance   Target end date:  Indefinite    Indications    Atrial fibrillation (CMS/HCC) [I48.91]  Atrial fibrillation with RVR (CMS/HCC) (Resolved) [I48.91]             Anticoagulation Episode Summary     INR check location:      Preferred lab:      Send INR reminders to:   ABBIE ORTIZ  POOL    Comments:  lab per [facility]--they will fax to us (phone: [phone])      Anticoagulation Care Providers     Provider Role Specialty Phone number    Vinh Apple MD Referring Cardiology [phone]          INR History:  Anticoagulation Monitoring 2021   INR 1.90 1.60 1.97   INR Date 2021 2021 2/3/2021   INR Goal 2.0-3.0 2.0-3.0 2.0-3.0   Trend Same Up Same   Last Week Total 10 mg 10 mg 11 mg   Next Week Total 10 mg 12 mg 11 mg   Sun - 1 mg 1 mg   Mon - 2 mg 2 mg   Tue 1 mg 1 mg 1 mg   Wed - 3 mg () -   Thu - 1 mg 1 mg   Fri - 2 mg 2 mg   Sat - 2 mg 2 mg   Visit Report - - -   Some recent data might be hidden       Plan:  1. INR is Subtherapeutic today- see above in Anticoagulation Summary.   Provided instructions to Ofelia with [facility] to Continue their warfarin regimen- see above in Anticoagulation Summary. Also faxed orders.   2. Follow up in 1 week      Daren Osman RPH   At goal, no changes

## (undated) DEVICE — MIXER A07A CEMENT

## (undated) DEVICE — ENCORE® LATEX ORTHO SIZE 8, STERILE LATEX POWDER-FREE SURGICAL GLOVE: Brand: ENCORE

## (undated) DEVICE — SPNG GZ WOVN 4X4IN 12PLY 10/BX STRL

## (undated) DEVICE — NEEDLE, QUINCKE 22GX3.5": Brand: MEDLINE INDUSTRIES, INC.

## (undated) DEVICE — GOWN,NON-REINFORCED,SIRUS,SET IN SLV,XL: Brand: MEDLINE

## (undated) DEVICE — PREP SOL POVIDONE/IODINE BT 4OZ

## (undated) DEVICE — PAD GRND REM POLYHESIVE A/ DISP

## (undated) DEVICE — EQUIPMENT COVER BAG TYPE 48” X 36” (122CM X 91CM): Brand: EQUIPMENT COVER BAG TYPE

## (undated) DEVICE — 4.5 MM FULL RADIUS STRAIGHT                                    BLADES, POWER/EP-1, YELLOW, PACKAGED                                    6 PER BOX, STERILE: Brand: DYONICS

## (undated) DEVICE — GLV SURG SENSICARE ORTHO PF LF 9 STRL

## (undated) DEVICE — APPL CHLORAPREP W/TINT 26ML ORNG

## (undated) DEVICE — APPL CHLORAPREP HI/LITE 26ML ORNG

## (undated) DEVICE — BNDG ELAS ELITE V/CLOSE 4IN 5YD LF STRL

## (undated) DEVICE — 3.5 MM INCISOR STRAIGHT BLADES,                                    POWER/EP-1, MEDIUM GRAY, PACKAGED 6                                    PER BOX, STERILE

## (undated) DEVICE — BONE TAMP KIT KEX152EB FF E2 15/2 OI: Brand: KYPHON EXPRESS II KYPHOPAK TRAY

## (undated) DEVICE — 3M™ STERI-STRIP™ REINFORCED ADHESIVE SKIN CLOSURES, R1547, 1/2 IN X 4 IN (12 MM X 100 MM), 6 STRIPS/ENVELOPE: Brand: 3M™ STERI-STRIP™

## (undated) DEVICE — PK KYPHOPLASTY 40

## (undated) DEVICE — DRSNG WND GZ CURAD OIL EMULSION 3X3IN STRL

## (undated) DEVICE — SUPER TURBOVAC 90 IFS: Brand: COBLATION

## (undated) DEVICE — TBG PUMP ARTHSCP MAIN AR6400 16FT

## (undated) DEVICE — PK ARTHSCP 40

## (undated) DEVICE — MAT FLR ABSORBENT LG 4FT 10 2.5FT

## (undated) DEVICE — ENCORE® LATEX ORTHO SIZE 8.5, STERILE LATEX POWDER-FREE SURGICAL GLOVE: Brand: ENCORE

## (undated) DEVICE — GLV SURG SENSICARE PI MIC PF SZ7.5 LF STRL

## (undated) DEVICE — UNDERCAST PADDING: Brand: DEROYAL

## (undated) DEVICE — BNDG ESMARK STRL 6INX12FT LF

## (undated) DEVICE — DRSNG ADAPTIC 3X8

## (undated) DEVICE — PAD,ABDOMINAL,8"X10",ST,LF: Brand: MEDLINE

## (undated) DEVICE — VITAL SIGNS™ ADULT ANESTHESIA BREATHING CIRCUIT: Brand: VITAL SIGNS™

## (undated) DEVICE — TOWEL,OR,DSP,ST,BLUE,STD,4/PK,20PK/CS: Brand: MEDLINE

## (undated) DEVICE — COTTON UNDERCAST PADDING,CRIMPED FINISH: Brand: WEBRIL

## (undated) DEVICE — CEMENT GUN AND BONE FILLER CDS2A SISE 2: Brand: MEDTRONIC REUSABLE INSTRUMENTS

## (undated) DEVICE — GLV SURG TRIUMPH CLASSIC PF LTX 8 STRL

## (undated) DEVICE — DISPOSABLE TOURNIQUET CUFF SINGLE BLADDER, SINGLE PORT AND QUICK CONNECT CONNECTOR: Brand: COLOR CUFF

## (undated) DEVICE — BONE BIOPSY DEVICE F07A TAPERED SIZE 2: Brand: MEDTRONIC REUSABLE INSTRUMENTS

## (undated) DEVICE — BNDG ELAS ELITE V/CLOSE 6IN 5YD LF STRL

## (undated) DEVICE — CEMENT CARTRIDGES CC02A CDS: Brand: KYPHON® CEMENT DELIVERY SYSTEM

## (undated) DEVICE — SUT ETHLN 3/0 PS1 18IN 1663H

## (undated) DEVICE — SKIN AFFIX SURG ADHESIVE 72/CS 0.55ML: Brand: MEDLINE

## (undated) DEVICE — SYR LUERLOK 30CC